# Patient Record
Sex: MALE | Race: WHITE | Employment: OTHER | URBAN - METROPOLITAN AREA
[De-identification: names, ages, dates, MRNs, and addresses within clinical notes are randomized per-mention and may not be internally consistent; named-entity substitution may affect disease eponyms.]

---

## 2017-01-01 ENCOUNTER — HOSPITAL ENCOUNTER (OUTPATIENT)
Dept: GENERAL RADIOLOGY | Age: 77
Discharge: HOME | End: 2017-12-22
Payer: MEDICARE

## 2017-01-01 DIAGNOSIS — R06.02 SHORTNESS OF BREATH: ICD-10-CM

## 2017-01-01 PROCEDURE — 71020 XR CHEST PA LAT: CPT

## 2017-01-06 PROBLEM — Z86.73 HISTORY OF TIA (TRANSIENT ISCHEMIC ATTACK): Status: ACTIVE | Noted: 2017-01-06

## 2017-01-10 ENCOUNTER — HOSPITAL ENCOUNTER (OUTPATIENT)
Dept: GENERAL RADIOLOGY | Age: 77
Discharge: HOME | End: 2017-01-10
Attending: INTERNAL MEDICINE
Payer: MEDICARE

## 2017-01-10 DIAGNOSIS — R05.9 COUGH: ICD-10-CM

## 2017-01-10 PROCEDURE — 71020 XR CHEST PA LAT: CPT

## 2017-01-26 PROBLEM — R05.9 COUGH: Status: ACTIVE | Noted: 2017-01-26

## 2017-04-07 PROBLEM — R06.02 SHORTNESS OF BREATH: Status: ACTIVE | Noted: 2017-04-07

## 2017-04-20 NOTE — PROGRESS NOTES
Patient pre-assessment complete for [facility] POSS WITH Dr Theopolis Goodpasture scheduled for 17 at 11am, arrival time 9am. Patient verified using . Patient instructed to bring all home medications in labeled bottles on the day of procedure. NPO status reinforced. Patient informed to take a full dose aspirin 325mg  or 81 mg x 4 on the day of procedure. Patient instructed to HOLD eliquis (last dose 17) & lasix the am of procedure. Instructed they can take all other medications excluding vitamins & supplements. Patient verbalizes understanding of all instructions & denies any questions at this time.

## 2017-04-21 ENCOUNTER — HOSPITAL ENCOUNTER (OUTPATIENT)
Dept: CARDIAC CATH/INVASIVE PROCEDURES | Age: 77
Setting detail: OBSERVATION
Discharge: HOME | End: 2017-04-22
Attending: INTERNAL MEDICINE | Admitting: INTERNAL MEDICINE
Payer: MEDICARE

## 2017-04-21 LAB
ACT BLD: 199 SECS (ref 70–128)
ANION GAP BLD CALC-SCNC: 10 MMOL/L (ref 7–16)
BUN SERPL-MCNC: 70 MG/DL (ref 8–23)
CALCIUM SERPL-MCNC: 9.6 MG/DL (ref 8.3–10.4)
CHLORIDE SERPL-SCNC: 107 MMOL/L (ref 98–107)
CO2 SERPL-SCNC: 24 MMOL/L (ref 21–32)
CREAT SERPL-MCNC: 12.2 MG/DL (ref 0.8–1.5)
ERYTHROCYTE [DISTWIDTH] IN BLOOD BY AUTOMATED COUNT: 15.3 % (ref 11.9–14.6)
GLUCOSE SERPL-MCNC: 102 MG/DL (ref 65–100)
HCT VFR BLD AUTO: 33.6 % (ref 41.1–50.3)
HGB BLD-MCNC: 11 G/DL (ref 13.6–17.2)
INR PPP: 1 (ref 0.9–1.2)
MAGNESIUM SERPL-MCNC: 2.8 MG/DL (ref 1.8–2.4)
MCH RBC QN AUTO: 33.7 PG (ref 26.1–32.9)
MCHC RBC AUTO-ENTMCNC: 32.7 G/DL (ref 31.4–35)
MCV RBC AUTO: 103.1 FL (ref 79.6–97.8)
PLATELET # BLD AUTO: 196 K/UL (ref 150–450)
PMV BLD AUTO: 9.6 FL (ref 10.8–14.1)
POTASSIUM SERPL-SCNC: 5.1 MMOL/L (ref 3.5–5.1)
PROTHROMBIN TIME: 11.3 SEC (ref 9.6–12)
RBC # BLD AUTO: 3.26 M/UL (ref 4.23–5.67)
SODIUM SERPL-SCNC: 141 MMOL/L (ref 136–145)
WBC # BLD AUTO: 9.9 K/UL (ref 4.3–11.1)

## 2017-04-21 PROCEDURE — 74011000258 HC RX REV CODE- 258: Performed by: INTERNAL MEDICINE

## 2017-04-21 PROCEDURE — 74011000250 HC RX REV CODE- 250: Performed by: INTERNAL MEDICINE

## 2017-04-21 PROCEDURE — 85027 COMPLETE CBC AUTOMATED: CPT | Performed by: INTERNAL MEDICINE

## 2017-04-21 PROCEDURE — 83735 ASSAY OF MAGNESIUM: CPT | Performed by: INTERNAL MEDICINE

## 2017-04-21 PROCEDURE — C8929 TTE W OR WO FOL WCON,DOPPLER: HCPCS

## 2017-04-21 PROCEDURE — 93459 L HRT ART/GRFT ANGIO: CPT

## 2017-04-21 PROCEDURE — C1874 STENT, COATED/COV W/DEL SYS: HCPCS

## 2017-04-21 PROCEDURE — 85610 PROTHROMBIN TIME: CPT | Performed by: INTERNAL MEDICINE

## 2017-04-21 PROCEDURE — 74011250636 HC RX REV CODE- 250/636: Performed by: INTERNAL MEDICINE

## 2017-04-21 PROCEDURE — 77030005318 HC CATH ELECTRD PACE TMP BARD -C

## 2017-04-21 PROCEDURE — C1887 CATHETER, GUIDING: HCPCS

## 2017-04-21 PROCEDURE — 99152 MOD SED SAME PHYS/QHP 5/>YRS: CPT

## 2017-04-21 PROCEDURE — 93571 IV DOP VEL&/PRESS C FLO 1ST: CPT

## 2017-04-21 PROCEDURE — 77030004558 HC CATH ANGI DX SUPR TORQ CARD -A

## 2017-04-21 PROCEDURE — 74011250636 HC RX REV CODE- 250/636

## 2017-04-21 PROCEDURE — C1894 INTRO/SHEATH, NON-LASER: HCPCS

## 2017-04-21 PROCEDURE — C1724 CATH, TRANS ATHEREC,ROTATION: HCPCS

## 2017-04-21 PROCEDURE — 80048 BASIC METABOLIC PNL TOTAL CA: CPT | Performed by: INTERNAL MEDICINE

## 2017-04-21 PROCEDURE — 92978 ENDOLUMINL IVUS OCT C 1ST: CPT

## 2017-04-21 PROCEDURE — C1769 GUIDE WIRE: HCPCS

## 2017-04-21 PROCEDURE — 99153 MOD SED SAME PHYS/QHP EA: CPT

## 2017-04-21 PROCEDURE — 77030012468 HC VLV BLEEDBK CNTRL ABBT -B

## 2017-04-21 PROCEDURE — 74011250637 HC RX REV CODE- 250/637: Performed by: INTERNAL MEDICINE

## 2017-04-21 PROCEDURE — C1760 CLOSURE DEV, VASC: HCPCS

## 2017-04-21 PROCEDURE — C1725 CATH, TRANSLUMIN NON-LASER: HCPCS

## 2017-04-21 PROCEDURE — 77030004559 HC CATH ANGI DX SUPT CARD -B

## 2017-04-21 PROCEDURE — 92933 PRQ TRLML C ATHRC ST ANGIOP1: CPT

## 2017-04-21 PROCEDURE — C1753 CATH, INTRAVAS ULTRASOUND: HCPCS

## 2017-04-21 PROCEDURE — 74011636320 HC RX REV CODE- 636/320: Performed by: INTERNAL MEDICINE

## 2017-04-21 PROCEDURE — 99218 HC RM OBSERVATION: CPT

## 2017-04-21 PROCEDURE — 85347 COAGULATION TIME ACTIVATED: CPT

## 2017-04-21 RX ORDER — HEPARIN SODIUM 200 [USP'U]/100ML
25 INJECTION, SOLUTION INTRAVENOUS CONTINUOUS
Status: DISCONTINUED | OUTPATIENT
Start: 2017-04-21 | End: 2017-04-21

## 2017-04-21 RX ORDER — LOSARTAN POTASSIUM 50 MG/1
50 TABLET ORAL DAILY
Status: DISCONTINUED | OUTPATIENT
Start: 2017-04-22 | End: 2017-04-22 | Stop reason: HOSPADM

## 2017-04-21 RX ORDER — DIAZEPAM 5 MG/1
5 TABLET ORAL ONCE
Status: ACTIVE | OUTPATIENT
Start: 2017-04-21 | End: 2017-04-21

## 2017-04-21 RX ORDER — SEVELAMER CARBONATE 800 MG/1
800 TABLET, FILM COATED ORAL
Status: DISCONTINUED | OUTPATIENT
Start: 2017-04-21 | End: 2017-04-22 | Stop reason: HOSPADM

## 2017-04-21 RX ORDER — TAMSULOSIN HYDROCHLORIDE 0.4 MG/1
0.4 CAPSULE ORAL DAILY
Status: DISCONTINUED | OUTPATIENT
Start: 2017-04-22 | End: 2017-04-21

## 2017-04-21 RX ORDER — FUROSEMIDE 40 MG/1
80 TABLET ORAL 2 TIMES DAILY
Status: DISCONTINUED | OUTPATIENT
Start: 2017-04-21 | End: 2017-04-22 | Stop reason: HOSPADM

## 2017-04-21 RX ORDER — PANTOPRAZOLE SODIUM 40 MG/1
40 TABLET, DELAYED RELEASE ORAL
Status: DISCONTINUED | OUTPATIENT
Start: 2017-04-22 | End: 2017-04-22 | Stop reason: HOSPADM

## 2017-04-21 RX ORDER — HYDRALAZINE HYDROCHLORIDE 50 MG/1
50 TABLET, FILM COATED ORAL 2 TIMES DAILY
Status: DISCONTINUED | OUTPATIENT
Start: 2017-04-21 | End: 2017-04-22 | Stop reason: HOSPADM

## 2017-04-21 RX ORDER — CINACALCET 30 MG/1
120 TABLET, FILM COATED ORAL
Status: DISCONTINUED | OUTPATIENT
Start: 2017-04-21 | End: 2017-04-22 | Stop reason: HOSPADM

## 2017-04-21 RX ORDER — HEPARIN SODIUM 10000 [USP'U]/ML
1000-9000 INJECTION, SOLUTION INTRAVENOUS; SUBCUTANEOUS
Status: DISCONTINUED | OUTPATIENT
Start: 2017-04-21 | End: 2017-04-21

## 2017-04-21 RX ORDER — CLOPIDOGREL BISULFATE 75 MG/1
75 TABLET ORAL DAILY
Status: DISCONTINUED | OUTPATIENT
Start: 2017-04-22 | End: 2017-04-22 | Stop reason: HOSPADM

## 2017-04-21 RX ORDER — POTASSIUM CHLORIDE 20 MEQ/1
20 TABLET, EXTENDED RELEASE ORAL DAILY
Status: DISCONTINUED | OUTPATIENT
Start: 2017-04-22 | End: 2017-04-22

## 2017-04-21 RX ORDER — FINASTERIDE 5 MG/1
5 TABLET, FILM COATED ORAL DAILY
Status: DISCONTINUED | OUTPATIENT
Start: 2017-04-22 | End: 2017-04-22 | Stop reason: HOSPADM

## 2017-04-21 RX ORDER — DOCUSATE SODIUM 100 MG/1
100 CAPSULE, LIQUID FILLED ORAL DAILY
Status: DISCONTINUED | OUTPATIENT
Start: 2017-04-22 | End: 2017-04-22 | Stop reason: HOSPADM

## 2017-04-21 RX ORDER — GUAIFENESIN 100 MG/5ML
81 LIQUID (ML) ORAL DAILY
Status: DISCONTINUED | OUTPATIENT
Start: 2017-04-22 | End: 2017-04-22 | Stop reason: HOSPADM

## 2017-04-21 RX ORDER — DICYCLOMINE HYDROCHLORIDE 10 MG/1
10 CAPSULE ORAL
Status: DISCONTINUED | OUTPATIENT
Start: 2017-04-21 | End: 2017-04-22 | Stop reason: HOSPADM

## 2017-04-21 RX ORDER — MIDAZOLAM HYDROCHLORIDE 1 MG/ML
1-6 INJECTION, SOLUTION INTRAMUSCULAR; INTRAVENOUS
Status: DISCONTINUED | OUTPATIENT
Start: 2017-04-21 | End: 2017-04-21

## 2017-04-21 RX ORDER — MAG HYDROX/ALUMINUM HYD/SIMETH 200-200-20
30 SUSPENSION, ORAL (FINAL DOSE FORM) ORAL ONCE
Status: COMPLETED | OUTPATIENT
Start: 2017-04-21 | End: 2017-04-21

## 2017-04-21 RX ORDER — AMLODIPINE BESYLATE 5 MG/1
5 TABLET ORAL DAILY
Status: DISCONTINUED | OUTPATIENT
Start: 2017-04-22 | End: 2017-04-22 | Stop reason: HOSPADM

## 2017-04-21 RX ORDER — SODIUM CHLORIDE 9 MG/ML
75 INJECTION, SOLUTION INTRAVENOUS CONTINUOUS
Status: DISCONTINUED | OUTPATIENT
Start: 2017-04-21 | End: 2017-04-21

## 2017-04-21 RX ORDER — CLOPIDOGREL 300 MG/1
600 TABLET, FILM COATED ORAL ONCE
Status: COMPLETED | OUTPATIENT
Start: 2017-04-21 | End: 2017-04-21

## 2017-04-21 RX ORDER — PREDNISONE 10 MG/1
10 TABLET ORAL
Status: DISCONTINUED | OUTPATIENT
Start: 2017-04-22 | End: 2017-04-22 | Stop reason: HOSPADM

## 2017-04-21 RX ORDER — LIDOCAINE HYDROCHLORIDE 20 MG/ML
1-20 INJECTION, SOLUTION INFILTRATION; PERINEURAL ONCE
Status: COMPLETED | OUTPATIENT
Start: 2017-04-21 | End: 2017-04-21

## 2017-04-21 RX ORDER — GUAIFENESIN 100 MG/5ML
324 LIQUID (ML) ORAL ONCE
Status: DISCONTINUED | OUTPATIENT
Start: 2017-04-21 | End: 2017-04-21

## 2017-04-21 RX ORDER — ALLOPURINOL 100 MG/1
100 TABLET ORAL DAILY
Status: DISCONTINUED | OUTPATIENT
Start: 2017-04-22 | End: 2017-04-22 | Stop reason: HOSPADM

## 2017-04-21 RX ORDER — CARVEDILOL 25 MG/1
25 TABLET ORAL 2 TIMES DAILY WITH MEALS
Status: DISCONTINUED | OUTPATIENT
Start: 2017-04-21 | End: 2017-04-22 | Stop reason: HOSPADM

## 2017-04-21 RX ORDER — FENTANYL CITRATE 50 UG/ML
25-100 INJECTION, SOLUTION INTRAMUSCULAR; INTRAVENOUS
Status: DISCONTINUED | OUTPATIENT
Start: 2017-04-21 | End: 2017-04-21

## 2017-04-21 RX ORDER — TAMSULOSIN HYDROCHLORIDE 0.4 MG/1
0.4 CAPSULE ORAL EVERY EVENING
Status: DISCONTINUED | OUTPATIENT
Start: 2017-04-21 | End: 2017-04-22 | Stop reason: HOSPADM

## 2017-04-21 RX ADMIN — HEPARIN SODIUM 25 ML/HR: 200 INJECTION, SOLUTION INTRAVENOUS at 14:20

## 2017-04-21 RX ADMIN — SODIUM CHLORIDE 75 ML/HR: 900 INJECTION, SOLUTION INTRAVENOUS at 10:00

## 2017-04-21 RX ADMIN — APIXABAN 2.5 MG: 2.5 TABLET, FILM COATED ORAL at 20:50

## 2017-04-21 RX ADMIN — HEPARIN SODIUM 2000 UNITS: 10000 INJECTION, SOLUTION INTRAVENOUS; SUBCUTANEOUS at 13:40

## 2017-04-21 RX ADMIN — IOPAMIDOL 290 ML: 755 INJECTION, SOLUTION INTRAVENOUS at 14:51

## 2017-04-21 RX ADMIN — PERFLUTREN 1 ML: 6.52 INJECTION, SUSPENSION INTRAVENOUS at 11:00

## 2017-04-21 RX ADMIN — HEPARIN SODIUM 5000 UNITS: 10000 INJECTION, SOLUTION INTRAVENOUS; SUBCUTANEOUS at 13:30

## 2017-04-21 RX ADMIN — HEPARIN SODIUM 25 ML/HR: 200 INJECTION, SOLUTION INTRAVENOUS at 12:55

## 2017-04-21 RX ADMIN — LIDOCAINE HYDROCHLORIDE 200 MG: 20 INJECTION, SOLUTION INFILTRATION; PERINEURAL at 13:09

## 2017-04-21 RX ADMIN — TAMSULOSIN HYDROCHLORIDE 0.4 MG: 0.4 CAPSULE ORAL at 20:50

## 2017-04-21 RX ADMIN — ALUMINUM HYDROXIDE, MAGNESIUM HYDROXIDE, AND SIMETHICONE 30 ML: 200; 200; 20 SUSPENSION ORAL at 14:48

## 2017-04-21 RX ADMIN — CINACALCET HYDROCHLORIDE 120 MG: 30 TABLET, COATED ORAL at 22:07

## 2017-04-21 RX ADMIN — CARVEDILOL 25 MG: 25 TABLET, FILM COATED ORAL at 18:34

## 2017-04-21 RX ADMIN — CLOPIDOGREL BISULFATE 600 MG: 300 TABLET, FILM COATED ORAL at 14:48

## 2017-04-21 RX ADMIN — FUROSEMIDE 80 MG: 40 TABLET ORAL at 18:34

## 2017-04-21 RX ADMIN — HYDRALAZINE HYDROCHLORIDE 50 MG: 50 TABLET, FILM COATED ORAL at 18:34

## 2017-04-21 RX ADMIN — MIDAZOLAM HYDROCHLORIDE 2 MG: 1 INJECTION, SOLUTION INTRAMUSCULAR; INTRAVENOUS at 13:04

## 2017-04-21 NOTE — PROCEDURES
Ashlynandrea Augustin 44       Name:  Elly Antunez   MR#:  [id number]   :  1940   Account #:  [de-identified]   Date of Adm:  2017       DATE OF PROCEDURE: 2017    PROCEDURES PERFORMED:   1. Cardiac catheterization. 2. Percutaneous coronary intervention to the right coronary   artery. HISTORY: This is a 26-year-old gentleman with a history of   coronary artery disease. He has had prior remote CABG and PCI. He is having problems with worsening angina pectoris in spite of   medical therapy. He also has chronic renal failure and is on   home peritoneal dialysis. A cardiac catheterization with   possible angioplasty is recommended. PROCEDURE: Via the right femoral artery, left heart   catheterization with left ventriculography, coronary   angiography, and graft angiography is carried out with a 6-  [place] multipurpose, #4 left Juan Miguel, #4 right Juan Miguel,   multipurpose and IM catheters. After angiography, the right   coronary artery was further assessed with a pressure wire. At   rest from the aorta to the proximal segment, the right coronary   artery has an FFR of 0.75. With angioplasty, the guide catheter was a 6-Kiswahili JR4 with   side holes. A 5-Kiswahili balloon tipped pacer was placed to the   right ventricular apex via right common femoral vein for a   rotational atherectomy. Rotational atherectomy was then   performed of a Roto floppy wire with a 1.5 kwasi. High pressure   balloon angioplasty of the ostium and proximal segment was then   performed with a 3.0 mm balloon. Over a wiggle wire, a 38 mm   long Xience stent was deployed from the ostium through the   proximal segment on a 3.5 mm balloon. Avoiding the distal edge,   the stent was then postdilated at high pressure of 4.0 mm. Repeat IVUS and angiography showed an excellent result. Heparin   was the anticoagulant. He was loaded with Plavix 600 mg post-  procedure.  At the end of the procedure, the sheaths were removed   and Perclose was deployed both at the femoral artery and vein   with good hemostasis. FINDINGS: The central aortic pressure is 128/70 mmHg. The left   ventricular end-diastolic pressure is 16 mmHg. There is no   gradient on pullback across the aortic valve. Left ventriculography reveals normal left ventricular size. There is irregularity of the rhythm due to his atrial   fibrillation. The ejection fraction appears to be normal to   mildly reduced on the order of 50%. Coronary angiography reveals a short left main with disease. It   divides into an LAD and left circumflex. The LAD is atherosclerotic and calcified in its proximal   segment. It gives rise to a septal . The middle and   distal segments filled by antegrade flow and by flow via the   LIMA graft. The major diagonal branch of the LAD is diffusely   diseased with no severe obstruction. The left circumflex is occluded at its origin and fills via its   saphenous vein graft. The right coronary artery is moderately narrowed along its   course in the proximal segment. There appears to be an old   stent. There appears to be some restenosis. The middle and   distal portions of the vessel show atherosclerotic disease and   calcification but no severe obstruction. The saphenous vein graft to the LAD is patent and normal with   good runoff into the obtuse marginal branch which is a large   trifurcateed vessel with retrograde filling up to the left main. The LIMA graft to the LAD is patent and normal with competitive   runoff into the distal LAD segment. Post angioplasty and stenting, the right coronary artery is now   widely patent. The stent is deployed right at the ostium. Repeat   angiography and IVUS showed a very good result. IMPRESSION:   1. Low normal left ventricular function. 2. Severe coronary disease as described. The left anterior   descending is narrowed.  The distal vessel fills via the left   internal mammary artery graft and antegrade flow. The left   circumflex is occluded. The distal vessel fills via normal   saphenous vein graft. The right coronary artery is a big vessel. It has a previously stented in its proximal segment. There is   severe obstructive disease at the ostium and in the proximal   segment. There are diffuse distal changes. 3. Successful percutaneous coronary intervention with stenting   of the right coronary is performed as described above. A good   result was obtained after deployment of a 38 mm long Xience   stent from the ostium of the right coronary artery through its   proximal segment postdilated to high pressure of 4.0 mm.   4. 2/2 patent grafts as described above.         MD ROS [place] / Glenn Ramos   D:  04/21/2017   15:04   T:  04/21/2017   15:32   Job #:  [id number]

## 2017-04-21 NOTE — ROUTINE PROCESS
TRANSFER - OUT REPORT:    Verbal report given to Rafael Jackson RN (name) on Racheal Mccauley  being transferred to [address] [place] [address] (unit) for routine progression of care       Report consisted of patients Situation, Background, Assessment and   Recommendations(SBAR). Information from the following report(s) Procedure Summary, MAR and Recent Results was reviewed with the receiving nurse. Lines:   Peripheral IV 04/21/17 Right Arm (Active)        Opportunity for questions and clarification was provided.       Patient transported with:   [facility] w/ Dr Villalpando Gloss  1 stent to RCA  Perclose to RFA, Perclose to RFV; sites w/o oozing or hematoma  Heparin 7000 units IV  Versed 2 mg IV  Plavix 600 mg PO  Mylanta 30 mls PO

## 2017-04-21 NOTE — PROCEDURES
Brief Cardiac Procedure Note    Patient: Sandra Bolden MRN: [id number]  SSN: [id number]    YOB: 1940  Age: 68 y.o. Sex: male      Date of Procedure: 4/21/2017     Pre-procedure Diagnosis: Chest pain CCS Class III    Post-procedure Diagnosis: Coronary Artery Disease    Procedure: Left Heart Catheterization with Percutaneous Coronary Intervention    Brief Description of Procedure: via rfa and fv    Performed By: April Varghese MD     Assistants:     Anesthesia: Moderate Sedation    Estimated Blood Loss: Less than 10 mL      Specimens: None    Implants: None    Findings:   Ef 55%  Lm ok  Lad severe prox, distal fills via antegrade flow and lima graft  lcx 100%  Distal vessel fills via svg  rca w/ mod angio disease but 0.75 ffr at rest  [facility] nml  svg nml  Pci;  0% ca after 38 mm Xience from ostium thru prox segment  + heparin  + ivus  + ptcra  + perclose fa and fv    Complications: None    Recommendations: Continue medical therapy.     Signed By: April Varghese MD     April 21, 2017

## 2017-04-21 NOTE — PROGRESS NOTES
TRANSFER - IN REPORT:    Verbal report received from Alfredo Grant RN(name) on [employer]  being received from cath lab(unit) for routine progression of care      Report consisted of patients Situation, Background, Assessment and   Recommendations(SBAR). Information from the following report(s) Procedure Summary was reviewed with the receiving nurse. Opportunity for questions and clarification was provided. Assessment completed upon patients arrival to unit and care assumed.

## 2017-04-21 NOTE — PROGRESS NOTES
Bedside and Verbal shift change report given to Raegan Burr RN (oncoming nurse) by self (offgoing nurse). Report included the following information SBAR, Kardex, MAR and Recent Results. Right groin site assessed and WDL.

## 2017-04-21 NOTE — IP AVS SNAPSHOT
Current Discharge Medication List  
  
START taking these medications Dose & Instructions Dispensing Information Comments Morning Noon Evening Bedtime  
 aspirin 81 mg chewable tablet Your last dose was: Your next dose is:    
   
   
 Dose:  81 mg Take 1 Tab by mouth daily. Refills:  0  
     
   
   
   
  
 clopidogrel 75 mg Tab Commonly known as:  PLAVIX Your last dose was: Your next dose is:    
   
   
 Dose:  75 mg Take 1 Tab by mouth daily. Quantity:  30 Tab Refills:  11 CONTINUE these medications which have CHANGED Dose & Instructions Dispensing Information Comments Morning Noon Evening Bedtime  
 azelastine 137 mcg (0.1 %) nasal spray Commonly known as:  ASTELIN What changed:   
- when to take this 
- reasons to take this 
- additional instructions Your last dose was: Your next dose is:    
   
   
 Dose:  1 Spray 1 [place] by Both Nostrils route two (2) times a day. Use in each nostril as directed Quantity:  1 Bottle Refills:  5 CONTINUE these medications which have NOT CHANGED Dose & Instructions Dispensing Information Comments Morning Noon Evening Bedtime  
 allopurinol 100 mg tablet Commonly known as:  Giuliano Ceron Your last dose was: Your next dose is:    
   
   
 Dose:  100 mg Take 100 mg by mouth daily. Indications: am  
 Refills:  0  
     
   
   
   
  
 amLODIPine 5 mg tablet Commonly known as:  Ly Snow Your last dose was: Your next dose is:    
   
   
 Dose:  5 mg Take 5 mg by mouth daily. pm  
 Refills:  0  
     
   
   
   
  
 apixaban 2.5 mg tablet Commonly known as:  Esvin Myles Your last dose was: Your next dose is:    
   
   
 Dose:  2.5 mg Take 1 Tab by mouth two (2) times a day. Quantity:  60 Tab Refills:  11  
     
   
   
   
  
 carvedilol 25 mg tablet Commonly known as:  Harriet Gatica Your last dose was: Your next dose is:    
   
   
 Dose:  25 mg Take 25 mg by mouth two (2) times daily (with meals). Indications: 2-3 times daily with meals Refills:  0  
     
   
   
   
  
 dicyclomine 10 mg capsule Commonly known as:  BENTYL Your last dose was: Your next dose is:    
   
   
 Dose:  10 mg Take 10 mg by mouth daily as needed. Refills:  0  
     
   
   
   
  
 epoetin alcides 10,000 unit/mL injection Commonly known as:  EPOGEN;PROCRIT Your last dose was: Your next dose is:    
   
   
 by SubCUTAneous route as needed. Currently taking abt twice a month at [facility] Refills:  0  
     
   
   
   
  
 finasteride 5 mg tablet Commonly known as:  PROSCAR Your last dose was: Your next dose is:    
   
   
 Dose:  5 mg Take 5 mg by mouth daily. Refills:  0  
     
   
   
   
  
 furosemide 80 mg tablet Commonly known as:  LASIX Your last dose was: Your next dose is:    
   
   
 Dose:  80 mg Take 80 mg by mouth two (2) times a day. Indications: am  
 Refills:  0  
     
   
   
   
  
 hydrALAZINE 50 mg tablet Commonly known as:  APRESOLINE Your last dose was: Your next dose is:    
   
   
 Dose:  50 mg Take 50 mg by mouth two (2) times a day. Refills:  0  
     
   
   
   
  
 isosorbide dinitrate 30 mg tablet Commonly known as:  ISORDIL Your last dose was: Your next dose is:    
   
   
 Dose:  30 mg Take 30 mg by mouth daily. Refills:  0 KRILL OIL PO Your last dose was: Your next dose is: Take  by mouth daily. Refills:  0  
     
   
   
   
  
 losartan 50 mg tablet Commonly known as:  COZAAR Your last dose was: Your next dose is: TAKE 1 TABLET BY MOUTH DAILY Quantity:  90 Tab Refills:  3  
 **Patient requests 90 days supply**  
    
   
   
   
  
 Magnesium Oxide 500 mg Cap Your last dose was: Your next dose is: Take  by mouth daily. Refills:  0  
     
   
   
   
  
 multivitamin tablet Commonly known as:  ONE A DAY Your last dose was: Your next dose is:    
   
   
 Dose:  1 Tab Take 1 Tab by mouth daily. Special Complex for dialysis patients Refills:  0  
     
   
   
   
  
 nitroglycerin 0.4 mg SL tablet Commonly known as:  NITROSTAT Your last dose was: Your next dose is: ONE TABLET UNDER TONGUE AS NEEDED FOR CHEST PAIN EVERY 5 MINUTES Quantity:  25 Tab Refills:  6 Omeprazole Magnesium 20 mg Cpdr  
   
Your last dose was: Your next dose is: Take  by mouth daily. Refills:  0  
     
   
   
   
  
 predniSONE 10 mg tablet Commonly known as:  Hoang Primer Your last dose was: Your next dose is: Take  by mouth as needed. Refills:  0 PROBIOTIC 4X 10-15 mg Tbec Generic drug:  B.infantis-B.ani-B.long-B.bifi Your last dose was: Your next dose is: Take  by mouth daily. Refills:  0  
     
   
   
   
  
 RENVELA 800 mg Tab tab Generic drug:  sevelamer carbonate Your last dose was: Your next dose is:    
   
   
 Dose:  800 mg Take 800 mg by mouth three (3) times daily (with meals). Indications: 4 with meals, 2 with snacks Refills:  0 SENSIPAR 60 mg Tab Generic drug:  cinacalcet Your last dose was: Your next dose is: Take  by mouth. Two tablets QHS Refills:  0 STOOL SOFTENER PO Your last dose was: Your next dose is: Take  by mouth daily. Refills:  0  
     
   
   
   
  
 tamsulosin 0.4 mg capsule Commonly known as:  FLOMAX Your last dose was: Your next dose is:    
   
   
 TK 1 C PO Q NIGHT Refills:  4 STOP taking these medications   
 potassium chloride 20 mEq tablet Commonly known as:  K-DUR, KLOR-CON Where to Get Your Medications Information on where to get these meds will be given to you by the nurse or doctor. ! Ask your nurse or doctor about these medications  
  clopidogrel 75 mg Tab

## 2017-04-21 NOTE — IP AVS SNAPSHOT
[address] 
[phone] Patient: Bernardo Willard MRN: [id number] BLW:[birth year] You are allergic to the following Allergen Reactions Nka (No Known Allergies) Unknown (comments) Recent Documentation Height Weight BMI Smoking Status 1.753 m 87.1 kg 28.37 kg/m2 Former Smoker Emergency Contacts Name Discharge Info Relation Home Work Mobile Jovana Perales DISCHARGE CAREGIVER [3] Spouse [3] [phone] About your hospitalization You were admitted on:  April 21, 2017 You last received care in the:  [facility] 3 TELEMETRY You were discharged on:  April 22, 2017 Unit phone number:  [phone] Why you were hospitalized Your primary diagnosis was:  Not on File Providers Seen During Your Hospitalizations Provider Role Specialty Primary office phone Jose Carlos Kwon MD Attending Provider Cardiology [phone] Your Primary Care Physician (PCP) Primary Care Physician Office Phone Office Fax Tenisha Kaba [phone] Follow-up Information Follow up With Details Comments Contact Info Jose Carlos Kwon MD  2 weeks--office will call on monday with appt [address] [facility] [address] 
[phone] Marilia Pedraza MD Call As needed, For Hospital follow-up [address] [facility] [address] [phone] Your Appointments Monday May 15, 2017  1:00 PM EDT Office Visit with Jose Carlos Kown MD  
[facility] Cardiology ([address] [facility]) [address] [place]  
[address] [facility]  
[phone] Thursday June 01, 2017  1:30 PM EDT Annual Wellness Exam with VALENTÍN Acevedo [facility] ([facility]) [address] [place] [address]  
[phone]  Thursday June 01, 2017  2:00 PM EDT  
 Annual Wellness Exam with Susanne Montes De Oca MD  
[address] [facility] [address] (16[address] [facility]") [address] [facility] [address]  
[phone] Current Discharge Medication List  
  
START taking these medications Dose & Instructions Dispensing Information Comments Morning Noon Evening Bedtime  
 aspirin 81 mg chewable tablet Your last dose was: Your next dose is:    
   
   
 Dose:  81 mg Take 1 Tab by mouth daily. Refills:  0  
     
   
   
   
  
 clopidogrel 75 mg Tab Commonly known as:  PLAVIX Your last dose was: Your next dose is:    
   
   
 Dose:  75 mg Take 1 Tab by mouth daily. Quantity:  30 Tab Refills:  11 CONTINUE these medications which have CHANGED Dose & Instructions Dispensing Information Comments Morning Noon Evening Bedtime  
 azelastine 137 mcg (0.1 %) nasal spray Commonly known as:  ASTELIN What changed:   
- when to take this 
- reasons to take this 
- additional instructions Your last dose was: Your next dose is:    
   
   
 Dose:  1 Spray 1 Chelsea by Both Nostrils route two (2) times a day. Use in each nostril as directed Quantity:  1 Bottle Refills:  5 CONTINUE these medications which have NOT CHANGED Dose & Instructions Dispensing Information Comments Morning Noon Evening Bedtime  
 allopurinol 100 mg tablet Commonly known as:  Nicho See Your last dose was: Your next dose is:    
   
   
 Dose:  100 mg Take 100 mg by mouth daily. Indications: am  
 Refills:  0  
     
   
   
   
  
 amLODIPine 5 mg tablet Commonly known as:  David Hodgson Your last dose was: Your next dose is:    
   
   
 Dose:  5 mg Take 5 mg by mouth daily. pm  
 Refills:  0  
     
   
   
   
  
 apixaban 2.5 mg tablet Commonly known as:  Genna Snyder Your last dose was: Your next dose is:    
   
   
 Dose:  2.5 mg Take 1 Tab by mouth two (2) times a day. Quantity:  60 Tab Refills:  11  
     
   
   
   
  
 carvedilol 25 mg tablet Commonly known as:  Larryabel Jenkins Your last dose was: Your next dose is:    
   
   
 Dose:  25 mg Take 25 mg by mouth two (2) times daily (with meals). Indications: 2-3 times daily with meals Refills:  0  
     
   
   
   
  
 dicyclomine 10 mg capsule Commonly known as:  BENTYL Your last dose was: Your next dose is:    
   
   
 Dose:  10 mg Take 10 mg by mouth daily as needed. Refills:  0  
     
   
   
   
  
 epoetin alcides 10,000 unit/mL injection Commonly known as:  EPOGEN;PROCRIT Your last dose was: Your next dose is:    
   
   
 by SubCUTAneous route as needed. Currently taking abt twice a month at [facility] Refills:  0  
     
   
   
   
  
 finasteride 5 mg tablet Commonly known as:  PROSCAR Your last dose was: Your next dose is:    
   
   
 Dose:  5 mg Take 5 mg by mouth daily. Refills:  0  
     
   
   
   
  
 furosemide 80 mg tablet Commonly known as:  LASIX Your last dose was: Your next dose is:    
   
   
 Dose:  80 mg Take 80 mg by mouth two (2) times a day. Indications: am  
 Refills:  0  
     
   
   
   
  
 hydrALAZINE 50 mg tablet Commonly known as:  APRESOLINE Your last dose was: Your next dose is:    
   
   
 Dose:  50 mg Take 50 mg by mouth two (2) times a day. Refills:  0  
     
   
   
   
  
 isosorbide dinitrate 30 mg tablet Commonly known as:  ISORDIL Your last dose was: Your next dose is:    
   
   
 Dose:  30 mg Take 30 mg by mouth daily. Refills:  0 KRILL OIL PO Your last dose was: Your next dose is: Take  by mouth daily. Refills:  0  
     
   
   
   
  
 losartan 50 mg tablet Commonly known as:  COZAAR  
   
 Your last dose was: Your next dose is: TAKE 1 TABLET BY MOUTH DAILY Quantity:  90 Tab Refills:  3  
 **Patient requests 90 days supply** Magnesium Oxide 500 mg Cap Your last dose was: Your next dose is: Take  by mouth daily. Refills:  0  
     
   
   
   
  
 multivitamin tablet Commonly known as:  ONE A DAY Your last dose was: Your next dose is:    
   
   
 Dose:  1 Tab Take 1 Tab by mouth daily. Special Complex for dialysis patients Refills:  0  
     
   
   
   
  
 nitroglycerin 0.4 mg SL tablet Commonly known as:  NITROSTAT Your last dose was: Your next dose is: ONE TABLET UNDER TONGUE AS NEEDED FOR CHEST PAIN EVERY 5 MINUTES Quantity:  25 Tab Refills:  6 Omeprazole Magnesium 20 mg Cpdr  
   
Your last dose was: Your next dose is: Take  by mouth daily. Refills:  0  
     
   
   
   
  
 predniSONE 10 mg tablet Commonly known as:  Liza Rodríguez Your last dose was: Your next dose is: Take  by mouth as needed. Refills:  0 PROBIOTIC 4X 10-15 mg Tbec Generic drug:  B.infantis-B.ani-B.long-B.bifi Your last dose was: Your next dose is: Take  by mouth daily. Refills:  0  
     
   
   
   
  
 RENVELA 800 mg Tab tab Generic drug:  sevelamer carbonate Your last dose was: Your next dose is:    
   
   
 Dose:  800 mg Take 800 mg by mouth three (3) times daily (with meals). Indications: 4 with meals, 2 with snacks Refills:  0 SENSIPAR 60 mg Tab Generic drug:  cinacalcet Your last dose was: Your next dose is: Take  by mouth. Two tablets QHS Refills:  0 STOOL SOFTENER PO Your last dose was: Your next dose is: Take  by mouth daily. Refills:  0  
     
   
   
   
  
 tamsulosin 0.4 mg capsule Commonly known as:  FLOMAX Your last dose was: Your next dose is:    
   
   
 TK 1 C PO Q NIGHT Refills:  4 STOP taking these medications   
 potassium chloride 20 mEq tablet Commonly known as:  K-DUR, KLOR-CON Where to Get Your Medications Information on where to get these meds will be given to you by the nurse or doctor. ! Ask your nurse or doctor about these medications  
  clopidogrel 75 mg Tab Discharge Instructions Chest Pain: Care Instructions Your Care Instructions There are many things that can cause chest pain. Some are not serious and will get better on their own in a few days. But some kinds of chest pain need more testing and treatment. Your doctor may have recommended a follow-up visit in the next 8 to 12 hours. If you are not getting better, you may need more tests or treatment. Even though your doctor has released you, you still need to watch for any problems. The doctor carefully checked you, but sometimes problems can develop later. If you have new symptoms or if your symptoms do not get better, get medical care right away. If you have worse or different chest pain or pressure that lasts more than 5 minutes or you passed out (lost consciousness), call 911 or seek other emergency help right away. A medical visit is only one step in your treatment. Even if you feel better, you still need to do what your doctor recommends, such as going to all suggested follow-up appointments and taking medicines exactly as directed. This will help you recover and help prevent future problems. How can you care for yourself at home? · Rest until you feel better. · Take your medicine exactly as prescribed. Call your doctor if you think you are having a problem with your medicine. · Do not drive after taking a prescription pain medicine. When should you call for help? Call 911 if: 
· You passed out (lost consciousness). · You have severe difficulty breathing. · You have symptoms of a heart attack. These may include: ¨ Chest pain or pressure, or a strange feeling in your chest. 
¨ Sweating. ¨ Shortness of breath. ¨ Nausea or vomiting. ¨ Pain, pressure, or a strange feeling in your back, neck, jaw, or upper belly or in one or both shoulders or arms. ¨ Lightheadedness or sudden weakness. ¨ A fast or irregular heartbeat. After you call 911, the  may tell you to chew 1 adult-strength or 2 to 4 low-dose aspirin. Wait for an ambulance. Do not try to drive yourself. Call your doctor today if: 
· You have any trouble breathing. · Your chest pain gets worse. · You are dizzy or lightheaded, or you feel like you may faint. · You are not getting better as expected. · You are having new or different chest pain. Where can you learn more? Go to http://suly-lauren.info/. Enter A120 in the search box to learn more about \"Chest Pain: Care Instructions. \" Current as of: May 27, 2016 Content Version: 11.2 © 9322-5590 [employer]. Care instructions adapted under license by [employer] (which disclaims liability or warranty for this information). If you have questions about a medical condition or this instruction, always ask your healthcare professional. [place] [address] any warranty or liability for your use of this information. Percutaneous Coronary Intervention: What to Expect at [facility] Your Recovery Percutaneous coronary intervention (PCI) is the name for procedures that are used to open a narrowed or blocked coronary artery. The two most common PCI procedures are coronary angioplasty and coronary stent placement. Your groin or arm may have a bruise and feel sore for a day or two after a percutaneous coronary intervention (PCI).  You can do light activities around the house, but nothing strenuous for several days. This care sheet gives you a general idea about how long it will take for you to recover. But each person recovers at a different pace. Follow the steps below to get better as quickly as possible. How can you care for yourself at home? Activity · Do not do strenuous exercise and do not lift, pull, or push anything heavy until your doctor says it is okay. This may be for a day or two. You can walk around the house and do light activity, such as cooking. · You may shower 24 to 48 hours after the procedure, if your doctor okays it. Pat the incision dry. Do not take a bath for 1 week, or until your doctor tells you it is okay. · If the catheter was placed in your groin, try not to walk up stairs for the first couple of days. · If the catheter was placed in your arm near your wrist, do not bend your wrist deeply for the first couple of days. Be careful using your hand to get into and out of a chair or bed. · If your doctor recommends it, get more exercise. Walking is a good choice. Bit by bit, increase the amount you walk every day. Try for at least 30 minutes on most days of the week. Diet · Drink plenty of fluids to help your body flush out the dye. If you have kidney, heart, or liver disease and have to limit fluids, talk with your doctor before you increase the amount of fluids you drink. · Keep eating a heart-healthy diet that has lots of fruits, vegetables, and whole grains. If you have not been eating this way, talk to your doctor. You also may want to talk to a dietitian. This expert can help you to learn about healthy foods and plan meals. Medicines · Your doctor will tell you if and when you can restart your medicines. He or she will also give you instructions about taking any new medicines. · If you take blood thinners, such as warfarin (Coumadin), clopidogrel (Plavix), or aspirin, be sure to talk to your doctor.  He or she will tell you if and when to start taking those medicines again. Make sure that you understand exactly what your doctor wants you to do. · Your doctor will prescribe blood-thinning medicines. You will likely take aspirin plus another antiplatelet, such as clopidogrel (Plavix). It is very important that you take these medicines exactly as directed. These medicines help keep the coronary artery open and reduce your risk of a heart attack. · Call your doctor if you think you are having a problem with your medicine. Care of the catheter site · For 1 or 2 days, keep a bandage over the spot where the catheter was inserted. The bandage probably will fall off in this time. · Put ice or a cold pack on the area for 10 to 20 minutes at a time to help with soreness or swelling. Put a thin cloth between the ice and your skin. Follow-up care is a key part of your treatment and safety. Be sure to make and go to all appointments, and call your doctor if you are having problems. It's also a good idea to know your test results and keep a list of the medicines you take. When should you call for help? Call 911 anytime you think you may need emergency care. For example, call if: 
· You passed out (lost consciousness). · You have severe trouble breathing. · You have sudden chest pain and shortness of breath, or you cough up blood. · You have symptoms of a heart attack, such as: ¨ Chest pain or pressure. ¨ Sweating. ¨ Shortness of breath. ¨ Nausea or vomiting. ¨ Pain that spreads from the chest to the neck, jaw, or one or both shoulders or arms. ¨ Dizziness or lightheadedness. ¨ A fast or uneven pulse. After calling 911, chew 1 adult-strength aspirin. Wait for an ambulance. Do not try to drive yourself. · You have been diagnosed with angina, and you have angina symptoms that do not go away with rest or are not getting better within 5 minutes after you take one dose of nitroglycerin. Call your doctor now or seek immediate medical care if: 
· You are bleeding from the area where the catheter was put in your artery. · You have a fast-growing, painful lump at the catheter site. · You have signs of infection, such as: 
¨ Increased pain, swelling, warmth, or redness. ¨ Red streaks leading from the catheter site. ¨ Pus draining from the catheter site. ¨ A fever. · Your leg or arm looks blue or feels cold, numb, or tingly. Watch closely for changes in your health, and be sure to contact your doctor if you have any problems. Where can you learn more? Go to http://suly-lauren.info/. Enter S824 in the search box to learn more about \"Percutaneous Coronary Intervention: What to Expect at Home. \" Current as of: January 27, 2016 Content Version: 11.2 © 7707-8682 [employer]. Care instructions adapted under license by [employer] (which disclaims liability or warranty for this information). If you have questions about a medical condition or this instruction, always ask your healthcare professional. [address] any warranty or liability for your use of this information. Clopidogrel (By mouth) Clopidogrel (jbve-UAM-pf-grel) Helps prevent stroke, heart attack, and other heart problems. This medicine is a platelet inhibitor. Brand Name(s):Plavix There may be other brand names for this medicine. When This Medicine Should Not Be Used: This medicine is not right for everyone. Do not use it if you had an allergic reaction to clopidogrel, or have current bleeding problems, such as a bleeding stomach ulcer. How to Use This Medicine:  
Tablet · Your doctor will tell you how much medicine to use. Do not use more than directed. · This medicine should come with a Medication Guide. Ask your pharmacist for a copy if you do not have one. · Missed dose: Take a dose as soon as you remember.  If it is almost time for your next dose, wait until then and take a regular dose. Do not take extra medicine to make up for a missed dose. · Store the medicine in a closed container at room temperature, away from heat, moisture, and direct light. Drugs and Foods to Avoid: Ask your doctor or pharmacist before using any other medicine, including over-the-counter medicines, vitamins, and herbal products. · Some medicines can affect how clopidogrel works. Tell your doctor if you are using any of the following: ¨ Warfarin ¨ An NSAID medicine, such as celecoxib, diclofenac, ibuprofen, or naproxen ¨ Medicine to treat depression ¨ Stomach medicine, such as esomeprazole or omeprazole Warnings While Using This Medicine: · Tell your doctor if you are pregnant or breastfeeding, had a recent stroke, or have a history of bleeding problems. · This medicine can cause you to bleed and bruise more easily. Take precautions to avoid injury. Brush and floss your teeth gently, do not play rough sports, and be careful with sharp objects. Severe bleeding can be life-threatening. · This medicine may cause a rare but serious blood clotting condition called thrombotic thrombocytopenic purpura. · Make sure any doctor or dentist who treats you knows that you are using this medicine. Tell your doctor if you plan to have surgery or a dental procedure. · Do not stop using this medicine suddenly. Your doctor will need to slowly decrease your dose before you stop it completely. · Your doctor will check your progress and the effects of this medicine at regular visits. Keep all appointments. · Keep all medicine out of the reach of children. Never share your medicine with anyone. Possible Side Effects While Using This Medicine:  
Call your doctor right away if you notice any of these side effects: · Allergic reaction: Itching or hives, swelling in your face or hands, swelling or tingling in your mouth or throat, chest tightness, trouble breathing · Bloody or black, tarry stools · Nosebleeds · Pinpoint red or purple spots on your skin or in your mouth · Problems with vision, speech, or walking · Red or dark brown urine · Seizures · Severe stomach pain · Trouble breathing, tiredness, fast heartbeat, yellow skin or eyes · Unusual bleeding, bruising, or weakness · Vomiting of blood or vomit that looks like coffee grounds If you notice other side effects that you think are caused by this medicine, tell your doctor. Call your doctor for medical advice about side effects. You may report side effects to FDA at 1-417-BES-0855 © 2016 [address] is for End User's use only and may not be sold, redistributed or otherwise used for commercial purposes. The above information is an  only. It is not intended as medical advice for individual conditions or treatments. Talk to your doctor, nurse or pharmacist before following any medical regimen to see if it is safe and effective for you. Discharge Orders Procedure Order Date Status Priority Quantity Spec Type Associated Dx METABOLIC PANEL, BASIC 09/62/50 0806 Future Routine 1 Blood Comments:  BMP in 3 days (on Monday 4/24) Dx: Hyperkalemia "[employer] Announcement We are excited to announce that we are making your provider's discharge notes available to you in "[employer]. You will see these notes when they are completed and signed by the physician that discharged you from your recent hospital stay. If you have any questions or concerns about any information you see in "[employer] please call the Health Information Department where you were seen or reach out to your Primary Care Provider for more information about your plan of care. Introducing [facility] & HE[facility] SERVICES! Dear Jonelle Sheikh: Thank you for requesting a "[employer] account. Our records indicate that you already have an active "[employer] account.   You can access your account anytime at [email or web address] Did you know that you can access your hospital and ER discharge instructions at any time in upurskill? You can also review all of your test results from your hospital stay or ER visit. Additional Information If you have questions, please visit the Frequently Asked Questions section of the upurskill website at [email or web address] Remember, upurskill is NOT to be used for urgent needs. For medical emergencies, dial 911. Now available from your iPhone and Android! General Information Please provide this summary of care documentation to your next provider. Patient Signature:  ____________________________________________________________ Date:  ____________________________________________________________  
  
Jake Breath Provider Signature:  ____________________________________________________________ Date:  ____________________________________________________________

## 2017-04-21 NOTE — PROGRESS NOTES
Right groin site covered with sterile tegaderm, noted to be clean, dry, and intact without bleeding or hematoma. Patient instructed to keep right leg straight and still and head on pillow. Patient verbalizes understanding.

## 2017-04-21 NOTE — CONSULTS
Nephrology consult    Admission Date:  4/21/2017    Admission Diagnosis  chest pain  chest pain    History of Present Illness: This is a 67 yo WM with a history of ESRD on PD who dialyzes with [place] on CCPD. She presented with anginal symptoms and then had cath today that demonstrated multivessel disease. He had a RCA stent placed. He feels well now. Past Medical History:   Diagnosis Date    Anemia, unspecified  7/7/2016    Arrhythmia     IRREG.  HEART BEAT- pt denies a fib - found on cardiac note 5/3/13- pt states he feels flutter at times    Arthritis     ASCAD - artery bypass graft 7/7/2016    ASCAD - artery bypass graft 7/7/2016    CAD (coronary artery disease)     CABG 2007, stented 1995    Chronic kidney disease     STAGE 3 CKD, dialysis    Chronic prostatitis 11/25/2013    Diverticulitis     Diverticulosis 7/7/2016    GERD (gastroesophageal reflux disease)     controlled with NA Bicarb pt takes as renal pt    Glomerulonephritis 7/7/2016    GOUT, UNSPECIFIED 7/7/2016    Hypercholesteremia     Hypertension     Hypertrophy of prostate with urinary obstruction and other lower urinary tract symptoms (LUTS) 11/25/2013    Paroxysmal atrial fibrillation ([address] 7/7/2016    TIA (transient ischemic attack) 7/7/2016      Past Surgical History:   Procedure Laterality Date    ABDOMEN SURGERY PROC UNLISTED      perineal cath    CARDIAC SURG PROCEDURE UNLIST      CABG x 2 in 2007; PTCA WITH STENTS    CREAT AV FISTULA,AUTOGENOUS GRAFT      HX COLONOSCOPY  2011    HX CSF SHUNT      HX HERNIA REPAIR      bilateral    HX ORTHOPAEDIC      rt shoulder rotater cuff,lt knee    HX VASCULAR ACCESS  2010    L u arm    OTHER CELL      LEFT KNEE SURGERY    VASCULAR SURGERY PROCEDURE UNLIST      cabg x2      Current Facility-Administered Medications   Medication Dose Route Frequency    diazePAM (VALIUM) tablet 5 mg  5 mg Oral ONCE    [START ON 4/22/2017] allopurinol (ZYLOPRIM) tablet 100 mg  100 mg Oral DAILY    [START ON 4/22/2017] amLODIPine (NORVASC) tablet 5 mg  5 mg Oral DAILY    apixaban (ELIQUIS) tablet 2.5 mg  2.5 mg Oral BID    carvedilol (COREG) tablet 25 mg  25 mg Oral BID WITH MEALS    cinacalcet (SENSIPAR) tablet 120 mg  120 mg Oral QHS    dicyclomine (BENTYL) capsule 10 mg  10 mg Oral DAILY PRN    . PHARMACY TO SUBSTITUTE PER PROTOCOL    Per Protocol    [START ON 4/22/2017] finasteride (PROSCAR) tablet 5 mg  5 mg Oral DAILY    furosemide (LASIX) tablet 80 mg  80 mg Oral BID    hydrALAZINE (APRESOLINE) tablet 50 mg  50 mg Oral BID    [START ON 4/22/2017] isosorbide dinitrate (ISORDIL) tablet 30 mg  30 mg Oral DAILY    [START ON 4/22/2017] losartan (COZAAR) tablet 50 mg  50 mg Oral DAILY    . PHARMACY TO SUBSTITUTE PER PROTOCOL    Per Protocol    [START ON 4/22/2017] potassium chloride (K-DUR, KLOR-CON) SR tablet 20 mEq  20 mEq Oral DAILY    [START ON 4/22/2017] predniSONE (DELTASONE) tablet 10 mg  10 mg Oral DAILY WITH BREAKFAST    sevelamer carbonate (RENVELA) tab 800 mg  800 mg Oral TID WITH MEALS    [START ON 4/22/2017] tamsulosin (FLOMAX) capsule 0.4 mg  0.4 mg Oral DAILY    [START ON 4/22/2017] clopidogrel (PLAVIX) tablet 75 mg  75 mg Oral DAILY    [START ON 4/22/2017] aspirin chewable tablet 81 mg  81 mg Oral DAILY     Allergies   Allergen Reactions    Nka [No Known Allergies] Unknown (comments)      Social History   Substance Use Topics    Smoking status: Former Smoker     Quit date: 1/1/1980    Smokeless tobacco: Never Used      Comment: CIGAR DAILY FOR 10 YEARS    Alcohol use 0.0 oz/week      Comment: rare      Family History   Problem Relation Age of Onset    Heart Disease Father 59     MI    Heart Attack Father 72     MI    Stroke Mother     Hypertension Mother     Heart Disease Mother     Cancer Sister      stomach        Review of Systems  +SOB  No edema   No n/v/d  No abdominal pain    Objective:     Vitals:    04/21/17 1615 04/21/17 1630 04/21/17 1645 04/21/17 1705   BP: (!) 177/97 (!) 168/97 (!) 177/99 168/90   Pulse: (!) 101 (!) 103 (!) 104 85   Resp: 21 22 16 20   Temp:    97.2 °F (36.2 °C)   SpO2:    92%   Weight:       Height:         No intake or output data in the 24 hours ending 04/21/17 1724    Physical Exam  GEN :in no distress, alert and oriented  HEENT: anicteric sclerae, eomi. Oropharynx without lesions. Mucous membranes are moist.  Neck - supple without JVD, no thyromegaly. No lymphadenopathy. CV - regular rate and rhythm, no murmur, no rub  Lung - clear bilaterally, lungs expand symmetrically  Chest wall - normal appearance  Abd - soft, nontender, bowel sounds present, no hepatosplenomegaly  Ext - no clubbing, no cyanosis, no edema  Neurologic - nonfocal  Genitourinary - bladder nonpalpable  Skin - no rashes, no purpura, no ecchymoses  Psychiatric: Normal mood and affect. LUE AVF with thrill and bruit  PD catheter      Data Review:   Recent Labs      04/21/17   1004   WBC  9.9   HGB  11.0*   HCT  33.6*   PLT  196     Recent Labs      04/21/17   1004   NA  141   K  5.1   CL  107   CO2  24   BUN  70*   CREA  12.20*   GLU  102*   CA  9.6   MG  2.8*     No results for input(s): PH, PCO2, PO2, PCO2 in the last 72 hours. Problem List:     Patient Active Problem List    Diagnosis Date Noted    Shortness of breath 04/07/2017    Cough 01/26/2017    History of TIA (transient ischemic attack) 01/06/2017    End-stage renal disease on peritoneal dialysis ([facility] [address] 11/18/2016    Chronic atrial fibrillation ([facility] 07/19/2016    GOUT, UNSPECIFIED 07/07/2016    Anemia, unspecified  07/07/2016    Diverticulosis 07/07/2016    Benign non-nodular prostatic hyperplasia with lower urinary tract symptoms 11/25/2013    CAD (coronary artery disease) 03/28/2011    HTN (hypertension) 03/28/2011    Dyslipidemia 03/28/2011       Impression/Plan:  1. ESRD On CCPD: 9 hours. Dialyzed last night without difficulty. Volume and electrolytes are OK.  Will hold PD today and plan on PD tomorrow night at home or here if he is not discharged.   2. CAD s/p RCA stent

## 2017-04-21 NOTE — PROGRESS NOTES
TRANSFER - OUT REPORT:    Verbal report given to Love RN(name) on Racheal Mccauley  being transferred to tele(unit) for routine progression of care       Report consisted of patients Situation, Background, Assessment and   Recommendations(SBAR). Information from the following report(s) Procedure Summary was reviewed with the receiving nurse. Lines:   Peripheral IV 04/21/17 Right Arm (Active)        Opportunity for questions and clarification was provided.       Patient transported with:   Registered Nurse

## 2017-04-21 NOTE — PROGRESS NOTES
Bedside and Verbal shift change report given to Kobi Kaur RN (oncoming nurse) by Donna Garcia RN (offgoing nurse). Report included the following information SBAR, Kardex, MAR and Recent Results. Right groin - WDL post heart catheterization. Discussed bed rest restrictions and to call for assistance. Patients Monday- Sunday pill box - located at bedside. Locked up in patients room lock box for safety measures.

## 2017-04-21 NOTE — PROGRESS NOTES
Pt transported to room 309. Right groin assessed with Colonel Oconnor RN. No bleeding or hematoma noted.

## 2017-04-22 VITALS
OXYGEN SATURATION: 96 % | SYSTOLIC BLOOD PRESSURE: 159 MMHG | BODY MASS INDEX: 28.45 KG/M2 | HEIGHT: 69 IN | DIASTOLIC BLOOD PRESSURE: 84 MMHG | HEART RATE: 103 BPM | TEMPERATURE: 97.5 F | RESPIRATION RATE: 20 BRPM | WEIGHT: 192.1 LBS

## 2017-04-22 LAB
ANION GAP BLD CALC-SCNC: 13 MMOL/L (ref 7–16)
BUN SERPL-MCNC: 79 MG/DL (ref 8–23)
CALCIUM SERPL-MCNC: 8.7 MG/DL (ref 8.3–10.4)
CHLORIDE SERPL-SCNC: 109 MMOL/L (ref 98–107)
CO2 SERPL-SCNC: 20 MMOL/L (ref 21–32)
CREAT SERPL-MCNC: 12.8 MG/DL (ref 0.8–1.5)
GLUCOSE SERPL-MCNC: 86 MG/DL (ref 65–100)
POTASSIUM SERPL-SCNC: 5.7 MMOL/L (ref 3.5–5.1)
SODIUM SERPL-SCNC: 142 MMOL/L (ref 136–145)

## 2017-04-22 PROCEDURE — 36415 COLL VENOUS BLD VENIPUNCTURE: CPT | Performed by: INTERNAL MEDICINE

## 2017-04-22 PROCEDURE — 99218 HC RM OBSERVATION: CPT

## 2017-04-22 PROCEDURE — 74011250637 HC RX REV CODE- 250/637: Performed by: INTERNAL MEDICINE

## 2017-04-22 PROCEDURE — 80048 BASIC METABOLIC PNL TOTAL CA: CPT | Performed by: INTERNAL MEDICINE

## 2017-04-22 RX ORDER — GUAIFENESIN 100 MG/5ML
81 LIQUID (ML) ORAL DAILY
Status: SHIPPED | COMMUNITY
Start: 2017-04-22 | End: 2017-08-22

## 2017-04-22 RX ORDER — SODIUM POLYSTYRENE SULFONATE 15 G/60ML
15 SUSPENSION ORAL; RECTAL
Status: DISCONTINUED | OUTPATIENT
Start: 2017-04-22 | End: 2017-04-22 | Stop reason: HOSPADM

## 2017-04-22 RX ORDER — CLOPIDOGREL BISULFATE 75 MG/1
75 TABLET ORAL DAILY
Qty: 30 TAB | Refills: 11 | Status: SHIPPED | OUTPATIENT
Start: 2017-04-22 | End: 2017-11-21 | Stop reason: CLARIF

## 2017-04-22 RX ADMIN — CARVEDILOL 25 MG: 25 TABLET, FILM COATED ORAL at 08:20

## 2017-04-22 RX ADMIN — ISOSORBIDE DINITRATE 30 MG: 20 TABLET ORAL at 08:20

## 2017-04-22 RX ADMIN — HYDRALAZINE HYDROCHLORIDE 50 MG: 50 TABLET, FILM COATED ORAL at 08:19

## 2017-04-22 RX ADMIN — LOSARTAN POTASSIUM 50 MG: 50 TABLET ORAL at 08:19

## 2017-04-22 RX ADMIN — APIXABAN 2.5 MG: 2.5 TABLET, FILM COATED ORAL at 08:20

## 2017-04-22 RX ADMIN — PANTOPRAZOLE SODIUM 40 MG: 40 TABLET, DELAYED RELEASE ORAL at 08:19

## 2017-04-22 RX ADMIN — ALLOPURINOL 100 MG: 100 TABLET ORAL at 08:19

## 2017-04-22 RX ADMIN — ASPIRIN 81 MG: 81 TABLET, CHEWABLE ORAL at 08:19

## 2017-04-22 RX ADMIN — CLOPIDOGREL BISULFATE 75 MG: 75 TABLET, FILM COATED ORAL at 08:20

## 2017-04-22 RX ADMIN — AMLODIPINE BESYLATE 5 MG: 5 TABLET ORAL at 08:20

## 2017-04-22 RX ADMIN — FUROSEMIDE 80 MG: 40 TABLET ORAL at 08:19

## 2017-04-22 NOTE — PROGRESS NOTES
Bedside and Verbal shift change report given to Stephy Bah RN (oncoming nurse) by Alvaro Dorsey RN (offgoing nurse). Report included the following information SBAR, Kardex, MAR and Recent Results. Assessed groin site with oncoming nurse. WDL - ecchymosis noted.

## 2017-04-22 NOTE — PROGRESS NOTES
Notified by monitor room patient had 16 beat run of VTach. Patient asymptomatic - lying in bed.  AxO4 HR 99 /75 - will continue to monitor

## 2017-04-22 NOTE — DISCHARGE SUMMARY
[facility] Cardiology Discharge Summary     Patient ID:  Wing Aparicio  [id number]  68 y.o.  1940    Admit date: 4/21/2017    Discharge date and time:  4-    Admitting Physician: Manav Goel MD     Discharge Physician: Sherrill Dixon PA-C/[facility]    Admission Diagnoses: chest pain    Discharge Diagnoses:   Patient Active Problem List    Diagnosis Date Noted    Shortness of breath 04/07/2017    Cough 01/26/2017    History of TIA (transient ischemic attack) 01/06/2017    End-stage renal disease on peritoneal dialysis ([facility] [address] 11/18/2016    Chronic atrial fibrillation ([facility] [address] 07/19/2016    GOUT, UNSPECIFIED 07/07/2016    Anemia, unspecified  07/07/2016    Diverticulosis 07/07/2016    Benign non-nodular prostatic hyperplasia with lower urinary tract symptoms 11/25/2013    CAD (coronary artery disease) 03/28/2011    HTN (hypertension) 03/28/2011    Dyslipidemia 03/28/2011       Cardiology Procedures this admission:  Left heart catheterization with PCI  Consults: Nephrology    Hospital Course: Pt is a 42-year-old gentleman with a history of coronary artery disease. He has had prior remote CABG and PCI. He has been having problems with worsening angina pectoris in spite of medical therapy. He also has chronic renal failure and is on home peritoneal dialysis. A cardiac catheterization with possible angioplasty was recommended. He was admitted for an AM Admission C at [facility] on 4-21-17. Pt came in to [facility] and was taken to the cath lab by Dr[facility]. Pt was found to have a RCA stenosis that was stented with a 3.5x38 mm Xience Alpine SOCORRO with 0% residual stenosis. Pt tolerated the procedure well and was taken to the telemetry floor for recovery. The following morning Pt was up feeling well without any complaints of CP, SOB or palpitations. Pt's right groin cath site was clean, dry and intact without hematoma or bruit.  Pt's labs showed chronic renal failure and hyperkalemia with K+ 5.7, his KCL was stopped and Kayexalate was ordered per Dr. Jersey Hall. Pt will have BMP on Monday. Pt was seen and examined by Dr. Jersey Hall and determined stable and ready for discharge. Pt was instructed on the importance of taking aspirin and plavix everyday without missing a dose. Pt will need to take dual antiplatelet therapy for at least one year. DISPOSITION: The patient is being discharged home in stable condition on a low saturated fat, low cholesterol and low salt diet. Pt is instructed to advance activities as tolerated limited to fatigue or shortness of breath. Pt is instructed to do no heavy lifting, straining, stooping or squatting for 5 days. Pt is instructed to watch cath site for bleeding/oozing, if seen Pt is instructed to apply firm pressure with clean cloth and call office at [phone]. Pt is instructed to watch for signs of infection which include increasing area of redness around site, fever/hot to touch or purulent drainage. Pt is instructed not to soak in a tub bath for 1 week, but it is okay to shower. Pt is instructed to call office or return to ER for immediate evaluation of any shortness of breath or chest pain not relieved by NTG. Follow up with [facility]iology Dr. Jersey Hall in 2 weeks  Pt is being referred to out patient cardiac rehab. Discharge Exam:   Visit Vitals    /77    Pulse 93    Temp 97.3 °F (36.3 °C)    Resp 18    Ht 5' 9\" (1.753 m)    Wt 87.1 kg (192 lb 1.6 oz)    SpO2 95%    BMI 28.37 kg/m2    Pt has been seen by Dr. Jersey Hall: see his progress note for exam details.     Recent Results (from the past 24 hour(s))   CBC W/O DIFF    Collection Time: 04/21/17 10:04 AM   Result Value Ref Range    WBC 9.9 4.3 - 11.1 K/uL    RBC 3.26 (L) 4.23 - 5.67 M/uL    HGB 11.0 (L) 13.6 - 17.2 g/dL    HCT 33.6 (L) 41.1 - 50.3 %    .1 (H) 79.6 - 97.8 FL    MCH 33.7 (H) 26.1 - 32.9 PG    MCHC 32.7 31.4 - 35.0 g/dL    RDW 15.3 (H) 11.9 - 14.6 %    PLATELET 347 302 - 564 K/uL    MPV 9.6 (L) 10.8 - 80.2 FL   METABOLIC PANEL, BASIC    Collection Time: 04/21/17 10:04 AM   Result Value Ref Range    Sodium 141 136 - 145 mmol/L    Potassium 5.1 3.5 - 5.1 mmol/L    Chloride 107 98 - 107 mmol/L    CO2 24 21 - 32 mmol/L    Anion gap 10 7 - 16 mmol/L    Glucose 102 (H) 65 - 100 mg/dL    BUN 70 (H) 8 - 23 MG/DL    Creatinine 12.20 (H) 0.8 - 1.5 MG/DL    GFR est AA 5 (L) >60 ml/min/1.73m2    GFR est non-AA 4 (L) >60 ml/min/1.73m2    Calcium 9.6 8.3 - 10.4 MG/DL   PROTHROMBIN TIME + INR    Collection Time: 04/21/17 10:04 AM   Result Value Ref Range    Prothrombin time 11.3 9.6 - 12.0 sec    INR 1.0 0.9 - 1.2     MAGNESIUM    Collection Time: 04/21/17 10:04 AM   Result Value Ref Range    Magnesium 2.8 (H) 1.8 - 2.4 mg/dL   POC ACTIVATED CLOTTING TIME    Collection Time: 04/21/17 12:32 PM   Result Value Ref Range    Activated Clotting Time (POC) 199 (H) 70 - 643 SECS   METABOLIC PANEL, BASIC    Collection Time: 04/22/17  6:40 AM   Result Value Ref Range    Sodium 142 136 - 145 mmol/L    Potassium 5.7 (H) 3.5 - 5.1 mmol/L    Chloride 109 (H) 98 - 107 mmol/L    CO2 20 (L) 21 - 32 mmol/L    Anion gap 13 7 - 16 mmol/L    Glucose 86 65 - 100 mg/dL    BUN 79 (H) 8 - 23 MG/DL    Creatinine 12.80 (H) 0.8 - 1.5 MG/DL    GFR est AA 5 (L) >60 ml/min/1.73m2    GFR est non-AA 4 (L) >60 ml/min/1.73m2    Calcium 8.7 8.3 - 10.4 MG/DL         Patient Instructions:   Current Discharge Medication List      START taking these medications    Details   aspirin 81 mg chewable tablet Take 1 Tab by mouth daily. clopidogrel (PLAVIX) 75 mg tab Take 1 Tab by mouth daily. Qty: 30 Tab, Refills: 11         CONTINUE these medications which have NOT CHANGED    Details   finasteride (PROSCAR) 5 mg tablet Take 5 mg by mouth daily. B.infantis-B.ani-B.long-B.bifi (PROBIOTIC 4X) 10-15 mg TbEC Take  by mouth daily.       losartan (COZAAR) 50 mg tablet TAKE 1 TABLET BY MOUTH DAILY  Qty: 90 Tab, Refills: 3    Comments: **Patient requests 90 days supply**      cinacalcet (SENSIPAR) 60 mg tab Take  by mouth. Two tablets QHS      tamsulosin (FLOMAX) 0.4 mg capsule TK 1 C PO Q NIGHT  Refills: 4      DOCUSATE CALCIUM (STOOL SOFTENER PO) Take  by mouth daily. multivitamin (ONE A DAY) tablet Take 1 Tab by mouth daily. Special Complex for dialysis patients      isosorbide dinitrate (ISORDIL) 30 mg tablet Take 30 mg by mouth daily. Omeprazole Magnesium 20 mg cpDR Take  by mouth daily. epoetin alcides (EPOGEN;PROCRIT) 10,000 unit/mL injection by SubCUTAneous route as needed. Currently taking abt twice a month at [facility]      Magnesium Oxide 500 mg cap Take  by mouth daily. KRILL OIL PO Take  by mouth daily. RENVELA 800 mg Tab tab Take 800 mg by mouth three (3) times daily (with meals). Indications: 4 with meals, 2 with snacks      allopurinol (ZYLOPRIM) 100 mg tablet Take 100 mg by mouth daily. Indications: am      furosemide (LASIX) 80 mg tablet Take 80 mg by mouth two (2) times a day. Indications: am      AMLODIPINE 5 mg tablet Take 5 mg by mouth daily. pm      CARVEDILOL 25 mg tablet Take 25 mg by mouth two (2) times daily (with meals). Indications: 2-3 times daily with meals      HYDRALAZINE 50 mg tablet Take 50 mg by mouth two (2) times a day. nitroglycerin (NITROSTAT) 0.4 mg SL tablet ONE TABLET UNDER TONGUE AS NEEDED FOR CHEST PAIN EVERY 5 MINUTES  Qty: 25 Tab, Refills: 6      azelastine (ASTELIN) 137 mcg (0.1 %) nasal spray 1 [place] by Both Nostrils route two (2) times a day. Use in each nostril as directed  Qty: 1 Bottle, Refills: 5    Associated Diagnoses: Cough      apixaban (ELIQUIS) 2.5 mg tablet Take 1 Tab by mouth two (2) times a day. Qty: 60 Tab, Refills: 11      dicyclomine (BENTYL) 10 mg capsule Take 10 mg by mouth daily as needed. predniSONE (DELTASONE) 10 mg tablet Take  by mouth as needed.          HOLD taking these medications       potassium chloride (K-DUR, KLOR-CON) 20 mEq tablet Comments: HOLD                  Signed:  Crys Jimenes NAMRATA  4/22/2017  8:07 AM

## 2017-04-22 NOTE — PROGRESS NOTES
TRANSFER - IN REPORT:    [facility]name[facility] on [employer]  being received from cath lab (unit) for routine progression of care      Report consisted of patients Situation, Background, Assessment and   Recommendations(SBAR). Information from the following report(s) SBAR, Kardex and Procedure Summary was reviewed with the receiving nurse. Opportunity for questions and clarification was provided. Telemetry monitor applied. VS taken. Right groin site assessed and WDL. Pt instructed on bed rest and importance of keeping RLE straight. Bed low and locked. Side rails x2. Call light within reach. Pt verbalizes understanding to call for assistance. Assessment completed upon patients arrival to unit and care assumed.

## 2017-04-22 NOTE — PROGRESS NOTES
Notified by monitor room patient had 6 beat run of VTach. Patient asymptomatic - lying in bed with wife at the bedside. AXO4, /82, HR 96. Will continue to monitor.

## 2017-04-22 NOTE — PROGRESS NOTES
Discharge instructions given and reviewed with patient and wife. Prescriptions given, questions answered. Discharged home.

## 2017-04-22 NOTE — DISCHARGE INSTRUCTIONS
Chest Pain: Care Instructions  Your Care Instructions  There are many things that can cause chest pain. Some are not serious and will get better on their own in a few days. But some kinds of chest pain need more testing and treatment. Your doctor may have recommended a follow-up visit in the next 8 to 12 hours. If you are not getting better, you may need more tests or treatment. Even though your doctor has released you, you still need to watch for any problems. The doctor carefully checked you, but sometimes problems can develop later. If you have new symptoms or if your symptoms do not get better, get medical care right away. If you have worse or different chest pain or pressure that lasts more than 5 minutes or you passed out (lost consciousness), call 911 or seek other emergency help right away. A medical visit is only one step in your treatment. Even if you feel better, you still need to do what your doctor recommends, such as going to all suggested follow-up appointments and taking medicines exactly as directed. This will help you recover and help prevent future problems. How can you care for yourself at home? · Rest until you feel better. · Take your medicine exactly as prescribed. Call your doctor if you think you are having a problem with your medicine. · Do not drive after taking a prescription pain medicine. When should you call for help? Call 911 if:  · You passed out (lost consciousness). · You have severe difficulty breathing. · You have symptoms of a heart attack. These may include:  ¨ Chest pain or pressure, or a strange feeling in your chest.  ¨ Sweating. ¨ Shortness of breath. ¨ Nausea or vomiting. ¨ Pain, pressure, or a strange feeling in your back, neck, jaw, or upper belly or in one or both shoulders or arms. ¨ Lightheadedness or sudden weakness. ¨ A fast or irregular heartbeat.   After you call 911, the  may tell you to chew 1 adult-strength or 2 to 4 low-dose aspirin. Wait for an ambulance. Do not try to drive yourself. Call your doctor today if:  · You have any trouble breathing. · Your chest pain gets worse. · You are dizzy or lightheaded, or you feel like you may faint. · You are not getting better as expected. · You are having new or different chest pain. Where can you learn more? Go to http://suly-lauren.info/. Enter A120 in the search box to learn more about \"Chest Pain: Care Instructions. \"  Current as of: May 27, 2016  Content Version: 11.2  © 0717-4512 [email or web address]. Care instructions adapted under license by [employer] (which disclaims liability or warranty for this information). If you have questions about a medical condition or this instruction, always ask your healthcare professional. [address] any warranty or liability for your use of this information. Percutaneous Coronary Intervention: What to Expect at [facility]    Percutaneous coronary intervention (PCI) is the name for procedures that are used to open a narrowed or blocked coronary artery. The two most common PCI procedures are coronary angioplasty and coronary stent placement. Your groin or arm may have a bruise and feel sore for a day or two after a percutaneous coronary intervention (PCI). You can do light activities around the house, but nothing strenuous for several days. This care sheet gives you a general idea about how long it will take for you to recover. But each person recovers at a different pace. Follow the steps below to get better as quickly as possible. How can you care for yourself at home? Activity  · Do not do strenuous exercise and do not lift, pull, or push anything heavy until your doctor says it is okay. This may be for a day or two. You can walk around the house and do light activity, such as cooking. · You may shower 24 to 48 hours after the procedure, if your doctor okays it.  Matt Ratliff the incision dry. Do not take a bath for 1 week, or until your doctor tells you it is okay. · If the catheter was placed in your groin, try not to walk up stairs for the first couple of days. · If the catheter was placed in your arm near your wrist, do not bend your wrist deeply for the first couple of days. Be careful using your hand to get into and out of a chair or bed. · If your doctor recommends it, get more exercise. Walking is a good choice. Bit by bit, increase the amount you walk every day. Try for at least 30 minutes on most days of the week. Diet  · Drink plenty of fluids to help your body flush out the dye. If you have kidney, heart, or liver disease and have to limit fluids, talk with your doctor before you increase the amount of fluids you drink. · Keep eating a heart-healthy diet that has lots of fruits, vegetables, and whole grains. If you have not been eating this way, talk to your doctor. You also may want to talk to a dietitian. This expert can help you to learn about healthy foods and plan meals. Medicines  · Your doctor will tell you if and when you can restart your medicines. He or she will also give you instructions about taking any new medicines. · If you take blood thinners, such as warfarin (Coumadin), clopidogrel (Plavix), or aspirin, be sure to talk to your doctor. He or she will tell you if and when to start taking those medicines again. Make sure that you understand exactly what your doctor wants you to do. · Your doctor will prescribe blood-thinning medicines. You will likely take aspirin plus another antiplatelet, such as clopidogrel (Plavix). It is very important that you take these medicines exactly as directed. These medicines help keep the coronary artery open and reduce your risk of a heart attack. · Call your doctor if you think you are having a problem with your medicine.   Care of the catheter site  · For 1 or 2 days, keep a bandage over the spot where the catheter was inserted. The bandage probably will fall off in this time. · Put ice or a cold pack on the area for 10 to 20 minutes at a time to help with soreness or swelling. Put a thin cloth between the ice and your skin. Follow-up care is a key part of your treatment and safety. Be sure to make and go to all appointments, and call your doctor if you are having problems. It's also a good idea to know your test results and keep a list of the medicines you take. When should you call for help? Call 911 anytime you think you may need emergency care. For example, call if:  · You passed out (lost consciousness). · You have severe trouble breathing. · You have sudden chest pain and shortness of breath, or you cough up blood. · You have symptoms of a heart attack, such as:  ¨ Chest pain or pressure. ¨ Sweating. ¨ Shortness of breath. ¨ Nausea or vomiting. ¨ Pain that spreads from the chest to the neck, jaw, or one or both shoulders or arms. ¨ Dizziness or lightheadedness. ¨ A fast or uneven pulse. After calling 911, chew 1 adult-strength aspirin. Wait for an ambulance. Do not try to drive yourself. · You have been diagnosed with angina, and you have angina symptoms that do not go away with rest or are not getting better within 5 minutes after you take one dose of nitroglycerin. Call your doctor now or seek immediate medical care if:  · You are bleeding from the area where the catheter was put in your artery. · You have a fast-growing, painful lump at the catheter site. · You have signs of infection, such as:  ¨ Increased pain, swelling, warmth, or redness. ¨ Red streaks leading from the catheter site. ¨ Pus draining from the catheter site. ¨ A fever. · Your leg or arm looks blue or feels cold, numb, or tingly. Watch closely for changes in your health, and be sure to contact your doctor if you have any problems. Where can you learn more? Go to http://suly-lauren.info/.   Enter X503 in the search box to learn more about \"Percutaneous Coronary Intervention: What to Expect at Home. \"  Current as of: January 27, 2016  Content Version: 11.2  © 3100-1769 [employer]. Care instructions adapted under license by [employer] (which disclaims liability or warranty for this information). If you have questions about a medical condition or this instruction, always ask your healthcare professional. [address] any warranty or liability for your use of this information. Clopidogrel (By mouth)   Clopidogrel ([id number]-[id number]-grel)  Helps prevent stroke, heart attack, and other heart problems. This medicine is a platelet inhibitor. Brand Name(s):Plavix   There may be other brand names for this medicine. When This Medicine Should Not Be Used: This medicine is not right for everyone. Do not use it if you had an allergic reaction to clopidogrel, or have current bleeding problems, such as a bleeding stomach ulcer. How to Use This Medicine:   Tablet  · Your doctor will tell you how much medicine to use. Do not use more than directed. · This medicine should come with a Medication Guide. Ask your pharmacist for a copy if you do not have one. · Missed dose: Take a dose as soon as you remember. If it is almost time for your next dose, wait until then and take a regular dose. Do not take extra medicine to make up for a missed dose. · Store the medicine in a closed container at room temperature, away from heat, moisture, and direct light. Drugs and Foods to Avoid:   Ask your doctor or pharmacist before using any other medicine, including over-the-counter medicines, vitamins, and herbal products. · Some medicines can affect how clopidogrel works.  Tell your doctor if you are using any of the following:   ¨ Warfarin  ¨ An NSAID medicine, such as celecoxib, diclofenac, ibuprofen, or naproxen  ¨ Medicine to treat depression  ¨ Stomach medicine, such as esomeprazole or omeprazole  Warnings While Using This Medicine:   · Tell your doctor if you are pregnant or breastfeeding, had a recent stroke, or have a history of bleeding problems. · This medicine can cause you to bleed and bruise more easily. Take precautions to avoid injury. Brush and floss your teeth gently, do not play rough sports, and be careful with sharp objects. Severe bleeding can be life-threatening. · This medicine may cause a rare but serious blood clotting condition called thrombotic thrombocytopenic purpura. · Make sure any doctor or dentist who treats you knows that you are using this medicine. Tell your doctor if you plan to have surgery or a dental procedure. · Do not stop using this medicine suddenly. Your doctor will need to slowly decrease your dose before you stop it completely. · Your doctor will check your progress and the effects of this medicine at regular visits. Keep all appointments. · Keep all medicine out of the reach of children. Never share your medicine with anyone. Possible Side Effects While Using This Medicine:   Call your doctor right away if you notice any of these side effects:  · Allergic reaction: Itching or hives, swelling in your face or hands, swelling or tingling in your mouth or throat, chest tightness, trouble breathing  · Bloody or black, tarry stools  · Nosebleeds  · Pinpoint red or purple spots on your skin or in your mouth  · Problems with vision, speech, or walking  · Red or dark brown urine  · Seizures  · Severe stomach pain  · Trouble breathing, tiredness, fast heartbeat, yellow skin or eyes  · Unusual bleeding, bruising, or weakness  · Vomiting of blood or vomit that looks like coffee grounds  If you notice other side effects that you think are caused by this medicine, tell your doctor. Call your doctor for medical advice about side effects.  You may report side effects to FDA at 0-960-FDA-8446  © 2016 [address] is for End User's use only and may not be sold, redistributed or otherwise used for commercial purposes. The above information is an  only. It is not intended as medical advice for individual conditions or treatments. Talk to your doctor, nurse or pharmacist before following any medical regimen to see if it is safe and effective for you.

## 2017-04-22 NOTE — PROGRESS NOTES
Skin assessment completed with secondary RN. Right groin site puncture s/p heart cath. Site C/D/I and WDL. Sacrum and heels visualized. Intact with no breakdown noted.

## 2017-04-22 NOTE — PROGRESS NOTES
[facility] CARDIOLOGY PROGRESS NOTE           4/22/2017 7:51 AM    Admit Date: 4/21/2017      Subjective:   No cp or sob    ROS:  Cardiovascular:  As noted above    Objective:      Vitals:    04/21/17 2321 04/22/17 0103 04/22/17 0440 04/22/17 0549   BP:  151/82 176/86 160/77   Pulse: 88 89 93    Resp:  20 18    Temp:  97.7 °F (36.5 °C) 97.3 °F (36.3 °C)    SpO2:  96% 95%    Weight:   87.1 kg (192 lb 1.6 oz)    Height:           Physical Exam:  General-No Acute Distress  Neck- supple, no JVD  CV- regular rate and rhythm no MRG  Lung- clear bilaterally  Abd- soft, nontender, nondistended  Ext- no edema bilaterally. Skin- warm and dry    Data Review:   Recent Labs      04/22/17   0640  04/21/17   1004   NA  142  141   K  5.7*  5.1   MG   --   2.8*   BUN  79*  70*   CREA  12.80*  12.20*   GLU  86  102*   WBC   --   9.9   HGB   --   11.0*   HCT   --   33.6*   PLT   --   196   INR   --   1.0       Assessment/Plan:     Doing well post pci, home today.       Sreekanth Jalloh MD  4/22/2017 7:51 AM

## 2017-04-24 ENCOUNTER — HOSPITAL ENCOUNTER (OUTPATIENT)
Dept: LAB | Age: 77
Discharge: HOME | End: 2017-04-24
Attending: INTERNAL MEDICINE
Payer: MEDICARE

## 2017-04-24 LAB
ANION GAP BLD CALC-SCNC: 12 MMOL/L
BUN SERPL-MCNC: 70 MG/DL (ref 8–23)
CALCIUM SERPL-MCNC: 8.4 MG/DL (ref 8.3–10.4)
CHLORIDE SERPL-SCNC: 103 MMOL/L (ref 98–107)
CO2 SERPL-SCNC: 25 MMOL/L (ref 21–32)
CREAT SERPL-MCNC: 12.4 MG/DL (ref 0.8–1.5)
GLUCOSE SERPL-MCNC: 111 MG/DL (ref 65–100)
POTASSIUM SERPL-SCNC: 4.2 MMOL/L (ref 3.5–5.1)
SODIUM SERPL-SCNC: 140 MMOL/L (ref 136–145)

## 2017-04-24 PROCEDURE — 36415 COLL VENOUS BLD VENIPUNCTURE: CPT | Performed by: PHYSICIAN ASSISTANT

## 2017-04-24 PROCEDURE — 80048 BASIC METABOLIC PNL TOTAL CA: CPT | Performed by: PHYSICIAN ASSISTANT

## 2017-06-01 PROBLEM — R06.02 SHORTNESS OF BREATH: Status: RESOLVED | Noted: 2017-04-07 | Resolved: 2017-06-01

## 2017-06-01 PROBLEM — R05.9 COUGH: Status: RESOLVED | Noted: 2017-01-26 | Resolved: 2017-06-01

## 2017-10-18 PROBLEM — K21.9 GASTROESOPHAGEAL REFLUX DISEASE WITHOUT ESOPHAGITIS: Status: ACTIVE | Noted: 2017-10-18

## 2017-12-04 PROBLEM — R06.02 SHORTNESS OF BREATH: Status: RESOLVED | Noted: 2017-04-07 | Resolved: 2017-01-01

## 2018-01-01 ENCOUNTER — PATIENT OUTREACH (OUTPATIENT)
Dept: CASE MANAGEMENT | Age: 78
End: 2018-01-01

## 2018-01-01 ENCOUNTER — APPOINTMENT (OUTPATIENT)
Dept: GENERAL RADIOLOGY | Age: 78
DRG: 070 | End: 2018-01-01
Attending: INTERNAL MEDICINE
Payer: MEDICARE

## 2018-01-01 ENCOUNTER — HOSPITAL ENCOUNTER (OUTPATIENT)
Age: 78
Setting detail: OUTPATIENT SURGERY
Discharge: HOME | End: 2018-04-30
Attending: INTERNAL MEDICINE | Admitting: INTERNAL MEDICINE
Payer: MEDICARE

## 2018-01-01 ENCOUNTER — HOSPITAL ENCOUNTER (OUTPATIENT)
Dept: SURGERY | Age: 78
Discharge: HOME | End: 2018-06-06

## 2018-01-01 ENCOUNTER — ANESTHESIA (OUTPATIENT)
Dept: SURGERY | Age: 78
End: 2018-01-01
Payer: MEDICARE

## 2018-01-01 ENCOUNTER — HOSPITAL ENCOUNTER (OUTPATIENT)
Age: 78
Setting detail: OUTPATIENT SURGERY
Discharge: HOME | End: 2018-06-13
Attending: SURGERY | Admitting: SURGERY
Payer: MEDICARE

## 2018-01-01 ENCOUNTER — APPOINTMENT (OUTPATIENT)
Dept: PHYSICAL THERAPY | Age: 78
End: 2018-01-01
Attending: FAMILY MEDICINE

## 2018-01-01 ENCOUNTER — APPOINTMENT (OUTPATIENT)
Dept: GENERAL RADIOLOGY | Age: 78
End: 2018-01-01
Attending: EMERGENCY MEDICINE
Payer: MEDICARE

## 2018-01-01 ENCOUNTER — APPOINTMENT (OUTPATIENT)
Dept: MRI IMAGING | Age: 78
DRG: 070 | End: 2018-01-01
Attending: PHYSICIAN ASSISTANT
Payer: MEDICARE

## 2018-01-01 ENCOUNTER — HOSPITAL ENCOUNTER (EMERGENCY)
Age: 78
Discharge: HOME | DRG: 260 | End: 2018-09-08
Attending: EMERGENCY MEDICINE
Payer: MEDICARE

## 2018-01-01 ENCOUNTER — HOSPITAL ENCOUNTER (OUTPATIENT)
Age: 78
Setting detail: OUTPATIENT SURGERY
Discharge: HOME | End: 2018-01-04
Attending: INTERNAL MEDICINE | Admitting: INTERNAL MEDICINE
Payer: MEDICARE

## 2018-01-01 ENCOUNTER — HOSPITAL ENCOUNTER (OUTPATIENT)
Dept: PHYSICAL THERAPY | Age: 78
Discharge: HOME | End: 2018-07-06
Attending: FAMILY MEDICINE

## 2018-01-01 ENCOUNTER — HOSPITAL ENCOUNTER (INPATIENT)
Age: 78
LOS: 1 days | Discharge: HOME | DRG: 312 | End: 2018-08-01
Admitting: INTERNAL MEDICINE
Payer: MEDICARE

## 2018-01-01 ENCOUNTER — HOSPICE ADMISSION (OUTPATIENT)
Dept: HOSPICE | Facility: HOSPICE | Age: 78
End: 2018-01-01

## 2018-01-01 ENCOUNTER — HOSPITAL ENCOUNTER (EMERGENCY)
Age: 78
Discharge: HOME | End: 2018-08-07
Attending: EMERGENCY MEDICINE
Payer: MEDICARE

## 2018-01-01 ENCOUNTER — APPOINTMENT (OUTPATIENT)
Dept: CT IMAGING | Age: 78
DRG: 260 | End: 2018-01-01
Attending: EMERGENCY MEDICINE
Payer: MEDICARE

## 2018-01-01 ENCOUNTER — HOSPITAL ENCOUNTER (OUTPATIENT)
Dept: GENERAL RADIOLOGY | Age: 78
Discharge: HOME | End: 2018-04-23
Attending: INTERNAL MEDICINE
Payer: MEDICARE

## 2018-01-01 ENCOUNTER — APPOINTMENT (OUTPATIENT)
Dept: GENERAL RADIOLOGY | Age: 78
DRG: 070 | End: 2018-01-01
Attending: EMERGENCY MEDICINE
Payer: MEDICARE

## 2018-01-01 ENCOUNTER — APPOINTMENT (OUTPATIENT)
Dept: GENERAL RADIOLOGY | Age: 78
DRG: 260 | End: 2018-01-01
Attending: FAMILY MEDICINE
Payer: MEDICARE

## 2018-01-01 ENCOUNTER — APPOINTMENT (OUTPATIENT)
Dept: MRI IMAGING | Age: 78
DRG: 070 | End: 2018-01-01
Attending: HOSPITALIST
Payer: MEDICARE

## 2018-01-01 ENCOUNTER — HOSPITAL ENCOUNTER (OUTPATIENT)
Dept: LAB | Age: 78
Discharge: HOME | End: 2018-08-23
Payer: MEDICARE

## 2018-01-01 ENCOUNTER — HOSPITAL ENCOUNTER (INPATIENT)
Age: 78
LOS: 9 days | DRG: 070 | End: 2018-11-30
Admitting: INTERNAL MEDICINE
Payer: MEDICARE

## 2018-01-01 ENCOUNTER — APPOINTMENT (OUTPATIENT)
Dept: CT IMAGING | Age: 78
DRG: 070 | End: 2018-01-01
Payer: MEDICARE

## 2018-01-01 ENCOUNTER — HOSPITAL ENCOUNTER (INPATIENT)
Age: 78
LOS: 7 days | Discharge: NURSING, REHAB OR LONG-TERM CARE | DRG: 260 | End: 2018-09-18
Attending: EMERGENCY MEDICINE | Admitting: INTERNAL MEDICINE
Payer: MEDICARE

## 2018-01-01 ENCOUNTER — ANESTHESIA EVENT (OUTPATIENT)
Dept: SURGERY | Age: 78
End: 2018-01-01
Payer: MEDICARE

## 2018-01-01 ENCOUNTER — HOSPITAL ENCOUNTER (OUTPATIENT)
Dept: PHYSICAL THERAPY | Age: 78
Discharge: HOME | End: 2018-06-18
Attending: FAMILY MEDICINE
Payer: MEDICARE

## 2018-01-01 ENCOUNTER — HOSPITAL ENCOUNTER (OUTPATIENT)
Age: 78
Setting detail: OUTPATIENT SURGERY
Discharge: HOME | End: 2018-01-31
Attending: INTERNAL MEDICINE | Admitting: INTERNAL MEDICINE
Payer: MEDICARE

## 2018-01-01 ENCOUNTER — HOSPITAL ENCOUNTER (EMERGENCY)
Age: 78
Discharge: HOME | End: 2018-11-09
Attending: EMERGENCY MEDICINE
Payer: MEDICARE

## 2018-01-01 ENCOUNTER — APPOINTMENT (OUTPATIENT)
Dept: CT IMAGING | Age: 78
End: 2018-01-01
Attending: EMERGENCY MEDICINE
Payer: MEDICARE

## 2018-01-01 ENCOUNTER — APPOINTMENT (OUTPATIENT)
Dept: GENERAL RADIOLOGY | Age: 78
DRG: 260 | End: 2018-01-01
Attending: EMERGENCY MEDICINE
Payer: MEDICARE

## 2018-01-01 ENCOUNTER — APPOINTMENT (OUTPATIENT)
Dept: ULTRASOUND IMAGING | Age: 78
DRG: 312 | End: 2018-01-01
Attending: INTERNAL MEDICINE
Payer: MEDICARE

## 2018-01-01 ENCOUNTER — APPOINTMENT (OUTPATIENT)
Dept: CT IMAGING | Age: 78
DRG: 070 | End: 2018-01-01
Attending: INTERNAL MEDICINE
Payer: MEDICARE

## 2018-01-01 ENCOUNTER — HOSPITAL ENCOUNTER (OUTPATIENT)
Dept: CARDIAC CATH/INVASIVE PROCEDURES | Age: 78
Discharge: HOME | End: 2018-08-29
Attending: INTERNAL MEDICINE | Admitting: INTERNAL MEDICINE
Payer: MEDICARE

## 2018-01-01 ENCOUNTER — APPOINTMENT (OUTPATIENT)
Dept: GENERAL RADIOLOGY | Age: 78
DRG: 312 | End: 2018-01-01
Attending: EMERGENCY MEDICINE
Payer: MEDICARE

## 2018-01-01 ENCOUNTER — HOSPITAL ENCOUNTER (OUTPATIENT)
Age: 78
Setting detail: OBSERVATION
Discharge: HOME | End: 2018-10-12
Attending: EMERGENCY MEDICINE | Admitting: INTERNAL MEDICINE
Payer: MEDICARE

## 2018-01-01 ENCOUNTER — APPOINTMENT (OUTPATIENT)
Dept: GENERAL RADIOLOGY | Age: 78
DRG: 312 | End: 2018-01-01
Attending: INTERNAL MEDICINE
Payer: MEDICARE

## 2018-01-01 VITALS
BODY MASS INDEX: 26.67 KG/M2 | WEIGHT: 186.29 LBS | OXYGEN SATURATION: 97 % | HEIGHT: 70 IN | HEART RATE: 81 BPM | RESPIRATION RATE: 18 BRPM | DIASTOLIC BLOOD PRESSURE: 58 MMHG | SYSTOLIC BLOOD PRESSURE: 123 MMHG

## 2018-01-01 VITALS
HEIGHT: 70 IN | HEART RATE: 88 BPM | RESPIRATION RATE: 20 BRPM | OXYGEN SATURATION: 98 % | BODY MASS INDEX: 26.63 KG/M2 | WEIGHT: 186 LBS | DIASTOLIC BLOOD PRESSURE: 70 MMHG | TEMPERATURE: 97.4 F | SYSTOLIC BLOOD PRESSURE: 152 MMHG

## 2018-01-01 VITALS
HEIGHT: 70 IN | TEMPERATURE: 97.8 F | RESPIRATION RATE: 20 BRPM | DIASTOLIC BLOOD PRESSURE: 72 MMHG | WEIGHT: 155 LBS | OXYGEN SATURATION: 96 % | SYSTOLIC BLOOD PRESSURE: 152 MMHG | BODY MASS INDEX: 22.19 KG/M2 | HEART RATE: 74 BPM

## 2018-01-01 VITALS
WEIGHT: 155 LBS | SYSTOLIC BLOOD PRESSURE: 125 MMHG | BODY MASS INDEX: 22.19 KG/M2 | RESPIRATION RATE: 18 BRPM | OXYGEN SATURATION: 97 % | DIASTOLIC BLOOD PRESSURE: 62 MMHG | HEART RATE: 88 BPM | HEIGHT: 70 IN | TEMPERATURE: 97.4 F

## 2018-01-01 VITALS
SYSTOLIC BLOOD PRESSURE: 140 MMHG | HEART RATE: 94 BPM | BODY MASS INDEX: 25.2 KG/M2 | WEIGHT: 176 LBS | DIASTOLIC BLOOD PRESSURE: 67 MMHG | HEIGHT: 70 IN | OXYGEN SATURATION: 92 % | RESPIRATION RATE: 18 BRPM

## 2018-01-01 VITALS
WEIGHT: 165 LBS | TEMPERATURE: 98.4 F | HEIGHT: 70 IN | BODY MASS INDEX: 23.62 KG/M2 | HEART RATE: 121 BPM | DIASTOLIC BLOOD PRESSURE: 72 MMHG | OXYGEN SATURATION: 92 % | RESPIRATION RATE: 18 BRPM | SYSTOLIC BLOOD PRESSURE: 134 MMHG

## 2018-01-01 VITALS
BODY MASS INDEX: 22.19 KG/M2 | OXYGEN SATURATION: 94 % | DIASTOLIC BLOOD PRESSURE: 83 MMHG | SYSTOLIC BLOOD PRESSURE: 150 MMHG | HEART RATE: 89 BPM | HEIGHT: 70 IN | RESPIRATION RATE: 16 BRPM | WEIGHT: 155 LBS

## 2018-01-01 VITALS
RESPIRATION RATE: 17 BRPM | SYSTOLIC BLOOD PRESSURE: 128 MMHG | DIASTOLIC BLOOD PRESSURE: 77 MMHG | TEMPERATURE: 98.6 F | HEART RATE: 89 BPM | OXYGEN SATURATION: 94 %

## 2018-01-01 VITALS
WEIGHT: 169 LBS | RESPIRATION RATE: 19 BRPM | TEMPERATURE: 97.5 F | HEART RATE: 97 BPM | OXYGEN SATURATION: 94 % | HEIGHT: 70 IN | DIASTOLIC BLOOD PRESSURE: 64 MMHG | SYSTOLIC BLOOD PRESSURE: 134 MMHG | BODY MASS INDEX: 24.2 KG/M2

## 2018-01-01 VITALS
WEIGHT: 157.25 LBS | TEMPERATURE: 96.9 F | BODY MASS INDEX: 22.51 KG/M2 | DIASTOLIC BLOOD PRESSURE: 59 MMHG | SYSTOLIC BLOOD PRESSURE: 108 MMHG | HEART RATE: 92 BPM | OXYGEN SATURATION: 95 % | RESPIRATION RATE: 16 BRPM | HEIGHT: 70 IN

## 2018-01-01 VITALS
OXYGEN SATURATION: 93 % | DIASTOLIC BLOOD PRESSURE: 61 MMHG | WEIGHT: 151.1 LBS | BODY MASS INDEX: 22.38 KG/M2 | SYSTOLIC BLOOD PRESSURE: 98 MMHG | HEART RATE: 131 BPM | TEMPERATURE: 99.1 F | HEIGHT: 69 IN | RESPIRATION RATE: 28 BRPM

## 2018-01-01 VITALS
DIASTOLIC BLOOD PRESSURE: 71 MMHG | WEIGHT: 158 LBS | HEART RATE: 70 BPM | SYSTOLIC BLOOD PRESSURE: 142 MMHG | RESPIRATION RATE: 20 BRPM | TEMPERATURE: 97.6 F | BODY MASS INDEX: 23 KG/M2 | OXYGEN SATURATION: 96 %

## 2018-01-01 VITALS
BODY MASS INDEX: 22.96 KG/M2 | SYSTOLIC BLOOD PRESSURE: 149 MMHG | HEART RATE: 79 BPM | DIASTOLIC BLOOD PRESSURE: 73 MMHG | WEIGHT: 155 LBS | RESPIRATION RATE: 16 BRPM | OXYGEN SATURATION: 94 % | TEMPERATURE: 97.9 F | HEIGHT: 69 IN

## 2018-01-01 DIAGNOSIS — I35.0 AORTIC VALVE STENOSIS, ETIOLOGY OF CARDIAC VALVE DISEASE UNSPECIFIED: ICD-10-CM

## 2018-01-01 DIAGNOSIS — R55 SYNCOPE AND COLLAPSE: Primary | ICD-10-CM

## 2018-01-01 DIAGNOSIS — S80.02XA CONTUSION OF LEFT KNEE, INITIAL ENCOUNTER: ICD-10-CM

## 2018-01-01 DIAGNOSIS — J90 PLEURAL EFFUSION: ICD-10-CM

## 2018-01-01 DIAGNOSIS — M54.2 NECK PAIN: ICD-10-CM

## 2018-01-01 DIAGNOSIS — M25.462 PREPATELLAR EFFUSION OF LEFT KNEE: ICD-10-CM
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
## 2018-01-01 DIAGNOSIS — J90 PLEURAL EFFUSION ON RIGHT: ICD-10-CM

## 2018-01-01 DIAGNOSIS — R53.81 DEBILITY: ICD-10-CM

## 2018-01-01 DIAGNOSIS — S80.02XA CONTUSION OF LEFT KNEE, INITIAL ENCOUNTER: Primary | ICD-10-CM

## 2018-01-01 DIAGNOSIS — I25.10 CORONARY ARTERY DISEASE INVOLVING NATIVE CORONARY ARTERY OF NATIVE HEART WITHOUT ANGINA PECTORIS: Chronic | ICD-10-CM

## 2018-01-01 DIAGNOSIS — T14.8XXA MULTIPLE SKIN TEARS: ICD-10-CM

## 2018-01-01 DIAGNOSIS — R55 SYNCOPE AND COLLAPSE: ICD-10-CM

## 2018-01-01 DIAGNOSIS — R53.81 MALAISE: Primary | ICD-10-CM

## 2018-01-01 DIAGNOSIS — S62.307A CLOSED NONDISPLACED FRACTURE OF FIFTH METACARPAL BONE OF LEFT HAND, UNSPECIFIED PORTION OF METACARPAL, INITIAL ENCOUNTER: ICD-10-CM

## 2018-01-01 DIAGNOSIS — G93.40 ENCEPHALOPATHY ACUTE: ICD-10-CM

## 2018-01-01 DIAGNOSIS — Z41.9 SURGERY, ELECTIVE: ICD-10-CM

## 2018-01-01 DIAGNOSIS — T85.611A PERITONEAL DIALYSIS CATHETER DYSFUNCTION, INITIAL ENCOUNTER (HCC): Primary | ICD-10-CM

## 2018-01-01 DIAGNOSIS — T14.8XXA ABRASION: ICD-10-CM

## 2018-01-01 LAB
ACID FAST STN SPEC: NORMAL
ALBUMIN SERPL-MCNC: 2.6 G/DL (ref 3.2–4.6)
ALBUMIN SERPL-MCNC: 2.7 G/DL (ref 3.2–4.6)
ALBUMIN SERPL-MCNC: 2.8 G/DL (ref 3.2–4.6)
ALBUMIN SERPL-MCNC: 3 G/DL (ref 3.2–4.6)
ALBUMIN SERPL-MCNC: 3 G/DL (ref 3.2–4.6)
ALBUMIN SERPL-MCNC: 3.2 G/DL (ref 3.2–4.6)
ALBUMIN SERPL-MCNC: 3.4 G/DL (ref 3.2–4.6)
ALBUMIN/GLOB SERPL: 0.6 {RATIO} (ref 1.2–3.5)
ALBUMIN/GLOB SERPL: 0.7 {RATIO} (ref 1.2–3.5)
ALBUMIN/GLOB SERPL: 0.8 {RATIO} (ref 1.2–3.5)
ALP SERPL-CCNC: 107 U/L (ref 50–136)
ALP SERPL-CCNC: 111 U/L (ref 50–136)
ALP SERPL-CCNC: 135 U/L (ref 50–136)
ALP SERPL-CCNC: 152 U/L (ref 50–136)
ALP SERPL-CCNC: 160 U/L (ref 50–136)
ALT SERPL-CCNC: 12 U/L (ref 12–65)
ALT SERPL-CCNC: 13 U/L (ref 12–65)
ALT SERPL-CCNC: 16 U/L (ref 12–65)
ALT SERPL-CCNC: 19 U/L (ref 12–65)
ALT SERPL-CCNC: 21 U/L (ref 12–65)
ANION GAP SERPL CALC-SCNC: 11 MMOL/L (ref 7–16)
ANION GAP SERPL CALC-SCNC: 12 MMOL/L (ref 7–16)
ANION GAP SERPL CALC-SCNC: 13 MMOL/L
ANION GAP SERPL CALC-SCNC: 13 MMOL/L (ref 7–16)
ANION GAP SERPL CALC-SCNC: 14 MMOL/L (ref 7–16)
ANION GAP SERPL CALC-SCNC: 14 MMOL/L (ref 7–16)
ANION GAP SERPL CALC-SCNC: 15 MMOL/L (ref 7–16)
ANION GAP SERPL CALC-SCNC: 16 MMOL/L (ref 7–16)
APPEARANCE FLD: CLEAR
AST SERPL-CCNC: 16 U/L (ref 15–37)
AST SERPL-CCNC: 17 U/L (ref 15–37)
AST SERPL-CCNC: 18 U/L (ref 15–37)
AST SERPL-CCNC: 28 U/L (ref 15–37)
AST SERPL-CCNC: 37 U/L (ref 15–37)
ATRIAL RATE: 104 BPM
ATRIAL RATE: 117 BPM
ATRIAL RATE: 156 BPM
ATRIAL RATE: 163 BPM
ATRIAL RATE: 250 BPM
ATRIAL RATE: 312 BPM
ATRIAL RATE: 72 BPM
BACTERIA SPEC CULT: NORMAL
BACTERIA SPEC CULT: NORMAL
BASOPHILS # BLD: 0 K/UL (ref 0–0.2)
BASOPHILS # BLD: 0.1 K/UL (ref 0–0.2)
BASOPHILS NFR BLD: 0 % (ref 0–2)
BASOPHILS NFR BLD: 1 % (ref 0–2)
BASOPHILS NFR BLD: 1 % (ref 0–2)
BILIRUB SERPL-MCNC: 0.6 MG/DL (ref 0.2–1.1)
BILIRUB SERPL-MCNC: 0.6 MG/DL (ref 0.2–1.1)
BILIRUB SERPL-MCNC: 0.7 MG/DL (ref 0.2–1.1)
BILIRUB SERPL-MCNC: 0.7 MG/DL (ref 0.2–1.1)
BILIRUB SERPL-MCNC: 1.2 MG/DL (ref 0.2–1.1)
BUN SERPL-MCNC: 25 MG/DL (ref 8–23)
BUN SERPL-MCNC: 28 MG/DL (ref 8–23)
BUN SERPL-MCNC: 33 MG/DL (ref 8–23)
BUN SERPL-MCNC: 34 MG/DL (ref 8–23)
BUN SERPL-MCNC: 38 MG/DL (ref 8–23)
BUN SERPL-MCNC: 41 MG/DL (ref 8–23)
BUN SERPL-MCNC: 42 MG/DL (ref 8–23)
BUN SERPL-MCNC: 42 MG/DL (ref 8–23)
BUN SERPL-MCNC: 45 MG/DL (ref 8–23)
BUN SERPL-MCNC: 47 MG/DL (ref 8–23)
BUN SERPL-MCNC: 52 MG/DL (ref 8–23)
BUN SERPL-MCNC: 59 MG/DL (ref 8–23)
BUN SERPL-MCNC: 59 MG/DL (ref 8–23)
BUN SERPL-MCNC: 60 MG/DL (ref 8–23)
BUN SERPL-MCNC: 60 MG/DL (ref 8–23)
BUN SERPL-MCNC: 65 MG/DL (ref 8–23)
BUN SERPL-MCNC: 67 MG/DL (ref 8–23)
BUN SERPL-MCNC: 73 MG/DL (ref 8–23)
CALCIUM SERPL-MCNC: 6.2 MG/DL (ref 8.3–10.4)
CALCIUM SERPL-MCNC: 6.6 MG/DL (ref 8.3–10.4)
CALCIUM SERPL-MCNC: 6.9 MG/DL (ref 8.3–10.4)
CALCIUM SERPL-MCNC: 7.1 MG/DL (ref 8.3–10.4)
CALCIUM SERPL-MCNC: 7.2 MG/DL (ref 8.3–10.4)
CALCIUM SERPL-MCNC: 7.5 MG/DL (ref 8.3–10.4)
CALCIUM SERPL-MCNC: 7.8 MG/DL (ref 8.3–10.4)
CALCIUM SERPL-MCNC: 8.4 MG/DL (ref 8.3–10.4)
CALCIUM SERPL-MCNC: 8.6 MG/DL (ref 8.3–10.4)
CALCIUM SERPL-MCNC: 8.7 MG/DL (ref 8.3–10.4)
CALCIUM SERPL-MCNC: 8.9 MG/DL (ref 8.3–10.4)
CALCIUM SERPL-MCNC: 9 MG/DL (ref 8.3–10.4)
CALCIUM SERPL-MCNC: 9 MG/DL (ref 8.3–10.4)
CALCIUM SERPL-MCNC: 9.2 MG/DL (ref 8.3–10.4)
CALCIUM SERPL-MCNC: 9.9 MG/DL (ref 8.3–10.4)
CALCULATED P AXIS, ECG09: -162 DEGREES
CALCULATED R AXIS, ECG10: 100 DEGREES
CALCULATED R AXIS, ECG10: 102 DEGREES
CALCULATED R AXIS, ECG10: 105 DEGREES
CALCULATED R AXIS, ECG10: 109 DEGREES
CALCULATED R AXIS, ECG10: 117 DEGREES
CALCULATED R AXIS, ECG10: 53 DEGREES
CALCULATED R AXIS, ECG10: 87 DEGREES
CALCULATED T AXIS, ECG11: -136 DEGREES
CALCULATED T AXIS, ECG11: -154 DEGREES
CALCULATED T AXIS, ECG11: -155 DEGREES
CALCULATED T AXIS, ECG11: -155 DEGREES
CALCULATED T AXIS, ECG11: -173 DEGREES
CALCULATED T AXIS, ECG11: -174 DEGREES
CALCULATED T AXIS, ECG11: 173 DEGREES
CHLORIDE SERPL-SCNC: 100 MMOL/L (ref 98–107)
CHLORIDE SERPL-SCNC: 101 MMOL/L (ref 98–107)
CHLORIDE SERPL-SCNC: 97 MMOL/L (ref 98–107)
CHLORIDE SERPL-SCNC: 98 MMOL/L (ref 98–107)
CHLORIDE SERPL-SCNC: 99 MMOL/L (ref 98–107)
CHLORIDE SERPL-SCNC: 99 MMOL/L (ref 98–107)
CHOLEST SERPL-MCNC: 123 MG/DL
CK MB CFR SERPL CALC: 2.5 %
CK MB SERPL-MCNC: 7.4 NG/ML (ref 0.5–3.6)
CK SERPL-CCNC: 179 U/L (ref 21–215)
CK SERPL-CCNC: 302 U/L (ref 21–215)
CO2 SERPL-SCNC: 21 MMOL/L (ref 21–32)
CO2 SERPL-SCNC: 23 MMOL/L (ref 21–32)
CO2 SERPL-SCNC: 24 MMOL/L (ref 21–32)
CO2 SERPL-SCNC: 25 MMOL/L (ref 21–32)
CO2 SERPL-SCNC: 26 MMOL/L (ref 21–32)
CO2 SERPL-SCNC: 27 MMOL/L (ref 21–32)
CO2 SERPL-SCNC: 28 MMOL/L (ref 21–32)
CO2 SERPL-SCNC: 28 MMOL/L (ref 21–32)
CO2 SERPL-SCNC: 29 MMOL/L (ref 21–32)
COLOR FLD: NORMAL
CORTIS AM PEAK SERPL-MCNC: 21.1 UG/DL (ref 7–25)
CREAT SERPL-MCNC: 5.21 MG/DL (ref 0.8–1.5)
CREAT SERPL-MCNC: 5.3 MG/DL (ref 0.8–1.5)
CREAT SERPL-MCNC: 5.56 MG/DL (ref 0.8–1.5)
CREAT SERPL-MCNC: 6.02 MG/DL (ref 0.8–1.5)
CREAT SERPL-MCNC: 6.08 MG/DL (ref 0.8–1.5)
CREAT SERPL-MCNC: 6.15 MG/DL (ref 0.8–1.5)
CREAT SERPL-MCNC: 6.26 MG/DL (ref 0.8–1.5)
CREAT SERPL-MCNC: 6.35 MG/DL (ref 0.8–1.5)
CREAT SERPL-MCNC: 6.37 MG/DL (ref 0.8–1.5)
CREAT SERPL-MCNC: 6.47 MG/DL (ref 0.8–1.5)
CREAT SERPL-MCNC: 6.83 MG/DL (ref 0.8–1.5)
CREAT SERPL-MCNC: 7.42 MG/DL (ref 0.8–1.5)
CREAT SERPL-MCNC: 7.73 MG/DL (ref 0.8–1.5)
CREAT SERPL-MCNC: 8.34 MG/DL (ref 0.8–1.5)
CREAT SERPL-MCNC: 8.82 MG/DL (ref 0.8–1.5)
CREAT SERPL-MCNC: 8.92 MG/DL (ref 0.8–1.5)
CREAT SERPL-MCNC: 9.19 MG/DL (ref 0.8–1.5)
CREAT SERPL-MCNC: 9.4 MG/DL (ref 0.8–1.5)
DIAGNOSIS, 93000: NORMAL
DIFFERENTIAL METHOD BLD: ABNORMAL
EOSINOPHIL # BLD: 0.1 K/UL (ref 0–0.8)
EOSINOPHIL # BLD: 0.2 K/UL (ref 0–0.8)
EOSINOPHIL NFR BLD: 1 % (ref 0.5–7.8)
EOSINOPHIL NFR BLD: 2 % (ref 0.5–7.8)
ERYTHROCYTE [DISTWIDTH] IN BLOOD BY AUTOMATED COUNT: 15.4 % (ref 11.9–14.6)
ERYTHROCYTE [DISTWIDTH] IN BLOOD BY AUTOMATED COUNT: 15.4 % (ref 11.9–14.6)
ERYTHROCYTE [DISTWIDTH] IN BLOOD BY AUTOMATED COUNT: 15.8 % (ref 11.9–14.6)
ERYTHROCYTE [DISTWIDTH] IN BLOOD BY AUTOMATED COUNT: 16.1 %
ERYTHROCYTE [DISTWIDTH] IN BLOOD BY AUTOMATED COUNT: 16.2 %
ERYTHROCYTE [DISTWIDTH] IN BLOOD BY AUTOMATED COUNT: 16.3 %
ERYTHROCYTE [DISTWIDTH] IN BLOOD BY AUTOMATED COUNT: 16.5 %
ERYTHROCYTE [DISTWIDTH] IN BLOOD BY AUTOMATED COUNT: 16.5 %
ERYTHROCYTE [DISTWIDTH] IN BLOOD BY AUTOMATED COUNT: 16.6 %
ERYTHROCYTE [DISTWIDTH] IN BLOOD BY AUTOMATED COUNT: 16.6 %
ERYTHROCYTE [DISTWIDTH] IN BLOOD BY AUTOMATED COUNT: 16.8 %
ERYTHROCYTE [DISTWIDTH] IN BLOOD BY AUTOMATED COUNT: 17 %
ERYTHROCYTE [DISTWIDTH] IN BLOOD BY AUTOMATED COUNT: 17.7 %
ERYTHROCYTE [DISTWIDTH] IN BLOOD BY AUTOMATED COUNT: 17.9 %
EST. AVERAGE GLUCOSE BLD GHB EST-MCNC: 103 MG/DL
EST. AVERAGE GLUCOSE BLD GHB EST-MCNC: NORMAL MG/DL
FERRITIN SERPL-MCNC: 1106 NG/ML (ref 8–388)
FUNGUS CULTURE, RFCO2T: NORMAL
FUNGUS SMEAR, RFCO1T: NORMAL
FUNGUS SPEC CULT: NORMAL
FUNGUS STAIN, 188244: NORMAL
GLOBULIN SER CALC-MCNC: 4.1 G/DL (ref 2.3–3.5)
GLOBULIN SER CALC-MCNC: 4.2 G/DL (ref 2.3–3.5)
GLOBULIN SER CALC-MCNC: 4.2 G/DL (ref 2.3–3.5)
GLOBULIN SER CALC-MCNC: 4.5 G/DL (ref 2.3–3.5)
GLOBULIN SER CALC-MCNC: 4.7 G/DL (ref 2.3–3.5)
GLUCOSE BLD STRIP.AUTO-MCNC: 101 MG/DL (ref 65–100)
GLUCOSE BLD STRIP.AUTO-MCNC: 102 MG/DL (ref 65–100)
GLUCOSE BLD STRIP.AUTO-MCNC: 104 MG/DL (ref 65–100)
GLUCOSE BLD STRIP.AUTO-MCNC: 105 MG/DL (ref 65–100)
GLUCOSE BLD STRIP.AUTO-MCNC: 106 MG/DL (ref 65–100)
GLUCOSE BLD STRIP.AUTO-MCNC: 107 MG/DL (ref 65–100)
GLUCOSE BLD STRIP.AUTO-MCNC: 111 MG/DL (ref 65–100)
GLUCOSE BLD STRIP.AUTO-MCNC: 113 MG/DL (ref 65–100)
GLUCOSE BLD STRIP.AUTO-MCNC: 114 MG/DL (ref 65–100)
GLUCOSE BLD STRIP.AUTO-MCNC: 126 MG/DL (ref 65–100)
GLUCOSE BLD STRIP.AUTO-MCNC: 136 MG/DL (ref 65–100)
GLUCOSE BLD STRIP.AUTO-MCNC: 137 MG/DL (ref 65–100)
GLUCOSE BLD STRIP.AUTO-MCNC: 144 MG/DL (ref 65–100)
GLUCOSE BLD STRIP.AUTO-MCNC: 148 MG/DL (ref 65–100)
GLUCOSE BLD STRIP.AUTO-MCNC: 148 MG/DL (ref 65–100)
GLUCOSE BLD STRIP.AUTO-MCNC: 164 MG/DL (ref 65–100)
GLUCOSE BLD STRIP.AUTO-MCNC: 165 MG/DL (ref 65–100)
GLUCOSE BLD STRIP.AUTO-MCNC: 191 MG/DL (ref 65–100)
GLUCOSE BLD STRIP.AUTO-MCNC: 52 MG/DL (ref 65–100)
GLUCOSE BLD STRIP.AUTO-MCNC: 55 MG/DL (ref 65–100)
GLUCOSE BLD STRIP.AUTO-MCNC: 59 MG/DL (ref 65–100)
GLUCOSE BLD STRIP.AUTO-MCNC: 60 MG/DL (ref 65–100)
GLUCOSE BLD STRIP.AUTO-MCNC: 77 MG/DL (ref 65–100)
GLUCOSE BLD STRIP.AUTO-MCNC: 77 MG/DL (ref 65–100)
GLUCOSE BLD STRIP.AUTO-MCNC: 78 MG/DL (ref 65–100)
GLUCOSE BLD STRIP.AUTO-MCNC: 79 MG/DL (ref 65–100)
GLUCOSE BLD STRIP.AUTO-MCNC: 80 MG/DL (ref 65–100)
GLUCOSE BLD STRIP.AUTO-MCNC: 80 MG/DL (ref 65–100)
GLUCOSE BLD STRIP.AUTO-MCNC: 81 MG/DL (ref 65–100)
GLUCOSE BLD STRIP.AUTO-MCNC: 82 MG/DL (ref 65–100)
GLUCOSE BLD STRIP.AUTO-MCNC: 86 MG/DL (ref 65–100)
GLUCOSE BLD STRIP.AUTO-MCNC: 86 MG/DL (ref 65–100)
GLUCOSE BLD STRIP.AUTO-MCNC: 89 MG/DL (ref 65–100)
GLUCOSE BLD STRIP.AUTO-MCNC: 90 MG/DL (ref 65–100)
GLUCOSE BLD STRIP.AUTO-MCNC: 90 MG/DL (ref 65–100)
GLUCOSE BLD STRIP.AUTO-MCNC: 94 MG/DL (ref 65–100)
GLUCOSE BLD STRIP.AUTO-MCNC: 95 MG/DL (ref 65–100)
GLUCOSE BLD STRIP.AUTO-MCNC: 97 MG/DL (ref 65–100)
GLUCOSE BLD STRIP.AUTO-MCNC: 98 MG/DL (ref 65–100)
GLUCOSE BLD STRIP.AUTO-MCNC: 99 MG/DL (ref 65–100)
GLUCOSE FLD-MCNC: 120 MG/DL
GLUCOSE SERPL-MCNC: 100 MG/DL (ref 65–100)
GLUCOSE SERPL-MCNC: 102 MG/DL (ref 65–100)
GLUCOSE SERPL-MCNC: 104 MG/DL (ref 65–100)
GLUCOSE SERPL-MCNC: 119 MG/DL (ref 65–100)
GLUCOSE SERPL-MCNC: 132 MG/DL (ref 65–100)
GLUCOSE SERPL-MCNC: 134 MG/DL (ref 65–100)
GLUCOSE SERPL-MCNC: 146 MG/DL (ref 65–100)
GLUCOSE SERPL-MCNC: 171 MG/DL (ref 65–100)
GLUCOSE SERPL-MCNC: 181 MG/DL (ref 65–100)
GLUCOSE SERPL-MCNC: 66 MG/DL (ref 65–100)
GLUCOSE SERPL-MCNC: 72 MG/DL (ref 65–100)
GLUCOSE SERPL-MCNC: 84 MG/DL (ref 65–100)
GLUCOSE SERPL-MCNC: 84 MG/DL (ref 65–100)
GLUCOSE SERPL-MCNC: 86 MG/DL (ref 65–100)
GLUCOSE SERPL-MCNC: 87 MG/DL (ref 65–100)
GLUCOSE SERPL-MCNC: 89 MG/DL (ref 65–100)
GLUCOSE SERPL-MCNC: 92 MG/DL (ref 65–100)
GLUCOSE SERPL-MCNC: 99 MG/DL (ref 65–100)
GRAM STN SPEC: NORMAL
HBA1C MFR BLD: 4.9 % (ref 4.8–6)
HBA1C MFR BLD: 5.2 % (ref 4.8–6)
HCT VFR BLD AUTO: 30.4 % (ref 41.1–50.3)
HCT VFR BLD AUTO: 30.6 % (ref 41.1–50.3)
HCT VFR BLD AUTO: 31 % (ref 41.1–50.3)
HCT VFR BLD AUTO: 32.4 % (ref 41.1–50.3)
HCT VFR BLD AUTO: 32.5 % (ref 41.1–50.3)
HCT VFR BLD AUTO: 33.1 % (ref 41.1–50.3)
HCT VFR BLD AUTO: 34.1 % (ref 41.1–50.3)
HCT VFR BLD AUTO: 34.7 % (ref 41.1–50.3)
HCT VFR BLD AUTO: 34.9 % (ref 41.1–50.3)
HCT VFR BLD AUTO: 35 % (ref 41.1–50.3)
HCT VFR BLD AUTO: 35.8 % (ref 41.1–50.3)
HCT VFR BLD AUTO: 35.9 % (ref 41.1–50.3)
HCT VFR BLD AUTO: 37.8 % (ref 41.1–50.3)
HCT VFR BLD AUTO: 38 % (ref 41.1–50.3)
HCT VFR BLD AUTO: 38.2 % (ref 41.1–50.3)
HDLC SERPL-MCNC: 40 MG/DL (ref 40–60)
HDLC SERPL: 3.1 {RATIO}
HGB BLD-MCNC: 10.1 G/DL (ref 13.6–17.2)
HGB BLD-MCNC: 10.3 G/DL (ref 13.6–17.2)
HGB BLD-MCNC: 10.8 G/DL (ref 13.6–17.2)
HGB BLD-MCNC: 10.9 G/DL (ref 13.6–17.2)
HGB BLD-MCNC: 10.9 G/DL (ref 13.6–17.2)
HGB BLD-MCNC: 11 G/DL (ref 13.6–17.2)
HGB BLD-MCNC: 11.3 G/DL (ref 13.6–17.2)
HGB BLD-MCNC: 11.4 G/DL (ref 13.6–17.2)
HGB BLD-MCNC: 11.5 G/DL (ref 13.6–17.2)
HGB BLD-MCNC: 11.8 G/DL (ref 13.6–17.2)
HGB BLD-MCNC: 12 G/DL (ref 13.6–17.2)
HGB BLD-MCNC: 12.1 G/DL (ref 13.6–17.2)
HGB BLD-MCNC: 9.4 G/DL (ref 13.6–17.2)
HGB BLD-MCNC: 9.5 G/DL (ref 13.6–17.2)
HGB BLD-MCNC: 9.7 G/DL (ref 13.6–17.2)
IMM GRANULOCYTES # BLD: 0 K/UL (ref 0–0.5)
IMM GRANULOCYTES # BLD: 0 K/UL (ref 0–0.5)
IMM GRANULOCYTES # BLD: 0.1 K/UL (ref 0–0.5)
IMM GRANULOCYTES # BLD: 0.2 K/UL (ref 0–0.5)
IMM GRANULOCYTES NFR BLD AUTO: 0 % (ref 0–5)
IMM GRANULOCYTES NFR BLD AUTO: 0 % (ref 0–5)
IMM GRANULOCYTES NFR BLD AUTO: 1 % (ref 0–5)
IMM GRANULOCYTES NFR BLD AUTO: 2 % (ref 0–5)
INR PPP: 1.1
IRON SATN MFR SERPL: 25 %
IRON SERPL-MCNC: 60 UG/DL (ref 35–150)
IRON SERPL-MCNC: 60 UG/DL (ref 35–150)
LACTATE BLD-SCNC: 1.21 MMOL/L (ref 0.5–1.9)
LDH FLD L TO P-CCNC: 52 U/L
LDLC SERPL CALC-MCNC: 51.6 MG/DL
LIPASE SERPL-CCNC: 387 U/L (ref 73–393)
LIPID PROFILE,FLP: ABNORMAL
LYMPHOCYTES # BLD: 1 K/UL (ref 0.5–4.6)
LYMPHOCYTES # BLD: 1.1 K/UL (ref 0.5–4.6)
LYMPHOCYTES # BLD: 1.4 K/UL (ref 0.5–4.6)
LYMPHOCYTES # BLD: 1.7 K/UL (ref 0.5–4.6)
LYMPHOCYTES # BLD: 1.9 K/UL (ref 0.5–4.6)
LYMPHOCYTES # BLD: 1.9 K/UL (ref 0.5–4.6)
LYMPHOCYTES NFR BLD: 11 % (ref 13–44)
LYMPHOCYTES NFR BLD: 13 % (ref 13–44)
LYMPHOCYTES NFR BLD: 15 % (ref 13–44)
LYMPHOCYTES NFR BLD: 15 % (ref 13–44)
LYMPHOCYTES NFR BLD: 21 % (ref 13–44)
LYMPHOCYTES NFR BLD: 7 % (ref 13–44)
LYMPHOCYTES NFR BLD: 9 % (ref 13–44)
MAGNESIUM SERPL-MCNC: 2.2 MG/DL (ref 1.8–2.4)
MAGNESIUM SERPL-MCNC: 2.4 MG/DL (ref 1.8–2.4)
MAGNESIUM SERPL-MCNC: 2.5 MG/DL (ref 1.8–2.4)
MAGNESIUM SERPL-MCNC: 2.5 MG/DL (ref 1.8–2.4)
MAGNESIUM SERPL-MCNC: 2.6 MG/DL (ref 1.8–2.4)
MAGNESIUM SERPL-MCNC: 2.6 MG/DL (ref 1.8–2.4)
MCH RBC QN AUTO: 33.7 PG (ref 26.1–32.9)
MCH RBC QN AUTO: 33.7 PG (ref 26.1–32.9)
MCH RBC QN AUTO: 33.9 PG (ref 26.1–32.9)
MCH RBC QN AUTO: 34.9 PG (ref 26.1–32.9)
MCH RBC QN AUTO: 34.9 PG (ref 26.1–32.9)
MCH RBC QN AUTO: 35 PG (ref 26.1–32.9)
MCH RBC QN AUTO: 35.2 PG (ref 26.1–32.9)
MCH RBC QN AUTO: 35.3 PG (ref 26.1–32.9)
MCH RBC QN AUTO: 35.4 PG (ref 26.1–32.9)
MCH RBC QN AUTO: 35.5 PG (ref 26.1–32.9)
MCH RBC QN AUTO: 35.5 PG (ref 26.1–32.9)
MCH RBC QN AUTO: 35.6 PG (ref 26.1–32.9)
MCH RBC QN AUTO: 35.6 PG (ref 26.1–32.9)
MCH RBC QN AUTO: 35.7 PG (ref 26.1–32.9)
MCHC RBC AUTO-ENTMCNC: 30.6 G/DL (ref 31.4–35)
MCHC RBC AUTO-ENTMCNC: 30.7 G/DL (ref 31.4–35)
MCHC RBC AUTO-ENTMCNC: 31.1 G/DL (ref 31.4–35)
MCHC RBC AUTO-ENTMCNC: 31.1 G/DL (ref 31.4–35)
MCHC RBC AUTO-ENTMCNC: 31.2 G/DL (ref 31.4–35)
MCHC RBC AUTO-ENTMCNC: 31.2 G/DL (ref 31.4–35)
MCHC RBC AUTO-ENTMCNC: 31.4 G/DL (ref 31.4–35)
MCHC RBC AUTO-ENTMCNC: 31.4 G/DL (ref 31.4–35)
MCHC RBC AUTO-ENTMCNC: 31.9 G/DL (ref 31.4–35)
MCHC RBC AUTO-ENTMCNC: 32 G/DL (ref 31.4–35)
MCHC RBC AUTO-ENTMCNC: 32.6 G/DL (ref 31.4–35)
MCHC RBC AUTO-ENTMCNC: 33.2 G/DL (ref 31.4–35)
MCV RBC AUTO: 106.9 FL (ref 79.6–97.8)
MCV RBC AUTO: 107.1 FL (ref 79.6–97.8)
MCV RBC AUTO: 108.7 FL (ref 79.6–97.8)
MCV RBC AUTO: 109.3 FL (ref 79.6–97.8)
MCV RBC AUTO: 109.7 FL (ref 79.6–97.8)
MCV RBC AUTO: 109.9 FL (ref 79.6–97.8)
MCV RBC AUTO: 109.9 FL (ref 79.6–97.8)
MCV RBC AUTO: 111.1 FL (ref 79.6–97.8)
MCV RBC AUTO: 111.4 FL (ref 79.6–97.8)
MCV RBC AUTO: 111.5 FL (ref 79.6–97.8)
MCV RBC AUTO: 112.9 FL (ref 79.6–97.8)
MCV RBC AUTO: 113.7 FL (ref 79.6–97.8)
MCV RBC AUTO: 113.7 FL (ref 79.6–97.8)
MCV RBC AUTO: 114.8 FL (ref 79.6–97.8)
MM INDURATION POC: 0 MM (ref 0–5)
MM INDURATION POC: NORMAL 0 (ref 0–5)
MM INDURATION POC: NORMAL MM (ref 0–5)
MONOCYTES # BLD: 0.4 K/UL (ref 0.1–1.3)
MONOCYTES # BLD: 0.6 K/UL (ref 0.1–1.3)
MONOCYTES # BLD: 1.2 K/UL (ref 0.1–1.3)
MONOCYTES # BLD: 1.2 K/UL (ref 0.1–1.3)
MONOCYTES # BLD: 1.3 K/UL (ref 0.1–1.3)
MONOCYTES # BLD: 1.3 K/UL (ref 0.1–1.3)
MONOCYTES # BLD: 1.4 K/UL (ref 0.1–1.3)
MONOCYTES # BLD: 1.6 K/UL (ref 0.1–1.3)
MONOCYTES # BLD: 2.2 K/UL (ref 0.1–1.3)
MONOCYTES NFR BLD: 10 % (ref 4–12)
MONOCYTES NFR BLD: 11 % (ref 4–12)
MONOCYTES NFR BLD: 13 % (ref 4–12)
MONOCYTES NFR BLD: 15 % (ref 4–12)
MONOCYTES NFR BLD: 17 % (ref 4–12)
MONOCYTES NFR BLD: 4 % (ref 4–12)
MONOCYTES NFR BLD: 5 % (ref 4–12)
MYCOBACTERIUM SPEC QL CULT: NEGATIVE
NEUTS SEG # BLD: 10.6 K/UL (ref 1.7–8.2)
NEUTS SEG # BLD: 11.8 K/UL (ref 1.7–8.2)
NEUTS SEG # BLD: 6.2 K/UL (ref 1.7–8.2)
NEUTS SEG # BLD: 6.5 K/UL (ref 1.7–8.2)
NEUTS SEG # BLD: 7.2 K/UL (ref 1.7–8.2)
NEUTS SEG # BLD: 8.2 K/UL (ref 1.7–8.2)
NEUTS SEG # BLD: 8.7 K/UL (ref 1.7–8.2)
NEUTS SEG # BLD: 8.9 K/UL (ref 1.7–8.2)
NEUTS SEG # BLD: 9 K/UL (ref 1.7–8.2)
NEUTS SEG NFR BLD: 65 % (ref 43–78)
NEUTS SEG NFR BLD: 66 % (ref 43–78)
NEUTS SEG NFR BLD: 73 % (ref 43–78)
NEUTS SEG NFR BLD: 75 % (ref 43–78)
NEUTS SEG NFR BLD: 76 % (ref 43–78)
NEUTS SEG NFR BLD: 78 % (ref 43–78)
NEUTS SEG NFR BLD: 78 % (ref 43–78)
NEUTS SEG NFR BLD: 80 % (ref 43–78)
NEUTS SEG NFR BLD: 80 % (ref 43–78)
NRBC # BLD: 0 K/UL (ref 0–0.2)
NRBC # BLD: 0.03 K/UL (ref 0–0.2)
NRBC # BLD: 0.03 K/UL (ref 0–0.2)
NRBC # BLD: 0.04 K/UL (ref 0–0.2)
NRBC # BLD: 0.06 K/UL (ref 0–0.2)
NRBC # BLD: 0.15 K/UL (ref 0–0.2)
NUC CELL # FLD: 38 /CU MM
PHOSPHATE SERPL-MCNC: 4.5 MG/DL (ref 2.3–3.7)
PHOSPHATE SERPL-MCNC: 4.9 MG/DL (ref 2.3–3.7)
PLATELET # BLD AUTO: 124 K/UL (ref 150–450)
PLATELET # BLD AUTO: 127 K/UL (ref 150–450)
PLATELET # BLD AUTO: 139 K/UL (ref 150–450)
PLATELET # BLD AUTO: 174 K/UL (ref 150–450)
PLATELET # BLD AUTO: 177 K/UL (ref 150–450)
PLATELET # BLD AUTO: 187 K/UL (ref 150–450)
PLATELET # BLD AUTO: 189 K/UL (ref 150–450)
PLATELET # BLD AUTO: 190 K/UL (ref 150–450)
PLATELET # BLD AUTO: 207 K/UL (ref 150–450)
PLATELET # BLD AUTO: 211 K/UL (ref 150–450)
PLATELET # BLD AUTO: 220 K/UL (ref 150–450)
PLATELET # BLD AUTO: 223 K/UL (ref 150–450)
PLATELET # BLD AUTO: 233 K/UL (ref 150–450)
PLATELET # BLD AUTO: 244 K/UL (ref 150–450)
PMV BLD AUTO: 10 FL (ref 9.4–12.3)
PMV BLD AUTO: 10.1 FL (ref 9.4–12.3)
PMV BLD AUTO: 10.2 FL (ref 9.4–12.3)
PMV BLD AUTO: 10.4 FL (ref 9.4–12.3)
PMV BLD AUTO: 10.5 FL (ref 9.4–12.3)
PMV BLD AUTO: 10.5 FL (ref 9.4–12.3)
PMV BLD AUTO: 10.6 FL (ref 9.4–12.3)
PMV BLD AUTO: 10.7 FL (ref 9.4–12.3)
PMV BLD AUTO: 9.3 FL (ref 10.8–14.1)
PMV BLD AUTO: 9.3 FL (ref 10.8–14.1)
PMV BLD AUTO: 9.8 FL (ref 10.8–14.1)
PMV BLD AUTO: 9.8 FL (ref 9.4–12.3)
POTASSIUM BLD-SCNC: 4.3 MMOL/L (ref 3.5–5.1)
POTASSIUM SERPL-SCNC: 3.8 MMOL/L (ref 3.5–5.1)
POTASSIUM SERPL-SCNC: 3.9 MMOL/L (ref 3.5–5.1)
POTASSIUM SERPL-SCNC: 3.9 MMOL/L (ref 3.5–5.1)
POTASSIUM SERPL-SCNC: 4 MMOL/L (ref 3.5–5.1)
POTASSIUM SERPL-SCNC: 4.1 MMOL/L (ref 3.5–5.1)
POTASSIUM SERPL-SCNC: 4.2 MMOL/L (ref 3.5–5.1)
POTASSIUM SERPL-SCNC: 4.3 MMOL/L (ref 3.5–5.1)
POTASSIUM SERPL-SCNC: 4.3 MMOL/L (ref 3.5–5.1)
POTASSIUM SERPL-SCNC: 4.6 MMOL/L (ref 3.5–5.1)
POTASSIUM SERPL-SCNC: 4.8 MMOL/L (ref 3.5–5.1)
POTASSIUM SERPL-SCNC: 4.8 MMOL/L (ref 3.5–5.1)
POTASSIUM SERPL-SCNC: 4.9 MMOL/L (ref 3.5–5.1)
POTASSIUM SERPL-SCNC: 5 MMOL/L (ref 3.5–5.1)
POTASSIUM SERPL-SCNC: 5.1 MMOL/L (ref 3.5–5.1)
POTASSIUM SERPL-SCNC: 5.2 MMOL/L (ref 3.5–5.1)
POTASSIUM SERPL-SCNC: 6.1 MMOL/L (ref 3.5–5.1)
PPD POC: NEGATIVE NEGATIVE
PPD POC: NORMAL NEGATIVE
PPD POC: NORMAL NEGATIVE
PROT FLD-MCNC: 1 G/DL
PROT SERPL-MCNC: 6.9 G/DL (ref 6.3–8.2)
PROT SERPL-MCNC: 7.1 G/DL (ref 6.3–8.2)
PROT SERPL-MCNC: 7.4 G/DL (ref 6.3–8.2)
PROT SERPL-MCNC: 7.5 G/DL (ref 6.3–8.2)
PROT SERPL-MCNC: 8.1 G/DL (ref 6.3–8.2)
PROTHROMBIN TIME: 14.2 SEC (ref 11.5–14.5)
Q-T INTERVAL, ECG07: 322 MS
Q-T INTERVAL, ECG07: 348 MS
Q-T INTERVAL, ECG07: 356 MS
Q-T INTERVAL, ECG07: 366 MS
Q-T INTERVAL, ECG07: 366 MS
Q-T INTERVAL, ECG07: 372 MS
Q-T INTERVAL, ECG07: 432 MS
QRS DURATION, ECG06: 138 MS
QRS DURATION, ECG06: 82 MS
QRS DURATION, ECG06: 84 MS
QRS DURATION, ECG06: 86 MS
QRS DURATION, ECG06: 86 MS
QRS DURATION, ECG06: 88 MS
QRS DURATION, ECG06: 88 MS
QTC CALCULATION (BEZET), ECG08: 397 MS
QTC CALCULATION (BEZET), ECG08: 406 MS
QTC CALCULATION (BEZET), ECG08: 442 MS
QTC CALCULATION (BEZET), ECG08: 452 MS
QTC CALCULATION (BEZET), ECG08: 460 MS
QTC CALCULATION (BEZET), ECG08: 481 MS
QTC CALCULATION (BEZET), ECG08: 514 MS
RBC # BLD AUTO: 2.73 M/UL (ref 4.23–5.6)
RBC # BLD AUTO: 2.79 M/UL (ref 4.23–5.6)
RBC # BLD AUTO: 2.82 M/UL (ref 4.23–5.6)
RBC # BLD AUTO: 2.91 M/UL (ref 4.23–5.6)
RBC # BLD AUTO: 2.98 M/UL (ref 4.23–5.6)
RBC # BLD AUTO: 3.04 M/UL (ref 4.23–5.67)
RBC # BLD AUTO: 3.09 M/UL (ref 4.23–5.6)
RBC # BLD AUTO: 3.12 M/UL (ref 4.23–5.67)
RBC # BLD AUTO: 3.14 M/UL (ref 4.23–5.6)
RBC # BLD AUTO: 3.23 M/UL (ref 4.23–5.6)
RBC # BLD AUTO: 3.24 M/UL (ref 4.23–5.67)
RBC # BLD AUTO: 3.31 M/UL (ref 4.23–5.6)
RBC # BLD AUTO: 3.36 M/UL (ref 4.23–5.6)
RBC # BLD AUTO: 3.44 M/UL (ref 4.23–5.6)
RBC # FLD: NORMAL /CU MM
REFLEX TO ID, RFCO3T: NORMAL
SERVICE CMNT-IMP: NORMAL
SERVICE CMNT-IMP: NORMAL
SODIUM SERPL-SCNC: 134 MMOL/L (ref 136–145)
SODIUM SERPL-SCNC: 135 MMOL/L (ref 136–145)
SODIUM SERPL-SCNC: 136 MMOL/L (ref 136–145)
SODIUM SERPL-SCNC: 136 MMOL/L (ref 136–145)
SODIUM SERPL-SCNC: 137 MMOL/L (ref 136–145)
SODIUM SERPL-SCNC: 138 MMOL/L (ref 136–145)
SODIUM SERPL-SCNC: 139 MMOL/L (ref 136–145)
SODIUM SERPL-SCNC: 139 MMOL/L (ref 136–145)
SODIUM SERPL-SCNC: 140 MMOL/L (ref 136–145)
SODIUM SERPL-SCNC: 141 MMOL/L (ref 136–145)
SODIUM SERPL-SCNC: 141 MMOL/L (ref 136–145)
SPECIMEN PREPARATION: NORMAL
SPECIMEN SOURCE FLD: NORMAL
SPECIMEN SOURCE: NORMAL
TIBC SERPL-MCNC: 237 UG/DL (ref 250–450)
TRIGL SERPL-MCNC: 157 MG/DL (ref 35–150)
TROPONIN I BLD-MCNC: 0.01 NG/ML (ref 0.02–0.05)
TROPONIN I BLD-MCNC: 0.03 NG/ML (ref 0.02–0.05)
TROPONIN I BLD-MCNC: 0.1 NG/ML (ref 0.02–0.05)
TROPONIN I BLD-MCNC: 0.17 NG/ML (ref 0.02–0.05)
TROPONIN I SERPL-MCNC: 0.03 NG/ML (ref 0.02–0.05)
TROPONIN I SERPL-MCNC: 0.03 NG/ML (ref 0.02–0.05)
TROPONIN I SERPL-MCNC: 0.11 NG/ML (ref 0.02–0.05)
TROPONIN I SERPL-MCNC: 0.54 NG/ML (ref 0.02–0.05)
TROPONIN I SERPL-MCNC: 0.64 NG/ML (ref 0.02–0.05)
TROPONIN I SERPL-MCNC: 0.66 NG/ML (ref 0.02–0.05)
VENTRICULAR RATE, ECG03: 104 BPM
VENTRICULAR RATE, ECG03: 123 BPM
VENTRICULAR RATE, ECG03: 75 BPM
VENTRICULAR RATE, ECG03: 82 BPM
VENTRICULAR RATE, ECG03: 85 BPM
VENTRICULAR RATE, ECG03: 88 BPM
VENTRICULAR RATE, ECG03: 89 BPM
VLDLC SERPL CALC-MCNC: 31.4 MG/DL (ref 6–23)
WBC # BLD AUTO: 10.7 K/UL (ref 4.3–11.1)
WBC # BLD AUTO: 11 K/UL (ref 4.3–11.1)
WBC # BLD AUTO: 11.7 K/UL (ref 4.3–11.1)
WBC # BLD AUTO: 12.7 K/UL (ref 4.3–11.1)
WBC # BLD AUTO: 12.9 K/UL (ref 4.3–11.1)
WBC # BLD AUTO: 13 K/UL (ref 4.3–11.1)
WBC # BLD AUTO: 13 K/UL (ref 4.3–11.1)
WBC # BLD AUTO: 14.9 K/UL (ref 4.3–11.1)
WBC # BLD AUTO: 9.1 K/UL (ref 4.3–11.1)
WBC # BLD AUTO: 9.4 K/UL (ref 4.3–11.1)
WBC # BLD AUTO: 9.6 K/UL (ref 4.3–11.1)
WBC # BLD AUTO: 9.8 K/UL (ref 4.3–11.1)

## 2018-01-01 PROCEDURE — 74011250637 HC RX REV CODE- 250/637: Performed by: INTERNAL MEDICINE

## 2018-01-01 PROCEDURE — 80048 BASIC METABOLIC PNL TOTAL CA: CPT

## 2018-01-01 PROCEDURE — 99218 HC RM OBSERVATION: CPT

## 2018-01-01 PROCEDURE — 82962 GLUCOSE BLOOD TEST: CPT

## 2018-01-01 PROCEDURE — 65660000000 HC RM CCU STEPDOWN

## 2018-01-01 PROCEDURE — 96372 THER/PROPH/DIAG INJ SC/IM: CPT

## 2018-01-01 PROCEDURE — 85027 COMPLETE CBC AUTOMATED: CPT

## 2018-01-01 PROCEDURE — 97530 THERAPEUTIC ACTIVITIES: CPT

## 2018-01-01 PROCEDURE — 88112 CYTOPATH CELL ENHANCE TECH: CPT | Performed by: INTERNAL MEDICINE

## 2018-01-01 PROCEDURE — 82728 ASSAY OF FERRITIN: CPT

## 2018-01-01 PROCEDURE — 83690 ASSAY OF LIPASE: CPT

## 2018-01-01 PROCEDURE — 83615 LACTATE (LD) (LDH) ENZYME: CPT | Performed by: INTERNAL MEDICINE

## 2018-01-01 PROCEDURE — 90935 HEMODIALYSIS ONE EVALUATION: CPT

## 2018-01-01 PROCEDURE — 36415 COLL VENOUS BLD VENIPUNCTURE: CPT

## 2018-01-01 PROCEDURE — 77030019605

## 2018-01-01 PROCEDURE — G8979 MOBILITY GOAL STATUS: HCPCS

## 2018-01-01 PROCEDURE — 99285 EMERGENCY DEPT VISIT HI MDM: CPT | Performed by: EMERGENCY MEDICINE

## 2018-01-01 PROCEDURE — 83735 ASSAY OF MAGNESIUM: CPT

## 2018-01-01 PROCEDURE — 74011250636 HC RX REV CODE- 250/636: Performed by: HOSPITALIST

## 2018-01-01 PROCEDURE — 74011000250 HC RX REV CODE- 250: Performed by: HOSPITALIST

## 2018-01-01 PROCEDURE — 0JH632Z INSERTION OF MONITORING DEVICE INTO CHEST SUBCUTANEOUS TISSUE AND FASCIA, PERCUTANEOUS APPROACH: ICD-10-PCS | Performed by: INTERNAL MEDICINE

## 2018-01-01 PROCEDURE — C1887 CATHETER, GUIDING: HCPCS

## 2018-01-01 PROCEDURE — 74011250636 HC RX REV CODE- 250/636

## 2018-01-01 PROCEDURE — 74640000003 HC CRRT SET UP OR EXCHANGE

## 2018-01-01 PROCEDURE — 90471 IMMUNIZATION ADMIN: CPT

## 2018-01-01 PROCEDURE — 84484 ASSAY OF TROPONIN QUANT: CPT

## 2018-01-01 PROCEDURE — 80048 BASIC METABOLIC PNL TOTAL CA: CPT | Performed by: FAMILY MEDICINE

## 2018-01-01 PROCEDURE — 36591 DRAW BLOOD OFF VENOUS DEVICE: CPT

## 2018-01-01 PROCEDURE — 74011250636 HC RX REV CODE- 250/636: Performed by: INTERNAL MEDICINE

## 2018-01-01 PROCEDURE — 99285 EMERGENCY DEPT VISIT HI MDM: CPT

## 2018-01-01 PROCEDURE — 70450 CT HEAD/BRAIN W/O DYE: CPT

## 2018-01-01 PROCEDURE — 80053 COMPREHEN METABOLIC PANEL: CPT

## 2018-01-01 PROCEDURE — 77030011256 HC DRSG MEPILEX <16IN NO BORD MOLN -A

## 2018-01-01 PROCEDURE — 80069 RENAL FUNCTION PANEL: CPT

## 2018-01-01 PROCEDURE — 94640 AIRWAY INHALATION TREATMENT: CPT

## 2018-01-01 PROCEDURE — 97161 PT EVAL LOW COMPLEX 20 MIN: CPT

## 2018-01-01 PROCEDURE — 77030037400 HC ADH TISS HI VISC EXOFIN CHMP -B

## 2018-01-01 PROCEDURE — 99204 OFFICE O/P NEW MOD 45 MIN: CPT | Performed by: INTERNAL MEDICINE

## 2018-01-01 PROCEDURE — 74011000258 HC RX REV CODE- 258: Performed by: INTERNAL MEDICINE

## 2018-01-01 PROCEDURE — 99284 EMERGENCY DEPT VISIT MOD MDM: CPT | Performed by: EMERGENCY MEDICINE

## 2018-01-01 PROCEDURE — 74011250637 HC RX REV CODE- 250/637: Performed by: HOSPITALIST

## 2018-01-01 PROCEDURE — G8978 MOBILITY CURRENT STATUS: HCPCS

## 2018-01-01 PROCEDURE — 80069 RENAL FUNCTION PANEL: CPT | Performed by: INTERNAL MEDICINE

## 2018-01-01 PROCEDURE — 65270000029 HC RM PRIVATE

## 2018-01-01 PROCEDURE — 83036 HEMOGLOBIN GLYCOSYLATED A1C: CPT

## 2018-01-01 PROCEDURE — C1729 CATH, DRAINAGE: HCPCS | Performed by: INTERNAL MEDICINE

## 2018-01-01 PROCEDURE — 73562 X-RAY EXAM OF KNEE 3: CPT

## 2018-01-01 PROCEDURE — 85025 COMPLETE CBC W/AUTO DIFF WBC: CPT

## 2018-01-01 PROCEDURE — 36415 COLL VENOUS BLD VENIPUNCTURE: CPT | Performed by: INTERNAL MEDICINE

## 2018-01-01 PROCEDURE — 74011250637 HC RX REV CODE- 250/637: Performed by: NURSE PRACTITIONER

## 2018-01-01 PROCEDURE — C1751 CATH, INF, PER/CENT/MIDLINE: HCPCS

## 2018-01-01 PROCEDURE — 5A1D70Z PERFORMANCE OF URINARY FILTRATION, INTERMITTENT, LESS THAN 6 HOURS PER DAY: ICD-10-PCS | Performed by: INTERNAL MEDICINE

## 2018-01-01 PROCEDURE — 97110 THERAPEUTIC EXERCISES: CPT

## 2018-01-01 PROCEDURE — 77030018836 HC SOL IRR NACL ICUM -A: Performed by: SURGERY

## 2018-01-01 PROCEDURE — 93005 ELECTROCARDIOGRAM TRACING: CPT | Performed by: EMERGENCY MEDICINE

## 2018-01-01 PROCEDURE — 93306 TTE W/DOPPLER COMPLETE: CPT

## 2018-01-01 PROCEDURE — C1894 INTRO/SHEATH, NON-LASER: HCPCS

## 2018-01-01 PROCEDURE — 74011250637 HC RX REV CODE- 250/637: Performed by: FAMILY MEDICINE

## 2018-01-01 PROCEDURE — 73130 X-RAY EXAM OF HAND: CPT

## 2018-01-01 PROCEDURE — 74011000302 HC RX REV CODE- 302: Performed by: INTERNAL MEDICINE

## 2018-01-01 PROCEDURE — 74011250636 HC RX REV CODE- 250/636: Performed by: EMERGENCY MEDICINE

## 2018-01-01 PROCEDURE — 97165 OT EVAL LOW COMPLEX 30 MIN: CPT

## 2018-01-01 PROCEDURE — 73721 MRI JNT OF LWR EXTRE W/O DYE: CPT

## 2018-01-01 PROCEDURE — 77030012935 HC DRSG AQUACEL BMS -B

## 2018-01-01 PROCEDURE — 76060000032 HC ANESTHESIA 0.5 TO 1 HR: Performed by: SURGERY

## 2018-01-01 PROCEDURE — 32555 ASPIRATE PLEURA W/ IMAGING: CPT | Performed by: INTERNAL MEDICINE

## 2018-01-01 PROCEDURE — 87102 FUNGUS ISOLATION CULTURE: CPT | Performed by: INTERNAL MEDICINE

## 2018-01-01 PROCEDURE — 74011000250 HC RX REV CODE- 250: Performed by: INTERNAL MEDICINE

## 2018-01-01 PROCEDURE — C1769 GUIDE WIRE: HCPCS

## 2018-01-01 PROCEDURE — 0S9D3ZZ DRAINAGE OF LEFT KNEE JOINT, PERCUTANEOUS APPROACH: ICD-10-PCS | Performed by: ORTHOPAEDIC SURGERY

## 2018-01-01 PROCEDURE — 83605 ASSAY OF LACTIC ACID: CPT

## 2018-01-01 PROCEDURE — 93005 ELECTROCARDIOGRAM TRACING: CPT | Performed by: HOSPITALIST

## 2018-01-01 PROCEDURE — 94760 N-INVAS EAR/PLS OXIMETRY 1: CPT

## 2018-01-01 PROCEDURE — 82553 CREATINE MB FRACTION: CPT

## 2018-01-01 PROCEDURE — 87205 SMEAR GRAM STAIN: CPT

## 2018-01-01 PROCEDURE — 80048 BASIC METABOLIC PNL TOTAL CA: CPT | Performed by: EMERGENCY MEDICINE

## 2018-01-01 PROCEDURE — 70551 MRI BRAIN STEM W/O DYE: CPT

## 2018-01-01 PROCEDURE — 74011000250 HC RX REV CODE- 250: Performed by: SURGERY

## 2018-01-01 PROCEDURE — 74011250637 HC RX REV CODE- 250/637: Performed by: EMERGENCY MEDICINE

## 2018-01-01 PROCEDURE — 89050 BODY FLUID CELL COUNT: CPT | Performed by: INTERNAL MEDICINE

## 2018-01-01 PROCEDURE — 85025 COMPLETE CBC W/AUTO DIFF WBC: CPT | Performed by: INTERNAL MEDICINE

## 2018-01-01 PROCEDURE — 88305 TISSUE EXAM BY PATHOLOGIST: CPT | Performed by: INTERNAL MEDICINE

## 2018-01-01 PROCEDURE — 87070 CULTURE OTHR SPECIMN AEROBIC: CPT | Performed by: INTERNAL MEDICINE

## 2018-01-01 PROCEDURE — 71045 X-RAY EXAM CHEST 1 VIEW: CPT

## 2018-01-01 PROCEDURE — 93460 R&L HRT ART/VENTRICLE ANGIO: CPT

## 2018-01-01 PROCEDURE — 77030004559 HC CATH ANGI DX SUPT CARD -B

## 2018-01-01 PROCEDURE — 75810000053 HC SPLINT APPLICATION: Performed by: EMERGENCY MEDICINE

## 2018-01-01 PROCEDURE — 33282 HC IMP PT CARD EVENT RECORD: CPT

## 2018-01-01 PROCEDURE — 72040 X-RAY EXAM NECK SPINE 2-3 VW: CPT

## 2018-01-01 PROCEDURE — 82533 TOTAL CORTISOL: CPT

## 2018-01-01 PROCEDURE — 86580 TB INTRADERMAL TEST: CPT | Performed by: INTERNAL MEDICINE

## 2018-01-01 PROCEDURE — 90686 IIV4 VACC NO PRSV 0.5 ML IM: CPT | Performed by: FAMILY MEDICINE

## 2018-01-01 PROCEDURE — 85018 HEMOGLOBIN: CPT | Performed by: INTERNAL MEDICINE

## 2018-01-01 PROCEDURE — 80061 LIPID PANEL: CPT

## 2018-01-01 PROCEDURE — 76040000019: Performed by: INTERNAL MEDICINE

## 2018-01-01 PROCEDURE — 77030002986 HC SUT PROL J&J -A: Performed by: SURGERY

## 2018-01-01 PROCEDURE — 84132 ASSAY OF SERUM POTASSIUM: CPT

## 2018-01-01 PROCEDURE — 77030031139 HC SUT VCRL2 J&J -A: Performed by: SURGERY

## 2018-01-01 PROCEDURE — 99152 MOD SED SAME PHYS/QHP 5/>YRS: CPT

## 2018-01-01 PROCEDURE — 85025 COMPLETE CBC W/AUTO DIFF WBC: CPT | Performed by: EMERGENCY MEDICINE

## 2018-01-01 PROCEDURE — C1760 CLOSURE DEV, VASC: HCPCS

## 2018-01-01 PROCEDURE — 77030019945 HC PDNG CST 3M -A: Performed by: EMERGENCY MEDICINE

## 2018-01-01 PROCEDURE — 93461 R&L HRT ART/VENTRICLE ANGIO: CPT

## 2018-01-01 PROCEDURE — 83036 HEMOGLOBIN GLYCOSYLATED A1C: CPT | Performed by: FAMILY MEDICINE

## 2018-01-01 PROCEDURE — 77030011640 HC PAD GRND REM COVD -A: Performed by: SURGERY

## 2018-01-01 PROCEDURE — 99153 MOD SED SAME PHYS/QHP EA: CPT

## 2018-01-01 PROCEDURE — 93005 ELECTROCARDIOGRAM TRACING: CPT | Performed by: INTERNAL MEDICINE

## 2018-01-01 PROCEDURE — 77030020246 HC SOL INJ D 10% LFCR 1000ML BG

## 2018-01-01 PROCEDURE — 83540 ASSAY OF IRON: CPT

## 2018-01-01 PROCEDURE — 82550 ASSAY OF CK (CPK): CPT

## 2018-01-01 PROCEDURE — 71046 X-RAY EXAM CHEST 2 VIEWS: CPT

## 2018-01-01 PROCEDURE — 84157 ASSAY OF PROTEIN OTHER: CPT | Performed by: INTERNAL MEDICINE

## 2018-01-01 PROCEDURE — 77030008298 HC SPLNT FBRGLS SCTCH 3M -A: Performed by: EMERGENCY MEDICINE

## 2018-01-01 PROCEDURE — 97535 SELF CARE MNGMENT TRAINING: CPT

## 2018-01-01 PROCEDURE — 76010000138 HC OR TIME 0.5 TO 1 HR: Performed by: SURGERY

## 2018-01-01 PROCEDURE — 74011250636 HC RX REV CODE- 250/636: Performed by: SURGERY

## 2018-01-01 PROCEDURE — 99214 OFFICE O/P EST MOD 30 MIN: CPT | Performed by: INTERNAL MEDICINE

## 2018-01-01 PROCEDURE — G8987 SELF CARE CURRENT STATUS: HCPCS

## 2018-01-01 PROCEDURE — 93880 EXTRACRANIAL BILAT STUDY: CPT

## 2018-01-01 PROCEDURE — 77030039266 HC ADH SKN EXOFIN S2SG -A: Performed by: SURGERY

## 2018-01-01 PROCEDURE — 96360 HYDRATION IV INFUSION INIT: CPT | Performed by: EMERGENCY MEDICINE

## 2018-01-01 PROCEDURE — G8988 SELF CARE GOAL STATUS: HCPCS

## 2018-01-01 PROCEDURE — 76210000006 HC OR PH I REC 0.5 TO 1 HR: Performed by: SURGERY

## 2018-01-01 PROCEDURE — C1764 EVENT RECORDER, CARDIAC: HCPCS

## 2018-01-01 PROCEDURE — 82945 GLUCOSE OTHER FLUID: CPT | Performed by: INTERNAL MEDICINE

## 2018-01-01 PROCEDURE — 77010033678 HC OXYGEN DAILY

## 2018-01-01 PROCEDURE — 85610 PROTHROMBIN TIME: CPT

## 2018-01-01 PROCEDURE — 77030020255 HC SOL INJ LR 1000ML BG

## 2018-01-01 PROCEDURE — 76604 US EXAM CHEST: CPT | Performed by: INTERNAL MEDICINE

## 2018-01-01 PROCEDURE — 74011250636 HC RX REV CODE- 250/636: Performed by: FAMILY MEDICINE

## 2018-01-01 PROCEDURE — 97166 OT EVAL MOD COMPLEX 45 MIN: CPT

## 2018-01-01 PROCEDURE — 74011636320 HC RX REV CODE- 636/320: Performed by: INTERNAL MEDICINE

## 2018-01-01 PROCEDURE — 87116 MYCOBACTERIA CULTURE: CPT | Performed by: INTERNAL MEDICINE

## 2018-01-01 PROCEDURE — 77030020782 HC GWN BAIR PAWS FLX 3M -B: Performed by: ANESTHESIOLOGY

## 2018-01-01 RX ORDER — DILTIAZEM HYDROCHLORIDE 180 MG/1
180 CAPSULE, COATED, EXTENDED RELEASE ORAL DAILY
Status: DISCONTINUED | OUTPATIENT
Start: 2018-01-01 | End: 2018-01-01

## 2018-01-01 RX ORDER — ACETAMINOPHEN 325 MG/1
650 TABLET ORAL
Status: DISCONTINUED | OUTPATIENT
Start: 2018-01-01 | End: 2018-01-01 | Stop reason: HOSPADM

## 2018-01-01 RX ORDER — HEPARIN SODIUM 5000 [USP'U]/ML
5000 INJECTION, SOLUTION INTRAVENOUS; SUBCUTANEOUS EVERY 8 HOURS
Status: DISCONTINUED | OUTPATIENT
Start: 2018-01-01 | End: 2018-01-01 | Stop reason: HOSPADM

## 2018-01-01 RX ORDER — CALCITRIOL 0.25 UG/1
0.5 CAPSULE ORAL DAILY
Status: DISCONTINUED | OUTPATIENT
Start: 2018-01-01 | End: 2018-01-01 | Stop reason: HOSPADM

## 2018-01-01 RX ORDER — BISACODYL 5 MG
5 TABLET, DELAYED RELEASE (ENTERIC COATED) ORAL DAILY PRN
Status: DISCONTINUED | OUTPATIENT
Start: 2018-01-01 | End: 2018-01-01 | Stop reason: HOSPADM

## 2018-01-01 RX ORDER — MORPHINE SULFATE 2 MG/ML
4 INJECTION, SOLUTION INTRAMUSCULAR; INTRAVENOUS
Status: DISCONTINUED | OUTPATIENT
Start: 2018-01-01 | End: 2018-01-01 | Stop reason: HOSPADM

## 2018-01-01 RX ORDER — HYDROCODONE BITARTRATE AND ACETAMINOPHEN 7.5; 325 MG/1; MG/1
1 TABLET ORAL
Status: COMPLETED | OUTPATIENT
Start: 2018-01-01 | End: 2018-01-01

## 2018-01-01 RX ORDER — MIDAZOLAM HYDROCHLORIDE 1 MG/ML
.5-2 INJECTION, SOLUTION INTRAMUSCULAR; INTRAVENOUS
Status: DISCONTINUED | OUTPATIENT
Start: 2018-01-01 | End: 2018-01-01 | Stop reason: HOSPADM

## 2018-01-01 RX ORDER — METOPROLOL SUCCINATE 25 MG/1
25 TABLET, EXTENDED RELEASE ORAL DAILY
Status: DISCONTINUED | OUTPATIENT
Start: 2018-01-01 | End: 2018-01-01 | Stop reason: HOSPADM

## 2018-01-01 RX ORDER — DIAZEPAM 5 MG/1
5 TABLET ORAL ONCE
Status: DISCONTINUED | OUTPATIENT
Start: 2018-01-01 | End: 2018-01-01 | Stop reason: HOSPADM

## 2018-01-01 RX ORDER — SODIUM CHLORIDE 0.9 % (FLUSH) 0.9 %
5-10 SYRINGE (ML) INJECTION AS NEEDED
Status: DISCONTINUED | OUTPATIENT
Start: 2018-01-01 | End: 2018-01-01 | Stop reason: HOSPADM

## 2018-01-01 RX ORDER — SODIUM CHLORIDE 9 MG/ML
INJECTION, SOLUTION INTRAVENOUS
Status: DISCONTINUED | OUTPATIENT
Start: 2018-01-01 | End: 2018-01-01 | Stop reason: HOSPADM

## 2018-01-01 RX ORDER — HYDROCODONE BITARTRATE AND ACETAMINOPHEN 5; 325 MG/1; MG/1
1 TABLET ORAL
Status: DISCONTINUED | OUTPATIENT
Start: 2018-01-01 | End: 2018-01-01 | Stop reason: HOSPADM

## 2018-01-01 RX ORDER — HEPARIN SODIUM 200 [USP'U]/100ML
3 INJECTION, SOLUTION INTRAVENOUS CONTINUOUS
Status: DISCONTINUED | OUTPATIENT
Start: 2018-01-01 | End: 2018-01-01 | Stop reason: HOSPADM

## 2018-01-01 RX ORDER — LIDOCAINE HYDROCHLORIDE 10 MG/ML
1-5 INJECTION INFILTRATION; PERINEURAL ONCE
Status: COMPLETED | OUTPATIENT
Start: 2018-01-01 | End: 2018-01-01

## 2018-01-01 RX ORDER — BUDESONIDE 0.5 MG/2ML
500 INHALANT ORAL
Status: DISCONTINUED | OUTPATIENT
Start: 2018-01-01 | End: 2018-01-01

## 2018-01-01 RX ORDER — SODIUM CHLORIDE, SODIUM LACTATE, POTASSIUM CHLORIDE, CALCIUM CHLORIDE 600; 310; 30; 20 MG/100ML; MG/100ML; MG/100ML; MG/100ML
100 INJECTION, SOLUTION INTRAVENOUS CONTINUOUS
Status: DISCONTINUED | OUTPATIENT
Start: 2018-01-01 | End: 2018-01-01 | Stop reason: HOSPADM

## 2018-01-01 RX ORDER — CLOPIDOGREL BISULFATE 75 MG/1
75 TABLET ORAL DAILY
Status: DISCONTINUED | OUTPATIENT
Start: 2018-01-01 | End: 2018-01-01 | Stop reason: HOSPADM

## 2018-01-01 RX ORDER — OXYCODONE HYDROCHLORIDE 5 MG/1
5 TABLET ORAL
Status: DISCONTINUED | OUTPATIENT
Start: 2018-01-01 | End: 2018-01-01 | Stop reason: HOSPADM

## 2018-01-01 RX ORDER — ONDANSETRON 2 MG/ML
4 INJECTION INTRAMUSCULAR; INTRAVENOUS
Status: DISCONTINUED | OUTPATIENT
Start: 2018-01-01 | End: 2018-01-01 | Stop reason: HOSPADM

## 2018-01-01 RX ORDER — HYDROCODONE BITARTRATE AND ACETAMINOPHEN 5; 325 MG/1; MG/1
1 TABLET ORAL ONCE
Status: COMPLETED | OUTPATIENT
Start: 2018-01-01 | End: 2018-01-01

## 2018-01-01 RX ORDER — METOPROLOL TARTRATE 5 MG/5ML
5 INJECTION INTRAVENOUS
Status: COMPLETED | OUTPATIENT
Start: 2018-01-01 | End: 2018-01-01

## 2018-01-01 RX ORDER — SODIUM CHLORIDE 0.9 % (FLUSH) 0.9 %
5-10 SYRINGE (ML) INJECTION EVERY 8 HOURS
Status: DISCONTINUED | OUTPATIENT
Start: 2018-01-01 | End: 2018-01-01 | Stop reason: HOSPADM

## 2018-01-01 RX ORDER — DEXTROSE MONOHYDRATE 100 MG/ML
25 INJECTION, SOLUTION INTRAVENOUS CONTINUOUS
Status: DISCONTINUED | OUTPATIENT
Start: 2018-01-01 | End: 2018-01-01

## 2018-01-01 RX ORDER — MAG HYDROX/ALUMINUM HYD/SIMETH 200-200-20
30 SUSPENSION, ORAL (FINAL DOSE FORM) ORAL
Status: DISCONTINUED | OUTPATIENT
Start: 2018-01-01 | End: 2018-01-01 | Stop reason: HOSPADM

## 2018-01-01 RX ORDER — PROPOFOL 10 MG/ML
INJECTION, EMULSION INTRAVENOUS AS NEEDED
Status: DISCONTINUED | OUTPATIENT
Start: 2018-01-01 | End: 2018-01-01 | Stop reason: HOSPADM

## 2018-01-01 RX ORDER — HEPARIN SODIUM 5000 [USP'U]/ML
5000 INJECTION, SOLUTION INTRAVENOUS; SUBCUTANEOUS ONCE
Status: COMPLETED | OUTPATIENT
Start: 2018-01-01 | End: 2018-01-01

## 2018-01-01 RX ORDER — HEPARIN SODIUM 1000 [USP'U]/ML
5000 INJECTION, SOLUTION INTRAVENOUS; SUBCUTANEOUS
Status: DISCONTINUED | OUTPATIENT
Start: 2018-01-01 | End: 2018-01-01

## 2018-01-01 RX ORDER — NALOXONE HYDROCHLORIDE 0.4 MG/ML
0.4 INJECTION, SOLUTION INTRAMUSCULAR; INTRAVENOUS; SUBCUTANEOUS AS NEEDED
Status: DISCONTINUED | OUTPATIENT
Start: 2018-01-01 | End: 2018-01-01

## 2018-01-01 RX ORDER — CINACALCET 30 MG/1
120 TABLET, FILM COATED ORAL
Status: DISCONTINUED | OUTPATIENT
Start: 2018-01-01 | End: 2018-01-01 | Stop reason: HOSPADM

## 2018-01-01 RX ORDER — SCOLOPAMINE TRANSDERMAL SYSTEM 1 MG/1
1 PATCH, EXTENDED RELEASE TRANSDERMAL
Status: DISCONTINUED | OUTPATIENT
Start: 2018-01-01 | End: 2018-01-01 | Stop reason: HOSPADM

## 2018-01-01 RX ORDER — HYDROCODONE BITARTRATE AND ACETAMINOPHEN 5; 325 MG/1; MG/1
1 TABLET ORAL
Qty: 5 TAB | Refills: 0 | Status: ON HOLD | OUTPATIENT
Start: 2018-01-01 | End: 2018-01-01

## 2018-01-01 RX ORDER — VALACYCLOVIR HYDROCHLORIDE 500 MG/1
500 TABLET, FILM COATED ORAL DAILY
Status: DISPENSED | OUTPATIENT
Start: 2018-01-01 | End: 2018-01-01

## 2018-01-01 RX ORDER — LORAZEPAM 0.5 MG/1
0.5 TABLET ORAL 3 TIMES DAILY
Status: DISCONTINUED | OUTPATIENT
Start: 2018-01-01 | End: 2018-01-01 | Stop reason: HOSPADM

## 2018-01-01 RX ORDER — NALOXONE HYDROCHLORIDE 0.4 MG/ML
0.1 INJECTION, SOLUTION INTRAMUSCULAR; INTRAVENOUS; SUBCUTANEOUS AS NEEDED
Status: DISCONTINUED | OUTPATIENT
Start: 2018-01-01 | End: 2018-01-01 | Stop reason: HOSPADM

## 2018-01-01 RX ORDER — PROPOFOL 10 MG/ML
INJECTION, EMULSION INTRAVENOUS
Status: DISCONTINUED | OUTPATIENT
Start: 2018-01-01 | End: 2018-01-01 | Stop reason: HOSPADM

## 2018-01-01 RX ORDER — FLUDROCORTISONE ACETATE 0.1 MG/1
100 TABLET ORAL DAILY
Status: DISCONTINUED | OUTPATIENT
Start: 2018-01-01 | End: 2018-01-01 | Stop reason: HOSPADM

## 2018-01-01 RX ORDER — GUAIFENESIN 100 MG/5ML
81-324 LIQUID (ML) ORAL ONCE
Status: DISCONTINUED | OUTPATIENT
Start: 2018-01-01 | End: 2018-01-01 | Stop reason: HOSPADM

## 2018-01-01 RX ORDER — ALBUTEROL SULFATE 0.83 MG/ML
2.5 SOLUTION RESPIRATORY (INHALATION)
Status: DISCONTINUED | OUTPATIENT
Start: 2018-01-01 | End: 2018-01-01

## 2018-01-01 RX ORDER — OXYCODONE HYDROCHLORIDE 5 MG/1
2.5 TABLET ORAL
Status: DISCONTINUED | OUTPATIENT
Start: 2018-01-01 | End: 2018-01-01 | Stop reason: HOSPADM

## 2018-01-01 RX ORDER — FENTANYL CITRATE 50 UG/ML
INJECTION, SOLUTION INTRAMUSCULAR; INTRAVENOUS AS NEEDED
Status: DISCONTINUED | OUTPATIENT
Start: 2018-01-01 | End: 2018-01-01 | Stop reason: HOSPADM

## 2018-01-01 RX ORDER — ATORVASTATIN CALCIUM 10 MG/1
20 TABLET, FILM COATED ORAL DAILY
Status: DISCONTINUED | OUTPATIENT
Start: 2018-01-01 | End: 2018-01-01

## 2018-01-01 RX ORDER — LANOLIN ALCOHOL/MO/W.PET/CERES
400 CREAM (GRAM) TOPICAL DAILY
Status: DISCONTINUED | OUTPATIENT
Start: 2018-01-01 | End: 2018-01-01

## 2018-01-01 RX ORDER — CEFAZOLIN SODIUM/WATER 2 G/20 ML
2 SYRINGE (ML) INTRAVENOUS
Status: COMPLETED | OUTPATIENT
Start: 2018-01-01 | End: 2018-01-01

## 2018-01-01 RX ORDER — CINACALCET 30 MG/1
60 TABLET, FILM COATED ORAL
Status: DISCONTINUED | OUTPATIENT
Start: 2018-01-01 | End: 2018-01-01 | Stop reason: HOSPADM

## 2018-01-01 RX ORDER — LIDOCAINE HYDROCHLORIDE 10 MG/ML
0.1 INJECTION INFILTRATION; PERINEURAL AS NEEDED
Status: DISCONTINUED | OUTPATIENT
Start: 2018-01-01 | End: 2018-01-01 | Stop reason: HOSPADM

## 2018-01-01 RX ORDER — MIDAZOLAM HYDROCHLORIDE 1 MG/ML
2 INJECTION, SOLUTION INTRAMUSCULAR; INTRAVENOUS ONCE
Status: DISCONTINUED | OUTPATIENT
Start: 2018-01-01 | End: 2018-01-01 | Stop reason: HOSPADM

## 2018-01-01 RX ORDER — INSULIN LISPRO 100 [IU]/ML
INJECTION, SOLUTION INTRAVENOUS; SUBCUTANEOUS
Status: DISCONTINUED | OUTPATIENT
Start: 2018-01-01 | End: 2018-01-01

## 2018-01-01 RX ORDER — METOPROLOL SUCCINATE 25 MG/1
25 TABLET, EXTENDED RELEASE ORAL DAILY
Status: DISCONTINUED | OUTPATIENT
Start: 2018-01-01 | End: 2018-01-01

## 2018-01-01 RX ORDER — HYDROCODONE BITARTRATE AND HOMATROPINE METHYLBROMIDE 1.5; 5 MG/5ML; MG/5ML
5 SYRUP ORAL
Status: DISCONTINUED | OUTPATIENT
Start: 2018-01-01 | End: 2018-01-01 | Stop reason: HOSPADM

## 2018-01-01 RX ORDER — CYCLOBENZAPRINE HCL 10 MG
2.5 TABLET ORAL
Status: DISCONTINUED | OUTPATIENT
Start: 2018-01-01 | End: 2018-01-01 | Stop reason: HOSPADM

## 2018-01-01 RX ORDER — DEXTROSE 40 %
15 GEL (GRAM) ORAL AS NEEDED
Status: DISCONTINUED | OUTPATIENT
Start: 2018-01-01 | End: 2018-01-01

## 2018-01-01 RX ORDER — FINASTERIDE 5 MG/1
5 TABLET, FILM COATED ORAL DAILY
Status: DISCONTINUED | OUTPATIENT
Start: 2018-01-01 | End: 2018-01-01 | Stop reason: HOSPADM

## 2018-01-01 RX ORDER — HYDROCODONE BITARTRATE AND ACETAMINOPHEN 5; 325 MG/1; MG/1
1 TABLET ORAL
Qty: 20 TAB | Refills: 0 | Status: SHIPPED | OUTPATIENT
Start: 2018-01-01 | End: 2018-01-01

## 2018-01-01 RX ORDER — LIDOCAINE HYDROCHLORIDE AND EPINEPHRINE 20; 10 MG/ML; UG/ML
1.5 INJECTION, SOLUTION INFILTRATION; PERINEURAL ONCE
Status: COMPLETED | OUTPATIENT
Start: 2018-01-01 | End: 2018-01-01

## 2018-01-01 RX ORDER — HYDROCODONE BITARTRATE AND ACETAMINOPHEN 5; 325 MG/1; MG/1
1 TABLET ORAL
Qty: 17 TAB | Refills: 0 | Status: ON HOLD | OUTPATIENT
Start: 2018-01-01 | End: 2018-01-01

## 2018-01-01 RX ORDER — OXYCODONE AND ACETAMINOPHEN 5; 325 MG/1; MG/1
1 TABLET ORAL
Qty: 10 TAB | Refills: 0 | Status: SHIPPED | OUTPATIENT
Start: 2018-01-01 | End: 2018-01-01

## 2018-01-01 RX ORDER — SODIUM CHLORIDE 9 MG/ML
75 INJECTION, SOLUTION INTRAVENOUS ONCE
Status: COMPLETED | OUTPATIENT
Start: 2018-01-01 | End: 2018-01-01

## 2018-01-01 RX ORDER — FLUDROCORTISONE ACETATE 0.1 MG/1
50 TABLET ORAL DAILY
Status: DISCONTINUED | OUTPATIENT
Start: 2018-01-01 | End: 2018-01-01

## 2018-01-01 RX ORDER — FLUDROCORTISONE ACETATE 0.1 MG/1
0.1 TABLET ORAL DAILY
Qty: 30 TAB | Refills: 0 | Status: SHIPPED
Start: 2018-01-01

## 2018-01-01 RX ORDER — ALBUTEROL SULFATE 0.83 MG/ML
2.5 SOLUTION RESPIRATORY (INHALATION) ONCE
Status: COMPLETED | OUTPATIENT
Start: 2018-01-01 | End: 2018-01-01

## 2018-01-01 RX ORDER — SEVELAMER CARBONATE 800 MG/1
800 TABLET, FILM COATED ORAL
Status: DISCONTINUED | OUTPATIENT
Start: 2018-01-01 | End: 2018-01-01

## 2018-01-01 RX ORDER — HEPARIN SODIUM 5000 [USP'U]/ML
5000 INJECTION, SOLUTION INTRAVENOUS; SUBCUTANEOUS EVERY 8 HOURS
Status: DISCONTINUED | OUTPATIENT
Start: 2018-01-01 | End: 2018-01-01

## 2018-01-01 RX ORDER — OXYCODONE HYDROCHLORIDE 5 MG/1
10 TABLET ORAL
Status: DISCONTINUED | OUTPATIENT
Start: 2018-01-01 | End: 2018-01-01 | Stop reason: HOSPADM

## 2018-01-01 RX ORDER — SEVELAMER CARBONATE 800 MG/1
800 TABLET, FILM COATED ORAL
Status: DISCONTINUED | OUTPATIENT
Start: 2018-01-01 | End: 2018-01-01 | Stop reason: HOSPADM

## 2018-01-01 RX ORDER — MIDAZOLAM HYDROCHLORIDE 1 MG/ML
2 INJECTION, SOLUTION INTRAMUSCULAR; INTRAVENOUS
Status: DISCONTINUED | OUTPATIENT
Start: 2018-01-01 | End: 2018-01-01 | Stop reason: HOSPADM

## 2018-01-01 RX ORDER — AMOXICILLIN 250 MG
1 CAPSULE ORAL DAILY
Status: DISCONTINUED | OUTPATIENT
Start: 2018-01-01 | End: 2018-01-01 | Stop reason: HOSPADM

## 2018-01-01 RX ORDER — TRAMADOL HYDROCHLORIDE 50 MG/1
50 TABLET ORAL
Qty: 20 TAB | Refills: 0 | Status: SHIPPED | OUTPATIENT
Start: 2018-01-01 | End: 2018-01-01

## 2018-01-01 RX ORDER — LORAZEPAM 0.5 MG/1
0.5 TABLET ORAL
Status: DISCONTINUED | OUTPATIENT
Start: 2018-01-01 | End: 2018-01-01 | Stop reason: HOSPADM

## 2018-01-01 RX ORDER — BUPIVACAINE HYDROCHLORIDE 2.5 MG/ML
INJECTION, SOLUTION EPIDURAL; INFILTRATION; INTRACAUDAL AS NEEDED
Status: DISCONTINUED | OUTPATIENT
Start: 2018-01-01 | End: 2018-01-01 | Stop reason: HOSPADM

## 2018-01-01 RX ORDER — NITROGLYCERIN 0.4 MG/1
0.4 TABLET SUBLINGUAL AS NEEDED
Status: DISCONTINUED | OUTPATIENT
Start: 2018-01-01 | End: 2018-01-01 | Stop reason: HOSPADM

## 2018-01-01 RX ORDER — NALOXONE HYDROCHLORIDE 0.4 MG/ML
0.4 INJECTION, SOLUTION INTRAMUSCULAR; INTRAVENOUS; SUBCUTANEOUS AS NEEDED
Status: DISCONTINUED | OUTPATIENT
Start: 2018-01-01 | End: 2018-01-01 | Stop reason: HOSPADM

## 2018-01-01 RX ORDER — ASPIRIN 325 MG
325 TABLET ORAL DAILY
Status: DISCONTINUED | OUTPATIENT
Start: 2018-01-01 | End: 2018-01-01

## 2018-01-01 RX ORDER — HYDROMORPHONE HYDROCHLORIDE 2 MG/ML
0.5 INJECTION, SOLUTION INTRAMUSCULAR; INTRAVENOUS; SUBCUTANEOUS
Status: DISCONTINUED | OUTPATIENT
Start: 2018-01-01 | End: 2018-01-01 | Stop reason: HOSPADM

## 2018-01-01 RX ORDER — CEFAZOLIN SODIUM/WATER 2 G/20 ML
2 SYRINGE (ML) INTRAVENOUS ONCE
Status: COMPLETED | OUTPATIENT
Start: 2018-01-01 | End: 2018-01-01

## 2018-01-01 RX ORDER — CALCITRIOL 0.25 UG/1
0.5 CAPSULE ORAL DAILY
Status: DISCONTINUED | OUTPATIENT
Start: 2018-01-01 | End: 2018-01-01 | Stop reason: ALTCHOICE

## 2018-01-01 RX ORDER — METOPROLOL SUCCINATE 25 MG/1
25 TABLET, EXTENDED RELEASE ORAL DAILY
Qty: 30 TAB | Refills: 0 | Status: SHIPPED
Start: 2018-01-01 | End: 2018-01-01 | Stop reason: SDUPTHER

## 2018-01-01 RX ORDER — METOPROLOL SUCCINATE 50 MG/1
100 TABLET, EXTENDED RELEASE ORAL DAILY
Status: DISCONTINUED | OUTPATIENT
Start: 2018-01-01 | End: 2018-01-01

## 2018-01-01 RX ORDER — EPINEPHRINE 1 MG/ML
INJECTION, SOLUTION, CONCENTRATE INTRAVENOUS AS NEEDED
Status: DISCONTINUED | OUTPATIENT
Start: 2018-01-01 | End: 2018-01-01 | Stop reason: HOSPADM

## 2018-01-01 RX ORDER — FLUDROCORTISONE ACETATE 0.1 MG/1
0.1 TABLET ORAL DAILY
Status: DISCONTINUED | OUTPATIENT
Start: 2018-01-01 | End: 2018-01-01 | Stop reason: HOSPADM

## 2018-01-01 RX ORDER — OXYCODONE HYDROCHLORIDE 5 MG/1
2.5 TABLET ORAL
Status: DISCONTINUED | OUTPATIENT
Start: 2018-01-01 | End: 2018-01-01

## 2018-01-01 RX ORDER — ONDANSETRON 2 MG/ML
4 INJECTION INTRAMUSCULAR; INTRAVENOUS ONCE
Status: DISCONTINUED | OUTPATIENT
Start: 2018-01-01 | End: 2018-01-01 | Stop reason: HOSPADM

## 2018-01-01 RX ORDER — GUAIFENESIN/DEXTROMETHORPHAN 100-10MG/5
5 SYRUP ORAL 3 TIMES DAILY
Status: DISPENSED | OUTPATIENT
Start: 2018-01-01 | End: 2018-01-01

## 2018-01-01 RX ORDER — FLUDROCORTISONE ACETATE 0.1 MG/1
0.1 TABLET ORAL DAILY
Status: DISCONTINUED | OUTPATIENT
Start: 2018-01-01 | End: 2018-01-01

## 2018-01-01 RX ORDER — TAMSULOSIN HYDROCHLORIDE 0.4 MG/1
0.4 CAPSULE ORAL
Status: DISCONTINUED | OUTPATIENT
Start: 2018-01-01 | End: 2018-01-01 | Stop reason: HOSPADM

## 2018-01-01 RX ORDER — METOPROLOL SUCCINATE 50 MG/1
50 TABLET, EXTENDED RELEASE ORAL DAILY
Qty: 30 TAB | Refills: 0 | Status: ON HOLD | OUTPATIENT
Start: 2018-01-01 | End: 2018-01-01

## 2018-01-01 RX ORDER — DICYCLOMINE HYDROCHLORIDE 10 MG/1
10 CAPSULE ORAL
Status: DISCONTINUED | OUTPATIENT
Start: 2018-01-01 | End: 2018-01-01 | Stop reason: HOSPADM

## 2018-01-01 RX ORDER — MORPHINE SULFATE 2 MG/ML
2 INJECTION, SOLUTION INTRAMUSCULAR; INTRAVENOUS
Status: DISCONTINUED | OUTPATIENT
Start: 2018-01-01 | End: 2018-01-01

## 2018-01-01 RX ORDER — HYDROCODONE BITARTRATE AND ACETAMINOPHEN 5; 325 MG/1; MG/1
1 TABLET ORAL
Status: DISCONTINUED | OUTPATIENT
Start: 2018-01-01 | End: 2018-01-01

## 2018-01-01 RX ORDER — METOPROLOL SUCCINATE 50 MG/1
50 TABLET, EXTENDED RELEASE ORAL DAILY
Status: DISCONTINUED | OUTPATIENT
Start: 2018-01-01 | End: 2018-01-01 | Stop reason: HOSPADM

## 2018-01-01 RX ORDER — ALBUTEROL SULFATE 0.83 MG/ML
2.5 SOLUTION RESPIRATORY (INHALATION) AS NEEDED
Status: DISCONTINUED | OUTPATIENT
Start: 2018-01-01 | End: 2018-01-01 | Stop reason: HOSPADM

## 2018-01-01 RX ORDER — LEVALBUTEROL INHALATION SOLUTION 0.63 MG/3ML
0.63 SOLUTION RESPIRATORY (INHALATION)
Status: DISCONTINUED | OUTPATIENT
Start: 2018-01-01 | End: 2018-01-01 | Stop reason: CLARIF

## 2018-01-01 RX ORDER — GUAIFENESIN 600 MG/1
600 TABLET, EXTENDED RELEASE ORAL EVERY 12 HOURS
Status: DISCONTINUED | OUTPATIENT
Start: 2018-01-01 | End: 2018-01-01

## 2018-01-01 RX ORDER — HYDROCODONE BITARTRATE AND HOMATROPINE METHYLBROMIDE 1.5; 5 MG/5ML; MG/5ML
5 SYRUP ORAL
Status: COMPLETED | OUTPATIENT
Start: 2018-01-01 | End: 2018-01-01

## 2018-01-01 RX ORDER — HYDROCODONE BITARTRATE AND ACETAMINOPHEN 5; 325 MG/1; MG/1
1-2 TABLET ORAL
Qty: 19 TAB | Refills: 0 | Status: SHIPPED | OUTPATIENT
Start: 2018-01-01

## 2018-01-01 RX ORDER — METOPROLOL SUCCINATE 50 MG/1
50 TABLET, EXTENDED RELEASE ORAL DAILY
Status: DISCONTINUED | OUTPATIENT
Start: 2018-01-01 | End: 2018-01-01

## 2018-01-01 RX ORDER — DIPHENHYDRAMINE HYDROCHLORIDE 50 MG/ML
12.5 INJECTION, SOLUTION INTRAMUSCULAR; INTRAVENOUS
Status: DISCONTINUED | OUTPATIENT
Start: 2018-01-01 | End: 2018-01-01 | Stop reason: HOSPADM

## 2018-01-01 RX ORDER — CLOPIDOGREL BISULFATE 75 MG/1
75 TABLET ORAL DAILY
Status: DISCONTINUED | OUTPATIENT
Start: 2018-01-01 | End: 2018-01-01

## 2018-01-01 RX ORDER — BENZONATATE 100 MG/1
100 CAPSULE ORAL
Status: DISCONTINUED | OUTPATIENT
Start: 2018-01-01 | End: 2018-01-01

## 2018-01-01 RX ORDER — LORAZEPAM 2 MG/ML
1 INJECTION INTRAMUSCULAR
Status: DISCONTINUED | OUTPATIENT
Start: 2018-01-01 | End: 2018-01-01 | Stop reason: HOSPADM

## 2018-01-01 RX ORDER — TAMSULOSIN HYDROCHLORIDE 0.4 MG/1
0.4 CAPSULE ORAL DAILY
Status: DISCONTINUED | OUTPATIENT
Start: 2018-01-01 | End: 2018-01-01

## 2018-01-01 RX ORDER — FENTANYL CITRATE 50 UG/ML
100 INJECTION, SOLUTION INTRAMUSCULAR; INTRAVENOUS ONCE
Status: DISCONTINUED | OUTPATIENT
Start: 2018-01-01 | End: 2018-01-01 | Stop reason: HOSPADM

## 2018-01-01 RX ORDER — ZOLPIDEM TARTRATE 5 MG/1
5 TABLET ORAL
Status: DISCONTINUED | OUTPATIENT
Start: 2018-01-01 | End: 2018-01-01 | Stop reason: HOSPADM

## 2018-01-01 RX ORDER — DEXTROSE 50 % IN WATER (D50W) INTRAVENOUS SYRINGE
25-50 AS NEEDED
Status: DISCONTINUED | OUTPATIENT
Start: 2018-01-01 | End: 2018-01-01

## 2018-01-01 RX ADMIN — ERYTHROPOIETIN 8000 UNITS: 4000 INJECTION, SOLUTION INTRAVENOUS; SUBCUTANEOUS at 10:30

## 2018-01-01 RX ADMIN — Medication 400 MG: at 09:20

## 2018-01-01 RX ADMIN — Medication 10 ML: at 13:04

## 2018-01-01 RX ADMIN — Medication 10 ML: at 14:00

## 2018-01-01 RX ADMIN — ALBUTEROL SULFATE 2.5 MG: 2.5 SOLUTION RESPIRATORY (INHALATION) at 19:08

## 2018-01-01 RX ADMIN — SEVELAMER CARBONATE 800 MG: 800 TABLET, FILM COATED ORAL at 09:22

## 2018-01-01 RX ADMIN — SEVELAMER CARBONATE 800 MG: 800 TABLET, FILM COATED ORAL at 09:20

## 2018-01-01 RX ADMIN — ZOLPIDEM TARTRATE 5 MG: 5 TABLET ORAL at 22:05

## 2018-01-01 RX ADMIN — INFLUENZA VIRUS VACCINE 0.5 ML: 15; 15; 15; 15 SUSPENSION INTRAMUSCULAR at 14:49

## 2018-01-01 RX ADMIN — SEVELAMER CARBONATE 800 MG: 800 TABLET, FILM COATED ORAL at 16:19

## 2018-01-01 RX ADMIN — Medication 1 AMPULE: at 20:02

## 2018-01-01 RX ADMIN — ERYTHROPOIETIN 4000 UNITS: 4000 INJECTION, SOLUTION INTRAVENOUS; SUBCUTANEOUS at 09:54

## 2018-01-01 RX ADMIN — ATORVASTATIN CALCIUM 20 MG: 10 TABLET, FILM COATED ORAL at 08:16

## 2018-01-01 RX ADMIN — FINASTERIDE 5 MG: 5 TABLET, FILM COATED ORAL at 08:34

## 2018-01-01 RX ADMIN — HEPARIN SODIUM 5000 UNITS: 5000 INJECTION INTRAVENOUS; SUBCUTANEOUS at 06:24

## 2018-01-01 RX ADMIN — DEXTROSE MONOHYDRATE 25 ML/HR: 10 INJECTION, SOLUTION INTRAVENOUS at 16:46

## 2018-01-01 RX ADMIN — METOPROLOL SUCCINATE 25 MG: 25 TABLET, EXTENDED RELEASE ORAL at 09:20

## 2018-01-01 RX ADMIN — LORAZEPAM 0.5 MG: 0.5 TABLET ORAL at 22:52

## 2018-01-01 RX ADMIN — FINASTERIDE 5 MG: 5 TABLET, FILM COATED ORAL at 12:32

## 2018-01-01 RX ADMIN — LIDOCAINE HYDROCHLORIDE,EPINEPHRINE BITARTRATE 30 MG: 20; .01 INJECTION, SOLUTION INFILTRATION; PERINEURAL at 15:00

## 2018-01-01 RX ADMIN — Medication 10 ML: at 17:15

## 2018-01-01 RX ADMIN — HYDROCODONE BITARTRATE AND ACETAMINOPHEN 1 TABLET: 5; 325 TABLET ORAL at 18:16

## 2018-01-01 RX ADMIN — CLOPIDOGREL BISULFATE 75 MG: 75 TABLET ORAL at 11:46

## 2018-01-01 RX ADMIN — DILTIAZEM HYDROCHLORIDE 180 MG: 180 CAPSULE, COATED, EXTENDED RELEASE ORAL at 10:46

## 2018-01-01 RX ADMIN — FINASTERIDE 5 MG: 5 TABLET, FILM COATED ORAL at 11:26

## 2018-01-01 RX ADMIN — SEVELAMER CARBONATE 800 MG: 800 TABLET, FILM COATED ORAL at 08:16

## 2018-01-01 RX ADMIN — HEPARIN SODIUM 3 ML/HR: 5000 INJECTION, SOLUTION INTRAVENOUS; SUBCUTANEOUS at 12:02

## 2018-01-01 RX ADMIN — ALUMINUM HYDROXIDE, MAGNESIUM HYDROXIDE, AND SIMETHICONE 30 ML: 200; 200; 20 SUSPENSION ORAL at 09:34

## 2018-01-01 RX ADMIN — HEPARIN SODIUM 5000 UNITS: 5000 INJECTION INTRAVENOUS; SUBCUTANEOUS at 00:47

## 2018-01-01 RX ADMIN — Medication 10 ML: at 14:30

## 2018-01-01 RX ADMIN — DEXTROSE MONOHYDRATE 25 G: 25 INJECTION, SOLUTION INTRAVENOUS at 15:02

## 2018-01-01 RX ADMIN — Medication 5 ML: at 05:13

## 2018-01-01 RX ADMIN — Medication 10 ML: at 05:51

## 2018-01-01 RX ADMIN — PROPOFOL 100 MG: 10 INJECTION, EMULSION INTRAVENOUS at 08:18

## 2018-01-01 RX ADMIN — MORPHINE SULFATE 4 MG: 2 INJECTION, SOLUTION INTRAMUSCULAR; INTRAVENOUS at 23:43

## 2018-01-01 RX ADMIN — GUAIFENESIN AND DEXTROMETHORPHAN 5 ML: 100; 10 SYRUP ORAL at 13:09

## 2018-01-01 RX ADMIN — BUDESONIDE 500 MCG: 0.5 INHALANT RESPIRATORY (INHALATION) at 19:08

## 2018-01-01 RX ADMIN — METOPROLOL SUCCINATE 25 MG: 25 TABLET, EXTENDED RELEASE ORAL at 17:35

## 2018-01-01 RX ADMIN — SEVELAMER CARBONATE 800 MG: 800 TABLET, FILM COATED ORAL at 16:59

## 2018-01-01 RX ADMIN — Medication 5 ML: at 05:27

## 2018-01-01 RX ADMIN — IOPAMIDOL 155 ML: 755 INJECTION, SOLUTION INTRAVENOUS at 12:54

## 2018-01-01 RX ADMIN — Medication 1 AMPULE: at 21:59

## 2018-01-01 RX ADMIN — SEVELAMER CARBONATE 800 MG: 800 TABLET, FILM COATED ORAL at 17:15

## 2018-01-01 RX ADMIN — Medication 5 ML: at 14:00

## 2018-01-01 RX ADMIN — FLUDROCORTISONE ACETATE 100 MCG: 0.1 TABLET ORAL at 12:42

## 2018-01-01 RX ADMIN — HEPARIN SODIUM 5000 UNITS: 5000 INJECTION INTRAVENOUS; SUBCUTANEOUS at 05:51

## 2018-01-01 RX ADMIN — METOPROLOL SUCCINATE 50 MG: 50 TABLET, EXTENDED RELEASE ORAL at 08:16

## 2018-01-01 RX ADMIN — Medication 10 ML: at 04:30

## 2018-01-01 RX ADMIN — Medication 5 ML: at 22:05

## 2018-01-01 RX ADMIN — SEVELAMER CARBONATE 800 MG: 800 TABLET, FILM COATED ORAL at 15:40

## 2018-01-01 RX ADMIN — CINACALCET HYDROCHLORIDE 120 MG: 30 TABLET, COATED ORAL at 21:41

## 2018-01-01 RX ADMIN — FENTANYL CITRATE 25 MCG: 50 INJECTION, SOLUTION INTRAMUSCULAR; INTRAVENOUS at 08:00

## 2018-01-01 RX ADMIN — SEVELAMER CARBONATE 800 MG: 800 TABLET, FILM COATED ORAL at 13:09

## 2018-01-01 RX ADMIN — SEVELAMER CARBONATE 800 MG: 800 TABLET, FILM COATED ORAL at 11:31

## 2018-01-01 RX ADMIN — Medication 10 ML: at 06:01

## 2018-01-01 RX ADMIN — METOPROLOL SUCCINATE 50 MG: 50 TABLET, EXTENDED RELEASE ORAL at 08:57

## 2018-01-01 RX ADMIN — HYDROCODONE BITARTRATE AND ACETAMINOPHEN 1 TABLET: 5; 325 TABLET ORAL at 22:06

## 2018-01-01 RX ADMIN — HYDROCODONE BITARTRATE AND ACETAMINOPHEN 1 TABLET: 5; 325 TABLET ORAL at 08:17

## 2018-01-01 RX ADMIN — HYDROCODONE BITARTRATE AND ACETAMINOPHEN 1 TABLET: 5; 325 TABLET ORAL at 22:29

## 2018-01-01 RX ADMIN — LORAZEPAM 1 MG: 2 INJECTION INTRAMUSCULAR; INTRAVENOUS at 20:14

## 2018-01-01 RX ADMIN — HYDROCODONE BITARTRATE AND ACETAMINOPHEN 1 TABLET: 5; 325 TABLET ORAL at 00:46

## 2018-01-01 RX ADMIN — FINASTERIDE 5 MG: 5 TABLET, FILM COATED ORAL at 11:00

## 2018-01-01 RX ADMIN — METOPROLOL SUCCINATE 50 MG: 50 TABLET, EXTENDED RELEASE ORAL at 11:47

## 2018-01-01 RX ADMIN — METOPROLOL SUCCINATE 50 MG: 50 TABLET, EXTENDED RELEASE ORAL at 09:13

## 2018-01-01 RX ADMIN — SEVELAMER CARBONATE 800 MG: 800 TABLET, FILM COATED ORAL at 16:32

## 2018-01-01 RX ADMIN — HYDROCODONE BITARTRATE AND ACETAMINOPHEN 1 TABLET: 5; 325 TABLET ORAL at 01:35

## 2018-01-01 RX ADMIN — CINACALCET HYDROCHLORIDE 120 MG: 30 TABLET, COATED ORAL at 22:04

## 2018-01-01 RX ADMIN — CLOPIDOGREL BISULFATE 75 MG: 75 TABLET ORAL at 08:34

## 2018-01-01 RX ADMIN — HEPARIN SODIUM 5000 UNITS: 5000 INJECTION INTRAVENOUS; SUBCUTANEOUS at 17:08

## 2018-01-01 RX ADMIN — SODIUM CHLORIDE 500 ML: 900 INJECTION, SOLUTION INTRAVENOUS at 13:34

## 2018-01-01 RX ADMIN — Medication 1 AMPULE: at 21:19

## 2018-01-01 RX ADMIN — Medication 10 ML: at 22:01

## 2018-01-01 RX ADMIN — HYDROCODONE BITARTRATE AND ACETAMINOPHEN 1 TABLET: 5; 325 TABLET ORAL at 21:08

## 2018-01-01 RX ADMIN — METOPROLOL SUCCINATE 25 MG: 25 TABLET, EXTENDED RELEASE ORAL at 09:05

## 2018-01-01 RX ADMIN — ATORVASTATIN CALCIUM 20 MG: 10 TABLET, FILM COATED ORAL at 08:23

## 2018-01-01 RX ADMIN — HEPARIN SODIUM 5000 UNITS: 5000 INJECTION INTRAVENOUS; SUBCUTANEOUS at 05:15

## 2018-01-01 RX ADMIN — HEPARIN SODIUM 5000 UNITS: 5000 INJECTION INTRAVENOUS; SUBCUTANEOUS at 22:42

## 2018-01-01 RX ADMIN — Medication 10 ML: at 13:12

## 2018-01-01 RX ADMIN — CLOPIDOGREL BISULFATE 75 MG: 75 TABLET ORAL at 09:20

## 2018-01-01 RX ADMIN — CLOPIDOGREL BISULFATE 75 MG: 75 TABLET, FILM COATED ORAL at 08:57

## 2018-01-01 RX ADMIN — ZOLPIDEM TARTRATE 5 MG: 5 TABLET ORAL at 21:08

## 2018-01-01 RX ADMIN — Medication 5 ML: at 05:49

## 2018-01-01 RX ADMIN — Medication 5 ML: at 21:09

## 2018-01-01 RX ADMIN — CLOPIDOGREL BISULFATE 75 MG: 75 TABLET ORAL at 12:42

## 2018-01-01 RX ADMIN — FINASTERIDE 5 MG: 5 TABLET, FILM COATED ORAL at 09:22

## 2018-01-01 RX ADMIN — GUAIFENESIN AND DEXTROMETHORPHAN 5 ML: 100; 10 SYRUP ORAL at 09:00

## 2018-01-01 RX ADMIN — FENTANYL CITRATE 25 MCG: 50 INJECTION, SOLUTION INTRAMUSCULAR; INTRAVENOUS at 08:16

## 2018-01-01 RX ADMIN — SEVELAMER CARBONATE 800 MG: 800 TABLET, FILM COATED ORAL at 17:08

## 2018-01-01 RX ADMIN — TUBERCULIN PURIFIED PROTEIN DERIVATIVE 5 UNITS: 5 INJECTION, SOLUTION INTRADERMAL at 21:09

## 2018-01-01 RX ADMIN — FLUDROCORTISONE ACETATE 100 MCG: 0.1 TABLET ORAL at 10:37

## 2018-01-01 RX ADMIN — CLOPIDOGREL BISULFATE 75 MG: 75 TABLET ORAL at 09:05

## 2018-01-01 RX ADMIN — Medication 1 AMPULE: at 10:38

## 2018-01-01 RX ADMIN — CLOPIDOGREL BISULFATE 75 MG: 75 TABLET ORAL at 11:00

## 2018-01-01 RX ADMIN — Medication 1 AMPULE: at 21:51

## 2018-01-01 RX ADMIN — SEVELAMER CARBONATE 800 MG: 800 TABLET, FILM COATED ORAL at 09:13

## 2018-01-01 RX ADMIN — Medication 10 ML: at 15:36

## 2018-01-01 RX ADMIN — APIXABAN 2.5 MG: 2.5 TABLET, FILM COATED ORAL at 08:44

## 2018-01-01 RX ADMIN — DICYCLOMINE HYDROCHLORIDE 10 MG: 10 CAPSULE ORAL at 20:12

## 2018-01-01 RX ADMIN — HEPARIN SODIUM 5000 UNITS: 5000 INJECTION INTRAVENOUS; SUBCUTANEOUS at 21:08

## 2018-01-01 RX ADMIN — HEPARIN SODIUM 5000 UNITS: 5000 INJECTION INTRAVENOUS; SUBCUTANEOUS at 18:29

## 2018-01-01 RX ADMIN — HEPARIN SODIUM 5000 UNITS: 5000 INJECTION INTRAVENOUS; SUBCUTANEOUS at 21:03

## 2018-01-01 RX ADMIN — HYDROCODONE BITARTRATE AND ACETAMINOPHEN 1 TABLET: 7.5; 325 TABLET ORAL at 07:43

## 2018-01-01 RX ADMIN — PROPOFOL 100 MG: 10 INJECTION, EMULSION INTRAVENOUS at 08:30

## 2018-01-01 RX ADMIN — GUAIFENESIN AND DEXTROMETHORPHAN 5 ML: 100; 10 SYRUP ORAL at 16:19

## 2018-01-01 RX ADMIN — SEVELAMER CARBONATE 800 MG: 800 TABLET, FILM COATED ORAL at 11:47

## 2018-01-01 RX ADMIN — Medication 10 ML: at 06:00

## 2018-01-01 RX ADMIN — HYDROCODONE BITARTRATE AND ACETAMINOPHEN 1 TABLET: 5; 325 TABLET ORAL at 04:28

## 2018-01-01 RX ADMIN — FLUDROCORTISONE ACETATE 100 MCG: 0.1 TABLET ORAL at 09:20

## 2018-01-01 RX ADMIN — Medication 1 AMPULE: at 09:00

## 2018-01-01 RX ADMIN — METOPROLOL TARTRATE 5 MG: 1 INJECTION, SOLUTION INTRAVENOUS at 17:35

## 2018-01-01 RX ADMIN — Medication 1 AMPULE: at 08:23

## 2018-01-01 RX ADMIN — Medication 10 ML: at 21:01

## 2018-01-01 RX ADMIN — METOPROLOL SUCCINATE 50 MG: 50 TABLET, EXTENDED RELEASE ORAL at 09:22

## 2018-01-01 RX ADMIN — Medication 10 ML: at 14:10

## 2018-01-01 RX ADMIN — Medication 10 ML: at 21:53

## 2018-01-01 RX ADMIN — Medication: at 19:04

## 2018-01-01 RX ADMIN — Medication 1 AMPULE: at 21:03

## 2018-01-01 RX ADMIN — HYDROCODONE BITARTRATE AND ACETAMINOPHEN 1 TABLET: 5; 325 TABLET ORAL at 21:22

## 2018-01-01 RX ADMIN — SEVELAMER CARBONATE 800 MG: 800 TABLET, FILM COATED ORAL at 12:10

## 2018-01-01 RX ADMIN — HYDROCODONE BITARTRATE AND ACETAMINOPHEN 1 TABLET: 5; 325 TABLET ORAL at 20:12

## 2018-01-01 RX ADMIN — METOPROLOL SUCCINATE 25 MG: 25 TABLET, EXTENDED RELEASE ORAL at 12:31

## 2018-01-01 RX ADMIN — CLOPIDOGREL BISULFATE 75 MG: 75 TABLET, FILM COATED ORAL at 13:10

## 2018-01-01 RX ADMIN — Medication 10 ML: at 21:09

## 2018-01-01 RX ADMIN — HYDROCODONE BITARTRATE AND ACETAMINOPHEN 1 TABLET: 5; 325 TABLET ORAL at 03:10

## 2018-01-01 RX ADMIN — BENZONATATE 100 MG: 100 CAPSULE ORAL at 17:36

## 2018-01-01 RX ADMIN — FLUDROCORTISONE ACETATE 100 MCG: 0.1 TABLET ORAL at 09:13

## 2018-01-01 RX ADMIN — HEPARIN SODIUM 5000 UNITS: 5000 INJECTION INTRAVENOUS; SUBCUTANEOUS at 21:19

## 2018-01-01 RX ADMIN — Medication 10 ML: at 23:44

## 2018-01-01 RX ADMIN — ALBUTEROL SULFATE 2.5 MG: 2.5 SOLUTION RESPIRATORY (INHALATION) at 18:30

## 2018-01-01 RX ADMIN — Medication 1 AMPULE: at 08:16

## 2018-01-01 RX ADMIN — METOPROLOL SUCCINATE 50 MG: 50 TABLET, EXTENDED RELEASE ORAL at 09:20

## 2018-01-01 RX ADMIN — ACETAMINOPHEN 650 MG: 325 TABLET ORAL at 11:31

## 2018-01-01 RX ADMIN — FINASTERIDE 5 MG: 5 TABLET, FILM COATED ORAL at 08:43

## 2018-01-01 RX ADMIN — SEVELAMER CARBONATE 800 MG: 800 TABLET, FILM COATED ORAL at 17:07

## 2018-01-01 RX ADMIN — STANDARDIZED SENNA CONCENTRATE AND DOCUSATE SODIUM 1 TABLET: 8.6; 5 TABLET, FILM COATED ORAL at 08:42

## 2018-01-01 RX ADMIN — FLUDROCORTISONE ACETATE 100 MCG: 0.1 TABLET ORAL at 08:16

## 2018-01-01 RX ADMIN — Medication 10 ML: at 13:13

## 2018-01-01 RX ADMIN — GUAIFENESIN 600 MG: 600 TABLET, EXTENDED RELEASE ORAL at 16:11

## 2018-01-01 RX ADMIN — FINASTERIDE 5 MG: 5 TABLET, FILM COATED ORAL at 09:07

## 2018-01-01 RX ADMIN — Medication 10 ML: at 22:00

## 2018-01-01 RX ADMIN — MORPHINE SULFATE 4 MG: 2 INJECTION, SOLUTION INTRAMUSCULAR; INTRAVENOUS at 17:45

## 2018-01-01 RX ADMIN — HEPARIN SODIUM 5000 UNITS: 5000 INJECTION INTRAVENOUS; SUBCUTANEOUS at 21:59

## 2018-01-01 RX ADMIN — VALACYCLOVIR HYDROCHLORIDE 500 MG: 500 TABLET, FILM COATED ORAL at 08:57

## 2018-01-01 RX ADMIN — GUAIFENESIN AND DEXTROMETHORPHAN 5 ML: 100; 10 SYRUP ORAL at 21:51

## 2018-01-01 RX ADMIN — SEVELAMER CARBONATE 800 MG: 800 TABLET, FILM COATED ORAL at 16:40

## 2018-01-01 RX ADMIN — Medication 10 ML: at 21:21

## 2018-01-01 RX ADMIN — Medication 5 ML: at 05:54

## 2018-01-01 RX ADMIN — Medication 10 ML: at 05:10

## 2018-01-01 RX ADMIN — ACETAMINOPHEN 650 MG: 325 TABLET ORAL at 23:16

## 2018-01-01 RX ADMIN — Medication 1 AMPULE: at 08:50

## 2018-01-01 RX ADMIN — Medication 400 MG: at 08:34

## 2018-01-01 RX ADMIN — Medication 5 ML: at 22:28

## 2018-01-01 RX ADMIN — HEPARIN SODIUM 5000 UNITS: 5000 INJECTION INTRAVENOUS; SUBCUTANEOUS at 14:21

## 2018-01-01 RX ADMIN — BENZONATATE 100 MG: 100 CAPSULE ORAL at 10:44

## 2018-01-01 RX ADMIN — APIXABAN 2.5 MG: 2.5 TABLET, FILM COATED ORAL at 21:18

## 2018-01-01 RX ADMIN — CLOPIDOGREL BISULFATE 75 MG: 75 TABLET, FILM COATED ORAL at 17:36

## 2018-01-01 RX ADMIN — APIXABAN 2.5 MG: 2.5 TABLET, FILM COATED ORAL at 10:45

## 2018-01-01 RX ADMIN — Medication 5 ML: at 21:41

## 2018-01-01 RX ADMIN — Medication 1 AMPULE: at 22:42

## 2018-01-01 RX ADMIN — Medication 10 ML: at 14:22

## 2018-01-01 RX ADMIN — Medication 5 ML: at 06:31

## 2018-01-01 RX ADMIN — CLOPIDOGREL BISULFATE 75 MG: 75 TABLET, FILM COATED ORAL at 09:13

## 2018-01-01 RX ADMIN — LIDOCAINE HYDROCHLORIDE 5 ML: 10 INJECTION, SOLUTION INFILTRATION; PERINEURAL at 12:00

## 2018-01-01 RX ADMIN — METOPROLOL SUCCINATE 100 MG: 50 TABLET, EXTENDED RELEASE ORAL at 10:45

## 2018-01-01 RX ADMIN — ERYTHROPOIETIN 4000 UNITS: 4000 INJECTION, SOLUTION INTRAVENOUS; SUBCUTANEOUS at 09:37

## 2018-01-01 RX ADMIN — METOPROLOL SUCCINATE 50 MG: 50 TABLET, EXTENDED RELEASE ORAL at 09:00

## 2018-01-01 RX ADMIN — Medication 10 ML: at 21:37

## 2018-01-01 RX ADMIN — CYCLOBENZAPRINE HYDROCHLORIDE 2.5 MG: 10 TABLET, FILM COATED ORAL at 10:39

## 2018-01-01 RX ADMIN — Medication 10 ML: at 22:06

## 2018-01-01 RX ADMIN — Medication 5 ML: at 21:18

## 2018-01-01 RX ADMIN — SEVELAMER CARBONATE 800 MG: 800 TABLET, FILM COATED ORAL at 17:50

## 2018-01-01 RX ADMIN — ACETAMINOPHEN 650 MG: 325 TABLET ORAL at 09:20

## 2018-01-01 RX ADMIN — Medication 10 ML: at 21:39

## 2018-01-01 RX ADMIN — CYCLOBENZAPRINE HYDROCHLORIDE 2.5 MG: 10 TABLET, FILM COATED ORAL at 16:18

## 2018-01-01 RX ADMIN — FLUDROCORTISONE ACETATE 100 MCG: 0.1 TABLET ORAL at 09:05

## 2018-01-01 RX ADMIN — PROPOFOL 100 MCG/KG/MIN: 10 INJECTION, EMULSION INTRAVENOUS at 08:15

## 2018-01-01 RX ADMIN — VALACYCLOVIR HYDROCHLORIDE 500 MG: 500 TABLET, FILM COATED ORAL at 15:40

## 2018-01-01 RX ADMIN — SEVELAMER CARBONATE 800 MG: 800 TABLET, FILM COATED ORAL at 16:46

## 2018-01-01 RX ADMIN — ERYTHROPOIETIN 4000 UNITS: 4000 INJECTION, SOLUTION INTRAVENOUS; SUBCUTANEOUS at 10:35

## 2018-01-01 RX ADMIN — VALACYCLOVIR HYDROCHLORIDE 500 MG: 500 TABLET, FILM COATED ORAL at 09:13

## 2018-01-01 RX ADMIN — MIDAZOLAM HYDROCHLORIDE 2 MG: 1 INJECTION, SOLUTION INTRAMUSCULAR; INTRAVENOUS at 12:02

## 2018-01-01 RX ADMIN — SEVELAMER CARBONATE 800 MG: 800 TABLET, FILM COATED ORAL at 12:44

## 2018-01-01 RX ADMIN — Medication 10 ML: at 20:08

## 2018-01-01 RX ADMIN — CALCITRIOL 0.5 MCG: 0.25 CAPSULE, LIQUID FILLED ORAL at 10:45

## 2018-01-01 RX ADMIN — CLOPIDOGREL BISULFATE 75 MG: 75 TABLET, FILM COATED ORAL at 08:16

## 2018-01-01 RX ADMIN — HEPARIN SODIUM 5000 UNITS: 5000 INJECTION INTRAVENOUS; SUBCUTANEOUS at 21:35

## 2018-01-01 RX ADMIN — Medication 2 G: at 08:09

## 2018-01-01 RX ADMIN — HEPARIN SODIUM 5000 UNITS: 5000 INJECTION INTRAVENOUS; SUBCUTANEOUS at 14:02

## 2018-01-01 RX ADMIN — Medication 2 G: at 14:47

## 2018-01-01 RX ADMIN — FINASTERIDE 5 MG: 5 TABLET, FILM COATED ORAL at 09:20

## 2018-01-01 RX ADMIN — APIXABAN 2.5 MG: 2.5 TABLET, FILM COATED ORAL at 09:06

## 2018-01-01 RX ADMIN — Medication 1 AMPULE: at 12:42

## 2018-01-01 RX ADMIN — Medication 10 ML: at 05:03

## 2018-01-01 RX ADMIN — Medication 10 ML: at 21:17

## 2018-01-01 RX ADMIN — Medication 10 ML: at 05:15

## 2018-01-01 RX ADMIN — Medication 1 AMPULE: at 21:08

## 2018-01-01 RX ADMIN — HYDROCODONE BITARTRATE AND HOMATROPINE METHYLBROMIDE 5 ML: 5; 1.5 SOLUTION ORAL at 16:11

## 2018-01-01 RX ADMIN — METOPROLOL SUCCINATE 50 MG: 50 TABLET, EXTENDED RELEASE ORAL at 11:26

## 2018-01-01 RX ADMIN — DEXTROSE MONOHYDRATE 25 G: 25 INJECTION, SOLUTION INTRAVENOUS at 09:12

## 2018-01-01 RX ADMIN — FLUDROCORTISONE ACETATE 100 MCG: 0.1 TABLET ORAL at 13:11

## 2018-01-01 RX ADMIN — CLOPIDOGREL BISULFATE 75 MG: 75 TABLET, FILM COATED ORAL at 08:23

## 2018-01-01 RX ADMIN — FENTANYL CITRATE 25 MCG: 50 INJECTION, SOLUTION INTRAMUSCULAR; INTRAVENOUS at 08:28

## 2018-01-01 RX ADMIN — ALUMINUM HYDROXIDE, MAGNESIUM HYDROXIDE, AND SIMETHICONE 30 ML: 200; 200; 20 SUSPENSION ORAL at 10:35

## 2018-01-01 RX ADMIN — Medication 1 AMPULE: at 21:36

## 2018-01-01 RX ADMIN — HYDROCODONE BITARTRATE AND ACETAMINOPHEN 1 TABLET: 5; 325 TABLET ORAL at 12:31

## 2018-01-01 RX ADMIN — CLOPIDOGREL BISULFATE 75 MG: 75 TABLET ORAL at 08:43

## 2018-01-01 RX ADMIN — Medication 400 MG: at 11:46

## 2018-01-01 RX ADMIN — SEVELAMER CARBONATE 800 MG: 800 TABLET, FILM COATED ORAL at 12:31

## 2018-01-01 RX ADMIN — CINACALCET HYDROCHLORIDE 120 MG: 30 TABLET, COATED ORAL at 20:03

## 2018-01-01 RX ADMIN — SEVELAMER CARBONATE 800 MG: 800 TABLET, FILM COATED ORAL at 09:06

## 2018-01-01 RX ADMIN — ACETAMINOPHEN 650 MG: 325 TABLET ORAL at 10:08

## 2018-01-01 RX ADMIN — SEVELAMER CARBONATE 800 MG: 800 TABLET, FILM COATED ORAL at 08:58

## 2018-01-01 RX ADMIN — APIXABAN 2.5 MG: 2.5 TABLET, FILM COATED ORAL at 16:58

## 2018-01-01 RX ADMIN — HYDROCODONE BITARTRATE AND ACETAMINOPHEN 1 TABLET: 5; 325 TABLET ORAL at 18:29

## 2018-01-01 RX ADMIN — HEPARIN SODIUM 5000 UNITS: 1000 INJECTION, SOLUTION INTRAVENOUS; SUBCUTANEOUS at 09:59

## 2018-01-01 RX ADMIN — CLOPIDOGREL BISULFATE 75 MG: 75 TABLET ORAL at 09:22

## 2018-01-01 RX ADMIN — Medication 10 ML: at 13:34

## 2018-01-01 RX ADMIN — HEPARIN SODIUM 5000 UNITS: 1000 INJECTION, SOLUTION INTRAVENOUS; SUBCUTANEOUS at 12:28

## 2018-01-01 RX ADMIN — HYDROCODONE BITARTRATE AND ACETAMINOPHEN 1 TABLET: 5; 325 TABLET ORAL at 21:17

## 2018-01-01 RX ADMIN — Medication 5 ML: at 18:29

## 2018-01-01 RX ADMIN — VALACYCLOVIR HYDROCHLORIDE 500 MG: 500 TABLET, FILM COATED ORAL at 13:10

## 2018-01-01 RX ADMIN — HEPARIN SODIUM 5000 UNITS: 5000 INJECTION INTRAVENOUS; SUBCUTANEOUS at 16:19

## 2018-01-01 RX ADMIN — METOPROLOL SUCCINATE 50 MG: 50 TABLET, EXTENDED RELEASE ORAL at 08:44

## 2018-01-01 RX ADMIN — FENTANYL CITRATE 25 MCG: 50 INJECTION, SOLUTION INTRAMUSCULAR; INTRAVENOUS at 08:25

## 2018-01-01 RX ADMIN — CINACALCET HYDROCHLORIDE 120 MG: 30 TABLET, COATED ORAL at 21:08

## 2018-01-01 RX ADMIN — FINASTERIDE 5 MG: 5 TABLET, FILM COATED ORAL at 12:41

## 2018-01-01 RX ADMIN — LORAZEPAM 1 MG: 2 INJECTION INTRAMUSCULAR; INTRAVENOUS at 14:48

## 2018-01-01 RX ADMIN — SEVELAMER CARBONATE 800 MG: 800 TABLET, FILM COATED ORAL at 10:46

## 2018-01-01 RX ADMIN — FLUDROCORTISONE ACETATE 100 MCG: 0.1 TABLET ORAL at 10:39

## 2018-01-01 RX ADMIN — TAMSULOSIN HYDROCHLORIDE 0.4 MG: 0.4 CAPSULE ORAL at 21:18

## 2018-01-01 RX ADMIN — Medication 10 ML: at 14:25

## 2018-01-01 RX ADMIN — FLUDROCORTISONE ACETATE 100 MCG: 0.1 TABLET ORAL at 08:23

## 2018-01-01 RX ADMIN — CINACALCET HYDROCHLORIDE 120 MG: 30 TABLET, COATED ORAL at 21:22

## 2018-01-01 RX ADMIN — Medication 400 MG: at 11:00

## 2018-01-01 RX ADMIN — Medication 10 ML: at 14:16

## 2018-01-01 RX ADMIN — BISACODYL 5 MG: 5 TABLET, COATED ORAL at 12:12

## 2018-01-01 RX ADMIN — METOPROLOL SUCCINATE 25 MG: 25 TABLET, EXTENDED RELEASE ORAL at 08:23

## 2018-01-01 RX ADMIN — SEVELAMER CARBONATE 800 MG: 800 TABLET, FILM COATED ORAL at 11:24

## 2018-01-01 RX ADMIN — HEPARIN SODIUM 5000 UNITS: 5000 INJECTION INTRAVENOUS; SUBCUTANEOUS at 06:00

## 2018-01-01 RX ADMIN — CALCITRIOL 0.5 MCG: 0.25 CAPSULE, LIQUID FILLED ORAL at 12:41

## 2018-01-01 RX ADMIN — METOPROLOL SUCCINATE 50 MG: 50 TABLET, EXTENDED RELEASE ORAL at 08:34

## 2018-01-01 RX ADMIN — APIXABAN 2.5 MG: 2.5 TABLET, FILM COATED ORAL at 16:59

## 2018-01-01 RX ADMIN — Medication 1 AMPULE: at 13:09

## 2018-01-01 RX ADMIN — SEVELAMER CARBONATE 800 MG: 800 TABLET, FILM COATED ORAL at 17:44

## 2018-01-01 RX ADMIN — HYDROCODONE BITARTRATE AND ACETAMINOPHEN 1 TABLET: 5; 325 TABLET ORAL at 05:12

## 2018-01-01 RX ADMIN — FLUDROCORTISONE ACETATE 100 MCG: 0.1 TABLET ORAL at 08:58

## 2018-01-01 RX ADMIN — Medication 10 ML: at 21:08

## 2018-01-01 RX ADMIN — CLOPIDOGREL BISULFATE 75 MG: 75 TABLET ORAL at 10:45

## 2018-01-01 RX ADMIN — SEVELAMER CARBONATE 800 MG: 800 TABLET, FILM COATED ORAL at 08:34

## 2018-01-01 RX ADMIN — CINACALCET HYDROCHLORIDE 120 MG: 30 TABLET, COATED ORAL at 21:17

## 2018-01-01 RX ADMIN — HEPARIN SODIUM 5000 UNITS: 5000 INJECTION INTRAVENOUS; SUBCUTANEOUS at 05:03

## 2018-01-01 RX ADMIN — LORAZEPAM 0.5 MG: 0.5 TABLET ORAL at 10:10

## 2018-01-01 RX ADMIN — ACETAMINOPHEN 650 MG: 325 TABLET ORAL at 09:19

## 2018-01-01 RX ADMIN — Medication 400 MG: at 11:26

## 2018-01-01 RX ADMIN — LORAZEPAM 0.5 MG: 0.5 TABLET ORAL at 09:00

## 2018-01-01 RX ADMIN — Medication 5 ML: at 23:16

## 2018-01-01 RX ADMIN — SEVELAMER CARBONATE 800 MG: 800 TABLET, FILM COATED ORAL at 09:05

## 2018-01-01 RX ADMIN — HYDROCODONE BITARTRATE AND HOMATROPINE METHYLBROMIDE 5 ML: 5; 1.5 SOLUTION ORAL at 22:52

## 2018-01-01 RX ADMIN — FINASTERIDE 5 MG: 5 TABLET, FILM COATED ORAL at 09:05

## 2018-01-01 RX ADMIN — CINACALCET HYDROCHLORIDE 120 MG: 30 TABLET, COATED ORAL at 21:01

## 2018-01-01 RX ADMIN — SODIUM CHLORIDE 75 ML/HR: 900 INJECTION, SOLUTION INTRAVENOUS at 14:45

## 2018-01-01 RX ADMIN — MORPHINE SULFATE 2 MG: 2 INJECTION, SOLUTION INTRAMUSCULAR; INTRAVENOUS at 14:51

## 2018-01-01 RX ADMIN — HEPARIN SODIUM 5000 UNITS: 1000 INJECTION, SOLUTION INTRAVENOUS; SUBCUTANEOUS at 08:22

## 2018-01-01 RX ADMIN — Medication 10 ML: at 14:52

## 2018-01-01 RX ADMIN — SEVELAMER CARBONATE 800 MG: 800 TABLET, FILM COATED ORAL at 16:53

## 2018-01-01 RX ADMIN — SODIUM CHLORIDE: 9 INJECTION, SOLUTION INTRAVENOUS at 08:08

## 2018-01-01 RX ADMIN — STANDARDIZED SENNA CONCENTRATE AND DOCUSATE SODIUM 1 TABLET: 8.6; 5 TABLET, FILM COATED ORAL at 10:45

## 2018-01-01 RX ADMIN — HYDROCODONE BITARTRATE AND ACETAMINOPHEN 1 TABLET: 5; 325 TABLET ORAL at 12:37

## 2018-01-01 RX ADMIN — CALCITRIOL 0.5 MCG: 0.25 CAPSULE, LIQUID FILLED ORAL at 08:44

## 2018-01-01 RX ADMIN — HEPARIN SODIUM 5000 UNITS: 5000 INJECTION INTRAVENOUS; SUBCUTANEOUS at 21:39

## 2018-01-01 RX ADMIN — CINACALCET HYDROCHLORIDE 120 MG: 30 TABLET, COATED ORAL at 23:15

## 2018-01-01 RX ADMIN — BISACODYL 5 MG: 5 TABLET, COATED ORAL at 21:01

## 2018-01-01 RX ADMIN — Medication 10 ML: at 05:18

## 2018-01-01 RX ADMIN — Medication 1 AMPULE: at 08:56

## 2018-01-01 RX ADMIN — HYDROCODONE BITARTRATE AND ACETAMINOPHEN 1 TABLET: 5; 325 TABLET ORAL at 21:59

## 2018-01-01 RX ADMIN — APIXABAN 2.5 MG: 2.5 TABLET, FILM COATED ORAL at 17:15

## 2018-01-01 RX ADMIN — TUBERCULIN PURIFIED PROTEIN DERIVATIVE 5 UNITS: 5 INJECTION, SOLUTION INTRADERMAL at 09:12

## 2018-01-01 RX ADMIN — GUAIFENESIN AND DEXTROMETHORPHAN 5 ML: 100; 10 SYRUP ORAL at 22:41

## 2018-01-01 RX ADMIN — CINACALCET HYDROCHLORIDE 120 MG: 30 TABLET, COATED ORAL at 22:28

## 2018-01-01 RX ADMIN — METOPROLOL SUCCINATE 50 MG: 50 TABLET, EXTENDED RELEASE ORAL at 10:40

## 2018-01-01 RX ADMIN — Medication 5 ML: at 21:22

## 2018-01-01 RX ADMIN — Medication 10 ML: at 21:06

## 2018-01-01 RX ADMIN — Medication 400 MG: at 09:07

## 2018-01-01 RX ADMIN — CLOPIDOGREL BISULFATE 75 MG: 75 TABLET ORAL at 11:26

## 2018-01-01 RX ADMIN — CLOPIDOGREL BISULFATE 75 MG: 75 TABLET ORAL at 09:07

## 2018-01-01 RX ADMIN — FINASTERIDE 5 MG: 5 TABLET, FILM COATED ORAL at 11:46

## 2018-01-01 RX ADMIN — Medication 10 ML: at 14:01

## 2018-01-01 RX ADMIN — HEPARIN SODIUM 5000 UNITS: 5000 INJECTION, SOLUTION INTRAVENOUS; SUBCUTANEOUS at 07:02

## 2018-01-01 RX ADMIN — SEVELAMER CARBONATE 800 MG: 800 TABLET, FILM COATED ORAL at 12:12

## 2018-01-01 RX ADMIN — SEVELAMER CARBONATE 800 MG: 800 TABLET, FILM COATED ORAL at 10:59

## 2018-01-01 RX ADMIN — CINACALCET HYDROCHLORIDE 60 MG: 30 TABLET, COATED ORAL at 21:17

## 2018-01-01 RX ADMIN — Medication 10 ML: at 06:25

## 2018-01-01 RX ADMIN — HYDROCODONE BITARTRATE AND ACETAMINOPHEN 1 TABLET: 5; 325 TABLET ORAL at 21:41

## 2018-01-01 RX ADMIN — CLOPIDOGREL BISULFATE 75 MG: 75 TABLET ORAL at 12:31

## 2018-01-01 RX ADMIN — Medication 1 AMPULE: at 21:39

## 2018-01-01 RX ADMIN — METOPROLOL SUCCINATE 50 MG: 50 TABLET, EXTENDED RELEASE ORAL at 13:11

## 2018-01-01 RX ADMIN — HEPARIN SODIUM 5000 UNITS: 5000 INJECTION INTRAVENOUS; SUBCUTANEOUS at 05:07

## 2018-01-04 PROBLEM — J90 PLEURAL EFFUSION ON RIGHT: Status: ACTIVE | Noted: 2018-01-01

## 2018-01-04 NOTE — PROGRESS NOTES
Pt sat up on side of bed for thoracentesis. Consent obtained. Time out performed. Pts vitals monitored throughout procedure. Right ultrasound done and pic taken of pleural fluid. ~1500 ml yellow pleural fluid from right. Pt tolerated procedure well with no adverse rxn. Specimens sent to the lab x 3 and labeled appropriately. Site dressed appropriately. Lung sliding done and ultrasound findings reviewed by MD.       Pt discharged home by himself after discharge instructions gone over.

## 2018-01-04 NOTE — IP AVS SNAPSHOT
[address] 
[phone] Patient: Major Guaman MRN: [id number] VPR:3/4/8802 About your hospitalization You were admitted on:  January 4, 2018 You last received care in the:  [facility] You were discharged on:  January 4, 2018 Why you were hospitalized Your primary diagnosis was:  Pleural Effusion On Right Follow-up Information None Your Scheduled Appointments Wednesday February 28, 2018  2:15 PM EST Office Visit with Sung Arambula, [address] ([address] [place] [address]) [address]  
[address] Paty Espinosa 81  
[phone] Discharge Orders None A check bianka indicates which time of day the medication should be taken. My Medications ASK your doctor about these medications Instructions Each Dose to Equal  
 Morning Noon Evening Bedtime  
 calcitRIOL 0.5 mcg capsule Commonly known as:  ROCALTROL Your last dose was: Your next dose is: Take 0.5 mcg by mouth daily. 0.5 mcg  
    
   
   
   
  
 clopidogrel 75 mg Tab Commonly known as:  PLAVIX Your last dose was: Your next dose is: Take 75 mg by mouth daily. 75 mg  
    
   
   
   
  
 dicyclomine 10 mg capsule Commonly known as:  BENTYL Your last dose was: Your next dose is: Take 10 mg by mouth daily as needed. 10 mg  
    
   
   
   
  
 dilTIAZem  mg ER capsule Commonly known as:  CARDIZEM CD Your last dose was: Your next dose is: Take 1 Cap by mouth daily. 180 mg  
    
   
   
   
  
 ELIQUIS 2.5 mg tablet Generic drug:  apixaban Your last dose was: Your next dose is: Take 2.5 mg by mouth two (2) times a day. 2.5 mg  
    
   
   
   
  
 epoetin alcides 10,000 unit/mL injection Commonly known as:  EPOGEN;PROCRIT Your last dose was: Your next dose is:    
   
   
 by SubCUTAneous route as needed. Currently taking abt twice a month at [facility]  
     
   
   
   
  
 finasteride 5 mg tablet Commonly known as:  PROSCAR Your last dose was: Your next dose is: Take 5 mg by mouth daily. 5 mg  
    
   
   
   
  
 furosemide 80 mg tablet Commonly known as:  LASIX Your last dose was: Your next dose is: Take 80 mg by mouth two (2) times a day. Indications: am  
 80 mg KRILL OIL PO Your last dose was: Your next dose is: Take  by mouth daily. losartan 50 mg tablet Commonly known as:  COZAAR Your last dose was: Your next dose is: TAKE 1 TABLET BY MOUTH DAILY Magnesium Oxide 500 mg Cap Your last dose was: Your next dose is: Take  by mouth daily. metoprolol succinate 100 mg tablet Commonly known as:  TOPROL-XL Your last dose was: Your next dose is: Take 1 Tab by mouth daily. 100 mg  
    
   
   
   
  
 multivitamin tablet Commonly known as:  ONE A DAY Your last dose was: Your next dose is: Take 1 Tab by mouth daily. Special Complex for dialysis patients 1 Tab  
    
   
   
   
  
 nitroglycerin 0.4 mg SL tablet Commonly known as:  NITROSTAT Your last dose was: Your next dose is: ONE TABLET UNDER TONGUE AS NEEDED FOR CHEST PAIN EVERY 5 MINUTES Omeprazole Magnesium 20 mg Cpdr  
   
Your last dose was: Your next dose is: Take  by mouth daily. predniSONE 10 mg tablet Commonly known as:  Flor Parker Your last dose was: Your next dose is: Take  by mouth as needed. PROBIOTIC 4X 10-15 mg Tbec Generic drug:  B.infantis-B.ani-B.long-B.bifi Your last dose was: Your next dose is: Take  by mouth daily. RENVELA 800 mg Tab tab Generic drug:  sevelamer carbonate Your last dose was: Your next dose is: Take 800 mg by mouth three (3) times daily (with meals). Indications: 4 with meals, 2 with snacks 800 mg SENSIPAR 60 mg Tab Generic drug:  cinacalcet Your last dose was: Your next dose is: Take 60 mg by mouth two (2) times a day. Two tablets QHS 60 mg STOOL SOFTENER PO Your last dose was: Your next dose is: Take  by mouth daily. Discharge Instructions Thoracentesis: What to Expect at [facility] Your Recovery Thoracentesis (say \"hezg-fe-tbj-BYRON-sis\") is a procedure to remove fluid from the space between the lungs and the chest wall (pleural space). This procedure may also be called a \"chest tap. \" It is normal to have a small amount of fluid in the pleural space. But too much fluid can build up because of problems such as infection, heart failure, or lung cancer. The procedure may have been done to help with shortness of breath and pain caused by the fluid buildup. Or you may have had this procedure so the doctor could test the fluid to find the cause of the buildup. Your chest may be sore where the doctor put the needle or catheter into your skin (the puncture site). This usually gets better after a day or two. You can go back to work or your normal activities as soon as you feel up to it. If the doctor sent the fluid to a lab for testing, it may take several days to get the results. The doctor or nurse will discuss the results with you.  
This care sheet gives you a general idea about how long it will take for you to recover. But each person recovers at a different pace. Follow the steps below to feel better as quickly as possible. How can you care for yourself at home? Activity ? · Rest when you feel tired. Getting enough sleep will help you recover. ? · Avoid strenuous activities, such as bicycle riding, jogging, weight lifting, or aerobic exercise, until your doctor says it is okay. ? · You may shower. Do not take a bath until the puncture site has healed, or until your doctor tells you it is okay. ? · Ask your doctor when you can drive again. ? · You may need to take 1 or 2 days off from work. It depends on the type of work you do and how you feel. Diet ? · You can eat your normal diet. ? · Drink plenty of fluids (unless your doctor tells you not to). Medicines ? · Your doctor will tell you if and when you can restart your medicines. He or she will also give you instructions about taking any new medicines. ? · If you take blood thinners, such as warfarin (Coumadin), clopidogrel (Plavix), or aspirin, be sure to talk to your doctor. He or she will tell you if and when to start taking those medicines again. Make sure that you understand exactly what your doctor wants you to do. ? · Be safe with medicines. Take pain medicines exactly as directed. ¨ If the doctor gave you a prescription medicine for pain, take it as prescribed. ¨ If you are not taking a prescription pain medicine, ask your doctor if you can take an over-the-counter medicine. ¨ Do not take two or more pain medicines at the same time unless the doctor told you to. Many pain medicines have acetaminophen, which is Tylenol. Too much acetaminophen (Tylenol) can be harmful. ? · If you think your pain medicine is making you sick to your stomach: 
¨ Take your medicine after meals (unless your doctor has told you not to). ¨ Ask your doctor for a different pain medicine. ? · If your doctor prescribed antibiotics, take them as directed. Do not stop taking them just because you feel better. You need to take the full course of antibiotics. ?Care of the puncture site ? · Wash the area daily with warm, soapy water, and pat it dry. Don't use hydrogen peroxide or alcohol, which may delay healing. You may cover the area with a gauze bandage if it weeps or rubs against clothing. Change the bandage every day. ? · Keep the area clean and dry. Follow-up care is a key part of your treatment and safety. Be sure to make and go to all appointments, and call your doctor if you are having problems. It's also a good idea to know your test results and keep a list of the medicines you take. Keep area dry for the next 24 hrs. Should you feel the fluid is returning (you're becoming short of breath) call [facility] @ [phone] for a repeat Thoracentesis. When should you call for help? Call 911 anytime you think you may need emergency care. For example, call if: 
? · You passed out (lost consciousness). ? · You have severe trouble breathing. ? · You have sudden chest pain and shortness of breath, or you cough up blood. ?Call your doctor now or seek immediate medical care if: 
? · You have shortness of breath that is new or getting worse. ? · You have new or worse pain in your chest, especially when you take a deep breath. ? · You are sick to your stomach or cannot keep fluids down. ? · You have a fever over 100°F.  
? · Bright red blood has soaked through the bandage over your puncture site. ? · You have signs of infection, such as: 
¨ Increased pain, swelling, warmth, or redness. ¨ Red streaks leading from the puncture site. ¨ Pus draining from the puncture site. ¨ Swollen lymph nodes in your neck, armpits, or groin. ¨ A fever. ? · You cough up a lot more mucus than normal, or your mucus changes color. ?Watch closely for changes in your health, and be sure to contact your doctor if you have any problems. Where can you learn more? Go to http://suly-lauren.info/. Enter O836 in the search box to learn more about \"Thoracentesis: What to Expect at Home. \" Current as of: May 12, 2017 Content Version: 11.4 © 3545-5318 [employer]Ã¢â€žÂ¢ Interactive. Care instructions adapted under license by [employer] (which disclaims liability or warranty for this information). If you have questions about a medical condition or this instruction, always ask your healthcare professional. [place] [address] any warranty or liability for your use of this information. ACO Transitions of Care Introducing [employer] [address]s offers a voluntary care coordination program to provide high quality service and care to [facility] fee-for-service beneficiaries. [place] was designed to help you enhance your health and well-being through the following services: ? Transitions of Care  support for individuals who are transitioning from one care setting to another (example: Hospital to home). ? Chronic and Complex Care Coordination  support for individuals and caregivers of those with serious or chronic illnesses or with more than one chronic (ongoing) condition and those who take a number of different medications. If you meet specific medical criteria, a [facility] may call you directly to coordinate your care with your primary care physician and your other care providers. For questions about the [facility] programs, please, contact your physicians office. For general questions or additional information about Accountable Care Organizations: 
Please visit www.medicare.gov/acos. html or call 1-800-MEDICARE (0-385.362.5271) TTY users should call 1-441.385.5965. Introducing [facility]! Dear Kushal Gonzalez: Thank you for requesting a [employer] account. Our records indicate that you already have an active [employer] account. You can access your account anytime at [email or web address] Did you know that you can access your hospital and ER discharge instructions at any time in [employer]? You can also review all of your test results from your hospital stay or ER visit. Additional Information If you have questions, please visit the Frequently Asked Questions section of the [employer] website at [email or web address] Remember, [employer] is NOT to be used for urgent needs. For medical emergencies, dial 911. Now available from your iPhone and Android! Providers Seen During Your Hospitalization Provider Specialty Primary office phone Lucille Shipman MD Pulmonary Disease [phone] Your Primary Care Physician (PCP) Primary Care Physician Office Phone Office Fax [facility] [address] [phone] You are allergic to the following Allergen Reactions Nka (No Known Allergies) Unknown (comments) Recent Documentation Height Weight BMI Smoking Status 1.778 m 84.5 kg 26.73 kg/m2 Current Every Day Smoker Emergency Contacts Name Discharge Info Relation Home Work Mobile Jasbir Castillo  Son [22] [phone] Patient Belongings The following personal items are in your possession at time of discharge: 
  Dental Appliances: None  Visual Aid: Glasses Please provide this summary of care documentation to your next provider. Signatures-by signing, you are acknowledging that this After Visit Summary has been reviewed with you and you have received a copy. Patient Signature:  ____________________________________________________________ Date:  ____________________________________________________________ `    
    
 Provider Signature:  ____________________________________________________________ Date:  ____________________________________________________________

## 2018-01-04 NOTE — H&P
History and Physical/Consult  [facility]    1/4/2018    Date of Admission:  1/4/2018      Subjective:     Patient is a 68 y.o.  male presents with R pleural effusion. Patient referred by Dr Lise Roland for possible R thoracentesis with R pleural effusion. Patient with history of PD nightly and had had an upper respiratory illness recently. Had been seen by Dr Gregg Gorman in the office and CXR revealed R pleural effusion- hence this visit. Patient here for diagnostic and therapeutic thoracentesis. This is a first time occurrence for him. States he lost ~5 lb in the past month and smokes a cigar occasionally but accumulated a 20 pack year smoking history. Prior to Admission Medications   Prescriptions Last Dose Informant Patient Reported? Taking? B.infantis-B.ani-B.long-B.bifi (PROBIOTIC 4X) 10-15 mg TbEC 1/4/2018 at Unknown time  Yes Yes   Sig: Take  by mouth daily. DOCUSATE CALCIUM (STOOL SOFTENER PO) 1/4/2018 at Unknown time  Yes Yes   Sig: Take  by mouth daily. KRILL OIL PO 1/4/2018 at Unknown time  Yes Yes   Sig: Take  by mouth daily. Magnesium Oxide 500 mg cap 1/4/2018 at Unknown time  Yes Yes   Sig: Take  by mouth daily. Omeprazole Magnesium 20 mg cpDR 1/4/2018 at Unknown time  Yes Yes   Sig: Take  by mouth daily. RENVELA 800 mg Tab tab 1/4/2018 at Unknown time  Yes Yes   Sig: Take 800 mg by mouth three (3) times daily (with meals). Indications: 4 with meals, 2 with snacks   apixaban (ELIQUIS) 2.5 mg tablet 12/30/2017  Yes No   Sig: Take 2.5 mg by mouth two (2) times a day. calcitRIOL (ROCALTROL) 0.5 mcg capsule 1/4/2018 at Unknown time  Yes Yes   Sig: Take 0.5 mcg by mouth daily. cinacalcet (SENSIPAR) 60 mg tab 1/3/2018 at Unknown time  Yes Yes   Sig: Take 60 mg by mouth two (2) times a day. Two tablets QHS    clopidogrel (PLAVIX) 75 mg tab 12/30/2017  Yes No   Sig: Take 75 mg by mouth daily.    dicyclomine (BENTYL) 10 mg capsule Unknown at Unknown time  Yes No   Sig: Take 10 mg by mouth daily as needed. dilTIAZem CD (CARDIZEM CD) 180 mg ER capsule 1/3/2018 at Unknown time  No Yes   Sig: Take 1 Cap by mouth daily. epoetin alcides (EPOGEN;PROCRIT) 10,000 unit/mL injection 12/4/2017 at Unknown time  Yes Yes   Sig: by SubCUTAneous route as needed. Currently taking abt [facility] a [facility] at [facility]   finasteride (PROSCAR) 5 mg tablet 1/4/2018 at Unknown time  Yes Yes   Sig: Take 5 mg by mouth daily. furosemide (LASIX) 80 mg tablet 1/4/2018 at Unknown time  Yes Yes   Sig: Take 80 mg by mouth two (2) times a day. Indications: am   losartan (COZAAR) 50 mg tablet 1/4/2018 at Unknown time  No Yes   Sig: TAKE 1 TABLET BY MOUTH DAILY   metoprolol succinate (TOPROL-XL) 100 mg tablet 1/4/2018 at Unknown time  No Yes   Sig: Take 1 Tab by mouth daily. multivitamin (ONE A DAY) tablet 1/4/2018 at Unknown time  Yes Yes   Sig: Take 1 Tab by mouth daily. Special Complex for dialysis patients   nitroglycerin (NITROSTAT) 0.4 mg SL tablet Unknown at Unknown time  No No   Sig: ONE TABLET UNDER TONGUE AS NEEDED FOR CHEST PAIN EVERY 5 MINUTES   predniSONE (DELTASONE) 10 mg tablet 12/4/2017 at Unknown time  Yes Yes   Sig: Take  by mouth as needed.       Facility-Administered Medications: None       Review of Systems  Denies: fevers, chills, sweats, fatigue, malaise, anorexia,   Denies: blurry vision, loss of vision, eye pain, photophobia  Denies: hearing loss, ringing in the ears, earache, epistaxis  Denies: chest pain, palpitations, syncope, orthopnea, paroxysmal nocturnal dyspnea, claudication  Denies: dysphagia, odynophagia, nausea, vomiting, diarrhea, constipation, abdominal pain, jaundice, melena   Denies: frequency, dysuria, nocturia, urinary incontinence, stones, hematuria  Denies: polydipsia/polyuria, skin changes, temperature intolerance, unexpected weight gain  Denies: back pain, joint pain, joint swelling, muscle pain, muscle weakness  Denies: bleeding problems, blood transfusions, bruising, pallor, swollen lymph nodes  Denies: headache, dysarthria, blurred vision, diplopia,seizure, focal deficits. Admits to:  Dyspnea and 5 lb wt loss          Past Medical History:   Diagnosis Date    Anemia, unspecified  7/7/2016    Arrhythmia     IRREG.  HEART BEAT- pt denies a fib - found on cardiac note 5/3/13- pt states he feels flutter at times    Arthritis     ASCAD - artery bypass graft 7/7/2016    ASCAD - artery bypass graft 7/7/2016    CAD (coronary artery disease)     CABG 2007, stented 1995    Chronic kidney disease     STAGE 3 CKD, dialysis    Chronic prostatitis 11/25/2013    Diverticulitis     Diverticulosis 7/7/2016    GERD (gastroesophageal reflux disease)     controlled with NA Bicarb pt takes as renal pt    Glomerulonephritis 7/7/2016    GOUT, UNSPECIFIED 7/7/2016    Hypercholesteremia     Hypertension     Hypertrophy of prostate with urinary obstruction and other lower urinary tract symptoms (LUTS) 11/25/2013    Kidney failure     Paroxysmal atrial fibrillation ([address] 7/7/2016    TIA (transient ischemic attack) 7/7/2016     Past Surgical History:   Procedure Laterality Date    ABDOMEN SURGERY PROC UNLISTED      perineal cath    CARDIAC SURG PROCEDURE UNLIST      CABG x 2 in 2007; PTCA WITH STENTS    CREAT AV FISTULA,AUTOGENOUS GRAFT      HX COLONOSCOPY  2011    HX CORONARY ARTERY BYPASS GRAFT      HX CSF SHUNT      HX HEART CATHETERIZATION      HX HERNIA REPAIR      bilateral    HX ORTHOPAEDIC      rt shoulder rotater cuff,lt knee    HX VASCULAR ACCESS  2010    L u arm    OTHER CELL      LEFT KNEE SURGERY    VASCULAR SURGERY PROCEDURE UNLIST      cabg x2     Social History     Social History    Marital status:      Spouse name: N/A    Number of children: 3    Years of education: N/A     Occupational History    marketing management      Social History Main Topics    Smoking status: Current Every Day Smoker     Last attempt to quit: 1/1/1980    Smokeless tobacco: Never Used Comment: CIGAR DAILY FOR 10 YEARS    Alcohol use 0.0 oz/week      Comment: rare    Drug use: No    Sexual activity: No     Other Topics Concern    Not on file     Social History Narrative     Family History   Problem Relation Age of Onset    Heart Disease Father 59     MI    Heart Attack Father 72     MI    Stroke Mother     Hypertension Mother     Heart Disease Mother     Cancer Sister      stomach     Allergies   Allergen Reactions    Nka [No Known Allergies] Unknown (comments)       No current facility-administered medications for this encounter. Objective:     Vitals:    01/04/18 1526 01/04/18 1555   BP: 160/88 165/75   Pulse: 90 80   Resp: 20 20   SpO2: 92% 95%   Weight: 186 lb 4.6 oz (84.5 kg)    Height: 5' 10\" (1.778 m)      PHYSICAL EXAM     Gen- the patient is well developed and in no acute distress  HEENT- PERRL, EOMI, no scleral icterus       nose without alar flaring or epistaxis                  oral muscosa moist without cyanosis  Neck- no JVD or retractions  Lungs- Decreased BS on R  Heart- RRR without M,G,R  Abd- soft and non-tender; with positive bowel sounds. Ext- warm without cyanosis. There is trace lower leg edema. Skin- no jaundice or rashes, no wounds   Neuro- alert and oriented x 3. No gross sensorimotor deficits are present. No results for input(s): WBC, HGB, HCT, PLT, HGBEXT, HCTEXT, PLTEXT in the last 72 hours. No results for input(s): NA, K, CL, GLU, CO2, BUN, CREA, MG, PHOS, INR, BNPP in the last 72 hours. No lab exists for component: TROIP, INREXT  No results for input(s): PH, PCO2, PO2, HCO3 in the last 72 hours.     Assessment:  (Medical Decision Making)     Patient Active Problem List   Diagnosis Code    CAD (coronary artery disease) I25.10    HTN (hypertension) I10    Benign non-nodular prostatic hyperplasia with lower urinary tract symptoms N40.1    GOUT, UNSPECIFIED M10.9    Anemia, unspecified  D64.9    Chronic atrial fibrillation (HCC) I48.2  End-stage renal disease (HCC) N18.6    History of TIA (transient ischemic attack) Z86.73    Hypercholesterolemia E78.00    Internal carotid artery stenosis I65.29    Gastroesophageal reflux disease without esophagitis K21.9    Pleural effusion on right J90       Plan:  (Medical Decision Making)     Hospital Problems  Date Reviewed: 11/21/2017          Codes Class Noted POA    * (Principal)Pleural effusion on right ICD-10-CM: J90  ICD-9-CM: 511.9  1/4/2018 Unknown    Proceed with planned thoracentesis for diagnosis and Rx.           Kathi Arguelles MD

## 2018-01-04 NOTE — PROCEDURES
PROCEDURE:    DIAGNOSTIC/THERAPEUTIC THORACENTESIS        PRE-OP DIAGNOSIS:    R PLEURAL EFFUSION    POST-OP DIAGNOSIS:    R PLEURAL EFFUSION    ASSISTANT:        ANESTHESIA:    LOCAL ANESTHESIA WITH 1% LIDOCAINE 10 CC TOTAL. CHEST ULTRASOUND FINDINGS:    A Turbo-M, Sonosite ultrasound with a 5-16 mHz probe was used to image the chest and localize the pleural effusion on the Left/and/Right chest.    A large/moderate/small anechoic space was seen on the Left/Right consistent with an uncomplicated pleural effusion. A large/moderate/small,  complex pleural space was identified on ultrasound, there were multiple loculations and septetions present, with the pleural fluid having a mixed echogenic character. DESCRIPTION OF PROCEDURE:    After obtaining informed consent and localizing the safest location for thoracentesis, the  9th  intercostal space was marked with a blunt, plastic needle cap in the mid scapular line. An [employer] AK-0100 Pleral-Seal thoracentesis kit was used to perform the procedure. The skin was  cleansed with the supplied  chlorhexididne swab and then draped in the usual fasion. Using the previously marked location as a giude, a 22 G 1.5 inch needle was used to inject 10 cc of 1% lidocaine into the skin and subcutaneous tissue, as well as onto the underlying rib and inter-costal muscles, pleural fluid was aspirated to assure proper location, prior to removing the anesthesia needle. A 3mm  incision was then made, with the supplied scalpel in the usual fashion to facilitate the insertiopn of the thoracentesis needle. The needle with an 8French thoracentesis catheter was then introduced into the chest through the previously made incision in the usual fashion, the rib localized with the needle, and the catheter then marched over the rib into the pleural space.     After aspirating fluid, the thoracentesis catheter was then placed into the chest using the needle itself as a trocar. The needle was then removed and the catheter was attached to the supplied tubing without complication. 12 cc of Clear Light Yellow  fluid, was aspirated and sent for analysis. Following this a total of 1500 cc was removed from the Right chest, without complication, with the patient experiencing coughing during the procedure. Fluid was sent for the following tests:    Cell count with differential  LDH  Glucose  Total protein  Cytology  AFB  Fungus  Routine culture and Gram stain    Post procedure US confirmed completedrainage of the effusion.     EBL:     One drop      COMPLICATIONS:    none    Suspect migration of dialysate fluid into R chest- await results of Roberth Roberts MD.

## 2018-01-04 NOTE — DISCHARGE INSTRUCTIONS
Thoracentesis: What to Expect at Home  Your Recovery  Thoracentesis (say \"rdfv-oy-rem-BYRON-sis\") is a procedure to remove fluid from the space between the lungs and the chest wall (pleural space). This procedure may also be called a \"chest tap. \" It is normal to have a small amount of fluid in the pleural space. But too much fluid can build up because of problems such as infection, heart failure, or lung cancer. The procedure may have been done to help with shortness of breath and pain caused by the fluid buildup. Or you may have had this procedure so the doctor could test the fluid to find the cause of the buildup. Your chest may be sore where the doctor put the needle or catheter into your skin (the puncture site). This usually gets better after a day or two. You can go back to work or your normal activities as soon as you feel up to it. If the doctor sent the fluid to a lab for testing, it may take several days to get the results. The doctor or nurse will discuss the results with you. This care sheet gives you a general idea about how long it will take for you to recover. But each person recovers at a different pace. Follow the steps below to feel better as quickly as possible. How can you care for yourself at home? Activity  ? · Rest when you feel tired. Getting enough sleep will help you recover. ? · Avoid strenuous activities, such as bicycle riding, jogging, weight lifting, or aerobic exercise, until your doctor says it is okay. ? · You may shower. Do not take a bath until the puncture site has healed, or until your doctor tells you it is okay. ? · Ask your doctor when you can drive again. ? · You may need to take 1 or 2 days off from work. It depends on the type of work you do and how you feel. Diet  ? · You can eat your normal diet. ? · Drink plenty of fluids (unless your doctor tells you not to). Medicines  ? · Your doctor will tell you if and when you can restart your medicines.  Zach or she will also give you instructions about taking any new medicines. ? · If you take blood thinners, such as warfarin (Coumadin), clopidogrel (Plavix), or aspirin, be sure to talk to your doctor. He or she will tell you if and when to start taking those medicines again. Make sure that you understand exactly what your doctor wants you to do. ? · Be safe with medicines. Take pain medicines exactly as directed. ¨ If the doctor gave you a prescription medicine for pain, take it as prescribed. ¨ If you are not taking a prescription pain medicine, ask your doctor if you can take an over-the-counter medicine. ¨ Do not take two or more pain medicines at the same time unless the doctor told you to. Many pain medicines have acetaminophen, which is Tylenol. Too much acetaminophen (Tylenol) can be harmful. ? · If you think your pain medicine is making you sick to your stomach:  ¨ Take your medicine after meals (unless your doctor has told you not to). ¨ Ask your doctor for a different pain medicine. ? · If your doctor prescribed antibiotics, take them as directed. Do not stop taking them just because you feel better. You need to take the full course of antibiotics. ?Care of the puncture site  ? · Wash the area daily with warm, soapy water, and pat it dry. Don't use hydrogen peroxide or alcohol, which may delay healing. You may cover the area with a gauze bandage if it weeps or rubs against clothing. Change the bandage every day. ? · Keep the area clean and dry. Follow-up care is a key part of your treatment and safety. Be sure to make and go to all appointments, and call your doctor if you are having problems. It's also a good idea to know your test results and keep a list of the medicines you take. Keep area dry for the next 24 hrs. Should you feel the fluid is returning (you're becoming short of breath) call [facility] @ [phone] for a repeat Thoracentesis.       When should you call for help?  Call 911 anytime you think you may need emergency care. For example, call if:  ? · You passed out (lost consciousness). ? · You have severe trouble breathing. ? · You have sudden chest pain and shortness of breath, or you cough up blood. ?Call your doctor now or seek immediate medical care if:  ? · You have shortness of breath that is new or getting worse. ? · You have new or worse pain in your chest, especially when you take a deep breath. ? · You are sick to your stomach or cannot keep fluids down. ? · You have a fever over 100°F.   ? · Bright red blood has soaked through the bandage over your puncture site. ? · You have signs of infection, such as:  ¨ Increased pain, swelling, warmth, or redness. ¨ Red streaks leading from the puncture site. ¨ Pus draining from the puncture site. ¨ Swollen lymph nodes in your neck, armpits, or groin. ¨ A fever. ? · You cough up a lot more mucus than normal, or your mucus changes color. ? Watch closely for changes in your health, and be sure to contact your doctor if you have any problems. Where can you learn more? Go to http://suly-lauren.info/. Enter L230 in the search box to learn more about \"Thoracentesis: What to Expect at Home. \"  Current as of: May 12, 2017  Content Version: 11.4  © 2567-8108 Healthwise, [employer]. Care instructions adapted under license by [employer] (which disclaims liability or warranty for this information). If you have questions about a medical condition or this instruction, always ask your healthcare professional. [place] [address] any warranty or liability for your use of this information.

## 2018-01-04 NOTE — IP AVS SNAPSHOT
Mary Meetfrancisca 
 
 
 [address] [facility] [address] [facility] 
[phone] Patient: Gilma Law MRN: [id number] YVB:[birth year] A check bianka indicates which time of day the medication should be taken. My Medications ASK your doctor about these medications Instructions Each Dose to Equal  
 Morning Noon Evening Bedtime  
 calcitRIOL 0.5 mcg capsule Commonly known as:  ROCALTROL Your last dose was: Your next dose is: Take 0.5 mcg by mouth daily. 0.5 mcg  
    
   
   
   
  
 clopidogrel 75 mg Tab Commonly known as:  PLAVIX Your last dose was: Your next dose is: Take 75 mg by mouth daily. 75 mg  
    
   
   
   
  
 dicyclomine 10 mg capsule Commonly known as:  BENTYL Your last dose was: Your next dose is: Take 10 mg by mouth daily as needed. 10 mg  
    
   
   
   
  
 dilTIAZem  mg ER capsule Commonly known as:  CARDIZEM CD Your last dose was: Your next dose is: Take 1 Cap by mouth daily. 180 mg  
    
   
   
   
  
 ELIQUIS 2.5 mg tablet Generic drug:  apixaban Your last dose was: Your next dose is: Take 2.5 mg by mouth two (2) times a day. 2.5 mg  
    
   
   
   
  
 epoetin alcides 10,000 unit/mL injection Commonly known as:  EPOGEN;PROCRIT Your last dose was: Your next dose is:    
   
   
 by SubCUTAneous route as needed. Currently taking abt twice a month at [facility]  
     
   
   
   
  
 finasteride 5 mg tablet Commonly known as:  PROSCAR Your last dose was: Your next dose is: Take 5 mg by mouth daily. 5 mg  
    
   
   
   
  
 furosemide 80 mg tablet Commonly known as:  LASIX Your last dose was: Your next dose is: Take 80 mg by mouth two (2) times a day.  Indications: am  
 80 mg  
    
   
   
   
  
 KRILL OIL PO Your last dose was: Your next dose is: Take  by mouth daily. losartan 50 mg tablet Commonly known as:  COZAAR Your last dose was: Your next dose is: TAKE 1 TABLET BY MOUTH DAILY Magnesium Oxide 500 mg Cap Your last dose was: Your next dose is: Take  by mouth daily. metoprolol succinate 100 mg tablet Commonly known as:  TOPROL-XL Your last dose was: Your next dose is: Take 1 Tab by mouth daily. 100 mg  
    
   
   
   
  
 multivitamin tablet Commonly known as:  ONE A DAY Your last dose was: Your next dose is: Take 1 Tab by mouth daily. Special Complex for dialysis patients 1 Tab  
    
   
   
   
  
 nitroglycerin 0.4 mg SL tablet Commonly known as:  NITROSTAT Your last dose was: Your next dose is: ONE TABLET UNDER TONGUE AS NEEDED FOR CHEST PAIN EVERY 5 MINUTES Omeprazole Magnesium 20 mg Cpdr  
   
Your last dose was: Your next dose is: Take  by mouth daily. predniSONE 10 mg tablet Commonly known as:  Natalia Panola Your last dose was: Your next dose is: Take  by mouth as needed. PROBIOTIC 4X 10-15 mg Tbec Generic drug:  B.infantis-B.ani-B.long-B.bifi Your last dose was: Your next dose is: Take  by mouth daily. RENVELA 800 mg Tab tab Generic drug:  sevelamer carbonate Your last dose was: Your next dose is: Take 800 mg by mouth three (3) times daily (with meals). Indications: 4 with meals, 2 with snacks 800 mg SENSIPAR 60 mg Tab Generic drug:  cinacalcet Your last dose was: Your next dose is: Take 60 mg by mouth two (2) times a day. Two tablets QHS 60 mg STOOL SOFTENER PO Your last dose was: Your next dose is: Take  by mouth daily.

## 2018-01-31 NOTE — PROGRESS NOTES
Pt sat up on side of bed for thoracentesis. Consent obtained. Time out performed. Pts vitals monitored throughout procedure. Left and right ultrasound done and pic taken of pleural fluid. 1500 ml pleural fluid from removed from the right side. Pt tolerated procedure well with no adverse rxn. No specimens sent to the lab per Dr. Barbara Collazo. Site dressed appropriately.

## 2018-01-31 NOTE — PROCEDURES
Pre PROCEDURE DX :    THERAPEUTIC THORACENTESIS. INDICATION/ Site:    PLEURAL EFFUSION --- right:    POST PROCEDURE DX : clear yellowish pleural effusion    Patient identification: done by RT and Dr. Esthela Aguillon. All agreed    ANESTHESIA:    LOCAL ANESTHESIA WITH 1% LIDOCAINE 10CC TOTAL. CHEST ULTRASOUND FINDINGS:    A Turbo-M, Sonosite ultrasound with a 5-16 mHz probe was used to image the chest and localize the pleural effusion on the right  chest.    A large anechoic space was seen on the right consistent with an uncomplicated pleural effusion. See image for review. DESCRIPTION OF PROCEDURE:    After obtaining informed consent and localizing the safest location for thoracentesis, the  space was marked with a blunt, plastic needle cap in the mid scapular line. An [employer] AK-0100 Pleral-Seal thoracentesis kit was used to perform the procedure. The skin was  cleansed with the supplied  chlorhexididne swab and then draped in the usual fasion. Using the previously marked location as a giude, a 22 G 1.5 inch needle was used to inject 10 cc of 1% lidocaine into the skin and subcutaneous tissue, as well as onto the underlying rib and inter-costal muscles, pleural fluid was aspirated to assure proper location, prior to removing the anesthesia needle. A 3mm  incision was then made, with the supplied scalpel in the usual fashion to facilitate the insertiopn of the thoracentesis needle. The needle with an 8French thoracentesis catheter was then introduced into the chest through the previously made incision in the usual fashio,. The rib licalized with the needle, and the catheter then marched over the rib into the pleural space. After aspirating fluid, the thoracentesis catheter was then placed into the chest using the needle itself as a trocar. The needle was then removed and the catheter was attached to the supplied tubing without complication.     A total of 1500 cc of yellow  fluid was removed without complication. Lung slide was performed and revealed no PTX. The patient was stable post procedure.      EBL : 2 ml    STUDIES ORDERED:   None    Dasha Lancaster MD

## 2018-01-31 NOTE — IP AVS SNAPSHOT
[address] [facility] 
[phone] Patient: Lino Ibarra MRN: [id number] KMS:[birth year] A check bianka indicates which time of day the medication should be taken. My Medications ASK your doctor about these medications Instructions Each Dose to Equal  
 Morning Noon Evening Bedtime  
 calcitRIOL 0.5 mcg capsule Commonly known as:  ROCALTROL Your last dose was: Your next dose is: Take 0.5 mcg by mouth daily. 0.5 mcg  
    
   
   
   
  
 clopidogrel 75 mg Tab Commonly known as:  PLAVIX Your last dose was: Your next dose is: Take 75 mg by mouth daily. 75 mg  
    
   
   
   
  
 dicyclomine 10 mg capsule Commonly known as:  BENTYL Your last dose was: Your next dose is: Take 10 mg by mouth daily as needed. 10 mg  
    
   
   
   
  
 dilTIAZem  mg ER capsule Commonly known as:  CARDIZEM CD Your last dose was: Your next dose is: Take 1 Cap by mouth daily. 180 mg  
    
   
   
   
  
 ELIQUIS 2.5 mg tablet Generic drug:  apixaban Your last dose was: Your next dose is: Take 2.5 mg by mouth two (2) times a day. 2.5 mg  
    
   
   
   
  
 epoetin alcides 10,000 unit/mL injection Commonly known as:  EPOGEN;PROCRIT Your last dose was: Your next dose is:    
   
   
 by SubCUTAneous route as needed. Currently taking abt twice a month at [facility]  
     
   
   
   
  
 finasteride 5 mg tablet Commonly known as:  PROSCAR Your last dose was: Your next dose is: Take 5 mg by mouth daily. 5 mg  
    
   
   
   
  
 furosemide 80 mg tablet Commonly known as:  LASIX Your last dose was: Your next dose is: Take 80 mg by mouth two (2) times a day.  Indications: am  
 80 mg  
    
   
   
   
  
 KRILL OIL PO Your last dose was: Your next dose is: Take  by mouth daily. losartan 50 mg tablet Commonly known as:  COZAAR Your last dose was: Your next dose is: TAKE 1 TABLET BY MOUTH DAILY Magnesium Oxide 500 mg Cap Your last dose was: Your next dose is: Take  by mouth daily. metoprolol succinate 100 mg tablet Commonly known as:  TOPROL-XL Your last dose was: Your next dose is: Take 1 Tab by mouth daily. 100 mg  
    
   
   
   
  
 multivitamin tablet Commonly known as:  ONE A DAY Your last dose was: Your next dose is: Take 1 Tab by mouth daily. Special Complex for dialysis patients 1 Tab  
    
   
   
   
  
 nitroglycerin 0.4 mg SL tablet Commonly known as:  NITROSTAT Your last dose was: Your next dose is: ONE TABLET UNDER TONGUE AS NEEDED FOR CHEST PAIN EVERY 5 MINUTES Omeprazole Magnesium 20 mg Cpdr  
   
Your last dose was: Your next dose is: Take  by mouth daily. predniSONE 10 mg tablet Commonly known as:  Buck Lies Your last dose was: Your next dose is: Take  by mouth as needed. PROBIOTIC 4X 10-15 mg Tbec Generic drug:  B.infantis-B.ani-B.long-B.bifi Your last dose was: Your next dose is: Take  by mouth daily. RENVELA 800 mg Tab tab Generic drug:  sevelamer carbonate Your last dose was: Your next dose is: Take 800 mg by mouth three (3) times daily (with meals). Indications: 4 with meals, 2 with snacks 800 mg SENSIPAR 60 mg Tab Generic drug:  cinacalcet Your last dose was: Your next dose is: Take 60 mg by mouth two (2) times a day. Two tablets QHS 60 mg STOOL SOFTENER PO Your last dose was: Your next dose is: Take  by mouth daily.

## 2018-01-31 NOTE — DISCHARGE INSTRUCTIONS
Thoracentesis: What to Expect at Home  Your Recovery  Thoracentesis (say \"unly-ip-xrq-BYRON-sis\") is a procedure to remove fluid from the space between the lungs and the chest wall (pleural space). This procedure may also be called a \"chest tap. \" It is normal to have a small amount of fluid in the pleural space. But too much fluid can build up because of problems such as infection, heart failure, or lung cancer. The procedure may have been done to help with shortness of breath and pain caused by the fluid buildup. Or you may have had this procedure so the doctor could test the fluid to find the cause of the buildup. Your chest may be sore where the doctor put the needle or catheter into your skin (the puncture site). This usually gets better after a day or two. You can go back to work or your normal activities as soon as you feel up to it. If the doctor sent the fluid to a lab for testing, it may take several days to get the results. The doctor or nurse will discuss the results with you. This care sheet gives you a general idea about how long it will take for you to recover. But each person recovers at a different pace. Follow the steps below to feel better as quickly as possible. How can you care for yourself at home? Activity  ? · Rest when you feel tired. Getting enough sleep will help you recover. ? · Avoid strenuous activities, such as bicycle riding, jogging, weight lifting, or aerobic exercise, until your doctor says it is okay. ? · You may shower. Do not take a bath until the puncture site has healed, or until your doctor tells you it is okay. ? · Ask your doctor when you can drive again. ? · You may need to take 1 or 2 days off from work. It depends on the type of work you do and how you feel. Diet  ? · You can eat your normal diet. ? · Drink plenty of fluids (unless your doctor tells you not to). Medicines  ? · Your doctor will tell you if and when you can restart your medicines.  Zach or she will also give you instructions about taking any new medicines. ? · If you take blood thinners, such as warfarin (Coumadin), clopidogrel (Plavix), or aspirin, be sure to talk to your doctor. He or she will tell you if and when to start taking those medicines again. Make sure that you understand exactly what your doctor wants you to do. ? · Be safe with medicines. Take pain medicines exactly as directed. ¨ If the doctor gave you a prescription medicine for pain, take it as prescribed. ¨ If you are not taking a prescription pain medicine, ask your doctor if you can take an over-the-counter medicine. ¨ Do not take two or more pain medicines at the same time unless the doctor told you to. Many pain medicines have acetaminophen, which is Tylenol. Too much acetaminophen (Tylenol) can be harmful. ? · If you think your pain medicine is making you sick to your stomach:  ¨ Take your medicine after meals (unless your doctor has told you not to). ¨ Ask your doctor for a different pain medicine. ? · If your doctor prescribed antibiotics, take them as directed. Do not stop taking them just because you feel better. You need to take the full course of antibiotics. ?Care of the puncture site  ? · Wash the area daily with warm, soapy water, and pat it dry. Don't use hydrogen peroxide or alcohol, which may delay healing. You may cover the area with a gauze bandage if it weeps or rubs against clothing. Change the bandage every day. ? · Keep the area clean and dry. Follow-up care is a key part of your treatment and safety. Be sure to make and go to all appointments, and call your doctor if you are having problems. It's also a good idea to know your test results and keep a list of the medicines you take. When should you call for help? Call 911 anytime you think you may need emergency care. For example, call if:  ? · You passed out (lost consciousness). ? · You have severe trouble breathing.    ? · You have sudden chest pain and shortness of breath, or you cough up blood. ?Call your doctor now or seek immediate medical care if:  ? · You have shortness of breath that is new or getting worse. ? · You have new or worse pain in your chest, especially when you take a deep breath. ? · You are sick to your stomach or cannot keep fluids down. ? · You have a fever over 100°F.   ? · Bright red blood has soaked through the bandage over your puncture site. ? · You have signs of infection, such as:  ¨ Increased pain, swelling, warmth, or redness. ¨ Red streaks leading from the puncture site. ¨ Pus draining from the puncture site. ¨ Swollen lymph nodes in your neck, armpits, or groin. ¨ A fever. ? · You cough up a lot more mucus than normal, or your mucus changes color. ? Watch closely for changes in your health, and be sure to contact your doctor if you have any problems. Where can you learn more? Go to http://suly-lauren.info/. Enter N087 in the search box to learn more about \"Thoracentesis: What to Expect at Home. \"  Current as of: May 12, 2017  Content Version: 11.4  © 7344-0867 Healthwise, [employer]. Care instructions adapted under license by [employer] (which disclaims liability or warranty for this information). If you have questions about a medical condition or this instruction, always ask your healthcare professional. [address] any warranty or liability for your use of this information.       Please call [facility] at [phone] with any issues

## 2018-01-31 NOTE — IP AVS SNAPSHOT
[address] 
[phone] Patient: Hannah Ellis MRN: [id number] About your hospitalization You were admitted on:  January 31, 2018 You last received care in the:  [facility] You were discharged on:  January 31, 2018 Why you were hospitalized Your primary diagnosis was:  Not on File Follow-up Information None Your Scheduled Appointments Wednesday February 28, 2018  2:15 PM EST Office Visit with Mary Govea, [address] [place] [address] ([address] [place] [address]) [address]  
[address] Paty Espinosa 81  
[phone] Discharge Orders None A check bianka indicates which time of day the medication should be taken. My Medications ASK your doctor about these medications Instructions Each Dose to Equal  
 Morning Noon Evening Bedtime  
 calcitRIOL 0.5 mcg capsule Commonly known as:  ROCALTROL Your last dose was: Your next dose is: Take 0.5 mcg by mouth daily. 0.5 mcg  
    
   
   
   
  
 clopidogrel 75 mg Tab Commonly known as:  PLAVIX Your last dose was: Your next dose is: Take 75 mg by mouth daily. 75 mg  
    
   
   
   
  
 dicyclomine 10 mg capsule Commonly known as:  BENTYL Your last dose was: Your next dose is: Take 10 mg by mouth daily as needed. 10 mg  
    
   
   
   
  
 dilTIAZem  mg ER capsule Commonly known as:  CARDIZEM CD Your last dose was: Your next dose is: Take 1 Cap by mouth daily. 180 mg  
    
   
   
   
  
 ELIQUIS 2.5 mg tablet Generic drug:  apixaban Your last dose was: Your next dose is: Take 2.5 mg by mouth two (2) times a day. 2.5 mg  
    
   
   
   
  
 epoetin alcides 10,000 unit/mL injection Commonly known as:  EPOGEN;PROCRIT Your last dose was: Your next dose is:    
   
   
 by SubCUTAneous route as needed. Currently taking abt twice a month at [facility]  
     
   
   
   
  
 finasteride 5 mg tablet Commonly known as:  PROSCAR Your last dose was: Your next dose is: Take 5 mg by mouth daily. 5 mg  
    
   
   
   
  
 furosemide 80 mg tablet Commonly known as:  LASIX Your last dose was: Your next dose is: Take 80 mg by mouth two (2) times a day. Indications: am  
 80 mg KRILL OIL PO Your last dose was: Your next dose is: Take  by mouth daily. losartan 50 mg tablet Commonly known as:  COZAAR Your last dose was: Your next dose is: TAKE 1 TABLET BY MOUTH DAILY Magnesium Oxide 500 mg Cap Your last dose was: Your next dose is: Take  by mouth daily. metoprolol succinate 100 mg tablet Commonly known as:  TOPROL-XL Your last dose was: Your next dose is: Take 1 Tab by mouth daily. 100 mg  
    
   
   
   
  
 multivitamin tablet Commonly known as:  ONE A DAY Your last dose was: Your next dose is: Take 1 Tab by mouth daily. Special Complex for dialysis patients 1 Tab  
    
   
   
   
  
 nitroglycerin 0.4 mg SL tablet Commonly known as:  NITROSTAT Your last dose was: Your next dose is: ONE TABLET UNDER TONGUE AS NEEDED FOR CHEST PAIN EVERY 5 MINUTES Omeprazole Magnesium 20 mg Cpdr  
   
Your last dose was: Your next dose is: Take  by mouth daily. predniSONE 10 mg tablet Commonly known as:  Vilmamary lou Rice[place] Your last dose was: Your next dose is: Take  by mouth as needed. PROBIOTIC 4X 10-15 mg Tbec Generic drug:  B.infantis-B.ani-B.long-B.bifi Your last dose was: Your next dose is: Take  by mouth daily. RENVELA 800 mg Tab tab Generic drug:  sevelamer carbonate Your last dose was: Your next dose is: Take 800 mg by mouth three (3) times daily (with meals). Indications: 4 with meals, 2 with snacks 800 mg SENSIPAR 60 mg Tab Generic drug:  cinacalcet Your last dose was: Your next dose is: Take 60 mg by mouth two (2) times a day. Two tablets QHS 60 mg STOOL SOFTENER PO Your last dose was: Your next dose is: Take  by mouth daily. Discharge Instructions Thoracentesis: What to Expect at [facility] Your Recovery Thoracentesis (say \"ndzv-rv-ows-BYRON-sis\") is a procedure to remove fluid from the space between the lungs and the chest wall (pleural space). This procedure may also be called a \"chest tap. \" It is normal to have a small amount of fluid in the pleural space. But too much fluid can build up because of problems such as infection, heart failure, or lung cancer. The procedure may have been done to help with shortness of breath and pain caused by the fluid buildup. Or you may have had this procedure so the doctor could test the fluid to find the cause of the buildup. Your chest may be sore where the doctor put the needle or catheter into your skin (the puncture site). This usually gets better after a day or two. You can go back to work or your normal activities as soon as you feel up to it. If the doctor sent the fluid to a lab for testing, it may take several days to get the results. The doctor or nurse will discuss the results with you.  
This care sheet gives you a general idea about how long it will take for you to recover. But each person recovers at a different pace. Follow the steps below to feel better as quickly as possible. How can you care for yourself at home? Activity ? · Rest when you feel tired. Getting enough sleep will help you recover. ? · Avoid strenuous activities, such as bicycle riding, jogging, weight lifting, or aerobic exercise, until your doctor says it is okay. ? · You may shower. Do not take a bath until the puncture site has healed, or until your doctor tells you it is okay. ? · Ask your doctor when you can drive again. ? · You may need to take 1 or 2 days off from work. It depends on the type of work you do and how you feel. Diet ? · You can eat your normal diet. ? · Drink plenty of fluids (unless your doctor tells you not to). Medicines ? · Your doctor will tell you if and when you can restart your medicines. He or she will also give you instructions about taking any new medicines. ? · If you take blood thinners, such as warfarin (Coumadin), clopidogrel (Plavix), or aspirin, be sure to talk to your doctor. He or she will tell you if and when to start taking those medicines again. Make sure that you understand exactly what your doctor wants you to do. ? · Be safe with medicines. Take pain medicines exactly as directed. ¨ If the doctor gave you a prescription medicine for pain, take it as prescribed. ¨ If you are not taking a prescription pain medicine, ask your doctor if you can take an over-the-counter medicine. ¨ Do not take two or more pain medicines at the same time unless the doctor told you to. Many pain medicines have acetaminophen, which is Tylenol. Too much acetaminophen (Tylenol) can be harmful. ? · If you think your pain medicine is making you sick to your stomach: 
¨ Take your medicine after meals (unless your doctor has told you not to). ¨ Ask your doctor for a different pain medicine. ? · If your doctor prescribed antibiotics, take them as directed. Do not stop taking them just because you feel better. You need to take the full course of antibiotics. ?Care of the puncture site ? · Wash the area daily with warm, soapy water, and pat it dry. Don't use hydrogen peroxide or alcohol, which may delay healing. You may cover the area with a gauze bandage if it weeps or rubs against clothing. Change the bandage every day. ? · Keep the area clean and dry. Follow-up care is a key part of your treatment and safety. Be sure to make and go to all appointments, and call your doctor if you are having problems. It's also a good idea to know your test results and keep a list of the medicines you take. When should you call for help? Call 911 anytime you think you may need emergency care. For example, call if: 
? · You passed out (lost consciousness). ? · You have severe trouble breathing. ? · You have sudden chest pain and shortness of breath, or you cough up blood. ?Call your doctor now or seek immediate medical care if: 
? · You have shortness of breath that is new or getting worse. ? · You have new or worse pain in your chest, especially when you take a deep breath. ? · You are sick to your stomach or cannot keep fluids down. ? · You have a fever over 100°F.  
? · Bright red blood has soaked through the bandage over your puncture site. ? · You have signs of infection, such as: 
¨ Increased pain, swelling, warmth, or redness. ¨ Red streaks leading from the puncture site. ¨ Pus draining from the puncture site. ¨ Swollen lymph nodes in your neck, armpits, or groin. ¨ A fever. ? · You cough up a lot more mucus than normal, or your mucus changes color. ? Watch closely for changes in your health, and be sure to contact your doctor if you have any problems. Where can you learn more? Go to http://suly-lauren.info/. Enter Q225 in the search box to learn more about \"Thoracentesis: What to Expect at Home. \" Current as of: May 12, 2017 Content Version: 11.4 © 0193-9531 Healthwise, Incorporated. Care instructions adapted under license by [employer] (which disclaims liability or warranty for this information). If you have questions about a medical condition or this instruction, always ask your healthcare professional. [address] any warranty or liability for your use of this information. Please call [facility] at [phone] with any issues ACO Transitions of Care Introducing [employer] offers a voluntary care coordination program to provide high quality service and care to [facility] fee-for-service beneficiaries. Kalyan Guo was designed to help you enhance your health and well-being through the following services: ? Transitions of Care  support for individuals who are transitioning from one care setting to another (example: Hospital to home). ? Chronic and Complex Care Coordination  support for individuals and caregivers of those with serious or chronic illnesses or with more than one chronic (ongoing) condition and those who take a number of different medications. If you meet specific medical criteria, a [address] [place] [address] Rd may call you directly to coordinate your care with your primary care physician and your other care providers. For questions about the [facility] programs, please, contact your physicians office. For general questions or additional information about Accountable Care Organizations: 
Please visit www.medicare.gov/acos. html or call 1-800-MEDICARE (2-549.550.5621) TTY users should call 9-758.613.3962. Introducing [facility]! Dear Blas Garcia: Thank you for requesting a [employer] account.   Our records indicate that you already have an active [employer] account. You can access your account anytime at [email or web address] Did you know that you can access your hospital and ER discharge instructions at any time in [employer]? You can also review all of your test results from your hospital stay or ER visit. Additional Information If you have questions, please visit the Frequently Asked Questions section of the [employer] website at [email or web address] Remember, [employer] is NOT to be used for urgent needs. For medical emergencies, dial 911. Now available from your iPhone and Android! Providers Seen During Your Hospitalization Provider Specialty Primary office phone More Barkley MD Pulmonary Disease [phone] Your Primary Care Physician (PCP) Primary Care Physician Office Phone Office Fax Brian Lynch, [address] [phone] You are allergic to the following Allergen Reactions Nka (No Known Allergies) Unknown (comments) Recent Documentation Height Weight BMI Smoking Status 1.778 m 84.4 kg 26.69 kg/m2 Current Every Day Smoker Emergency Contacts Name Discharge Info Relation Home Work Mobile Jasbir Leyva0  Son [22] [phone] Patient Belongings The following personal items are in your possession at time of discharge: 
  Dental Appliances: None  Visual Aid: Glasses Please provide this summary of care documentation to your next provider. Signatures-by signing, you are acknowledging that this After Visit Summary has been reviewed with you and you have received a copy. Patient Signature:  ____________________________________________________________ Date:  ____________________________________________________________  
  
Cristhian Crum Provider Signature:  ____________________________________________________________ Date:  ____________________________________________________________

## 2018-01-31 NOTE — H&P (VIEW-ONLY)
History and Physical/Consult  Luís Carreno    1/4/2018    Date of Admission:  1/4/2018      Subjective:     Patient is a 68 y.o.  male presents with R pleural effusion. Patient referred by Dr Garland Bumpers for possible R thoracentesis with R pleural effusion. Patient with history of PD nightly and had had an upper respiratory illness recently. Had been seen by Dr Jes Gibson in the office and CXR revealed R pleural effusion- hence this visit. Patient here for diagnostic and therapeutic thoracentesis. This is a first time occurrence for him. States he lost ~5 lb in the past month and smokes a cigar occasionally but accumulated a 20 pack year smoking history. Prior to Admission Medications   Prescriptions Last Dose Informant Patient Reported? Taking? B.infantis-B.ani-B.long-B.bifi (PROBIOTIC 4X) 10-15 mg TbEC 1/4/2018 at Unknown time  Yes Yes   Sig: Take  by mouth daily. DOCUSATE CALCIUM (STOOL SOFTENER PO) 1/4/2018 at Unknown time  Yes Yes   Sig: Take  by mouth daily. KRILL OIL PO 1/4/2018 at Unknown time  Yes Yes   Sig: Take  by mouth daily. Magnesium Oxide 500 mg cap 1/4/2018 at Unknown time  Yes Yes   Sig: Take  by mouth daily. Omeprazole Magnesium 20 mg cpDR 1/4/2018 at Unknown time  Yes Yes   Sig: Take  by mouth daily. RENVELA 800 mg Tab tab 1/4/2018 at Unknown time  Yes Yes   Sig: Take 800 mg by mouth three (3) times daily (with meals). Indications: 4 with meals, 2 with snacks   apixaban (ELIQUIS) 2.5 mg tablet 12/30/2017  Yes No   Sig: Take 2.5 mg by mouth two (2) times a day. calcitRIOL (ROCALTROL) 0.5 mcg capsule 1/4/2018 at Unknown time  Yes Yes   Sig: Take 0.5 mcg by mouth daily. cinacalcet (SENSIPAR) 60 mg tab 1/3/2018 at Unknown time  Yes Yes   Sig: Take 60 mg by mouth two (2) times a day. Two tablets QHS    clopidogrel (PLAVIX) 75 mg tab 12/30/2017  Yes No   Sig: Take 75 mg by mouth daily.    dicyclomine (BENTYL) 10 mg capsule Unknown at Unknown time  Yes No   Sig: Take 10 mg by mouth daily as needed. dilTIAZem CD (CARDIZEM CD) 180 mg ER capsule 1/3/2018 at Unknown time  No Yes   Sig: Take 1 Cap by mouth daily. epoetin alcides (EPOGEN;PROCRIT) 10,000 unit/mL injection 12/4/2017 at Unknown time  Yes Yes   Sig: by SubCUTAneous route as needed. Currently taking abt [facility] a [facility] at [facility]   finasteride (PROSCAR) 5 mg tablet 1/4/2018 at Unknown time  Yes Yes   Sig: Take 5 mg by mouth daily. furosemide (LASIX) 80 mg tablet 1/4/2018 at Unknown time  Yes Yes   Sig: Take 80 mg by mouth two (2) times a day. Indications: am   losartan (COZAAR) 50 mg tablet 1/4/2018 at Unknown time  No Yes   Sig: TAKE 1 TABLET BY MOUTH DAILY   metoprolol succinate (TOPROL-XL) 100 mg tablet 1/4/2018 at Unknown time  No Yes   Sig: Take 1 Tab by mouth daily. multivitamin (ONE A DAY) tablet 1/4/2018 at Unknown time  Yes Yes   Sig: Take 1 Tab by mouth daily. Special Complex for dialysis patients   nitroglycerin (NITROSTAT) 0.4 mg SL tablet Unknown at Unknown time  No No   Sig: ONE TABLET UNDER TONGUE AS NEEDED FOR CHEST PAIN EVERY 5 MINUTES   predniSONE (DELTASONE) 10 mg tablet 12/4/2017 at Unknown time  Yes Yes   Sig: Take  by mouth as needed.       Facility-Administered Medications: None       Review of Systems  Denies: fevers, chills, sweats, fatigue, malaise, anorexia,   Denies: blurry vision, loss of vision, eye pain, photophobia  Denies: hearing loss, ringing in the ears, earache, epistaxis  Denies: chest pain, palpitations, syncope, orthopnea, paroxysmal nocturnal dyspnea, claudication  Denies: dysphagia, odynophagia, nausea, vomiting, diarrhea, constipation, abdominal pain, jaundice, melena   Denies: frequency, dysuria, nocturia, urinary incontinence, stones, hematuria  Denies: polydipsia/polyuria, skin changes, temperature intolerance, unexpected weight gain  Denies: back pain, joint pain, joint swelling, muscle pain, muscle weakness  Denies: bleeding problems, blood transfusions, bruising, pallor, swollen lymph nodes  Denies: headache, dysarthria, blurred vision, diplopia,seizure, focal deficits. Admits to:  Dyspnea and 5 lb wt loss          Past Medical History:   Diagnosis Date    Anemia, unspecified  7/7/2016    Arrhythmia     IRREG.  HEART BEAT- pt denies a fib - found on cardiac note 5/3/13- pt states he feels flutter at times    Arthritis     ASCAD - artery bypass graft 7/7/2016    ASCAD - artery bypass graft 7/7/2016    CAD (coronary artery disease)     CABG 2007, stented 1995    Chronic kidney disease     STAGE 3 CKD, dialysis    Chronic prostatitis 11/25/2013    Diverticulitis     Diverticulosis 7/7/2016    GERD (gastroesophageal reflux disease)     controlled with NA Bicarb pt takes as renal pt    Glomerulonephritis 7/7/2016    GOUT, UNSPECIFIED 7/7/2016    Hypercholesteremia     Hypertension     Hypertrophy of prostate with urinary obstruction and other lower urinary tract symptoms (LUTS) 11/25/2013    Kidney failure     Paroxysmal atrial fibrillation ([address] 7/7/2016    TIA (transient ischemic attack) 7/7/2016     Past Surgical History:   Procedure Laterality Date    ABDOMEN SURGERY PROC UNLISTED      perineal cath    CARDIAC SURG PROCEDURE UNLIST      CABG x 2 in 2007; PTCA WITH STENTS    CREAT AV FISTULA,AUTOGENOUS GRAFT      HX COLONOSCOPY  2011    HX CORONARY ARTERY BYPASS GRAFT      HX CSF SHUNT      HX HEART CATHETERIZATION      HX HERNIA REPAIR      bilateral    HX ORTHOPAEDIC      rt shoulder rotater cuff,lt knee    HX VASCULAR ACCESS  2010    L u arm    OTHER CELL      LEFT KNEE SURGERY    VASCULAR SURGERY PROCEDURE UNLIST      cabg x2     Social History     Social History    Marital status:      Spouse name: N/A    Number of children: 3    Years of education: N/A     Occupational History    marketing management      Social History Main Topics    Smoking status: Current Every Day Smoker     Last attempt to quit: 1/1/1980    Smokeless tobacco: Never Used Comment: CIGAR DAILY FOR 10 YEARS    Alcohol use 0.0 oz/week      Comment: rare    Drug use: No    Sexual activity: No     Other Topics Concern    Not on file     Social History Narrative     Family History   Problem Relation Age of Onset    Heart Disease Father 59     MI    Heart Attack Father 72     MI    Stroke Mother     Hypertension Mother     Heart Disease Mother     Cancer Sister      stomach     Allergies   Allergen Reactions    Nka [No Known Allergies] Unknown (comments)       No current facility-administered medications for this encounter. Objective:     Vitals:    01/04/18 1526 01/04/18 1555   BP: 160/88 165/75   Pulse: 90 80   Resp: 20 20   SpO2: 92% 95%   Weight: 186 lb 4.6 oz (84.5 kg)    Height: 5' 10\" (1.778 m)      PHYSICAL EXAM     Gen- the patient is well developed and in no acute distress  HEENT- PERRL, EOMI, no scleral icterus       nose without alar flaring or epistaxis                  oral muscosa moist without cyanosis  Neck- no JVD or retractions  Lungs- Decreased BS on R  Heart- RRR without M,G,R  Abd- soft and non-tender; with positive bowel sounds. Ext- warm without cyanosis. There is trace lower leg edema. Skin- no jaundice or rashes, no wounds   Neuro- alert and oriented x 3. No gross sensorimotor deficits are present. No results for input(s): WBC, HGB, HCT, PLT, HGBEXT, HCTEXT, PLTEXT in the last 72 hours. No results for input(s): NA, K, CL, GLU, CO2, BUN, CREA, MG, PHOS, INR, BNPP in the last 72 hours. No lab exists for component: TROIP, INREXT  No results for input(s): PH, PCO2, PO2, HCO3 in the last 72 hours.     Assessment:  (Medical Decision Making)     Patient Active Problem List   Diagnosis Code    CAD (coronary artery disease) I25.10    HTN (hypertension) I10    Benign non-nodular prostatic hyperplasia with lower urinary tract symptoms N40.1    GOUT, UNSPECIFIED M10.9    Anemia, unspecified  D64.9    Chronic atrial fibrillation (HCC) I48.2  End-stage renal disease (HCC) N18.6    History of TIA (transient ischemic attack) Z86.73    Hypercholesterolemia E78.00    Internal carotid artery stenosis I65.29    Gastroesophageal reflux disease without esophagitis K21.9    Pleural effusion on right J90       Plan:  (Medical Decision Making)     Hospital Problems  Date Reviewed: 11/21/2017          Codes Class Noted POA    * (Principal)Pleural effusion on right ICD-10-CM: J90  ICD-9-CM: 511.9  1/4/2018 Unknown    Proceed with planned thoracentesis for diagnosis and Rx.           Figueroa Ferguson MD

## 2018-04-30 NOTE — PROCEDURES
PROCEDURE:  THERAPEUTIC THORACENTESIS       PRE-OP DIAGNOSIS:  R PLEURAL EFFUSION    POST-OP DIAGNOSIS:  R PLEURAL EFFUSION    VOLUME REMOVED:    2350cc    ANESTHESIA:    LOCAL ANESTHESIA WITH 1% LIDOCAINE 10 CC TOTAL. CHEST ULTRASOUND FINDINGS:    A Turbo-M, Sonosite ultrasound with a 5-16 mHz probe was used to image the chest and localize the pleural effusion on the right chest.    A large anechoic space was seen on the right consistent with an uncomplicated pleural effusion. DESCRIPTION OF PROCEDURE:    After obtaining informed consent and localizing the safest location for thoracentesis, the  8th intercostal space was marked with a blunt, plastic needle cap in the mid scapular line. An Vital Metrix AK-0100 Pleral-Seal thoracentesis kit was used to perform the procedure. The skin was cleansed with the supplied chlorhexidine swab and then draped in the usual fashion. Using the previously marked location as a guide, a 22 G 1.5 inch needle was used to inject 1% lidocaine into the skin and subcutaneous tissue, as well as onto the underlying rib and inter-costal muscles. Pleural fluid was aspirated to assure proper location and additional lidocaine was injected into the pleural space prior to removing the anesthesia needle. A 3mm incision was then made with the supplied scalpel in the usual fashion to facilitate the insertion of the thoracentesis needle. The needle with an 8 Frisian thoracentesis catheter was then introduced into the chest through the previously made incision in the usual fashion, the rib localized with the needle, and the catheter then marched over the rib into the pleural space. After aspirating fluid, the thoracentesis catheter was then placed into the chest using the needle itself as a trocar. The needle was then removed and the catheter was attached to the supplied tubing without complication. 2350 cc of clear, yellow fluid was aspirated. No analysis was sent. Post procedure US confirmed near complete drainage of the effusion and presence of lung sliding, ruling out pneumothorax.  ([id number])    EBL:     None    COMPLICATIONS:    None      Angeli Oshea MD

## 2018-04-30 NOTE — PROGRESS NOTES
Pt sat up on side of bed for thoracentesis. Consent obtained. Time out performed. Pts vitals monitored throughout procedure. Left and right ultrasound done and pic taken of pleural fluid. 2350 ml nathan yellow pleural fluid from right side removed. Pt tolerated procedure well with no adverse rxn. No specimens sent to the lab per Dr. Tatiana Bhatia. Site dressed appropriately.

## 2018-04-30 NOTE — IP AVS SNAPSHOT
[facility] 
 
 
 [address] 
[phone] Patient: Wolfgang Herron MRN: [id number] A check bianka indicates which time of day the medication should be taken. My Medications ASK your doctor about these medications Instructions Each Dose to Equal  
 Morning Noon Evening Bedtime  
 calcitRIOL 0.5 mcg capsule Commonly known as:  ROCALTROL Your last dose was: Your next dose is: Take 0.5 mcg by mouth daily. 0.5 mcg  
    
   
   
   
  
 clopidogrel 75 mg Tab Commonly known as:  PLAVIX Your last dose was: Your next dose is: Take 75 mg by mouth daily. 75 mg  
    
   
   
   
  
 dicyclomine 10 mg capsule Commonly known as:  BENTYL Your last dose was: Your next dose is: Take 10 mg by mouth daily as needed. 10 mg  
    
   
   
   
  
 dilTIAZem  mg ER capsule Commonly known as:  CARDIZEM CD Your last dose was: Your next dose is: Take 1 Cap by mouth daily. 180 mg  
    
   
   
   
  
 ELIQUIS 2.5 mg tablet Generic drug:  apixaban Your last dose was: Your next dose is: Take 2.5 mg by mouth two (2) times a day. 2.5 mg  
    
   
   
   
  
 epoetin alcides 10,000 unit/mL injection Commonly known as:  EPOGEN;PROCRIT Your last dose was: Your next dose is:    
   
   
 by SubCUTAneous route as needed. Currently taking abt twice a month at [facility]  
     
   
   
   
  
 finasteride 5 mg tablet Commonly known as:  PROSCAR Your last dose was: Your next dose is: Take 5 mg by mouth daily. 5 mg  
    
   
   
   
  
 furosemide 80 mg tablet Commonly known as:  LASIX Your last dose was: Your next dose is: Take 80 mg by mouth two (2) times a day.  Indications: am  
 80 mg  
    
   
   
   
  
 KRILL OIL PO Your last dose was: Your next dose is: Take  by mouth daily. losartan 50 mg tablet Commonly known as:  COZAAR Your last dose was: Your next dose is: TAKE 1 TABLET BY MOUTH DAILY Magnesium Oxide 500 mg Cap Your last dose was: Your next dose is: Take  by mouth daily. metoprolol succinate 100 mg tablet Commonly known as:  TOPROL-XL Your last dose was: Your next dose is: Take 1 Tab by mouth daily. 100 mg  
    
   
   
   
  
 multivitamin tablet Commonly known as:  ONE A DAY Your last dose was: Your next dose is: Take 1 Tab by mouth daily. Special Complex for dialysis patients 1 Tab  
    
   
   
   
  
 nitroglycerin 0.4 mg SL tablet Commonly known as:  NITROSTAT Your last dose was: Your next dose is: ONE TABLET UNDER TONGUE AS NEEDED FOR CHEST PAIN EVERY 5 MINUTES Omeprazole Magnesium 20 mg Cpdr  
   
Your last dose was: Your next dose is: Take  by mouth daily. predniSONE 10 mg tablet Commonly known as:  Melly Naranjo Your last dose was: Your next dose is: Take  by mouth as needed. PROBIOTIC 4X 10-15 mg Tbec Generic drug:  B.infantis-B.ani-B.long-B.bifi Your last dose was: Your next dose is: Take  by mouth daily. RENVELA 800 mg Tab tab Generic drug:  sevelamer carbonate Your last dose was: Your next dose is: Take 800 mg by mouth three (3) times daily (with meals). Indications: 3 with meals, 2 with snacks 800 mg SENSIPAR 60 mg Tab Generic drug:  cinacalcet Your last dose was: Your next dose is: Take 60 mg by mouth nightly. 60 mg STOOL SOFTENER PO Your last dose was: Your next dose is: Take  by mouth daily. tamsulosin 0.4 mg capsule Commonly known as:  FLOMAX Your last dose was: Your next dose is:    
   
   
 TK 1 C PO Q NIGHT

## 2018-04-30 NOTE — H&P
HISTORY AND PHYSICAL      [facility]    4[facility]    Date of Admission:  4/30/2018    The patient's chart is reviewed and the patient is discussed with the staff. Subjective:     Patient is a 68 y.o.  male presents with recurrent right sided pleural effusion. It is felt this effusion is coming from ESRD with PD until recently, changed back to HD. His last thoracentesis on record was 1/31 with 1.5L removed. He states he has had gradually worsening dyspnea since then. He has some ongoing LE edema. He denies any fever, chills, cough but does endorse fatigue. Seen urgently in the [address] lab for repeat thoracentesis    Review of Systems  A comprehensive review of systems was negative except for that written in the HPI. Patient Active Problem List   Diagnosis Code    CAD (coronary artery disease) I25.10    HTN (hypertension) I10    Benign non-nodular prostatic hyperplasia with lower urinary tract symptoms N40.1    GOUT, UNSPECIFIED M10.9    Anemia, unspecified  D64.9    Chronic atrial fibrillation (HCC) I48.2    End-stage renal disease (HCC) N18.6    History of TIA (transient ischemic attack) Z86.73    Hypercholesterolemia E78.00    Internal carotid artery stenosis I65.29    Gastroesophageal reflux disease without esophagitis K21.9    Pleural effusion on right J90       Prior to Admission Medications   Prescriptions Last Dose Informant Patient Reported? Taking? B.infantis-B.ani-B.long-B.bifi (PROBIOTIC 4X) 10-15 mg TbEC 4/29/2018 at Unknown time  Yes Yes   Sig: Take  by mouth daily. DOCUSATE CALCIUM (STOOL SOFTENER PO) 4/29/2018 at Unknown time  Yes Yes   Sig: Take  by mouth daily. KRILL OIL PO 4/29/2018 at Unknown time  Yes Yes   Sig: Take  by mouth daily. Magnesium Oxide 500 mg cap 4/29/2018 at Unknown time  Yes Yes   Sig: Take  by mouth daily. Omeprazole Magnesium 20 mg cpDR 4/29/2018 at Unknown time  Yes Yes   Sig: Take  by mouth daily.    RENVELA 800 mg Tab tab 4/29/2018 at Unknown time  Yes Yes   Sig: Take 800 mg by mouth three (3) times daily (with meals). Indications: 3 with meals, 2 with snacks   apixaban (ELIQUIS) 2.5 mg tablet 4/25/2018  Yes No   Sig: Take 2.5 mg by mouth two (2) times a day. calcitRIOL (ROCALTROL) 0.5 mcg capsule 4/29/2018 at Unknown time  Yes Yes   Sig: Take 0.5 mcg by mouth daily. cinacalcet (SENSIPAR) 60 mg tab 4/29/2018 at Unknown time  Yes Yes   Sig: Take 60 mg by mouth nightly. clopidogrel (PLAVIX) 75 mg tab 4/29/2018 at Unknown time  Yes Yes   Sig: Take 75 mg by mouth daily. dicyclomine (BENTYL) 10 mg capsule 4/29/2018 at Unknown time  Yes Yes   Sig: Take 10 mg by mouth daily as needed. dilTIAZem CD (CARDIZEM CD) 180 mg ER capsule 4/29/2018 at Unknown time  No Yes   Sig: Take 1 Cap by mouth daily. epoetin alcides (EPOGEN;PROCRIT) 10,000 unit/mL injection 4/29/2018 at Unknown time  Yes Yes   Sig: by SubCUTAneous route as needed. Currently taking abt twice a month at [facility]   finasteride (PROSCAR) 5 mg tablet 4/29/2018 at Unknown time  Yes Yes   Sig: Take 5 mg by mouth daily. furosemide (LASIX) 80 mg tablet 4/29/2018 at Unknown time  Yes Yes   Sig: Take 80 mg by mouth two (2) times a day. Indications: am   losartan (COZAAR) 50 mg tablet 4/29/2018 at Unknown time  No Yes   Sig: TAKE 1 TABLET BY MOUTH DAILY   metoprolol succinate (TOPROL-XL) 100 mg tablet 4/29/2018 at Unknown time  No Yes   Sig: Take 1 Tab by mouth daily. multivitamin (ONE A DAY) tablet 4/29/2018 at Unknown time  Yes Yes   Sig: Take 1 Tab by mouth daily. Special Complex for dialysis patients   nitroglycerin (NITROSTAT) 0.4 mg SL tablet 4/29/2018 at Unknown time  No Yes   Sig: ONE TABLET UNDER TONGUE AS NEEDED FOR CHEST PAIN EVERY 5 MINUTES   predniSONE (DELTASONE) 10 mg tablet 4/29/2018 at Unknown time  Yes Yes   Sig: Take  by mouth as needed.    tamsulosin (FLOMAX) 0.4 mg capsule   Yes No   Sig: TK 1 C PO Q NIGHT      Facility-Administered Medications: None Past Medical History:   Diagnosis Date    Anemia, unspecified  7/7/2016    Arrhythmia     IRREG.  HEART BEAT- pt denies a fib - found on cardiac note 5/3/13- pt states he feels flutter at times    Arthritis     ASCAD - artery bypass graft 7/7/2016    ASCAD - artery bypass graft 7/7/2016    CAD (coronary artery disease)     CABG 2007, stented 1995    Chronic kidney disease     STAGE 3 CKD, dialysis    Chronic prostatitis 11/25/2013    Diverticulitis     Diverticulosis 7/7/2016    GERD (gastroesophageal reflux disease)     controlled with NA Bicarb pt takes as renal pt    Glomerulonephritis 7/7/2016    GOUT, UNSPECIFIED 7/7/2016    Hypercholesteremia     Hypertension     Hypertrophy of prostate with urinary obstruction and other lower urinary tract symptoms (LUTS) 11/25/2013    Kidney failure     Paroxysmal atrial fibrillation ([address] 7/7/2016    TIA (transient ischemic attack) 7/7/2016     Past Surgical History:   Procedure Laterality Date    ABDOMEN SURGERY PROC UNLISTED      perineal cath    CARDIAC SURG PROCEDURE UNLIST      CABG x 2 in 2007; PTCA WITH STENTS    CREAT AV FISTULA,AUTOGENOUS GRAFT      HX COLONOSCOPY  2011    HX CORONARY ARTERY BYPASS GRAFT      HX CSF SHUNT      HX HEART CATHETERIZATION      HX HERNIA REPAIR      bilateral    HX ORTHOPAEDIC      rt shoulder rotater cuff,lt knee    HX VASCULAR ACCESS  2010    L u arm    OTHER CELL      LEFT KNEE SURGERY    VASCULAR SURGERY PROCEDURE UNLIST      cabg x2     Social History     Social History    Marital status:      Spouse name: N/A    Number of children: 3    Years of education: N/A     Occupational History    marketing management      Social History Main Topics    Smoking status: Current Every Day Smoker     Last attempt to quit: 1/1/1980    Smokeless tobacco: Never Used      Comment: CIGAR DAILY FOR 10 YEARS    Alcohol use 0.0 oz/week      Comment: rare    Drug use: No    Sexual activity: No Other Topics Concern    Not on file     Social History Narrative     Family History   Problem Relation Age of Onset    Heart Disease Father 59     MI    Heart Attack Father 72     MI    Stroke Mother     Hypertension Mother     Heart Disease Mother     Cancer Sister      stomach     Allergies   Allergen Reactions    Nka [No Known Allergies] Unknown (comments)       No current facility-administered medications for this encounter. Objective:     Vitals:    04/30/18 1151 04/30/18 1156 04/30/18 1200 04/30/18 1205   BP: 164/79 150/71 139/73 140/67   Pulse: 94 93 95 94   Resp: 16 18 22 18   SpO2: 95% 96% 97% 92%   Weight:       Height:           PHYSICAL EXAM     Constitutional:  the patient is well developed and in no acute distress  EENMT:  Sclera clear, pupils equal, oral mucosa moist  Respiratory: Decreased on right. Cardiovascular:  RRR without M,G,R  Gastrointestinal: soft and non-tender; with positive bowel sounds. Musculoskeletal: warm without cyanosis. There is 1-2+ B lower leg edema. Skin:  no jaundice or rashes, no wounds   Neurologic: no gross neuro deficits     Psychiatric:  alert and oriented x ppt    CXR:  4/23/18  IMPRESSION:   1. Increasing now moderate right pleural effusion. No results for input(s): WBC, HGB, HCT, PLT, INR, HGBEXT, HCTEXT, PLTEXT, HGBEXT, HCTEXT, PLTEXT in the last 72 hours. No lab exists for component: INREXT, INREXT  No results for input(s): NA, K, CL, GLU, CO2, BUN, CREA, MG, PHOS, CA, TROIQ, ALB, TBIL, TBILI, GPT, ALT, SGOT, BNPP in the last 72 hours. No lab exists for component: TROIP  No results for input(s): PH, PCO2, PO2, HCO3 in the last 72 hours. No results for input(s): LCAD, LAC in the last 72 hours.     Assessment:  (Medical Decision Making)     Hospital Problems  Date Reviewed: 11/21/2017          Codes Class Noted POA    Pleural effusion on right ICD-10-CM: J90  ICD-9-CM: 511.9  1/4/2018 Yes            Recurrent R sided pleural effusion, transudate on last PFA, likely related to ESRD. Possibly to PD in the past, now on HD. Plan:  (Medical Decision Making)     --Will proceed with planned thoracentesis. -will need to be run at lowest tolerated dry weight possible. More than 50% of the time documented was spent in face-to-face contact with the patient and in the care of the patient on the floor/unit where the patient is located.     Leeanna Finch MD

## 2018-04-30 NOTE — DISCHARGE INSTRUCTIONS
Thoracentesis: What to Expect at Home  Your Recovery  Thoracentesis (say \"mlbt-xo-lau-BYRON-sis\") is a procedure to remove fluid from the space between the lungs and the chest wall (pleural space). This procedure may also be called a \"chest tap. \" It is normal to have a small amount of fluid in the pleural space. But too much fluid can build up because of problems such as infection, heart failure, or lung cancer. The procedure may have been done to help with shortness of breath and pain caused by the fluid buildup. Or you may have had this procedure so the doctor could test the fluid to find the cause of the buildup. Your chest may be sore where the doctor put the needle or catheter into your skin (the puncture site). This usually gets better after a day or two. You can go back to work or your normal activities as soon as you feel up to it. If the doctor sent the fluid to a lab for testing, it may take several days to get the results. The doctor or nurse will discuss the results with you. This care sheet gives you a general idea about how long it will take for you to recover. But each person recovers at a different pace. Follow the steps below to feel better as quickly as possible. How can you care for yourself at home? Activity  ? · Rest when you feel tired. Getting enough sleep will help you recover. ? · Avoid strenuous activities, such as bicycle riding, jogging, weight lifting, or aerobic exercise, until your doctor says it is okay. ? · You may shower. Do not take a bath until the puncture site has healed, or until your doctor tells you it is okay. ? · Ask your doctor when you can drive again. ? · You may need to take 1 or 2 days off from work. It depends on the type of work you do and how you feel. Diet  ? · You can eat your normal diet. ? · Drink plenty of fluids (unless your doctor tells you not to). Medicines  ? · Your doctor will tell you if and when you can restart your medicines.  Zach or she will also give you instructions about taking any new medicines. ? · If you take blood thinners, such as warfarin (Coumadin), clopidogrel (Plavix), or aspirin, be sure to talk to your doctor. He or she will tell you if and when to start taking those medicines again. Make sure that you understand exactly what your doctor wants you to do. ? · Be safe with medicines. Take pain medicines exactly as directed. ¨ If the doctor gave you a prescription medicine for pain, take it as prescribed. ¨ If you are not taking a prescription pain medicine, ask your doctor if you can take an over-the-counter medicine. ¨ Do not take two or more pain medicines at the same time unless the doctor told you to. Many pain medicines have acetaminophen, which is Tylenol. Too much acetaminophen (Tylenol) can be harmful. ? · If you think your pain medicine is making you sick to your stomach:  ¨ Take your medicine after meals (unless your doctor has told you not to). ¨ Ask your doctor for a different pain medicine. ? · If your doctor prescribed antibiotics, take them as directed. Do not stop taking them just because you feel better. You need to take the full course of antibiotics. ?Care of the puncture site  ? · Wash the area daily with warm, soapy water, and pat it dry. Don't use hydrogen peroxide or alcohol, which may delay healing. You may cover the area with a gauze bandage if it weeps or rubs against clothing. Change the bandage every day. ? · Keep the area clean and dry. Follow-up care is a key part of your treatment and safety. Be sure to make and go to all appointments, and call your doctor if you are having problems. It's also a good idea to know your test results and keep a list of the medicines you take. When should you call for help? Call 911 anytime you think you may need emergency care. For example, call if:  ? · You passed out (lost consciousness). ? · You have severe trouble breathing.    ? · You have sudden chest pain and shortness of breath, or you cough up blood. ?Call your doctor now or seek immediate medical care if:  ? · You have shortness of breath that is new or getting worse. ? · You have new or worse pain in your chest, especially when you take a deep breath. ? · You are sick to your stomach or cannot keep fluids down. ? · You have a fever over 100°F.   ? · Bright red blood has soaked through the bandage over your puncture site. ? · You have signs of infection, such as:  ¨ Increased pain, swelling, warmth, or redness. ¨ Red streaks leading from the puncture site. ¨ Pus draining from the puncture site. ¨ Swollen lymph nodes in your neck, armpits, or groin. ¨ A fever. ? · You cough up a lot more mucus than normal, or your mucus changes color. ? Watch closely for changes in your health, and be sure to contact your doctor if you have any problems. Where can you learn more? Go to http://suly-lauren.info/. Enter T649 in the search box to learn more about \"Thoracentesis: What to Expect at Home. \"  Current as of: May 12, 2017  Content Version: 11.4  © 4050-4988 [employer], [employer]. Care instructions adapted under license by [employer] (which disclaims liability or warranty for this information). If you have questions about a medical condition or this instruction, always ask your healthcare professional. [address] any warranty or liability for your use of this information. Please call [facility] at [phone] with any issues.

## 2018-04-30 NOTE — IP AVS SNAPSHOT
[address] 
[phone] Patient: America Michel MRN: [id number] SHE:[id number] About your hospitalization You were admitted on:  April 30, 2018 You last received care in the:  [facility] You were discharged on:  April 30, 2018 Why you were hospitalized Your primary diagnosis was:  Not on File Follow-up Information None Your Scheduled Appointments Monday April 30, 2018 THORACENTESIS, ULTRASOUND with Vasyl Monreal MD  
[facility] ([address] [place] [address]) [address] [place] [address]  
[phone] Wednesday May 23, 2018 11:00 AM EDT Office Visit with Karma Mann. Gayatri Savage, [address] [place], [place] [address] [address] [address] [phone] Discharge Orders None A check bianka indicates which time of day the medication should be taken. My Medications ASK your doctor about these medications Instructions Each Dose to Equal  
 Morning Noon Evening Bedtime  
 calcitRIOL 0.5 mcg capsule Commonly known as:  ROCALTROL Your last dose was: Your next dose is: Take 0.5 mcg by mouth daily. 0.5 mcg  
    
   
   
   
  
 clopidogrel 75 mg Tab Commonly known as:  PLAVIX Your last dose was: Your next dose is: Take 75 mg by mouth daily. 75 mg  
    
   
   
   
  
 dicyclomine 10 mg capsule Commonly known as:  BENTYL Your last dose was: Your next dose is: Take 10 mg by mouth daily as needed. 10 mg  
    
   
   
   
  
 dilTIAZem  mg ER capsule Commonly known as:  CARDIZEM CD Your last dose was: Your next dose is: Take 1 Cap by mouth daily. 180 mg  
    
   
   
   
  
 ELIQUIS 2.5 mg tablet Generic drug:  apixaban Your last dose was: Your next dose is: Take 2.5 mg by mouth two (2) times a day. 2.5 mg  
    
   
   
   
  
 epoetin alcides 10,000 unit/mL injection Commonly known as:  EPOGEN;PROCRIT Your last dose was: Your next dose is:    
   
   
 by SubCUTAneous route as needed. Currently taking abt twice a month at [facility]  
     
   
   
   
  
 finasteride 5 mg tablet Commonly known as:  PROSCAR Your last dose was: Your next dose is: Take 5 mg by mouth daily. 5 mg  
    
   
   
   
  
 furosemide 80 mg tablet Commonly known as:  LASIX Your last dose was: Your next dose is: Take 80 mg by mouth two (2) times a day. Indications: am  
 80 mg KRILL OIL PO Your last dose was: Your next dose is: Take  by mouth daily. losartan 50 mg tablet Commonly known as:  COZAAR Your last dose was: Your next dose is: TAKE 1 TABLET BY MOUTH DAILY Magnesium Oxide 500 mg Cap Your last dose was: Your next dose is: Take  by mouth daily. metoprolol succinate 100 mg tablet Commonly known as:  TOPROL-XL Your last dose was: Your next dose is: Take 1 Tab by mouth daily. 100 mg  
    
   
   
   
  
 multivitamin tablet Commonly known as:  ONE A DAY Your last dose was: Your next dose is: Take 1 Tab by mouth daily. Special Complex for dialysis patients 1 Tab  
    
   
   
   
  
 nitroglycerin 0.4 mg SL tablet Commonly known as:  NITROSTAT Your last dose was: Your next dose is: ONE TABLET UNDER TONGUE AS NEEDED FOR CHEST PAIN EVERY 5 MINUTES Omeprazole Magnesium 20 mg Cpdr  
   
Your last dose was: Your next dose is: Take  by mouth daily. predniSONE 10 mg tablet Commonly known as:  Celeste Look Your last dose was: Your next dose is: Take  by mouth as needed. PROBIOTIC 4X 10-15 mg Tbec Generic drug:  B.infantis-B.ani-B.long-B.bifi Your last dose was: Your next dose is: Take  by mouth daily. RENVELA 800 mg Tab tab Generic drug:  sevelamer carbonate Your last dose was: Your next dose is: Take 800 mg by mouth three (3) times daily (with meals). Indications: 3 with meals, 2 with snacks 800 mg SENSIPAR 60 mg Tab Generic drug:  cinacalcet Your last dose was: Your next dose is: Take 60 mg by mouth nightly. 60 mg STOOL SOFTENER PO Your last dose was: Your next dose is: Take  by mouth daily. tamsulosin 0.4 mg capsule Commonly known as:  FLOMAX Your last dose was: Your next dose is:    
   
   
 TK 1 C PO Q NIGHT Discharge Instructions Thoracentesis: What to Expect at [facility] Your Recovery Thoracentesis (say \"erdj-gm-qpx-BYRON-sis\") is a procedure to remove fluid from the space between the lungs and the chest wall (pleural space). This procedure may also be called a \"chest tap. \" It is normal to have a small amount of fluid in the pleural space. But too much fluid can build up because of problems such as infection, heart failure, or lung cancer. The procedure may have been done to help with shortness of breath and pain caused by the fluid buildup. Or you may have had this procedure so the doctor could test the fluid to find the cause of the buildup. Your chest may be sore where the doctor put the needle or catheter into your skin (the puncture site).  This usually gets better after a day or two. You can go back to work or your normal activities as soon as you feel up to it. If the doctor sent the fluid to a lab for testing, it may take several days to get the results. The doctor or nurse will discuss the results with you. This care sheet gives you a general idea about how long it will take for you to recover. But each person recovers at a different pace. Follow the steps below to feel better as quickly as possible. How can you care for yourself at home? Activity ? · Rest when you feel tired. Getting enough sleep will help you recover. ? · Avoid strenuous activities, such as bicycle riding, jogging, weight lifting, or aerobic exercise, until your doctor says it is okay. ? · You may shower. Do not take a bath until the puncture site has healed, or until your doctor tells you it is okay. ? · Ask your doctor when you can drive again. ? · You may need to take 1 or 2 days off from work. It depends on the type of work you do and how you feel. Diet ? · You can eat your normal diet. ? · Drink plenty of fluids (unless your doctor tells you not to). Medicines ? · Your doctor will tell you if and when you can restart your medicines. He or she will also give you instructions about taking any new medicines. ? · If you take blood thinners, such as warfarin (Coumadin), clopidogrel (Plavix), or aspirin, be sure to talk to your doctor. He or she will tell you if and when to start taking those medicines again. Make sure that you understand exactly what your doctor wants you to do. ? · Be safe with medicines. Take pain medicines exactly as directed. ¨ If the doctor gave you a prescription medicine for pain, take it as prescribed. ¨ If you are not taking a prescription pain medicine, ask your doctor if you can take an over-the-counter medicine. ¨ Do not take two or more pain medicines at the same time unless the doctor told you to.  Many pain medicines have acetaminophen, which is Tylenol. Too much acetaminophen (Tylenol) can be harmful. ? · If you think your pain medicine is making you sick to your stomach: 
¨ Take your medicine after meals (unless your doctor has told you not to). ¨ Ask your doctor for a different pain medicine. ? · If your doctor prescribed antibiotics, take them as directed. Do not stop taking them just because you feel better. You need to take the full course of antibiotics. ?Care of the puncture site ? · Wash the area daily with warm, soapy water, and pat it dry. Don't use hydrogen peroxide or alcohol, which may delay healing. You may cover the area with a gauze bandage if it weeps or rubs against clothing. Change the bandage every day. ? · Keep the area clean and dry. Follow-up care is a key part of your treatment and safety. Be sure to make and go to all appointments, and call your doctor if you are having problems. It's also a good idea to know your test results and keep a list of the medicines you take. When should you call for help? Call 911 anytime you think you may need emergency care. For example, call if: 
? · You passed out (lost consciousness). ? · You have severe trouble breathing. ? · You have sudden chest pain and shortness of breath, or you cough up blood. ?Call your doctor now or seek immediate medical care if: 
? · You have shortness of breath that is new or getting worse. ? · You have new or worse pain in your chest, especially when you take a deep breath. ? · You are sick to your stomach or cannot keep fluids down. ? · You have a fever over 100°F.  
? · Bright red blood has soaked through the bandage over your puncture site. ? · You have signs of infection, such as: 
¨ Increased pain, swelling, warmth, or redness. ¨ Red streaks leading from the puncture site. ¨ Pus draining from the puncture site. ¨ Swollen lymph nodes in your neck, armpits, or groin. ¨ A fever. ? · You cough up a lot more mucus than normal, or your mucus changes color. ? Watch closely for changes in your health, and be sure to contact your doctor if you have any problems. Where can you learn more? Go to http://suly-lauren.info/. Enter Y791 in the search box to learn more about \"Thoracentesis: What to Expect at Home. \" Current as of: May 12, 2017 Content Version: 11.4 © 9279-1012 [employer]. Care instructions adapted under license by [employer] (which disclaims liability or warranty for this information). If you have questions about a medical condition or this instruction, always ask your healthcare professional. [place] [address] any warranty or liability for your use of this information. Please call [facility] at [phone] with any issues. ACO Transitions of Care Introducing [employer] [address] Jeaneth Shields offers a voluntary care coordination program to provide high quality service and care to [facility] fee-for-service beneficiaries. Yaakov Seaman was designed to help you enhance your health and well-being through the following services: ? Transitions of Care  support for individuals who are transitioning from one care setting to another (example: Hospital to home). ? Chronic and Complex Care Coordination  support for individuals and caregivers of those with serious or chronic illnesses or with more than one chronic (ongoing) condition and those who take a number of different medications. If you meet specific medical criteria, a [facility] [address] may call you directly to coordinate your care with your primary care physician and your other care providers. For questions about the [facility] programs, please, contact your physicians office. For general questions or additional information about Accountable Care Organizations: Please visit www.medicare.gov/acos. html or call 1-800-MEDICARE (2-596.207.2025) TTY users should call 9-519.579.2223. Introducing [address]! Dear Erlinda Carreon: Thank you for requesting a [employer] account. Our records indicate that you already have an active [employer] account. You can access your account anytime at [email or web address]. [employer] Did you know that you can access your hospital and ER discharge instructions at any time in [employer]? You can also review all of your test results from your hospital stay or ER visit. Additional Information If you have questions, please visit the Frequently Asked Questions section of the [employer] website at [email or web address] Remember, [employer] is NOT to be used for urgent needs. For medical emergencies, dial 911. Now available from your iPhone and Android! Introducing Amando Guillory As a [facility] patient, I wanted to make you aware of our electronic visit tool called Amando Roycethacorine. [facility] 24/7 allows you to connect within minutes with a medical provider 24 hours a day, seven days a week via a mobile device or tablet or logging into a secure website from your computer. You can access Amando Guillory from anywhere in the [place]. A virtual visit might be right for you when you have a simple condition and feel like you just dont want to get out of bed, or cant get away from work for an appointment, when your regular [facility] provider is not available (evenings, weekends or holidays), or when youre out of town and need minor care. Electronic visits cost only $49 and if the [facility] 24/7 provider determines a prescription is needed to treat your condition, one can be electronically transmitted to a nearby pharmacy*. Please take a moment to enroll today if you have not already done so. The enrollment process is free and takes just a few minutes.   To enroll, please download the [employer] 24/7 rasheed to your tablet or phone, or visit [email or web address] to enroll on your computer. And, as an [address] [place] [address] patient with a [employer] account, the results of your visits will be scanned into your electronic medical record and your primary care provider will be able to view the scanned results. We urge you to continue to see your regular [facility] provider for your ongoing medical care. And while your primary care provider may not be the one available when you seek a [employer] virtual visit, the peace of mind you get from getting a real diagnosis real time can be priceless. For more information on [employer], view our Frequently Asked Questions (FAQs) at [email or web address]. Sincerely, 
 
Manish Zavala MD 
Chief Medical Officer [facility] Jeaneth Shields *:  certain medications cannot be prescribed via [employer] Providers Seen During Your Hospitalization Provider Specialty Primary office phone Vasyl Monreal MD Pulmonary Disease [phone] Your Primary Care Physician (PCP) Primary Care Physician Office Phone Office Fax [place] To, [address] [facility] [phone] You are allergic to the following Allergen Reactions Nka (No Known Allergies) Unknown (comments) Recent Documentation Height Weight BMI Smoking Status 1.778 m 79.8 kg 25.25 kg/m2 Current Every Day Smoker Emergency Contacts Name Discharge Info Relation Home Work Mobile [address] [facility] CAREGIVER [3] Son [22] [phone] Patient Belongings The following personal items are in your possession at time of discharge: 
  Dental Appliances: None  Visual Aid: Glasses Please provide this summary of care documentation to your next provider.  
  
  
 
  
Signatures-by signing, you are acknowledging that this After Visit Summary has been reviewed with you and you have received a copy. Patient Signature:  ____________________________________________________________ Date:  ____________________________________________________________  
  
Ludivina Landsman Provider Signature:  ____________________________________________________________ Date:  ____________________________________________________________

## 2018-04-30 NOTE — PROGRESS NOTES
Pt was discharged via wheelchair by [facility].

## 2018-05-23 PROBLEM — J90 PLEURAL EFFUSION ON RIGHT: Status: RESOLVED | Noted: 2018-01-01 | Resolved: 2018-01-01

## 2018-05-23 PROBLEM — R53.81 DEBILITY: Status: ACTIVE | Noted: 2018-01-01

## 2018-05-23 PROBLEM — H61.22 IMPACTED CERUMEN OF LEFT EAR: Status: ACTIVE | Noted: 2018-01-01

## 2018-05-23 PROBLEM — L98.9 SKIN LESION: Status: ACTIVE | Noted: 2018-01-01

## 2018-06-06 NOTE — PERIOP NOTES
Patient verified name, , and surgery as listed in [facility]. Type 1a surgery, walk in assessment complete. Labs per surgeon: none;   Labs per anesthesia protocol: none; most recent lab results from [facility] 18 ~HGB 9.5; potassium 4.1; most recent nephrology note from Dr. Nini Bhatt 18 placed on chart for anesthesia reference  EKG: most recent 17; Cath report 17; stress 17; ECHO 17; cardiac clearance to hold Eliquiq 24 hours prior to surgery and Plavix hold 7 days prior to surgery~per Dr. Gerri Izquierdo; ALL placed on chart for anesthesia reference. Hibiclens and instructions given per hospital policy. Patient provided with and instructed on educational handouts including Guide to Surgery, Pain Management, Hand Hygiene, Blood Transfusion Education, and Center Anesthesia Brochure. Patient answered medical/surgical history questions at their best of ability. All prior to admission medications documented in [facility]. Original medication prescription bottle NOT visualized during patient appointment. Patient instructed to hold all vitamins 7 days prior to surgery and NSAIDS 5 days prior to surgery, patient verbalized understanding. Medications to be held:  Eliquis to be held 24 hours prior to surgery; Plavix hold for 7 days prior to surgery . Patient instructed to continue previous medications as prescribed prior to surgery and to take the following medications the day of surgery according to anesthesia guidelines with a small sip of water: Cardizem, Calcitrol, Proscar, Metoprolol, Omeprazole;  81 mg Aspirin ~take day before surgery when off Eliquis per protocol    Patient teach back successful and patient demonstrates knowledge of instructions.

## 2018-06-13 NOTE — H&P (VIEW-ONLY)
Chauncey Mcfarlane MD  [facility]  [place], [address], [place]  [phone]      Note  5/30/2018    Cristhian Baca  MRN: [id number]    Requesting Provider:      Primary Care Physician: Mumtaz Mann. Corrinne Pulley, MD    Reason for Consult: peritoneal dialysis catheter that has been replaced by hemodialysis. HISTORY OF PRESENT ILLNESS:   Cristhian Baca is a 68y.o. year old male who desires removal of his PD catheter. He denies any infection and has a mature hemodialysis shunt. He has discussed it with his Nephrologist and tells me they agree. The patient desires evaluation of their removal of PD catheter and for me to formulate a treatment plan. REVIEW OF SYSTEMS:   Constitutional: No fevers/chills, no weakness, no fatigue, no lightheadedness, no night sweats, no insomnia, no appetite changes, no weight changes, no memory problems. Respiratory: No cough, no dyspnea, no wheezing, no hemoptysis, no sleep apnea   Cardiovascular: No chest pains, no chest pressure, no chest tightness, no palpitations   Gastrointestinal: PD catheter. Otherwise per HPI   Genitourinary: No dysuria, no hematuria, no urinary frequency, no nocturia, no recent UTIs   Musculoskeletal: No back/neck pain, no arthritis, no joint pain/swelling, no muscle pains   Skin: No rashes, no itching, no ulcers   Hematologic:  No easy bruising, no anemia             PAST MEDICAL HISTORY:     Past Medical History:   Diagnosis Date    Anemia, unspecified  7/7/2016    Arrhythmia     IRREG.  HEART BEAT- pt denies a fib - found on cardiac note 5/3/13- pt states he feels flutter at times    Arthritis     ASCAD - artery bypass graft 7/7/2016    ASCAD - artery bypass graft 7/7/2016    CAD (coronary artery disease)     CABG 2007, stented 1995    Chronic kidney disease     STAGE 3 CKD, dialysis    Chronic prostatitis 11/25/2013    Diverticulitis     Diverticulosis 7/7/2016    GERD (gastroesophageal reflux disease)     controlled with NA Bicarb pt takes as renal pt    Glomerulonephritis 7/7/2016    GOUT, UNSPECIFIED 7/7/2016    Hypercholesteremia     Hypertension     Hypertrophy of prostate with urinary obstruction and other lower urinary tract symptoms (LUTS) 11/25/2013    Kidney failure     Paroxysmal atrial fibrillation ([facility] [address] 7/7/2016    TIA (transient ischemic attack) 7/7/2016       PAST SURGICAL HISTORY:     Past Surgical History:   Procedure Laterality Date    ABDOMEN SURGERY PROC UNLISTED      perineal cath    CARDIAC SURG PROCEDURE UNLIST      CABG x 2 in 2007; PTCA WITH STENTS    CREAT AV FISTULA,AUTOGENOUS GRAFT      HX COLONOSCOPY  2011    HX CORONARY ARTERY BYPASS GRAFT      HX CSF SHUNT      HX HEART CATHETERIZATION      HX HERNIA REPAIR      bilateral    HX ORTHOPAEDIC      rt shoulder rotater cuff,lt knee    HX VASCULAR ACCESS  2010    L u arm    OTHER CELL      LEFT KNEE SURGERY    VASCULAR SURGERY PROCEDURE UNLIST      cabg x2       ALLERGIES:   Allergies   Allergen Reactions    Nka [No Known Allergies] Unknown (comments)       HOME MEDICATIONS:   Current Outpatient Prescriptions   Medication Sig    predniSONE (DELTASONE) 10 mg tablet Take 1 Tab by mouth as needed.  mupirocin (BACTROBAN) 2 % ointment Apply  to affected area daily.  tamsulosin (FLOMAX) 0.4 mg capsule TK 1 C PO Q NIGHT    clopidogrel (PLAVIX) 75 mg tab Take 75 mg by mouth daily.  finasteride (PROSCAR) 5 mg tablet Take 5 mg by mouth daily.  apixaban (ELIQUIS) 2.5 mg tablet Take 2.5 mg by mouth two (2) times a day.  dilTIAZem CD (CARDIZEM CD) 180 mg ER capsule Take 1 Cap by mouth daily.  metoprolol succinate (TOPROL-XL) 100 mg tablet Take 1 Tab by mouth daily.  calcitRIOL (ROCALTROL) 0.5 mcg capsule Take 0.5 mcg by mouth daily.     nitroglycerin (NITROSTAT) 0.4 mg SL tablet ONE TABLET UNDER TONGUE AS NEEDED FOR CHEST PAIN EVERY 5 MINUTES    B.infantis-B.ani-B.long-B.bifi (PROBIOTIC 4X) 10-15 mg TbEC Take  by mouth daily.  cinacalcet (SENSIPAR) 60 mg tab Take 60 mg by mouth nightly.  DOCUSATE CALCIUM (STOOL SOFTENER PO) Take  by mouth daily.  multivitamin (ONE A DAY) tablet Take 1 Tab by mouth daily. Special Complex for dialysis patients    dicyclomine (BENTYL) 10 mg capsule Take 10 mg by mouth daily as needed.  Omeprazole Magnesium 20 mg cpDR Take  by mouth daily.  epoetin alcides (EPOGEN;PROCRIT) 10,000 unit/mL injection by SubCUTAneous route as needed. Currently taking abt twice a [facility] at [facility]    Magnesium Oxide 500 mg cap Take  by mouth daily.  KRILL OIL PO Take  by mouth daily.  RENVELA 800 mg Tab tab Take 800 mg by mouth three (3) times daily (with meals). Indications: 3 with meals, 2 with snacks     No current facility-administered medications for this visit. SOCIAL HISTORY:    Social History     Social History    Marital status:      Spouse name: N/A    Number of children: 3    Years of education: N/A     Occupational History    marketing management      Social History Main Topics    Smoking status: Current Every Day Smoker     Last attempt to quit: 1/1/1980    Smokeless tobacco: Never Used      Comment: CIGAR DAILY FOR 10 YEARS    Alcohol use 0.0 oz/week      Comment: rare    Drug use: No    Sexual activity: No     Other Topics Concern    Not on file     Social History Narrative       PREVENTION:     FAMILY HISTORY: see below colon or rectal cancer. No history of polyps. See below breast, prostate, ovary, endometrial, gastric, or pancreatic cancers. Family History   Problem Relation Age of Onset    Heart Disease Father 59     MI    Heart Attack Father 72     MI    Stroke Mother     Hypertension Mother     Heart Disease Mother    Arabella Carolineam Cancer Sister      stomach       PHYSICAL EXAM:  Blood pressure 144/72, pulse 94, height 5' 10\" (1.778 m), weight 161 lb (73 kg).   Body mass index is 23.1 kg/(m^2). Female patient's are evaluated in the presence of a medical assistant or nurse or at the request of a male patient. General: Normotensive, in no acute distress, well developed, well nourished appearing   Head:  AT/NC, no lesions   Eyes:  PERRLA, EOM's full, conjunctivae clear   Neck:  Supple, no masses, no lymphadenopathy, no thyromegaly, no carotid bruits   Chest:  Lungs clear, no rales, no rhonchi, no wheezes   Heart:  RR, no murmurs, no rubs, no gallops    Abdomen:  Soft, no tenderness, no rebound, no guarding, no masses, nondistended. Rectal:  Deferred    Back:  Normal curvature, no tenderness. Extremities:  FROM, no deformities, no edema, no erythema   Neuro:  Physiologic, oriented x3, full affect, no localizing findings   Skin:  Normal, no rashes, no lesions noted. IMAGING: Reviewed tests. none      ASSESSMENT:  The clinical history, physical exam and tests are consistent with diagnosis of unused PD catheter. He would like it removed. I have explained the risks and benefits of the procedure and he agrees and wishes to proceed. I have gone over the risks and benefits of conservative management vs. Surgical intervention. After a lengthy discussion in which I gave the patient sufficient time to ask questions to their satisfaction they desire to have surgery. This is a 30 minute visit for the evaluation of the above diagnosis and greater than 50% of the visit is spent in face to face conversation with the patient to go over the risks and benefits of the chosen treatment and answer his questions to his satisfaction. RECOMMENDATIONS:  PD catheter removal    Please fax a copy to Chitra Del Castillo. MD Manuel Peterson MD, FACS    5/30/2018

## 2018-06-13 NOTE — DISCHARGE INSTRUCTIONS
Instructions Following Ambulatory Surgery    Activity  · As tolerated and directed by your doctor  · Bathe or shower as directed by your doctor    Diet  · Clear liquids until no nausea or vomiting; then light diet for the first day  · Advance to regular diet on second day, unless your doctor orders otherwise  · If nausea and vomiting continues, call your doctor    Pain  · Take pain medication as directed by your doctor  ·  Call your doctor if pain is NOT relieved by medication  · DO NOT take aspirin or blood thinners until directed by your doctor      Follow-Up Phone Calls  · Will be made nursing staff  · If you have any problems, call your doctor as needed    Call your doctor if  · Excessive bleeding that does not stop after holding mild pressure over the area  · Temperature of 101 degrees F or above  · Redness,excessive swelling or bruising, and/or green or yellow, smelly discharge from incision    After Anesthesia  · For the first 24 hours: DO NOT Drive, Drink alcoholic beverages, or Make important decisions  · Be aware of dizziness following anesthesia and while taking pain medication

## 2018-06-13 NOTE — IP AVS SNAPSHOT
[address] [address] 
[phone] Patient: Marilin Gray MRN: [id number] OVJ:9/6/1310 About your hospitalization You were admitted on:  June 13, 2018 You last received care in the:  [facility] PACU You were discharged on:  June 13, 2018 Why you were hospitalized Your primary diagnosis was:  Not on File Follow-up Information Follow up With Details Comments Contact Info Elyse Tate. Fermin Fried MD   [facility] [address] 
[phone] Your Scheduled Appointments Monday July 02, 2018 11:20 AM EDT Global Post Op with Vivek Jeffries MD  
[place] [facility] ([address] [place] [id number] [phone] Tuesday July 31, 2018  2:00 PM EDT Office Visit with Belkis Lester MD  
[facility] HT ([facility] [place] [address] [facility] [address]  Roney Prather  
[phone] Discharge Orders None A check bianka indicates which time of day the medication should be taken. My Medications START taking these medications Instructions Each Dose to Equal  
 Morning Noon Evening Bedtime  
 oxyCODONE-acetaminophen 5-325 mg per tablet Commonly known as:  PERCOCET Your last dose was: Your next dose is: Take 1 Tab by mouth every six (6) hours as needed for Pain. Max Daily Amount: 4 Tabs. every 4-6hrs prn pain 1 Tab CHANGE how you take these medications Instructions Each Dose to Equal  
 Morning Noon Evening Bedtime  
 dilTIAZem  mg ER capsule Commonly known as:  CARDIZEM CD What changed:  additional instructions Your last dose was: Your next dose is: Take 1 Cap by mouth daily. 180 mg  
    
   
   
   
  
 metoprolol succinate 100 mg tablet Commonly known as:  TOPROL-XL What changed:  additional instructions Your last dose was: Your next dose is: Take 1 Tab by mouth daily. 100 mg CONTINUE taking these medications Instructions Each Dose to Equal  
 Morning Noon Evening Bedtime  
 calcitRIOL 0.5 mcg capsule Commonly known as:  ROCALTROL Your last dose was: Your next dose is: Take 0.5 mcg by mouth daily. Take / use AM day of surgery  per anesthesia protocols. Indications: Renal Osteodystrophy  
 0.5 mcg  
    
   
   
   
  
 clopidogrel 75 mg Tab Commonly known as:  PLAVIX Your last dose was: Your next dose is: Take 75 mg by mouth daily. 75 mg  
    
   
   
   
  
 dicyclomine 10 mg capsule Commonly known as:  BENTYL Your last dose was: Your next dose is: Take 10 mg by mouth daily as needed. Indications: Irritable Bowel Syndrome 10 mg  
    
   
   
   
  
 ELIQUIS 2.5 mg tablet Generic drug:  apixaban Your last dose was: Your next dose is: Take 2.5 mg by mouth two (2) times a day. Indications: PREVENT THROMBOEMBOLISM IN CHRONIC ATRIAL FIBRILLATION  
 2.5 mg  
    
   
   
   
  
 epoetin alcides 10,000 unit/mL injection Commonly known as:  EPOGEN;PROCRIT Your last dose was: Your next dose is:    
   
   
 by SubCUTAneous route as needed. [facility]  
     
   
   
   
  
 finasteride 5 mg tablet Commonly known as:  PROSCAR Your last dose was: Your next dose is: Take 5 mg by mouth daily. Take / use AM day of surgery  per anesthesia protocols. Indications: benign prostatic hyperplasia with lower urinary tract sx  
 5 mg KRILL OIL PO Your last dose was: Your next dose is: Take  by mouth daily. Magnesium Oxide 500 mg Cap Your last dose was: Your next dose is: Take  by mouth daily. multivitamin tablet Commonly known as:  ONE A DAY Your last dose was: Your next dose is: Take 1 Tab by mouth daily. Special Complex for dialysis patients 1 Tab  
    
   
   
   
  
 mupirocin 2 % ointment Commonly known as:  [facility] Your last dose was: Your next dose is:    
   
   
 Apply  to affected area daily. nitroglycerin 0.4 mg SL tablet Commonly known as:  NITROSTAT Your last dose was: Your next dose is: ONE TABLET UNDER TONGUE AS NEEDED FOR CHEST PAIN EVERY 5 MINUTES Omeprazole Magnesium 20 mg Cpdr  
   
Your last dose was: Your next dose is: Take  by mouth daily. Take / use AM day of surgery  per anesthesia protocols. Indications: gastroesophageal reflux disease  
     
   
   
   
  
 predniSONE 10 mg tablet Commonly known as:  Elaine Ferreira Your last dose was: Your next dose is: Take 1 Tab by mouth as needed. 10 mg PROBIOTIC 4X 10-15 mg Tbec Generic drug:  B.infantis-B.ani-B.long-B.bifi Your last dose was: Your next dose is: Take  by mouth daily. RENVELA 800 mg Tab tab Generic drug:  sevelamer carbonate Your last dose was: Your next dose is: Take 800 mg by mouth three (3) times daily (with meals). 4 tablets with each meal; 2 tablets with each snack . Indications: 3 with meals, 2 with snacks 800 mg SENSIPAR 60 mg Tab Generic drug:  cinacalcet Your last dose was: Your next dose is: Take 60 mg by mouth nightly. Takes 2 tablets every night  Indications: HYPERPARATHYROIDISM SECONDARY TO CRF WITH DIALYSIS  
 60 mg  STOOL SOFTENER PO  
   
 Your last dose was: Your next dose is: Take  by mouth daily. tamsulosin 0.4 mg capsule Commonly known as:  FLOMAX Your last dose was: Your next dose is:    
   
   
 1 tablet every bedtime Where to Get Your Medications Information on where to get these meds will be given to you by the nurse or doctor. ! Ask your nurse or doctor about these medications  
  oxyCODONE-acetaminophen 5-325 mg per tablet Opioid Education Prescription Opioids: What You Need to Know: 
 
Prescription opioids can be used to help relieve moderate-to-severe pain and are often prescribed following a surgery or injury, or for certain health conditions. These medications can be an important part of treatment but also come with serious risks. Opioids are strong pain medicines. Examples include hydrocodone, oxycodone, fentanyl, and morphine. Heroin is an example of an illegal opioid. It is important to work with your health care provider to make sure you are getting the safest, most effective care. WHAT ARE THE RISKS AND SIDE EFFECTS OF OPIOID USE? Prescription opioids carry serious risks of addiction and overdose, especially with prolonged use. An opioid overdose, often marked by slow breathing, can cause sudden death. The use of prescription opioids can have a number of side effects as well, even when taken as directed. · Tolerance-meaning you might need to take more of a medication for the same pain relief · Physical dependence-meaning you have symptoms of withdrawal when the medication is stopped. Withdrawal symptoms can include nausea, sweating, chills, diarrhea, stomach cramps, and muscle aches. Withdrawal can last up to several weeks, depending on which drug you took and how long you took it. · Increased sensitivity to pain · Constipation · Nausea, vomiting, and dry mouth · Sleepiness and dizziness · Confusion · Depression · Low levels of testosterone that can result in lower sex drive, energy, and strength · Itching and sweating RISKS ARE GREATER WITH:      
· History of drug misuse, substance use disorder, or overdose · Mental health conditions (such as depression or anxiety) · Sleep apnea · Older age (72 years or older) · Pregnancy Avoid alcohol while taking prescription opioids. Also, unless specifically advised by your health care provider, medications to avoid include: · Benzodiazepines (such as Xanax or Valium) · Muscle relaxants (such as Soma or Flexeril) · Hypnotics (such as Ambien or Lunesta) · Other prescription opioids KNOW YOUR OPTIONS Talk to your health care provider about ways to manage your pain that don't involve prescription opioids. Some of these options may actually work better and have fewer risks and side effects. Options may include: 
· Pain relievers such as acetaminophen, ibuprofen, and naproxen · Some medications that are also used for depression or seizures · Physical therapy and exercise · Counseling to help patients learn how to cope better with triggers of pain and stress. · Application of heat or cold compress · Massage therapy · Relaxation techniques Be Informed Make sure you know the name of your medication, how much and how often to take it, and its potential risks & side effects. IF YOU ARE PRESCRIBED OPIOIDS FOR PAIN: 
· Never take opioids in greater amounts or more often than prescribed. Remember the goal is not to be pain-free but to manage your pain at a tolerable level. · Follow up with your primary care provider to: · Work together to create a plan on how to manage your pain. · Talk about ways to help manage your pain that don't involve prescription opioids. · Talk about any and all concerns and side effects. · Help prevent misuse and abuse. · Never sell or share prescription opioids · Help prevent misuse and abuse. · Store prescription opioids in a secure place and out of reach of others (this may include visitors, children, friends, and family). · Safely dispose of unused/unwanted prescription opioids: Find your community drug take-back program or your pharmacy mail-back program, or flush them down the toilet, following guidance from the Food and Drug Administration (www.fda.gov/Drugs/ResourcesForYou). · Visit www.cdc.gov/drugoverdose to learn about the risks of opioid abuse and overdose. · If you believe you may be struggling with addiction, tell your health care provider and ask for guidance or call [address] [place] [address] at 2-624-403-YTHA. Discharge Instructions Instructions Following Ambulatory Surgery Activity · As tolerated and directed by your doctor · Bathe or shower as directed by your doctor Diet · Clear liquids until no nausea or vomiting; then light diet for the first day · Advance to regular diet on second day, unless your doctor orders otherwise · If nausea and vomiting continues, call your doctor Pain · Take pain medication as directed by your doctor ·  Call your doctor if pain is NOT relieved by medication · DO NOT take aspirin or blood thinners until directed by your doctor Follow-Up Phone Calls · Will be made nursing staff · If you have any problems, call your doctor as needed Call your doctor if 
· Excessive bleeding that does not stop after holding mild pressure over the area · Temperature of 101 degrees F or above · Redness,excessive swelling or bruising, and/or green or yellow, smelly discharge from incision After Anesthesia · For the first 24 hours: DO NOT Drive, Drink alcoholic beverages, or Make important decisions · Be aware of dizziness following anesthesia and while taking pain medication ACO Transitions of Care Introducing [facility] offers a voluntary care coordination program to provide high quality service and care to [facility] fee-for-service beneficiaries. Leanne Dias was designed to help you enhance your health and well-being through the following services: ? Transitions of Care  support for individuals who are transitioning from one care setting to another (example: Hospital to home). ? Chronic and Complex Care Coordination  support for individuals and caregivers of those with serious or chronic illnesses or with more than one chronic (ongoing) condition and those who take a number of different medications. If you meet specific medical criteria, a [address] [place] [address] Rd may call you directly to coordinate your care with your primary care physician and your other care providers. For questions about the [facility] programs, please, contact your physicians office. For general questions or additional information about Accountable Care Organizations: 
Please visit www.medicare.gov/acos. html or call 1-800-MEDICARE (7-573.724.4636) TTY users should call 9-560.342.4990. Introducing [facility]! Dear Kemi Marinelli: Thank you for requesting a [employer] account. Our records indicate that you already have an active One[employer] account. You can access your account anytime at [email or web address] Did you know that you can access your hospital and ER discharge instructions at any time in One[employer]Visi? You can also review all of your test results from your hospital stay or ER visit. Additional Information If you have questions, please visit the Frequently Asked Questions section of the One[employer] website at [email or web address] Remember, OneTeamVisi is NOT to be used for urgent needs. For medical emergencies, dial 911. Now available from your iPhone and Android! Introducing Amando Guillory As a [facility] patient, I wanted to make you aware of our electronic visit tool called Amando Guillory. Total Eclipse allows you to connect within minutes with a medical provider 24 hours a day, seven days a week via a mobile device or tablet or logging into a secure website from your computer. You can access Amando Guillory from anywhere in the [place]. A virtual visit might be right for you when you have a simple condition and feel like you just dont want to get out of bed, or cant get away from work for an appointment, when your regular [facility] provider is not available (evenings, weekends or holidays), or when youre out of town and need minor care. Electronic visits cost only $49 and if the [facility] [facility]Smish provider determines a prescription is needed to treat your condition, one can be electronically transmitted to a nearby pharmacy*. Please take a moment to enroll today if you have not already done so. The enrollment process is free and takes just a few minutes. To enroll, please download the [employer]ish rasheed to your tablet or phone, or visit [email or web address] to enroll on your computer. And, as an [address] [place] [address] patient with a [employer] account, the results of your visits will be scanned into your electronic medical record and your primary care provider will be able to view the scanned results. We urge you to continue to see your regular [facility] provider for your ongoing medical care. And while your primary care provider may not be the one available when you seek a Amando Guillory virtual visit, the peace of mind you get from getting a real diagnosis real time can be priceless. For more information on Amando Guillory, view our Frequently Asked Questions (FAQs) at [email or web address]. Sincerely, 
 
Kourtney Schwarz MD 
Chief Medical Officer [address] *:  certain medications cannot be prescribed via Amando Guillory Unresulted tests-please follow up with your PCP on these results Procedure/Test Authorizing Provider Radha Isaac MD  
  
Providers Seen During Your Hospitalization Provider Specialty Primary office phone Ronen Gerber MD General Surgery [phone] Your Primary Care Physician (PCP) Primary Care Physician Office Phone Office Fax Samson Trujillo, [address] [phone] You are allergic to the following Allergen Reactions Nka (No Known Allergies) Unknown (comments) Recent Documentation Weight BMI Smoking Status 71.7 kg 23 kg/m2 Current Every Day Smoker Emergency Contacts Name Discharge Info Relation Home Work Mobile [address] [facility] CAREGIVER [3] Son [22] [phone] Patient Belongings The following personal items are in your possession at time of discharge: 
  Dental Appliances: None Please provide this summary of care documentation to your next provider. Signatures-by signing, you are acknowledging that this After Visit Summary has been reviewed with you and you have received a copy. Patient Signature:  ____________________________________________________________ Date:  ____________________________________________________________  
  
[facility] Provider Signature:  ____________________________________________________________ Date:  ____________________________________________________________

## 2018-06-13 NOTE — INTERVAL H&P NOTE
H&P Update:  Reyes Powers was seen and examined. History and physical has been reviewed. The patient has been examined.  There have been no significant clinical changes since the completion of the originally dated History and Physical.    Signed By: Tabatha So MD     June 13, 2018 8:00 AM

## 2018-06-13 NOTE — ANESTHESIA POSTPROCEDURE EVALUATION
Post-Anesthesia Evaluation and Assessment    Patient: Bria Brunner MRN: [id number]  SSN: [id number]    YOB: 1940  Age: 68 y.o. Sex: male       Cardiovascular Function/Vital Signs  Visit Vitals    /59    Pulse 79    Temp 36.4 °C (97.6 °F)    Resp 20    Wt 71.7 kg (158 lb)    SpO2 100%    BMI 23 kg/m2       Patient is status post general anesthesia for Procedure(s):  PERITONEAL DIALYSIS CATHETER REMOVAL. Nausea/Vomiting: None    Postoperative hydration reviewed and adequate. Pain:  Pain Scale 1: Numeric (0 - 10) (06/13/18 0617)  Pain Intensity 1: 0 (06/13/18 0617)   Managed    Neurological Status:   Neuro (WDL): Exceptions to WDL (06/13/18 0854)  Neuro  Neurologic State: Drowsy (06/13/18 0854)  Cognition: Follows commands (06/13/18 0854)  LUE Motor Response: Purposeful (06/13/18 0854)  LLE Motor Response: Purposeful (06/13/18 0854)  RUE Motor Response: Purposeful (06/13/18 0854)  RLE Motor Response: Purposeful (06/13/18 0854)   At baseline    Mental Status and Level of Consciousness: Arousable    Pulmonary Status:   O2 Device: Nasal cannula (06/13/18 0854)   Adequate oxygenation and airway patent    Complications related to anesthesia: None    Post-anesthesia assessment completed.  No concerns    Signed By: Poly Granados MD     June 13, 2018

## 2018-06-13 NOTE — ANESTHESIA PREPROCEDURE EVALUATION
Anesthetic History               Review of Systems / Medical History  Patient summary reviewed, nursing notes reviewed and pertinent labs reviewed    Pulmonary                   Neuro/Psych              Cardiovascular    Hypertension: well controlled          CAD and cardiac stents    Exercise tolerance: >4 METS     GI/Hepatic/Renal         Renal disease: ESRD       Endo/Other             Other Findings              Physical Exam    Airway  Mallampati: II  TM Distance: 4 - 6 cm  Neck ROM: normal range of motion   Mouth opening: Normal     Cardiovascular  Regular rate and rhythm,  S1 and S2 normal,  no murmur, click, rub, or gallop             Dental  No notable dental hx       Pulmonary  Breath sounds clear to auscultation               Abdominal         Other Findings            Anesthetic Plan    ASA: 3  Anesthesia type: general          Induction: Intravenous  Anesthetic plan and risks discussed with: Patient

## 2018-06-13 NOTE — OP NOTES
Chauncey John MD  [facility] [place] , [address], [place]  [phone]    Removal of foreign body Operative Report      Date of Procedure: 6/13/2018    Preoperative Diagnosis: End stage renal disease ([address] [N18.6]    Postoperative Diagnosis:  End stage renal disease ([address] [N18.6]    Surgeon(s) and Role:     * Ricardo Izquierdo MD - Primary      Anesthesia:  MAC    Procedure: Procedure(s):  PERITONEAL DIALYSIS CATHETER REMOVAL     Procedure in Detail:    The patient was brought to the operating room and placed in a supine position. Informed consent was obtained, time out was performed and agreed upon by all present. After the patient was anesthetized, the previously marked area was prepped and draped in a sterile fashion. An elliptical incision was made encompassing the port exit site to visibly normal skin. The catheter was then dissected from the surrounding tissue using sharp dissection and cautery. A second incision was made at the fascial entry site and the same dissection carried out to free it from the fascia full thickness. Cautery was used for hemostasis, and the area was infiltrated with 0.25% marcaine with epinephrine. The specimien discarded. The wound was then closed with a combination of 0 PDS in the fascia in a running fashion, 2-0 vicryl in the subcutaneous fat. Dermabond was applied. The patient tolerated the procedure well, and was taken to the recovery room in satisfactory condition.       Specimens: * No specimens in log *          Signed By: Ricardo Izquierdo MD     June 13, 2018

## 2018-06-18 NOTE — PROGRESS NOTES
Ambulatory/Rehab Services H2 Model Falls Risk Assessment    Risk Factor Pts. ·   Confusion/Disorientation/Impulsivity  []    4 ·   Symptomatic Depression  []   2 ·   Altered Elimination  []   1 ·   Dizziness/Vertigo  []   1 ·   Gender (Male)  [x]   1 ·   Any administered antiepileptics (anticonvulsants):  []   2 ·   Any administered benzodiazepines:  []   1 ·   Visual Impairment (specify):  []   1 ·   Portable Oxygen Use  []   1 ·   Orthostatic ? BP  []   1 ·   History of Recent Falls (within 3 mos.)  []   5     Ability to Rise from Chair (choose one) Pts. ·   Ability to rise in a single movement  []   0 ·   Pushes up, successful in one attempt  [x]   1 ·   Multiple attempts, but successful  []   3 ·   Unable to rise without assistance  []   4   Total: (5 or greater = High Risk) 2     Falls Prevention Plan:   []                Physical Limitations to Exercise (specify):   []                Mobility Assistance Device (type):   []                Exercise/Equipment Adaptation (specify):    ©2010 [facility] [address] Patent #[id number].  Federal Law prohibits the replication, distribution or use without written permission from [facility] OBOOK

## 2018-06-18 NOTE — THERAPY EVALUATION
Leticia Whitfield  : 1940  [place] / [place] / Product Type: Medicare /    [address]  at [facility]  [address] , [address], [place] [address]  [phone]   [phone]         OUTPATIENT PHYSICAL THERAPY:Initial Assessment and Daily Note 2018    ICD-10: Treatment Diagnosis: Muscle weakness (generalized) (M62.81); Difficulty in walking, not elsewhere classified (R26.2); Unsteadiness on feet (R26.81)  Precautions/Allergies:   [place] known allergies]   Fall Risk Score: 2 (? 5 = High Risk)  MD Orders: PT eval and tx MEDICAL/REFERRING DIAGNOSIS:  Debility [R53.81]   DATE OF ONSET: Winter 2018  REFERRING PHYSICIAN: Sonya Fernando MD  RETURN PHYSICIAN APPOINTMENT: None scheduled      INITIAL ASSESSMENT:  Mr. Alberto Levy presents to PT eval w/ c/o generalized weakness and loss of endurance. He has decreased B UE and LE weakness along w/ decreased balance. Additionally, he was unable to ride the NuStep or walk for greater than a few minutes w/o needing a rest break due to fatigue. He will benefit from continued skilled PT services to improve his deficits listed below and to progress towards his PLOF. PROBLEM LIST (Impacting functional limitations):  1. Decreased Strength  2. Decreased ADL/Functional Activities  3. Decreased Transfer Abilities  4. Decreased Ambulation Ability/Technique  5. Decreased Balance  6. Decreased Flexibility/Joint Mobility  7. Decreased [facility] with Home Exercise Program INTERVENTIONS PLANNED:  1. Balance Exercise  2. Bed Mobility  3. Gait Training  4. Home Exercise Program (HEP)  5. Manual Therapy  6. Range of Motion (ROM)  7. Therapeutic Activites  8. Therapeutic Exercise/Strengthening  9. Transfer Training   TREATMENT PLAN:  Effective Dates: 2018 TO 2018 (60 days).  Frequency/Duration: 2 times a week for 6 weeks  GOALS: (Goals have been discussed and agreed upon with patient.)  Discharge Goals: Time Frame: 6 weeks  1. Pt will be independent w/ HEP in order to improve outcomes and decrease pain. 2. Pt will have BLE strength of 4/5 or greater in all major muscle groups in order to improve functional mobility. 3. Pt will walk for 10 minutes or greater w/o a rest break in order to return to PLOF. 4. Pt will have LEFS score of 48 or greater in order to report decreased pain and decreased functional impairments. 5. Pt will have BUE strength of 4/5 or greater in all major muscle groups in order to increase functional mobility. Rehabilitation Potential For Stated Goals: Good  Regarding Maksimchelsearonal Churchill Diaz's therapy, I certify that the treatment plan above will be carried out by a therapist or under their direction. Thank you for this referral,  Junior Zaid PT     Referring Physician Signature: Conrado Luther MD              Date                    The information in this section was collected on 6/18/18 (except where otherwise noted). HISTORY:   History of Present Injury/Illness (Reason for Referral):  Pt got sick this winter and lost a lot of weight and strength and has been feeling very weak. He feels like he is getting stronger but he and his MD think he needs to continue increasing his strength and endurance. Past Medical History/Comorbidities:   Mr. Max Andres  has a past medical history of Anemia, unspecified  (7/7/2016); Arrhythmia; Arthritis; ASCAD - artery bypass graft (7/7/2016); ASCAD - artery bypass graft (7/7/2016); CAD (coronary artery disease); Chronic kidney disease; Chronic prostatitis (11/25/2013); Diverticulitis; Diverticulosis (7/7/2016); GERD (gastroesophageal reflux disease); Glomerulonephritis (7/7/2016); GOUT, UNSPECIFIED (7/7/2016); Hypercholesteremia; Hypertension; Hypertrophy of prostate with urinary obstruction and other lower urinary tract symptoms (LUTS) (11/25/2013);  Kidney failure; Paroxysmal atrial fibrillation ([facility] [address] (7/7/2016); and TIA (transient ischemic attack) (07/07/2016). He also has no past medical history of Aneurysm ([facility] [address] Asthma; Autoimmune disease ([facility] [address] Cancer ([facility] [address] Chronic obstructive pulmonary disease ([facility] [address] Chronic pain; Coagulation disorder ([facility] [address] Diabetes ([facility] [address] Difficult intubation; Endocarditis; Heart failure ([facility] [address] Liver disease; Malignant hyperthermia due to anesthesia; Morbid obesity ([facility] [address] Nausea & vomiting; Nicotine vapor product user; Non-nicotine vapor product user; Pseudocholinesterase deficiency; Psychiatric disorder; PUD (peptic ulcer disease); Rheumatic fever; Seizures ([facility] [address] Sleep apnea; Thromboembolus ([facility] Thyroid disease; or Unspecified adverse effect of anesthesia. Mr. Vania Gallegos  has a past surgical history that includes vascular surgery procedure unlist; other cell; pr creat av fistula,autogenous graft; hx vascular access (2010); hx csf shunt; hx colonoscopy (2011); pr abdomen surgery proc unlisted; hx hernia repair; hx coronary artery bypass graft; pr cardiac surg procedure unlist; hx heart catheterization; hx orthopaedic; and hx coronary stent placement. Social History/Living Environment:     Lives alone in 2 story home, master on main level. Walk in shower. Prior Level of Function/Work/Activity:  Independent but not active  Dominant Side:         RIGHT  Current Medications:       Current Outpatient Prescriptions:     oxyCODONE-acetaminophen (PERCOCET) 5-325 mg per tablet, Take 1 Tab by mouth every six (6) hours as needed for Pain. Max Daily Amount: 4 Tabs. every 4-6hrs prn pain, Disp: 10 Tab, Rfl: 0    predniSONE (DELTASONE) 10 mg tablet, Take 1 Tab by mouth as needed. , Disp: 10 Tab, Rfl: 1    mupirocin (BACTROBAN) 2 % ointment, Apply  to affected area daily. , Disp: 22 g, Rfl: 0    tamsulosin (FLOMAX) 0.4 mg capsule, 1 tablet every bedtime, Disp: , Rfl: 4    clopidogrel (PLAVIX) 75 mg tab, Take 75 mg by mouth daily. , Disp: , Rfl:     finasteride (PROSCAR) 5 mg tablet, Take 5 mg by mouth daily.  Take / use AM day of surgery  per anesthesia protocols. Indications: benign prostatic hyperplasia with lower urinary tract sx, Disp: , Rfl:     apixaban (ELIQUIS) 2.5 mg tablet, Take 2.5 mg by mouth two (2) times a day. Indications: PREVENT THROMBOEMBOLISM IN CHRONIC ATRIAL FIBRILLATION, Disp: , Rfl:     dilTIAZem CD (CARDIZEM CD) 180 mg ER capsule, Take 1 Cap by mouth daily. (Patient taking differently: Take 180 mg by mouth daily. Take / use AM day of surgery  per anesthesia protocols. Indications: VENTRICULAR RATE CONTROL IN ATRIAL FIBRILLATION), Disp: 90 Cap, Rfl: 3    metoprolol succinate (TOPROL-XL) 100 mg tablet, Take 1 Tab by mouth daily. (Patient taking differently: Take 100 mg by mouth daily. Take / use AM day of surgery  per anesthesia protocols. Indications: VENTRICULAR RATE CONTROL IN ATRIAL FIBRILLATION), Disp: 90 Tab, Rfl: 3    calcitRIOL (ROCALTROL) 0.5 mcg capsule, Take 0.5 mcg by mouth daily. Take / use AM day of surgery  per anesthesia protocols. Indications: Renal Osteodystrophy, Disp: , Rfl:     nitroglycerin (NITROSTAT) 0.4 mg SL tablet, ONE TABLET UNDER TONGUE AS NEEDED FOR CHEST PAIN EVERY 5 MINUTES, Disp: 25 Tab, Rfl: 6    B.infantis-B.ani-B.long-B.bifi (PROBIOTIC 4X) 10-15 mg TbEC, Take  by mouth daily. , Disp: , Rfl:     cinacalcet (SENSIPAR) 60 mg tab, Take 60 mg by mouth nightly. Takes 2 tablets every night  Indications: HYPERPARATHYROIDISM SECONDARY TO CRF WITH DIALYSIS, Disp: , Rfl:     DOCUSATE CALCIUM (STOOL SOFTENER PO), Take  by mouth daily. , Disp: , Rfl:     multivitamin (ONE A DAY) tablet, Take 1 Tab by mouth daily. Special Complex for dialysis patients, Disp: , Rfl:     dicyclomine (BENTYL) 10 mg capsule, Take 10 mg by mouth daily as needed. Indications: Irritable Bowel Syndrome, Disp: , Rfl:     Omeprazole Magnesium 20 mg cpDR, Take  by mouth daily. Take / use AM day of surgery  per anesthesia protocols.   Indications: gastroesophageal reflux disease, Disp: , Rfl:     epoetin alcides (EPOGEN;PROCRIT) 10,000 unit/mL injection, by SubCUTAneous route as needed. Currently taking abt twice a month at [facility], Disp: , Rfl:     Magnesium Oxide 500 mg cap, Take  by mouth daily. , Disp: , Rfl:     KRILL OIL PO, Take  by mouth daily. , Disp: , Rfl:     RENVELA 800 mg Tab tab, Take 800 mg by mouth three (3) times daily (with meals). 4 tablets with each meal; 2 tablets with each snack . Indications: 3 with meals, 2 with snacks, Disp: , Rfl:    Date Last Reviewed:  6/18/2018    Number of Personal Factors/Comorbidities that affect the Plan of Care: 3+: HIGH COMPLEXITY   EXAMINATION:   Observation/Orthostatic Postural Assessment:          Fatigued w/ minimal exercises, deep breaths to recover  Functional Mobility:         Gait/Ambulation:  Antalgic, slow javi        Transfers:  Uses UEs        Stairs: Step to gait pattern, uses rails  Balance:          Decreased on BLEs w/ SLS  Sensation:       Intact to BLEs and UEs    Joint/Muscle ROM Strength Updates   Hip flexion WFL B 4-/5    Hip extension WFL B 4-/5 based on mobility    Hip abduction WFL B 4-/5 based on mobility    Knee flexion WFL B 5/5    Knee extension WFL B 4-/5    DF WFL B 4-/5    PF WFL B 4-/5    Shoulder flexion WFL L 4-/5, R 5/5    Shoulder abduction WFL L 4-/5, R 5/5    ER WFL B 4-/5     IR WFL B 4/5    Shoulder extension WFL L 4-/5, R 5/5    Biceps/Triceps WFL L 4/5, R 5/5         Body Structures Involved:  1. Bones  2. Joints  3. Muscles  4. Ligaments Body Functions Affected:  1. Sensory/Pain  2. Neuromusculoskeletal  3. Movement Related Activities and Participation Affected:  1. General Tasks and Demands  2. Mobility  3. Self Care  4. Domestic Life  5. Interpersonal Interactions and Relationships  6.  Community, Social and [place]   Number of elements (examined above) that affect the Plan of Care: 3: MODERATE COMPLEXITY   CLINICAL PRESENTATION:   Presentation: Stable and uncomplicated: LOW COMPLEXITY   CLINICAL DECISION MAKING: Outcome Measure: Tool Used: Lower Extremity Functional Scale (LEFS)  Score:  Initial: 41/80 Most Recent: X/80 (Date: -- )   Interpretation of Score: 20 questions each scored on a 5 point scale with 0 representing \"extreme difficulty or unable to perform\" and 4 representing \"no difficulty\". The lower the score, the greater the functional disability. 80/80 represents no disability. Minimal detectable change is 9 points. Score 80 79-63 62-48 47-32 31-16 15-1 0   Modifier CH CI CJ CK CL CM CN     ? Mobility - Walking and Moving Around:     - CURRENT STATUS: CK - 40%-59% impaired, limited or restricted    - GOAL STATUS: CJ - 20%-39% impaired, limited or restricted    - D/C STATUS:  ---------------To be determined---------------    Medical Necessity:   · Patient is expected to demonstrate progress in strength, range of motion, balance and coordination to increase independence with functional tasks. Reason for Services/Other Comments:  · Patient continues to demonstrate capacity to improve strength, ROM, balance, mobility which will increase independence. Use of outcome tool(s) and clinical judgement create a POC that gives a: Clear prediction of patient's progress: LOW COMPLEXITY            TREATMENT:   (In addition to Assessment/Re-Assessment sessions the following treatments were rendered)  Pre-treatment Symptoms/Complaints:  See above. Pain: Initial:     0/10 Post Session:  0/10     THERAPEUTIC EXERCISE: (25 minutes):  Exercises per grid below to improve mobility, strength and balance. Required minimal verbal and tactile cues to promote proper body alignment, promote proper body posture and promote proper body mechanics. Progressed resistance, range, repetitions and complexity of movement as indicated.    Date:  6/18/18 Date:   Date:     Activity/Exercise Parameters Parameters Parameters   LAQ 10x BLEs     Mini squat 10x at counter     Hip extension 10x at counter     Hip abduction 10x at counter     March 10x in //     Step ups 10x B LEs 3rd step in //     NuStep 2 minutes @ 2.0 before needed long break, 2 mins at 1.0 before needed break and knee soreness     Walking 2:30, 401 feet before pt had to stop for a break           Treatment/Session Assessment:    · Response to Treatment:  Pt very fatigued w/ minimal exercises. He will benefit from continued strength and endurance exercises. · Compliance with Program/Exercises: Will assess as treatment progresses. · Recommendations/Intent for next treatment session: \"Next visit will focus on advancements to more challenging activities\". [employer]  Access Code: [email or web address]   URL: [email or web address]   Date: 06/18/2018   Prepared by: Sraa Dasilva     Exercises   Seated Long Arc Quad - 10 reps - 1 sets - 5 hold - 1x daily - 3x weekly   Mini Squat with Counter Support - 10 reps - 1 sets - 5 hold - 1x daily - 3x weekly   Standing Hip Extension with Counter Support - 10 reps - 1 sets - 5 hold - 1x daily - 3x weekly   Standing Hip Abduction with Counter Support - 10 reps - 1 sets - 5 hold - 1x daily - 3x weekly     Variance from POC: none  PT Patient Time In/Time Out  Time In: 1343  Time Out: 1424  Treatment time: [address], PT

## 2018-07-06 NOTE — PROGRESS NOTES
Pako Del Angel  : 1940  [place] / Product Type: Medicare /    [address]  at [facility]  [address], [address], [place] [address]  [phone]   [phone]         OUTPATIENT PHYSICAL THERAPY:Discontinuation Summary 2018      INITIAL ASSESSMENT:  Pako eDl Angel was seen in physical therapy for only his initial evaluation and cancelled the remaining visits due to inability to have the stamina after his dialysis treatments. Treatment has been discontinued at this time due to pt preference and desire to return to MD..  The below goals were met prior to discontinuation. No goals were met due to patient only coming to evaluation. Thank you for this referral.        TREATMENT PLAN:  GOALS: (Goals have been discussed and agreed upon with patient.)  Discharge Goals: Time Frame: 6 weeks  1. Pt will be independent w/ HEP in order to improve outcomes and decrease pain. 2. Pt will have BLE strength of 4/5 or greater in all major muscle groups in order to improve functional mobility. 3. Pt will walk for 10 minutes or greater w/o a rest break in order to return to PLOF. 4. Pt will have LEFS score of 48 or greater in order to report decreased pain and decreased functional impairments. 5. Pt will have BUE strength of 4/5 or greater in all major muscle groups in order to increase functional mobility.             Wood Brower, PT

## 2018-07-30 NOTE — IP AVS SNAPSHOT
[address] 
[phone] Patient: Dimitri Davidson MRN: [id number] ONV:5/3/[birth year]31 About your hospitalization You were admitted on:  July 31, 2018 You last received care in the:  [facility] 7 MED SURG You were discharged on:  August 1, 2018 Why you were hospitalized Your primary diagnosis was:  Syncope Your diagnoses also included:  Esrd (End Stage Renal Disease) (Hcc), Cad (Coronary Artery Disease), Htn (Hypertension), Chronic Atrial Fibrillation (Hcc), Anemia, Hypercholesterolemia Follow-up Information Follow up With Details Comments Contact Info Devon Carney MD   [address] [place] [id number] 
[phone] Wilber Grant MD On 8/23/2018 at 10:45 a.m. 2 [address] [address] 
[phone] Your Scheduled Appointments Wednesday August 22, 2018 11:00 AM EDT Office Visit with Devon Carney, [facility]) [facility]5 [address] [address] [phone] Wednesday August 22, 2018 11:30 AM EDT MEDICARE [facility]LL[facility] with [address] [id number] [phone] Thursday August 23, 2018 10:45 AM EDT Office Visit with Wilber Grant, 700 [facility] ([address] [place] [address]) 2 [address] 81  
[phone] Discharge Orders None A check bianka indicates which time of day the medication should be taken. My Medications CHANGE how you take these medications Instructions Each Dose to Equal  
 Morning Noon Evening Bedtime  
 metoprolol succinate 50 mg XL tablet Commonly known as:  TOPROL-XL What changed:   
- medication strength 
- how much to take Your next dose is:  Tomorrow Morning Take 1 Tab by mouth daily. 50 mg CONTINUE taking these medications Instructions Each Dose to Equal  
 Morning Noon Evening Bedtime  
 calcitRIOL 0.5 mcg capsule Commonly known as:  ROCALTROL Your next dose is:  Tomorrow Morning Take 0.5 mcg by mouth daily. Take / use AM day of surgery  per anesthesia protocols. Indications: Renal Osteodystrophy  
 0.5 mcg  
    
  
   
   
   
  
 clopidogrel 75 mg Tab Commonly known as:  PLAVIX Your next dose is:  Tomorrow Morning Take 75 mg by mouth daily. 75 mg  
    
  
   
   
   
  
 dicyclomine 10 mg capsule Commonly known as:  BENTYL Your next dose is: Take on as needed schedule Take 10 mg by mouth daily as needed. Indications: Irritable Bowel Syndrome 10 mg  
    
   
   
   
  
 ELIQUIS 2.5 mg tablet Generic drug:  apixaban Your next dose is: This evening Take 2.5 mg by mouth two (2) times a day. Indications: PREVENT THROMBOEMBOLISM IN CHRONIC ATRIAL FIBRILLATION  
 2.5 mg  
    
  
   
   
  
   
  
 epoetin alcides 10,000 unit/mL injection Commonly known as:  EPOGEN;PROCRIT  
   
 by SubCUTAneous route as needed. Currently taking abt twice a month at [facility]  
     
   
   
   
  
 finasteride 5 mg tablet Commonly known as:  PROSCAR Your next dose is:  Tomorrow Morning Take 5 mg by mouth daily. Take / use AM day of surgery  per anesthesia protocols. Indications: benign prostatic hyperplasia with lower urinary tract sx  
 5 mg KRILL OIL PO Your next dose is:  Resume home schedule Take  by mouth daily. Magnesium Oxide 500 mg Cap Your next dose is:  Resume home schedule Take  by mouth daily. multivitamin tablet Commonly known as:  ONE A DAY Your next dose is:  Tomorrow Morning Take 1 Tab by mouth daily. Special Complex for dialysis patients 1 Tab mupirocin 2 % ointment Commonly known as:  [facility] Apply  to affected area daily. nitroglycerin 0.4 mg SL tablet Commonly known as:  NITROSTAT  
   
 ONE TABLET UNDER TONGUE AS NEEDED FOR CHEST PAIN EVERY 5 MINUTES Omeprazole Magnesium 20 mg Cpdr  
Your next dose is:  Tomorrow Morning Take  by mouth daily. Take / use AM day of surgery  per anesthesia protocols. Indications: gastroesophageal reflux disease  
     
  
   
   
   
  
 oxyCODONE-acetaminophen 5-325 mg per tablet Commonly known as:  PERCOCET Your next dose is: Take on as needed schedule Take 1 Tab by mouth every six (6) hours as needed for Pain. Max Daily Amount: 4 Tabs. every 4-6hrs prn pain 1 Tab  
    
   
   
   
  
 predniSONE 10 mg tablet Commonly known as:  Danielle Greek Your next dose is: Take on as needed schedule Take 1 Tab by mouth as needed. 10 mg PROBIOTIC 4X 10-15 mg Tbec Generic drug:  B.infantis-B.ani-B.long-B.bifi Your next dose is:  Resume home schedule Take  by mouth daily. RENVELA 800 mg Tab tab Generic drug:  sevelamer carbonate Your next dose is:  TODAY with meals Take 800 mg by mouth three (3) times daily (with meals). 4 tablets with each meal; 2 tablets with each snack . Indications: 3 with meals, 2 with snacks 800 mg SENSIPAR 60 mg Tab Generic drug:  cinacalcet Your next dose is: Take tonight Take 60 mg by mouth nightly. Takes 2 tablets every night  Indications: HYPERPARATHYROIDISM SECONDARY TO CRF WITH DIALYSIS  
 60 mg STOOL SOFTENER PO Your next dose is:  Resume home schedule Take  by mouth daily. tamsulosin 0.4 mg capsule Commonly known as:  FLOMAX Your next dose is: Take tonight 1 tablet every bedtime STOP taking these medications   
 dilTIAZem  mg ER capsule Commonly known as:  CARDIZEM CD Where to Get Your Medications These medications were sent to [address] AT [address], [address] [place] [id number] Phone:  [phone]  
  metoprolol succinate 50 mg XL tablet Opioid Education Prescription Opioids: What You Need to Know: 
 
Prescription opioids can be used to help relieve moderate-to-severe pain and are often prescribed following a surgery or injury, or for certain health conditions. These medications can be an important part of treatment but also come with serious risks. Opioids are strong pain medicines. Examples include hydrocodone, oxycodone, fentanyl, and morphine. Heroin is an example of an illegal opioid. It is important to work with your health care provider to make sure you are getting the safest, most effective care. WHAT ARE THE RISKS AND SIDE EFFECTS OF OPIOID USE? Prescription opioids carry serious risks of addiction and overdose, especially with prolonged use. An opioid overdose, often marked by slow breathing, can cause sudden death. The use of prescription opioids can have a number of side effects as well, even when taken as directed. · Tolerance-meaning you might need to take more of a medication for the same pain relief · Physical dependence-meaning you have symptoms of withdrawal when the medication is stopped. Withdrawal symptoms can include nausea, sweating, chills, diarrhea, stomach cramps, and muscle aches. Withdrawal can last up to several weeks, depending on which drug you took and how long you took it. · Increased sensitivity to pain · Constipation · Nausea, vomiting, and dry mouth · Sleepiness and dizziness · Confusion · Depression · Low levels of testosterone that can result in lower sex drive, energy, and strength · Itching and sweating RISKS ARE GREATER WITH:      
· History of drug misuse, substance use disorder, or overdose · Mental health conditions (such as depression or anxiety) · Sleep apnea · Older age (72 years or older) · Pregnancy Avoid alcohol while taking prescription opioids. Also, unless specifically advised by your health care provider, medications to avoid include: · Benzodiazepines (such as Xanax or Valium) · Muscle relaxants (such as Soma or Flexeril) · Hypnotics (such as Ambien or Lunesta) · Other prescription opioids KNOW YOUR OPTIONS Talk to your health care provider about ways to manage your pain that don't involve prescription opioids. Some of these options may actually work better and have fewer risks and side effects. Options may include: 
· Pain relievers such as acetaminophen, ibuprofen, and naproxen · Some medications that are also used for depression or seizures · Physical therapy and exercise · Counseling to help patients learn how to cope better with triggers of pain and stress. · Application of heat or cold compress · Massage therapy · Relaxation techniques Be Informed Make sure you know the name of your medication, how much and how often to take it, and its potential risks & side effects. IF YOU ARE PRESCRIBED OPIOIDS FOR PAIN: 
· Never take opioids in greater amounts or more often than prescribed. Remember the goal is not to be pain-free but to manage your pain at a tolerable level. · Follow up with your primary care provider to: · Work together to create a plan on how to manage your pain. · Talk about ways to help manage your pain that don't involve prescription opioids. · Talk about any and all concerns and side effects. · Help prevent misuse and abuse. · Never sell or share prescription opioids · Help prevent misuse and abuse.  
· Store prescription opioids in a secure place and out of reach of others (this may include visitors, children, friends, and family). · Safely dispose of unused/unwanted prescription opioids: Find your community drug take-back program or your pharmacy mail-back program, or flush them down the toilet, following guidance from the Food and Drug Administration (www.fda.gov/Drugs/ResourcesForYou). · Visit www.cdc.gov/drugoverdose to learn about the risks of opioid abuse and overdose. · If you believe you may be struggling with addiction, tell your health care provider and ask for guidance or call [facility] [employer] at 8-814-594-JURY. Discharge Instructions Fainting: Care Instructions Your Care Instructions When you faint, or pass out, you lose consciousness for a short time. A brief drop in blood flow to the brain often causes it. When you fall or lie down, more blood flows to your brain and you regain consciousness. Emotional stress, pain, or overheating-especially if you have been standing-can make you faint. In these cases, fainting is usually not serious. But fainting can be a sign of a more serious problem. Your doctor may want you to have more tests to rule out other causes. The treatment you need depends on the reason why you fainted. The doctor has checked you carefully, but problems can develop later. If you notice any problems or new symptoms, get medical treatment right away. Follow-up care is a key part of your treatment and safety. Be sure to make and go to all appointments, and call your doctor if you are having problems. It's also a good idea to know your test results and keep a list of the medicines you take. How can you care for yourself at home? · Drink plenty of fluids to prevent dehydration. If you have kidney, heart, or liver disease and have to limit fluids, talk with your doctor before you increase your fluid intake. When should you call for help? Call 911 anytime you think you may need emergency care. For example, call if: 
  · You have symptoms of a heart problem. These may include: ¨ Chest pain or pressure. ¨ Severe trouble breathing. ¨ A fast or irregular heartbeat. ¨ Lightheadedness or sudden weakness. ¨ Coughing up pink, foamy mucus. ¨ Passing out. After you call 911, the  may tell you to chew 1 adult-strength or 2 to 4 low-dose aspirin. Wait for an ambulance. Do not try to drive yourself.  
  · You have symptoms of a stroke. These may include: 
¨ Sudden numbness, tingling, weakness, or loss of movement in your face, arm, or leg, especially on only one side of your body. ¨ Sudden vision changes. ¨ Sudden trouble speaking. ¨ Sudden confusion or trouble understanding simple statements. ¨ Sudden problems with walking or balance. ¨ A sudden, severe headache that is different from past headaches.  
  · You passed out (lost consciousness) again.  
 Watch closely for changes in your health, and be sure to contact your doctor if: 
  · You do not get better as expected. Where can you learn more? Go to http://sulyMashworklauren.info/. Enter X088 in the search box to learn more about \"Fainting: Care Instructions. \" Current as of: November 20, 2017 Content Version: 11.7 © 4583-7788 "[employer]. Care instructions adapted under license by "[employer] (which disclaims liability or warranty for this information). If you have questions about a medical condition or this instruction, always ask your healthcare professional. [place] [address] any warranty or liability for your use of this information. DISCHARGE SUMMARY from Nurse PATIENT INSTRUCTIONS: 
 
 
F-face looks uneven A-arms unable to move or move unevenly S-speech slurred or non-existent T-time-call 911 as soon as signs and symptoms begin-DO NOT go Back to bed or wait to see if you get better-TIME IS BRAIN. Warning Signs of HEART ATTACK Call 911 if you have these symptoms: 
? Chest discomfort. Most heart attacks involve discomfort in the center of the chest that lasts more than a few minutes, or that goes away and comes back. It can feel like uncomfortable pressure, squeezing, fullness, or pain. ? Discomfort in other areas of the upper body. Symptoms can include pain or discomfort in one or both arms, the back, neck, jaw, or stomach. ? Shortness of breath with or without chest discomfort. ? Other signs may include breaking out in a cold sweat, nausea, or lightheadedness. Don't wait more than five minutes to call [address]! Fast action can save your life. Calling 911 is almost always the fastest way to get lifesaving treatment. Emergency Medical Services staff can begin treatment when they arrive  up to an hour sooner than if someone gets to the hospital by car. The discharge information has been reviewed with the patient. The patient verbalized understanding. Discharge medications reviewed with the patient and appropriate educational materials and side effects teaching were provided. ___________________________________________________________________________________________________________________________________ ACO Transitions of Care Introducing [employer] [address] Jeaneth Shields offers a voluntary care coordination program to provide high quality service and care to [facility] fee-for-service beneficiaries. Jodi Albert was designed to help you enhance your health and well-being through the following services: ? Transitions of Care  support for individuals who are transitioning from one care setting to another (example: Hospital to home). ? Chronic and Complex Care Coordination  support for individuals and caregivers of those with serious or chronic illnesses or with more than one chronic (ongoing) condition and those who take a number of different medications. If you meet specific medical criteria, a [facility] [address] may call you directly to coordinate your care with your primary care physician and your other care providers. For questions about the [facility] programs, please, contact your physicians office. For general questions or additional information about Accountable Care Organizations: 
Please visit www.medicare.gov/acos. html or call 1-800-MEDICARE (5-865.285.3712) TTY users should call 1-822.470.1400. Introducing [facility] & HEALTH SERVICES! Dear Juan Carlos Swift: Thank you for requesting a charity: water account. Our records indicate that you already have an active charity: water account. You can access your account anytime at [email or web address]/[email or web address] Did you know that you can access your hospital and ER discharge instructions at any time in charity: water? You can also review all of your test results from your hospital stay or ER visit. Additional Information If you have questions, please visit the Frequently Asked Questions section of the charity: water website at [email or web address] Remember, charity: water is NOT to be used for urgent needs. For medical emergencies, dial 911. Now available from your iPhone and Android! Introducing Amando Guillory As a Rosalva Moncada patient, I wanted to make you aware of our electronic visit tool called Amando Guillory. [facility] Moncada 24/7 allows you to connect within minutes with a medical provider 24 hours a day, seven days a week via a mobile device or tablet or logging into a secure website from your computer. You can access Amando Guillory from anywhere in the [place]. A virtual visit might be right for you when you have a simple condition and feel like you just dont want to get out of bed, or cant get away from work for an appointment, when your regular [employer] provider is not available (evenings, weekends or holidays), or when youre out of town and need minor care. Electronic visits cost only $49 and if the [employer] 24/7 provider determines a prescription is needed to treat your condition, one can be electronically transmitted to a nearby pharmacy*. Please take a moment to enroll today if you have not already done so. The enrollment process is free and takes just a few minutes. To enroll, please download the [employer] 24/7 rasheed to your tablet or phone, or visit [email or web address] to enroll on your computer. And, as an [address] [place] [address] patient with a [facility] account, the results of your visits will be scanned into your electronic medical record and your primary care provider will be able to view the scanned results. We urge you to continue to see your regular [employer] provider for your ongoing medical care. And while your primary care provider may not be the one available when you seek a [employer] virtual visit, the peace of mind you get from getting a real diagnosis real time can be priceless. For more information on [employer], view our Frequently Asked Questions (FAQs) at [email or web address]. Sincerely, 
 
Eunice Shetty MD 
Chief Medical Officer Tana Shields *:  certain medications cannot be prescribed via [employer] Providers Seen During Your Hospitalization Provider Specialty Primary office phone Nathalia Monk MD Emergency Medicine [phone] Clare Myles MD Family Practice [phone] MD [facility] [phone] Alexandria Lemons MD Family Practice [phone] Your Primary Care Physician (PCP) Primary Care Physician Office Phone Office Fax Wai Snow, [address] [phone] You are allergic to the following Allergen Reactions Nka (No Known Allergies) Unknown (comments) Recent Documentation Height Weight BMI Smoking Status 1.753 m 70.3 kg 22.89 kg/m2 Current Every Day Smoker Emergency Contacts Name Discharge Info Relation Home Work Mobile [address] [facility] CAREGIVER [3] Son [22] [phone] Patient Belongings The following personal items are in your possession at time of discharge: 
     Visual Aid: Glasses             Clothing: At bedside Please provide this summary of care documentation to your next provider. Signatures-by signing, you are acknowledging that this After Visit Summary has been reviewed with you and you have received a copy. Patient Signature:  ____________________________________________________________ Date:  ____________________________________________________________  
  
Pratibha Artist Provider Signature:  ____________________________________________________________ Date:  ____________________________________________________________

## 2018-07-31 PROBLEM — N18.6 ESRD (END STAGE RENAL DISEASE) (HCC): Status: ACTIVE | Noted: 2018-01-01

## 2018-07-31 PROBLEM — R55 SYNCOPE: Status: ACTIVE | Noted: 2018-01-01

## 2018-07-31 NOTE — PROGRESS NOTES
07/31/18 1632 Dual Skin Pressure Injury Assessment Dual Skin Pressure Injury Assessment WDL Second Care Provider (Based on [address] [place] [address]) Ramandeep Esquivel, RN

## 2018-07-31 NOTE — CONSULTS
Javier Staley 
MR#: [id number] : 1940 ACCOUNT #: [de-identified] DATE OF SERVICE: 2018 REASON FOR CONSULTATION:  We are seeing the patient at the request of Dr. Jose Francisco Francisco with regards to end-stage renal disease. HISTORY OF PRESENT ILLNESS:  The patient is a 20-year-old  male who presented to [facility] early this morning after a syncopal episode at home. He is being admitted to the hospital now for further evaluation and treatment. PAST MEDICAL HISTORY:  Significant for end-stage renal disease, on chronic maintenance hemodialysis at [facility] q. Tuesday, Thursday and Saturday via left upper arm AV fistula. He dialyzes every Tuesday, Thursday and Saturday and is due for dialysis today. He had been on peritoneal dialysis in the past, but this had to be discontinued due to recurrent leakage of pleural fluid into his thorax. Other medical problems include atherosclerotic coronary artery disease, status post CABG, history of atrial fibrillation, chronic hypertension, benign prostatic hypertrophy, anemia of chronic kidney disease and renal osteodystrophy. ALLERGIES:  HE HAS NO KNOWN DRUG ALLERGIES. CURRENT MEDICATIONS:  Include Eliquis 2.5 mg p.o. twice a day, Rocaltrol 0.5 mcg p.o. daily, Sensipar 60 mg p.o. at bedtime, Plavix 75 mg p.o. daily, Cardizem- mg p.o. daily, Proscar 5 mg p.o. daily, Toprol- mg p.o. daily, Stella-Colace 1 tab p.o. daily, Renvela 800 mg 3 tabs 3 times a day with meals and Flomax 0.4 mg p.o. at bedtime. SOCIAL HISTORY:  He is single. He smokes 1 cigar a day and rarely has an alcoholic beverage. FAMILY HISTORY:  Negative for any kidney disease. REVIEW OF SYSTEMS:  He denies any fevers, chills. He has had syncope as stated above. No headaches or other dizzy spells other than stated above.   He has no shortness of breath, cough, wheezing. No chest pain or palpitations. No nausea, vomiting, heartburn, abdominal pain, no constipation or diarrhea. He does not make any urine anymore. PHYSICAL EXAMINATION: 
GENERAL:  Reveals a pleasant elderly white male in no acute distress. VITAL SIGNS:  He is afebrile. Blood pressure is 134/72, pulse is 67, respirations are 16 and not labored. HEAD:  Normocephalic. EYES:  Pupils equal, react to light and accommodation. Extraocular muscles are intact. Fundi were not visualized. LUNGS:  Clear. No rales or wheezes are heard. Breath sounds equal bilaterally. HEART:  Regular rate and rhythm. ABDOMEN:  Soft. Bowel sounds are present. There is no hepatosplenomegaly. GENITAL AND RECTAL:  Deferred. EXTREMITIES:  He has a functioning AV fistula in his left upper arm with a good thrill and bruit present. LABORATORY DATA:  White count 11, hemoglobin 10.9, hematocrit 34.1, platelets are 765. Chemistry:  Sodium is 137, potassium 4.8, chloride 101, CO2 is 25, blood sugar 181, BUN 52, creatinine 8.34, calcium is 9.2. Chest x-ray shows cardiomegaly with vascular congestion and an infiltrate in the right lower lung with a right pleural effusion. EKG shows AFib. ASSESSMENT: 
1. Syncope, questionable etiology. 2.  End-stage renal disease, on chronic maintenance hemodialysis. 3.  Atrial fibrillation. 4.  Atherosclerotic coronary artery disease, status post coronary artery bypass graft. 5.  Renal osteodystrophy. 6.  Anemia of chronic kidney disease. 7.  Chronic hypertension. 8.  Benign prostatic hypertrophy. 9.  Remote history of transient ischemic attack. PLAN:  We will provide him with dialysis later today and 3 times a week while he is here in the hospital. 
 
Thank you very much for allowing us to see him and helping in his care. MD Lenore Le / Oli 
D: 07/31/2018 07:48    
T: 07/31/2018 09:03 JOB #: [id number] CC: Jc Page MD 
[facility] [address] Newman, [address] [facility] CC: [facility]

## 2018-07-31 NOTE — ED NOTES
Report from Fritz Paulino, [address] [facility]. Pt resting in bed,resp easy,VSS, belongs in reach. Offered restroom and change in position.

## 2018-07-31 NOTE — ED NOTES
Report to [facility], [address] [facility]. Transfer of care at this time.

## 2018-07-31 NOTE — ED NOTES
Medication sent earlier to pharmacy, called to obtain medications and was told they are labeled and they are being sent at this time.

## 2018-07-31 NOTE — CONSULTS
[facility] Cardiology Consult Date of  Admission: 7/30/2018 10:58 PM  
 
Primary Care Physician: Dr. Nikolai Hwang Primary Cardiologist: Dr. Aydee Puentes Referring Physician: Hospitalist  
Consulting Physician: Dr. Aydee Puentes 
 
CC/Reason for consult: Syncope Arleth Enriquez is a 68 y.o. male with prior h/o CAD s/p CABG x2 (LIMA. LAD, SVG>Lt Cx) in 2007 and PCI (4/17 with RCA) as well, HTN, chronic A. Fib, ESRD (HD on T,Th, and Sat), HLP, TIA, syncope and recurrent thoracentesis secondary to PD fluid now receiving HD. Patient presented to ED at [facility] with syncope. He reports was with generalized weakness on sat then Monday evening weakness returned after going from sitting position watching TV to walking to kitchen to do dishes. He laid on floor for approx 45 mins before he crawled to phone and called EMS. Lab in ED unremarkable compared to his baseline, CXR showed Cardiomegaly with vascular congestion, EKG with controlled A. Fib (no change from prior), and US carotids showed no evidence of hemodynamically significant stenosis. Hospitalist admitting to tele for syncope and consulted cardiology. Not orthostatic in ED. Currently in dialysis receiving HD. He currently denies any recurrent syncope. Diagnosis  CAD (coronary artery disease)  HTN (hypertension)  Benign non-nodular prostatic hyperplasia with lower urinary tract symptoms  GOUT, UNSPECIFIED  Anemia, unspecified  Chronic atrial fibrillation ([address]  End-stage renal disease ([address]  History of TIA (transient ischemic attack)  Hypercholesterolemia  Internal carotid artery stenosis  Gastroesophageal reflux disease without esophagitis  Impacted cerumen of left ear  Debility  Skin lesion  Syncope  ESRD (end stage renal disease) ([address] Past Medical History:  
Diagnosis Date  Anemia, unspecified  7/7/2016  Arrhythmia IRREG.  HEART BEAT- pt denies a fib - found on cardiac note 5/3/13- pt states he feels flutter at times  Arthritis  ASCAD - artery bypass graft 7/7/2016  ASCAD - artery bypass graft 7/7/2016  CAD (coronary artery disease) CABG 2007, stented 1995  Chronic kidney disease STAGE 3 CKD, dialysis  Chronic prostatitis 11/25/2013  Diverticulitis  Diverticulosis 7/7/2016  GERD (gastroesophageal reflux disease)   
 takes omeprazole  Glomerulonephritis 7/7/2016  GOUT, UNSPECIFIED 7/7/2016  Hypercholesteremia   
 pt states is under control  Hypertension  Hypertrophy of prostate with urinary obstruction and other lower urinary tract symptoms (LUTS) 11/25/2013  Kidney failure  Paroxysmal atrial fibrillation ([address] 7/7/2016  TIA (transient ischemic attack) 07/07/2016  
 no residual weakness Past Surgical History:  
Procedure Laterality Date  ABDOMEN SURGERY PROC UNLISTED    
 perineal cath  CARDIAC SURG PROCEDURE UNLIST CABG x 2 in 2007; PTCA WITH STENTS  
 CREAT AV FISTULA,AUTOGENOUS GRAFT    
 HX COLONOSCOPY  2011  HX CORONARY ARTERY BYPASS GRAFT    
 HX CORONARY STENT PLACEMENT    
 new stent placement 2017  HX CSF SHUNT  HX HEART CATHETERIZATION    
 HX HERNIA REPAIR    
 bilateral  
 HX ORTHOPAEDIC    
 rt shoulder rotater cuff,lt knee  HX VASCULAR ACCESS  2010 L u arm  
 OTHER CELL    
 LEFT KNEE SURGERY  VASCULAR SURGERY PROCEDURE UNLIST    
 cabg x2 Allergies Allergen Reactions  Nka [No Known Allergies] Unknown (comments) Family History Problem Relation Age of Onset  Heart Disease Father 59 MI  
 Heart Attack Father 72 MI  
 Stroke Mother  Hypertension Mother  Heart Disease Mother  Cancer Sister   
  stomach Current Facility-Administered Medications Medication Dose Route Frequency  sodium chloride (NS) flush 5-10 mL  5-10 mL IntraVENous Q8H  
 sodium chloride (NS) flush 5-10 mL  5-10 mL IntraVENous PRN  
 acetaminophen (TYLENOL) tablet 650 mg  650 mg Oral Q4H PRN  
 apixaban (ELIQUIS) tablet 2.5 mg  2.5 mg Oral Q12H  cinacalcet (SENSIPAR) tablet 60 mg  60 mg Oral QHS  calcitRIOL (ROCALTROL) capsule 0.5 mcg  0.5 mcg Oral DAILY  clopidogrel (PLAVIX) tablet 75 mg  75 mg Oral DAILY  dilTIAZem CD (CARDIZEM CD) capsule 180 mg  180 mg Oral DAILY  senna-docusate (PERICOLACE) 8.6-50 mg per tablet 1 Tab  1 Tab Oral DAILY  sevelamer carbonate (RENVELA) tab 800 mg  800 mg Oral TID WITH MEALS  finasteride (PROSCAR) tablet 5 mg  5 mg Oral DAILY  metoprolol succinate (TOPROL-XL) XL tablet 100 mg  100 mg Oral DAILY  tamsulosin (FLOMAX) capsule 0.4 mg  0.4 mg Oral QHS  ondansetron (ZOFRAN) injection 4 mg  4 mg IntraVENous Q6H PRN  
 oxyCODONE IR (ROXICODONE) tablet 2.5 mg  2.5 mg Oral Q6H PRN Current Outpatient Prescriptions Medication Sig  
 oxyCODONE-acetaminophen (PERCOCET) 5-325 mg per tablet Take 1 Tab by mouth every six (6) hours as needed for Pain. Max Daily Amount: 4 Tabs. every 4-6hrs prn pain  predniSONE (DELTASONE) 10 mg tablet Take 1 Tab by mouth as needed.  mupirocin (BACTROBAN) 2 % ointment Apply  to affected area daily.  tamsulosin (FLOMAX) 0.4 mg capsule 1 tablet every bedtime  clopidogrel (PLAVIX) 75 mg tab Take 75 mg by mouth daily.  finasteride (PROSCAR) 5 mg tablet Take 5 mg by mouth daily. Take / use AM day of surgery  per anesthesia protocols. Indications: benign prostatic hyperplasia with lower urinary tract sx  apixaban (ELIQUIS) 2.5 mg tablet Take 2.5 mg by mouth two (2) times a day. Indications: PREVENT THROMBOEMBOLISM IN CHRONIC ATRIAL FIBRILLATION  
 dilTIAZem CD (CARDIZEM CD) 180 mg ER capsule Take 1 Cap by mouth daily. (Patient taking differently: Take 180 mg by mouth daily. Take / use AM day of surgery  per anesthesia protocols. Indications: VENTRICULAR RATE CONTROL IN ATRIAL FIBRILLATION)  metoprolol succinate (TOPROL-XL) 100 mg tablet Take 1 Tab by mouth daily.  (Patient taking differently: Take 100 mg by mouth daily. Take / use AM day of surgery  per anesthesia protocols. Indications: VENTRICULAR RATE CONTROL IN ATRIAL FIBRILLATION)  calcitRIOL (ROCALTROL) 0.5 mcg capsule Take 0.5 mcg by mouth daily. Take / use AM day of surgery  per anesthesia protocols. Indications: Renal Osteodystrophy  nitroglycerin (NITROSTAT) 0.4 mg SL tablet ONE TABLET UNDER TONGUE AS NEEDED FOR CHEST PAIN EVERY 5 MINUTES  
 B.infantis-B.ani-B.long-B.bifi (PROBIOTIC 4X) 10-15 mg TbEC Take  by mouth daily.  cinacalcet (SENSIPAR) 60 mg tab Take 60 mg by mouth nightly. Takes 2 tablets every night  Indications: HYPERPARATHYROIDISM SECONDARY TO CRF WITH DIALYSIS  
 DOCUSATE CALCIUM (STOOL SOFTENER PO) Take  by mouth daily.  multivitamin (ONE A DAY) tablet Take 1 Tab by mouth daily. Special Complex for dialysis patients  dicyclomine (BENTYL) 10 mg capsule Take 10 mg by mouth daily as needed. Indications: Irritable Bowel Syndrome  Omeprazole Magnesium 20 mg cpDR Take  by mouth daily. Take / use AM day of surgery  per anesthesia protocols. Indications: gastroesophageal reflux disease  epoetin alcides (EPOGEN;PROCRIT) 10,000 unit/mL injection by SubCUTAneous route as needed. Currently taking abt [facility] a [facility] at [facility]  Magnesium Oxide 500 mg cap Take  by mouth daily.  KRILL OIL PO Take  by mouth daily.  RENVELA 800 mg Tab tab Take 800 mg by mouth three (3) times daily (with meals). 4 tablets with each meal; 2 tablets with each snack . Indications: 3 with meals, 2 with snacks Review of Systems Constitution: Positive for weakness and malaise/fatigue. Negative for diaphoresis. HENT: Negative for congestion. Cardiovascular: Positive for near-syncope and syncope. Negative for chest pain, claudication, cyanosis, dyspnea on exertion, irregular heartbeat, leg swelling, orthopnea, palpitations and paroxysmal nocturnal dyspnea.   
Respiratory: Negative for cough, shortness of breath and wheezing. Endocrine: Negative for cold intolerance and heat intolerance. Hematologic/Lymphatic: Does not bruise/bleed easily. Skin: Negative for nail changes. Neurological: Negative for dizziness and headaches. Physical Exam 
Vitals:  
 07/31/18 1004 07/31/18 1006 07/31/18 1007 07/31/18 1008 BP: 143/62 131/69 143/62 135/69 Pulse: 74 75 74 75 Resp: 20 20 22 21 Temp:      
SpO2: 97% 94% 96% 94% Weight:      
Height:      
 
 
Physical Exam: 
General: Well Developed, Well Nourished, No Acute Distress HEENT: pupils equal and round, no abnormalities noted Neck: supple, no JVD, no carotid bruits Heart: S1S2 irregular irregular Lungs: Clear throughout auscultation bilaterally without adventitious sounds Abd: soft, nontender, nondistended, with good bowel sounds Ext: warm, no edema, calves supple/nontender, pulses 2+ bilaterally Skin: warm and dry Psychiatric: Normal mood and affect Neurologic: Alert and oriented X 3 Cardiographics Telemetry: A. Fib with controlled rate ECG: A. Fib with controlled rate and NSST changes Echocardiogram: 4/17 showed -  Left ventricle: Systolic function was at the lower limits of normal. Ejection fraction was estimated in the range of 50 % to 55 %. Although no diagnostic regional wall motion abnormality was identified, this possibility 
cannot be completely excluded on the basis of this study. There was mild concentric hypertrophy. -  Right ventricle: There was moderate pulmonary artery hypertension. 
-  Left atrium: The atrium was moderately to markedly dilated. -  Right atrium: The atrium was mildly to moderately dilated. -  Inferior vena cava, hepatic veins: The inferior vena cava was mildly dilated. The respirophasic change in diameter was less than 50%. -  Aortic valve: There was mild to moderate stenosis. Valve mean gradient was18 mmHg. -  Mitral valve: There was mild to moderate regurgitation. -  Tricuspid valve:  There was moderate regurgitation. Labs:  
Recent Labs  
   07/30/18 
 2307 NA  137  
K  4.8 BUN  52* CREA  8.34* GLU  181* WBC  11.0 HGB  10.9* HCT  34.1*  
PLT  211 Assessment/Plan: 
 
 Assessment:  
  
Principal Problem: 
  Syncope (7/31/2018)- sounds orthostatic hypotension by history. Not orthostatic in ED. US carotids ok. Will order repeat orthostatic B/Ps when patient gets back to floor. B/P currently low side of normal. Will stop cardizem CD and decreased Toprol to 50 mg daily. Active Problems: 
  CAD (coronary artery disease) (3/28/2011)- no active angina sx; continue  Plavix and BB Overview: S/p stent prior to CABG; CABG 2007 HTN (hypertension) (3/28/2011)- actually low side of normal; see above with med changes Anemia, unspecified  (7/7/2016) Overview: 2nd to CKD Chronic atrial fibrillation ([facility] (7/19/2016)- rate controlled; see above with stopping cardizem CD and decreasing Toprol Will follow. Hypercholesterolemia (8/15/2013) ESRD (end stage renal disease) ([facility] (7/31/2018)- currently receiving HD. Thank you very much for this referral. We appreciate the opportunity to participate in this patient's care. We will follow along with above stated plan. Mervat Meyer NP Consulting MD: Dr. Lindsey Fisher

## 2018-07-31 NOTE — CONSULTS
[facility] Subjective: ESRD  Renal consult dictated # [id number] Review of Systems -  
General ROS: negative for - fever, chills Respiratory ROS: no SOB, cough, ALBERT Cardiovascular ROS: no CP, palpitations Gastrointestinal ROS: no N&V, abdominal pain, diarrhea Genito-Urinary ROS: no difficulty voiding, dysuria Neurological ROS: no seizures, focal weekness Objective: 
 
Vitals:  
 07/31/18 2341 07/31/18 0500 07/31/18 0449 07/31/18 4608 BP: 131/67 134/71 132/66 134/72 Pulse: 64 64 71 67 Resp:      
Temp:      
SpO2: 93% 94% 93% 93% Weight:      
Height:      
 
 
PE 
Gen: in no acute distress CV:reg rate Chest:clear Abd: soft Ext/Access: av fistula left upper arm ok Mabeline Sink LAB Recent Labs  
   07/30/18 
 2307 WBC  11.0 HGB  10.9* HCT  34.1*  
PLT  211 Recent Labs  
   07/30/18 
 2307 NA  137  
K  4.8  
CL  101 CO2  25 GLU  181* BUN  52* CREA  8.34* CA  9.2 Radiology A/P:  
Patient Active Problem List  
Diagnosis Code  CAD (coronary artery disease) I25.10  
 HTN (hypertension) I10  Benign non-nodular prostatic hyperplasia with lower urinary tract symptoms N40.1  GOUT, UNSPECIFIED M10.9  Anemia, unspecified  D64.9  Chronic atrial fibrillation (HCC) I48.2  End-stage renal disease (HCC) N18.6  History of TIA (transient ischemic attack) Z86.73  
 Hypercholesterolemia E78.00  Internal carotid artery stenosis I65.29  
 Gastroesophageal reflux disease without esophagitis K21.9  Impacted cerumen of left ear H61.22  
 Debility R53.81  
 Skin lesion L98.9  Syncope R55  ESRD (end stage renal disease) ([facility] N18.6 ESRD - for dialysis later today Syncope A Fib ASHD Pt seen on dialysis at 11:13am.   On dialysis for clearance.  
 
Danielle Pereira MD

## 2018-07-31 NOTE — DIALYSIS
TRANSFER IN - DIALYSIS Received patient in dialysis unit from ER (unit) for ordered procedure. Consent verified for renal replacement therapy. Patient alert and oriented and vital signs stable. /82 P75 Hemodialysis initiated using left upper arm AVF and 15 g needles. Machine settings per MD order. Will monitor during treatment.

## 2018-07-31 NOTE — DIALYSIS
TRANSFER OUT -DIALYSIS Hemodialysis treatment completed without complications. Patient alert and oriented. VS stable  /74  P 76   
 
 2.5 Kgs removed. Needles X2 removed from access and manual pressure held until hemostasis complete and pressure dressing applied. Patient to 18 after dialysis. Reported to Mariel Mendez RN.

## 2018-07-31 NOTE — ED PROVIDER NOTES
HPI Comments: 77-year-old male sick left side. Patient states yesterday he was feeling drained and had lack of energy particularly when he stood up. Denies got up and had a syncopal episode he did not injure anything on the fall. Patient is a dialysis patient receives his treatments on Tuesdays Thursdays Saturdays. He was on Saturday did not miss any. Patient is a 68 y.o. male presenting with syncope. The history is provided by the patient. Syncope This is a recurrent problem. The current episode started 3 to 5 hours ago. The problem occurs constantly. The problem has been resolved. Length of episode of loss of consciousness: unknown amount of time but was brief. The problem is associated with standing up. Associated symptoms include malaise/fatigue. Pertinent negatives include no visual change, no chest pain, no fever, no congestion, no headaches, no seizures and no slurred speech. He has tried nothing for the symptoms. Past Medical History:  
Diagnosis Date  Anemia, unspecified  7/7/2016  Arrhythmia IRREG. HEART BEAT- pt denies a fib - found on cardiac note 5/3/13- pt states he feels flutter at times  Arthritis  ASCAD - artery bypass graft 7/7/2016  ASCAD - artery bypass graft 7/7/2016  CAD (coronary artery disease) CABG 2007, stented 1995  Chronic kidney disease STAGE 3 CKD, dialysis  Chronic prostatitis 11/25/2013  Diverticulitis  Diverticulosis 7/7/2016  GERD (gastroesophageal reflux disease)   
 takes omeprazole  Glomerulonephritis 7/7/2016  GOUT, UNSPECIFIED 7/7/2016  Hypercholesteremia   
 pt states is under control  Hypertension  Hypertrophy of prostate with urinary obstruction and other lower urinary tract symptoms (LUTS) 11/25/2013  Kidney failure  Paroxysmal atrial fibrillation ([address] 7/7/2016  TIA (transient ischemic attack) 07/07/2016  
 no residual weakness Past Surgical History:  
Procedure Laterality Date  ABDOMEN SURGERY PROC UNLISTED    
 perineal cath  CARDIAC SURG PROCEDURE UNLIST CABG x 2 in 2007; PTCA WITH STENTS  
 CREAT AV FISTULA,AUTOGENOUS GRAFT    
 HX COLONOSCOPY  2011  HX CORONARY ARTERY BYPASS GRAFT    
 HX CORONARY STENT PLACEMENT    
 new stent placement 2017  HX CSF SHUNT  HX HEART CATHETERIZATION    
 HX HERNIA REPAIR    
 bilateral  
 HX ORTHOPAEDIC    
 rt shoulder rotater cuff,lt knee  HX VASCULAR ACCESS  2010 L u arm  
 OTHER CELL    
 LEFT KNEE SURGERY  VASCULAR SURGERY PROCEDURE UNLIST    
 cabg x2 Family History:  
Problem Relation Age of Onset  Heart Disease Father 59 MI  
 Heart Attack Father 72 MI  
 Stroke Mother  Hypertension Mother  Heart Disease Mother  Cancer Sister   
  stomach Social History Social History  Marital status:  Spouse name: N/A  
 Number of children: 3  
 Years of education: N/A Occupational History  marketing management Social History Main Topics  Smoking status: Current Every Day Smoker Last attempt to quit: 1/1/1980  Smokeless tobacco: Never Used Comment: CIGAR DAILY FOR 10 YEARS  
 Alcohol use 0.0 oz/week Comment: rare  Drug use: No  
 Sexual activity: No  
 
Other Topics Concern  Not on file Social History Narrative ALLERGIES: Nka [no known allergies] Review of Systems Constitutional: Positive for malaise/fatigue. Negative for activity change and fever. HENT: Negative. Negative for congestion. Eyes: Negative. Respiratory: Negative. Cardiovascular: Positive for syncope. Negative for chest pain. Gastrointestinal: Negative. Genitourinary: Negative. Musculoskeletal: Negative. Skin: Negative. Neurological: Negative for seizures and headaches. Psychiatric/Behavioral: Negative. All other systems reviewed and are negative. Vitals:  
 07/30/18 2301 BP: 129/61 Pulse: 77 Resp: 16 Temp: 98.6 °F (37 °C) SpO2: 93% Weight: 70.3 kg (155 lb) Height: 5' 9\" (1.753 m) Physical Exam  
Constitutional: He is oriented to person, place, and time. He appears well-developed and well-nourished. No distress. HENT:  
Head: Normocephalic and atraumatic. Right Ear: External ear normal.  
Left Ear: External ear normal.  
Nose: Nose normal.  
Mouth/Throat: Oropharynx is clear and moist. No oropharyngeal exudate. Eyes: Conjunctivae and EOM are normal. Pupils are equal, round, and reactive to light. Right eye exhibits no discharge. Left eye exhibits no discharge. No scleral icterus. Neck: Normal range of motion. Neck supple. No JVD present. No tracheal deviation present. Cardiovascular: Normal rate, regular rhythm and intact distal pulses. Murmur heard. Systolic murmur is present with a grade of 3/6 Pulmonary/Chest: Effort normal and breath sounds normal. No stridor. No respiratory distress. He has no wheezes. He exhibits no tenderness. Abdominal: Soft. Bowel sounds are normal. He exhibits no distension and no mass. There is no tenderness. Musculoskeletal: Normal range of motion. He exhibits no edema or tenderness. Neurological: He is alert and oriented to person, place, and time. No cranial nerve deficit. Skin: Skin is warm and dry. No rash noted. He is not diaphoretic. No erythema. No pallor. Psychiatric: He has a normal mood and affect. His behavior is normal. Thought content normal.  
Nursing note and vitals reviewed. MDM Number of Diagnoses or Management Options Syncope and collapse:  
Diagnosis management comments: Patient had syncopal episode has a cardiac history. We'll ask hospitalist to observe overnight. Have cardiology see in the morning. Amount and/or Complexity of Data Reviewed Clinical lab tests: ordered and reviewed Tests in the radiology section of CPT®: ordered and reviewed Tests in the medicine section of CPT®: ordered and reviewed Risk of Complications, Morbidity, and/or Mortality Presenting problems: high Diagnostic procedures: high Management options: high ED Course Procedures

## 2018-07-31 NOTE — H&P
HOSPITALIST H&P/CONSULT 
NAME:  Claudia Galeano Age:  68 y.o. 
:   1940 MRN:   [id number] PCP: Alecia Gerard. Shantanu Wheeler MD 
Consulting MD: Treatment Team: Attending Provider: Laureano Lester MD; Primary Nurse: Elizabeth Whitaker RN 
HPI:  
68yo M with PMH of ESRD (on HD T,Th,Sat), CAD sp CABG, Atrial Fib, HTN and BPH presents to ER with syncopal episode. Patient lives alone and reports single syncopal episode after standing in his home. He lost consciousness and denies head trauma. He reports generalized weakness and was not able to get up after he \"came to\". It took him several minutes to crawl to phone and call for EMS. He denies CP, palpitations, SOB or recent illness. EKG shows Atrial fib, rate controlled without ischemia. His Troponin < 0.01 and CXR showed vascular congestion. He denies missing any HD sessions Complete ROS done and is as stated in HPI or otherwise negative Past Medical History:  
Diagnosis Date  Anemia, unspecified  2016  Arrhythmia IRREG. HEART BEAT- pt denies a fib - found on cardiac note 5/3/13- pt states he feels flutter at times  Arthritis  ASCAD - artery bypass graft 2016  ASCAD - artery bypass graft 2016  CAD (coronary artery disease) CABG , stented   Chronic kidney disease STAGE 3 CKD, dialysis  Chronic prostatitis 2013  Diverticulitis  Diverticulosis 2016  GERD (gastroesophageal reflux disease)   
 takes omeprazole  Glomerulonephritis 2016  GOUT, UNSPECIFIED 2016  Hypercholesteremia   
 pt states is under control  Hypertension  Hypertrophy of prostate with urinary obstruction and other lower urinary tract symptoms (LUTS) 2013  Kidney failure  Paroxysmal atrial fibrillation ([facility] [address] 2016  TIA (transient ischemic attack) 2016  
 no residual weakness Past Surgical History:  
Procedure Laterality Date  [address] UNLISTED    
 perineal cath  CARDIAC SURG PROCEDURE UNLIST CABG x 2 in 2007; PTCA WITH STENTS  
 CREAT AV FISTULA,AUTOGENOUS GRAFT    
 HX COLONOSCOPY  2011  HX CORONARY ARTERY BYPASS GRAFT    
 HX CORONARY STENT PLACEMENT    
 new stent placement 2017  HX CSF SHUNT  HX HEART CATHETERIZATION    
 HX HERNIA REPAIR    
 bilateral  
 HX ORTHOPAEDIC    
 rt shoulder rotater cuff,lt knee  HX VASCULAR ACCESS  2010 L u arm  
 OTHER CELL    
 LEFT KNEE SURGERY  VASCULAR SURGERY PROCEDURE UNLIST    
 cabg x2 Prior to Admission Medications Prescriptions Last Dose Informant Patient Reported? Taking? B.infantis-B.ani-B.long-B.bifi (PROBIOTIC 4X) 10-15 mg TbEC   Yes No  
Sig: Take  by mouth daily. DOCUSATE CALCIUM (STOOL SOFTENER PO)   Yes No  
Sig: Take  by mouth daily. KRILL OIL PO   Yes No  
Sig: Take  by mouth daily. Magnesium Oxide 500 mg cap   Yes No  
Sig: Take  by mouth daily. Omeprazole Magnesium 20 mg cpDR   Yes No  
Sig: Take  by mouth daily. Take / use AM day of surgery  per anesthesia protocols. Indications: gastroesophageal reflux disease RENVELA 800 mg Tab tab   Yes No  
Sig: Take 800 mg by mouth three (3) times daily (with meals). 4 tablets with each meal; 2 tablets with each snack . Indications: 3 with meals, 2 with snacks  
apixaban (ELIQUIS) 2.5 mg tablet   Yes No  
Sig: Take 2.5 mg by mouth two (2) times a day. Indications: PREVENT THROMBOEMBOLISM IN CHRONIC ATRIAL FIBRILLATION  
calcitRIOL (ROCALTROL) 0.5 mcg capsule   Yes No  
Sig: Take 0.5 mcg by mouth daily. Take / use AM day of surgery  per anesthesia protocols. Indications: Renal Osteodystrophy  
cinacalcet (SENSIPAR) 60 mg tab   Yes No  
Sig: Take 60 mg by mouth nightly. Takes 2 tablets every night  Indications: HYPERPARATHYROIDISM SECONDARY TO CRF WITH DIALYSIS  
clopidogrel (PLAVIX) 75 mg tab   Yes No  
Sig: Take 75 mg by mouth daily.   
dicyclomine (BENTYL) 10 mg capsule   Yes No  
Sig: Take 10 mg by mouth daily as needed. Indications: Irritable Bowel Syndrome  
dilTIAZem CD (CARDIZEM CD) 180 mg ER capsule   No No  
Sig: Take 1 Cap by mouth daily. Patient taking differently: Take 180 mg by mouth daily. Take / use AM day of surgery  per anesthesia protocols. Indications: VENTRICULAR RATE CONTROL IN ATRIAL FIBRILLATION  
epoetin alcides (EPOGEN;PROCRIT) 10,000 unit/mL injection   Yes No  
Sig: by SubCUTAneous route as needed. Currently taking abt [facility] a [facility] at [facility]  
finasteride (PROSCAR) 5 mg tablet   Yes No  
Sig: Take 5 mg by mouth daily. Take / use AM day of surgery  per anesthesia protocols. Indications: benign prostatic hyperplasia with lower urinary tract sx  
metoprolol succinate (TOPROL-XL) 100 mg tablet   No No  
Sig: Take 1 Tab by mouth daily. Patient taking differently: Take 100 mg by mouth daily. Take / use AM day of surgery  per anesthesia protocols. Indications: VENTRICULAR RATE CONTROL IN ATRIAL FIBRILLATION  
multivitamin (ONE A DAY) tablet   Yes No  
Sig: Take 1 Tab by mouth daily. Special Complex for dialysis patients  
mupirocin (BACTROBAN) 2 % ointment   No No  
Sig: Apply  to affected area daily. nitroglycerin (NITROSTAT) 0.4 mg SL tablet   No No  
Sig: ONE TABLET UNDER TONGUE AS NEEDED FOR CHEST PAIN EVERY 5 MINUTES  
oxyCODONE-acetaminophen (PERCOCET) 5-325 mg per tablet   No No  
Sig: Take 1 Tab by mouth every six (6) hours as needed for Pain. Max Daily Amount: 4 Tabs. every 4-6hrs prn pain  
predniSONE (DELTASONE) 10 mg tablet   No No  
Sig: Take 1 Tab by mouth as needed. tamsulosin (FLOMAX) 0.4 mg capsule   Yes No  
Si tablet every bedtime Facility-Administered Medications: None Allergies Allergen Reactions  Nka [No Known Allergies] Unknown (comments) Social History Substance Use Topics  Smoking status: Current Every Day Smoker Last attempt to quit: 1980  Smokeless tobacco: Never Used    Comment: CIGAR DAILY FOR 10 YEARS  
 Alcohol use 0.0 oz/week Comment: rare Family History Problem Relation Age of Onset  Heart Disease Father 59 MI  
 Heart Attack Father 72 MI  
 Stroke Mother  Hypertension Mother  Heart Disease Mother  Cancer Sister   
  stomach Objective:  
 
Visit Vitals  /73  Pulse 65  Temp 98.6 °F (37 °C)  Resp 16  
 Ht 5' 9\" (1.753 m)  Wt 70.3 kg (155 lb)  SpO2 94%  BMI 22.89 kg/m2 Temp (24hrs), Av.6 °F (37 °C), Min:98.6 °F (37 °C), Max:98.6 °F (37 °C) Oxygen Therapy O2 Sat (%): 94 % (18 0344) Pulse via Oximetry: 67 beats per minute (18 0344) O2 Device: Room air (18 2301) Physical Exam: 
General:    Alert, cooperative, no distress, appears stated age. Head:   Normocephalic, without obvious abnormality, atraumatic. Lungs:   Clear to auscultation bilaterally. No Wheezing or Rhonchi. No rales. Heart:   irreregularly rhythm, Abdomen:   Soft, non-tender. Not distended. Bowel sounds normal.  
Extremities: No cyanosis. No edema. No clubbing Skin:     Texture, turgor normal. No rashes or lesions. Not Jaundiced Neurologic: Alert and oriented x 3, no focal deficits Data Review:  
Recent Results (from the past 24 hour(s)) CBC WITH AUTOMATED DIFF Collection Time: 18 11:07 PM  
Result Value Ref Range WBC 11.0 4.3 - 11.1 K/uL  
 RBC 3.12 (L) 4.23 - 5.67 M/uL  
 HGB 10.9 (L) 13.6 - 17.2 g/dL HCT 34.1 (L) 41.1 - 50.3 % .3 (H) 79.6 - 97.8 FL  
 MCH 34.9 (H) 26.1 - 32.9 PG  
 MCHC 32.0 31.4 - 35.0 g/dL  
 RDW 15.8 (H) 11.9 - 14.6 % PLATELET 351 596 - 757 K/uL MPV 9.3 (L) 10.8 - 14.1 FL  
 DF AUTOMATED NEUTROPHILS 78 43 - 78 % LYMPHOCYTES 9 (L) 13 - 44 % MONOCYTES 11 4.0 - 12.0 % EOSINOPHILS 1 0.5 - 7.8 % BASOPHILS 0 0.0 - 2.0 % IMMATURE GRANULOCYTES 1 0.0 - 5.0 %  
 ABS. NEUTROPHILS 8.7 (H) 1.7 - 8.2 K/UL  
 ABS. LYMPHOCYTES 1.0 0.5 - 4.6 K/UL  
 ABS. MONOCYTES 1.2 0.1 - 1.3 K/UL  
 ABS. EOSINOPHILS 0.1 0.0 - 0.8 K/UL  
 ABS. BASOPHILS 0.0 0.0 - 0.2 K/UL  
 ABS. IMM. GRANS. 0.1 0.0 - 0.5 K/UL METABOLIC PANEL, BASIC Collection Time: 07/30/18 11:07 PM  
Result Value Ref Range Sodium 137 136 - 145 mmol/L Potassium 4.8 3.5 - 5.1 mmol/L Chloride 101 98 - 107 mmol/L  
 CO2 25 21 - 32 mmol/L Anion gap 11 7 - 16 mmol/L Glucose 181 (H) 65 - 100 mg/dL BUN 52 (H) 8 - 23 MG/DL Creatinine 8.34 (H) 0.8 - 1.5 MG/DL  
 GFR est AA 8 (L) >60 ml/min/1.73m2 GFR est non-AA 7 (L) >60 ml/min/1.73m2 Calcium 9.2 8.3 - 10.4 MG/DL  
POC TROPONIN-I Collection Time: 07/30/18 11:07 PM  
Result Value Ref Range Troponin-I (POC) 0.03 0.02 - 0.05 ng/ml POC TROPONIN-I Collection Time: 07/31/18  1:24 AM  
Result Value Ref Range Troponin-I (POC) 0.01 (L) 0.02 - 0.05 ng/ml Imaging Soledad Ortega Assessment and Plan: Active Hospital Problems Diagnosis Date Noted  Syncope 07/31/2018  ESRD (end stage renal disease) ([facility] [address] 07/31/2018  Chronic atrial fibrillation ([facility] [address] 07/19/2016  Anemia, unspecified  07/07/2016  
  2nd to CKD  Hypercholesterolemia 08/15/2013  CAD (coronary artery disease) 03/28/2011 S/p stent prior to CABG; CABG 2007 
  
 HTN (hypertension) 03/28/2011 PLAN 
· Admit with Telemetry · Orthostatic BPs are pending · Resume home medications · Consult Nephro in am for HD and congestion seen on CXR · Echo is pending · Cardio consulted in am 
· Code Status: Full Signed By: Anupam Mcdermott MD   
 July 31, 2018

## 2018-07-31 NOTE — ED NOTES
Pt assisted to restroom. Pt ambulatory to restroom without complications. Pt states he has dialysis every tues, thurs, and Saturday.

## 2018-07-31 NOTE — PROGRESS NOTES
Admitted overnight. Seen at bedside. Stable. No more syncopal episodes. BP meds titrated by cardiology under the suspicion of orthostatic hypotension. Had HD today.

## 2018-07-31 NOTE — PROGRESS NOTES
TRANSFER - IN REPORT: 
 
Verbal report received from [place](name) on [employer]  being received from Dialysis(unit) for routine progression of care Report consisted of patients Situation, Background, Assessment and  
Recommendations(SBAR). Information from the following report(s) SBAR, Kardex, Procedure Summary and Recent Results was reviewed with the receiving nurse. Opportunity for questions and clarification was provided. Assessment completed upon patients arrival to unit and care assumed.

## 2018-07-31 NOTE — ED NOTES
Called pharmacy and spoke to Taylor Davis again to inform we still have not received medications. Taylor Davis stated she sent tube to ER, but advised again we have not received them. I checked just before calling. Taylor Davis placed me on hold to check if the tube was returned to their station. Taylor Davis came back to line and I advised Tamiko Alvarez RN came to me and retrieved them from the tube system about 3 minutes ago when they arrived. Call ended.

## 2018-07-31 NOTE — ED NOTES
Pt provided meal tray for breakfast. Tray heated up for pt. Pt provided with coffee from break room as well.

## 2018-08-01 NOTE — PROGRESS NOTES
Discharge instructions given and explained to pt. Pt verbalized understanding. Medication side effects sheet reviewed with pt. Pt to be discharged home by yellow cab.

## 2018-08-01 NOTE — DISCHARGE SUMMARY
Hospitalist Discharge Summary Patient ID: Magen Dangelo 
[id number] 
64 y.o. 
1940 Admit date: 7/30/2018 10:58 PM 
Discharge date and time: 8/1/2018 Attending: No att. providers found PCP:  Arleth Hansen. Jean Larios MD 
Treatment Team: Consulting Provider: Kae Webb MD; Consulting Provider: Yemi Edmonds, RN; Utilization Review: Nahum Ellis RN; Care Manager: Simon Maria RN 
 
Principal Diagnosis Syncope Principal Problem: 
  Syncope (7/31/2018) Active Problems: 
  CAD (coronary artery disease) (3/28/2011) Overview: S/p stent prior to CABG; CABG 2007 HTN (hypertension) (3/28/2011) Anemia, unspecified  (7/7/2016) Overview: 2nd to CKD Chronic atrial fibrillation ([facility] [address] (7/19/2016) Hypercholesterolemia (8/15/2013) ESRD (end stage renal disease) ([facility] [address] (7/31/2018) HPI: 
 
66yo M with PMH of ESRD (on HD T,Th,Sat), CAD sp CABG, Atrial Fib, HTN and BPH presents to ER with syncopal episode. Patient lives alone and reports single syncopal episode after standing in his home. He lost consciousness and denies head trauma. He reports generalized weakness and was not able to get up after he \"came to\". It took him several minutes to crawl to phone and call for EMS. He denies CP, palpitations, SOB or recent illness. EKG shows Atrial fib, rate controlled without ischemia. His Troponin < 0.01 and CXR showed vascular congestion. He denies missing any HD sessions Hospital Course: Pt ws seen by cardiology and BP meds were adjusted due to +ve Orthostatics. TTE was done that showed some worsening of AS from moderate to severe. His symptoms resolved and also seen by nephrology and cleared for discharge. Cards Dr Anton Maria recommended 2 weeks follow up for possible TAVR in future. His meds were adjusted as listed below. He will get HD tomorrow as scheduled outpt. Plan discussed with pt who is in agreement with the plan. All questions answered.  
 
Pt was stable at time of discharge. Follow up as per below. Significant Diagnostic Imaging:  
 
TTE: 
 
LEFT VENTRICLE: Size was normal. Systolic function was at the lower limits of 
normal. Ejection fraction was estimated in the range of 50 % to 55 %. There 
were no regional wall motion abnormalities. Wall thickness was mildly 
increased. Avg. E/e'= 27.85. There is Left ventricular diastolic dysfunction. RIGHT VENTRICLE: The ventricle was moderately dilated. Systolic function was 
mildly reduced. There was severe pulmonary artery hypertension. Estimated  
peak 
pressure was in the range of 65-70 mmHg. LEFT ATRIUM: The atrium was markedly dilated. RIGHT ATRIUM: The atrium was markedly dilated. SYSTEMIC VEINS: IVC: The inferior vena cava was dilated. The respirophasic 
change in diameter was less than 50%. AORTIC VALVE: The valve was probably trileaflet. Leaflets exhibited moderate  
to 
marked calcification and reduced mobility. The peak velocity was 3.98 m/s. The 
mean pressure gradient was 33 mmHg. Di= 0.27. SVi= 37.8. The peak pressure 
gradient was 60 mmHg. There was moderate to severe stenosis. There was mild  
to 
moderate regurgitation. MITRAL VALVE: There was mild diffuse thickening of the anterior and posterior 
leaflets. There was mild calcification of the anterior and posterior  
leaflets, 
involving the leaflet base more than the margin. Transmitral velocity was 
increased. There was no evidence for stenosis. There was moderate to severe 
regurgitation. The regurgitant jet was eccentric and directed posteriorly. TRICUSPID VALVE: The valve structure was normal. There was no evidence for 
stenosis. There was severe regurgitation. PULMONIC VALVE: Not well visualized. There was no evidence for stenosis. There 
was moderate regurgitation. PERICARDIUM: There was no pericardial effusion. There was a pleural effusion 
present. Ascites was noted.  
 
AORTA: The root exhibited normal size. 
 
Labs: Results:  
   
Chemistry Recent Labs 08/01/18 
 7652  07/31/18 
 1656  07/30/18 
 2307 GLU  66  72  181* NA  141  141  137  
K  4.0  4.1  4.8  
CL  101  100  101 CO2  27  29  25 BUN  38*  28*  52* CREA  6.26*  5.30*  8.34* CA  9.0  9.0  9.2 AGAP  13  12  11 ALB  2.8*   --    --   
  
CBC w/Diff Recent Labs 08/01/18 
 7209  07/31/18 
 1656  07/30/18 
 2307 WBC  9.1   --   11.0  
RBC  3.04*   --   3.12* HGB  10.8*  11.4*  10.9* HCT  32.5*  35.8*  34.1*  
PLT  187   --   211 GRANS  73   --   78  
LYMPH  21   --   9*  
EOS  2   --   1 Cardiac Enzymes No results for input(s): CPK, CKND1, KASSI in the last 72 hours. No lab exists for component: [place] Coagulation No results for input(s): PTP, INR, APTT in the last 72 hours. No lab exists for component: INREXT Last A1c Lab Results Component Value Date/Time Hemoglobin A1c 5.2 07/31/2018 04:56 PM  
  
Lipid Panel Lab Results Component Value Date/Time Cholesterol, total 137 03/29/2011 04:21 AM  
 HDL Cholesterol 31 (L) 03/29/2011 04:21 AM  
 LDL, calculated 57.8 03/29/2011 04:21 AM  
 VLDL, calculated 48.2 (H) 03/29/2011 04:21 AM  
 Triglyceride 241 (H) 03/29/2011 04:21 AM  
 CHOL/HDL Ratio 4.4 03/29/2011 04:21 AM  
  
BNP No results for input(s): BNPP in the last 72 hours. Liver Enzymes Recent Labs 08/01/18 
 0435 ALB  2.8* Thyroid Studies No results found for: T4, T3U, TSH, TSHEXT Discharge Exam: 
Patient Vitals for the past 24 hrs: 
 Temp Pulse Resp BP SpO2  
08/01/18 1141 97.9 °F (36.6 °C) 79 16 149/73 94 % 08/01/18 0815 97.8 °F (36.6 °C) 76 16 150/74 95 % 08/01/18 0434 98.5 °F (36.9 °C) 70 14 156/83 93 % 07/31/18 2349 98.7 °F (37.1 °C) 76 16 137/72 92 % 07/31/18 1928 98.2 °F (36.8 °C) 66 17 138/73 97 % 07/31/18 1716 98.4 °F (36.9 °C) 69 18 149/68 92 % Visit Vitals  /73 (BP 1 Location: Right arm, BP Patient Position: Sitting)  Pulse 79  Temp 97.9 °F (36.6 °C)  Resp 16  
 Ht 5' 9\" (1.753 m)  Wt 70.3 kg (155 lb)  SpO2 94%  BMI 22.89 kg/m2 General appearance: alert, cooperative, no distress Lungs: CTABL Heart: IRIR, no m/r/g Abdomen: soft, non-tender. Bowel sounds normal.  
Extremities: no cyanosis. Disposition: Home Discharge Condition: stable Patient Instructions: activity as tolerated Cardiac diet Discharge Medication List as of 8/1/2018  1:10 PM  
  
CONTINUE these medications which have CHANGED Details  
metoprolol succinate (TOPROL-XL) 50 mg XL tablet Take 1 Tab by mouth daily. , Normal, Disp-30 Tab, R-0  
  
  
CONTINUE these medications which have NOT CHANGED Details  
oxyCODONE-acetaminophen (PERCOCET) 5-325 mg per tablet Take 1 Tab by mouth every six (6) hours as needed for Pain. Max Daily Amount: 4 Tabs. every 4-6hrs prn pain, Print, Disp-10 Tab, R-0  
  
predniSONE (DELTASONE) 10 mg tablet Take 1 Tab by mouth as needed., Normal, Disp-10 Tab, R-1  
  
mupirocin (BACTROBAN) 2 % ointment Apply  to affected area daily. , Normal, Disp-22 g, R-0  
  
tamsulosin (FLOMAX) 0.4 mg capsule 1 tablet every bedtime, Historical Med, R-4  
  
clopidogrel (PLAVIX) 75 mg tab Take 75 mg by mouth daily. , Historical Med  
  
finasteride (PROSCAR) 5 mg tablet Take 5 mg by mouth daily. Take / use AM day of surgery  per anesthesia protocols. Indications: benign prostatic hyperplasia with lower urinary tract sx, Historical Med  
  
apixaban (ELIQUIS) 2.5 mg tablet Take 2.5 mg by mouth two (2) times a day. Indications: PREVENT THROMBOEMBOLISM IN CHRONIC ATRIAL FIBRILLATION, Historical Med  
  
calcitRIOL (ROCALTROL) 0.5 mcg capsule Take 0.5 mcg by mouth daily. Take / use AM day of surgery  per anesthesia protocols.   Indications: Renal Osteodystrophy, Historical Med  
  
nitroglycerin (NITROSTAT) 0.4 mg SL tablet ONE TABLET UNDER TONGUE AS NEEDED FOR CHEST PAIN EVERY 5 MINUTES, Normal, Disp-25 Tab, R-6  
  
B.infantis-B.ani-B.long-B.bifi (PROBIOTIC 4X) 10-15 mg TbEC Take  by mouth daily. , Historical Med  
  
cinacalcet (SENSIPAR) 60 mg tab Take 60 mg by mouth nightly. Takes 2 tablets every night  Indications: HYPERPARATHYROIDISM SECONDARY TO CRF WITH DIALYSIS, Historical Med DOCUSATE CALCIUM (STOOL SOFTENER PO) Take  by mouth daily. , Historical Med  
  
multivitamin (ONE A DAY) tablet Take 1 Tab by mouth daily. Special Complex for dialysis patients, Historical Med  
  
dicyclomine (BENTYL) 10 mg capsule Take 10 mg by mouth daily as needed. Indications: Irritable Bowel Syndrome, Historical Med Omeprazole Magnesium 20 mg cpDR Take  by mouth daily. Take / use AM day of surgery  per anesthesia protocols. Indications: gastroesophageal reflux disease, Historical Med  
  
epoetin alcides (EPOGEN;PROCRIT) 10,000 unit/mL injection by SubCUTAneous route as needed. Currently taking abt twice a month at [facility], Historical Med  
  
Magnesium Oxide 500 mg cap Take  by mouth daily. , Historical Med KRILL OIL PO Take  by mouth daily. , Historical Med RENVELA 800 mg Tab tab Take 800 mg by mouth three (3) times daily (with meals). 4 tablets with each meal; 2 tablets with each snack . Indications: 3 with meals, 2 with snacks, Historical Med  
  
  
STOP taking these medications  
  
 dilTIAZem CD (CARDIZEM CD) 180 mg ER capsule Comments:  
Reason for Stopping:   
   
  
 
 
Vaccinations:   Pt screened by nursing for pneumococcal and influenza vaccination needs at discharge, will be ordered if needed and pt consented. Immunization History Administered Date(s) Administered  Tdap 05/06/2015 Follow-up Information Follow up With Details Comments Contact Info Alecia Gerard. Shantanu Wheeler MD Call As needed [facility] [address] 
[phone] Olivia Gomez MD On 8/23/2018 at 10:45 a.m. 2 [address] [facility] [address] 
[phone] Signed By: Maxim Nunez MD   
 August 1, 2018

## 2018-08-01 NOTE — DISCHARGE INSTRUCTIONS
Fainting: Care Instructions  Your Care Instructions    When you faint, or pass out, you lose consciousness for a short time. A brief drop in blood flow to the brain often causes it. When you fall or lie down, more blood flows to your brain and you regain consciousness. Emotional stress, pain, or overheating-especially if you have been standing-can make you faint. In these cases, fainting is usually not serious. But fainting can be a sign of a more serious problem. Your doctor may want you to have more tests to rule out other causes. The treatment you need depends on the reason why you fainted. The doctor has checked you carefully, but problems can develop later. If you notice any problems or new symptoms, get medical treatment right away. Follow-up care is a key part of your treatment and safety. Be sure to make and go to all appointments, and call your doctor if you are having problems. It's also a good idea to know your test results and keep a list of the medicines you take. How can you care for yourself at home? · Drink plenty of fluids to prevent dehydration. If you have kidney, heart, or liver disease and have to limit fluids, talk with your doctor before you increase your fluid intake. When should you call for help? Call 911 anytime you think you may need emergency care. For example, call if:    · You have symptoms of a heart problem. These may include:  ¨ Chest pain or pressure. ¨ Severe trouble breathing. ¨ A fast or irregular heartbeat. ¨ Lightheadedness or sudden weakness. ¨ Coughing up pink, foamy mucus. ¨ Passing out. After you call 911, the  may tell you to chew 1 adult-strength or 2 to 4 low-dose aspirin. Wait for an ambulance. Do not try to drive yourself.     · You have symptoms of a stroke. These may include:  ¨ Sudden numbness, tingling, weakness, or loss of movement in your face, arm, or leg, especially on only one side of your body. ¨ Sudden vision changes.   ¨ Sudden trouble speaking. ¨ Sudden confusion or trouble understanding simple statements. ¨ Sudden problems with walking or balance. ¨ A sudden, severe headache that is different from past headaches.     · You passed out (lost consciousness) again.    Watch closely for changes in your health, and be sure to contact your doctor if:    · You do not get better as expected. Where can you learn more? Go to http://suly-lauren.info/. Enter Z722 in the search box to learn more about \"Fainting: Care Instructions. \"  Current as of: November 20, 2017  Content Version: 11.7  © 7045-3539 [employer]. Care instructions adapted under license by [employer] (which disclaims liability or warranty for this information). If you have questions about a medical condition or this instruction, always ask your healthcare professional. [address] any warranty or liability for your use of this information. DISCHARGE SUMMARY from Nurse    PATIENT INSTRUCTIONS:    After general anesthesia or intravenous sedation, for 24 hours or while taking prescription Narcotics:  · Limit your activities  · Do not drive and operate hazardous machinery  · Do not make important personal or business decisions  · Do  not drink alcoholic beverages  · If you have not urinated within 8 hours after discharge, please contact your surgeon on call. Report the following to your surgeon:  · Excessive pain, swelling, redness or odor of or around the surgical area  · Temperature over 100.5  · Nausea and vomiting lasting longer than 4 hours or if unable to take medications  · Any signs of decreased circulation or nerve impairment to extremity: change in color, persistent  numbness, tingling, coldness or increase pain  · Any questions    What to do at Home:  Recommended activity: Activity as tolerated, resume home diet as tolerated.      *  Please give a list of your current medications to your Primary Care Provider. *  Please update this list whenever your medications are discontinued, doses are      changed, or new medications (including over-the-counter products) are added. *  Please carry medication information at all times in case of emergency situations. These are general instructions for a healthy lifestyle:    No smoking/ No tobacco products/ Avoid exposure to second hand smoke  Surgeon General's Warning:  Quitting smoking now greatly reduces serious risk to your health. Obesity, smoking, and sedentary lifestyle greatly increases your risk for illness    A healthy diet, regular physical exercise & weight monitoring are important for maintaining a healthy lifestyle    You may be retaining fluid if you have a history of heart failure or if you experience any of the following symptoms:  Weight gain of 3 pounds or more overnight or 5 pounds in a week, increased swelling in our hands or feet or shortness of breath while lying flat in bed. Please call your doctor as soon as you notice any of these symptoms; do not wait until your next office visit. Recognize signs and symptoms of STROKE:    F-face looks uneven    A-arms unable to move or move unevenly    S-speech slurred or non-existent    T-time-call 911 as soon as signs and symptoms begin-DO NOT go       Back to bed or wait to see if you get better-TIME IS BRAIN. Warning Signs of HEART ATTACK     Call 911 if you have these symptoms:   Chest discomfort. Most heart attacks involve discomfort in the center of the chest that lasts more than a few minutes, or that goes away and comes back. It can feel like uncomfortable pressure, squeezing, fullness, or pain.  Discomfort in other areas of the upper body. Symptoms can include pain or discomfort in one or both arms, the back, neck, jaw, or stomach.  Shortness of breath with or without chest discomfort.  Other signs may include breaking out in a cold sweat, nausea, or lightheadedness.   Don't wait more than five minutes to call 911 - MINUTES MATTER! Fast action can save your life. Calling 911 is almost always the fastest way to get lifesaving treatment. Emergency Medical Services staff can begin treatment when they arrive -- up to an hour sooner than if someone gets to the hospital by car. The discharge information has been reviewed with the patient. The patient verbalized understanding. Discharge medications reviewed with the patient and appropriate educational materials and side effects teaching were provided.   ___________________________________________________________________________________________________________________________________

## 2018-08-01 NOTE — PROGRESS NOTES
Problem: Falls - Risk of 
Goal: *Absence of Falls Document Ruben President Fall Risk and appropriate interventions in the flowsheet. Outcome: Progressing Towards Goal 
Fall Risk Interventions: 
  
 
  
 
Medication Interventions: Assess postural VS orthostatic hypotension, Bed/chair exit alarm, Patient to call before getting OOB, Teach patient to arise slowly Elimination Interventions: Bed/chair exit alarm, Call light in reach, Patient to call for help with toileting needs, Toilet paper/wipes in reach, Toileting schedule/hourly rounds

## 2018-08-01 NOTE — PROGRESS NOTES
Spoke with patient regarding discharge planning. Alert and oriented x 4 Lives alone in a two story home with 2 step entry. Prior to admission independent with ADL's and driving. Neighbors provide all of his support and assistance if needed. All of his family members live out of town. PCP- Dr. Naima Gill and last seen 3/4 months ago. DME- access to crutches Dialysis- [address] T-Th-Sat Has had previous [facility] services after he had his CABG Patient is not a vet He does not have any problems filling his Rx. He has Medicare Part D. No anticipated needs noted at time of discharge. CM will continue to follow. Care Management Interventions PCP Verified by CM: Yes Mode of Transport at Discharge: Other (see comment) (friends) Transition of Care Consult (CM Consult): Discharge Planning Discharge Durable Medical Equipment: No 
Physical Therapy Consult: No 
Occupational Therapy Consult: No 
Current Support Network: Lives Alone, Own Home Confirm Follow Up Transport: Self Plan discussed with Pt/Family/Caregiver: Yes Freedom of Choice Offered: Yes Discharge Location Discharge Placement: Home with family assistance

## 2018-08-01 NOTE — PROGRESS NOTES
Renal Progress Note Admission Date: 7/30/2018 Subjective:  
  
.sitting up comfortable. No light-headedness ROS:  No chest pain, increased shortness of breath or nausea/vomitting Objective:  
 
Physical Exam:   
Patient Vitals for the past 8 hrs: 
 BP Temp Pulse Resp SpO2  
08/01/18 0815 150/74 97.8 °F (36.6 °C) 76 16 95 % 08/01/18 0434 156/83 98.5 °F (36.9 °C) 70 14 93 % Gen: comfortable , NAD HEENT: moist membranes CV: S1, S2 
Lungs: Clear bilaterally Extem: no edema Current Facility-Administered Medications Medication Dose Route Frequency  alcohol 62% (NOZIN) nasal  1 Ampule  1 Ampule Topical Q12H  
 sodium chloride (NS) flush 5-10 mL  5-10 mL IntraVENous Q8H  
 sodium chloride (NS) flush 5-10 mL  5-10 mL IntraVENous PRN  
 acetaminophen (TYLENOL) tablet 650 mg  650 mg Oral Q4H PRN  
 apixaban (ELIQUIS) tablet 2.5 mg  2.5 mg Oral Q12H  cinacalcet (SENSIPAR) tablet 60 mg  60 mg Oral QHS  calcitRIOL (ROCALTROL) capsule 0.5 mcg  0.5 mcg Oral DAILY  clopidogrel (PLAVIX) tablet 75 mg  75 mg Oral DAILY  senna-docusate (PERICOLACE) 8.6-50 mg per tablet 1 Tab  1 Tab Oral DAILY  sevelamer carbonate (RENVELA) tab 800 mg  800 mg Oral TID WITH MEALS  finasteride (PROSCAR) tablet 5 mg  5 mg Oral DAILY  tamsulosin (FLOMAX) capsule 0.4 mg  0.4 mg Oral QHS  ondansetron (ZOFRAN) injection 4 mg  4 mg IntraVENous Q6H PRN  
 oxyCODONE IR (ROXICODONE) tablet 2.5 mg  2.5 mg Oral Q6H PRN  
 metoprolol succinate (TOPROL-XL) XL tablet 50 mg  50 mg Oral DAILY Data Review:  
 
LABS:  
Recent Results (from the past 12 hour(s)) CBC WITH AUTOMATED DIFF Collection Time: 08/01/18  4:35 AM  
Result Value Ref Range WBC 9.1 4.3 - 11.1 K/uL  
 RBC 3.04 (L) 4.23 - 5.67 M/uL  
 HGB 10.8 (L) 13.6 - 17.2 g/dL HCT 32.5 (L) 41.1 - 50.3 % .9 (H) 79.6 - 97.8 FL  
 MCH 35.5 (H) 26.1 - 32.9 PG  
 MCHC 33.2 31.4 - 35.0 g/dL  
 RDW 15.4 (H) 11.9 - 14.6 % PLATELET 379 283 - 681 K/uL MPV 9.8 (L) 10.8 - 14.1 FL  
 DF AUTOMATED NEUTROPHILS 73 43 - 78 % LYMPHOCYTES 21 13 - 44 % MONOCYTES 4 4.0 - 12.0 % EOSINOPHILS 2 0.5 - 7.8 % BASOPHILS 0 0.0 - 2.0 % IMMATURE GRANULOCYTES 0 0.0 - 5.0 %  
 ABS. NEUTROPHILS 6.5 1.7 - 8.2 K/UL  
 ABS. LYMPHOCYTES 1.9 0.5 - 4.6 K/UL  
 ABS. MONOCYTES 0.4 0.1 - 1.3 K/UL  
 ABS. EOSINOPHILS 0.2 0.0 - 0.8 K/UL  
 ABS. BASOPHILS 0.0 0.0 - 0.2 K/UL  
 ABS. IMM. GRANS. 0.0 0.0 - 0.5 K/UL RENAL FUNCTION PANEL Collection Time: 08/01/18  4:35 AM  
Result Value Ref Range Sodium 141 136 - 145 mmol/L Potassium 4.0 3.5 - 5.1 mmol/L Chloride 101 98 - 107 mmol/L  
 CO2 27 21 - 32 mmol/L Anion gap 13 7 - 16 mmol/L Glucose 66 65 - 100 mg/dL BUN 38 (H) 8 - 23 MG/DL Creatinine 6.26 (H) 0.8 - 1.5 MG/DL  
 GFR est AA 11 (L) >60 ml/min/1.73m2 GFR est non-AA 9 (L) >60 ml/min/1.73m2 Calcium 9.0 8.3 - 10.4 MG/DL Phosphorus 4.5 (H) 2.3 - 3.7 MG/DL Albumin 2.8 (L) 3.2 - 4.6 g/dL Plan:  
 
Principal Problem: 
  Syncope (7/31/2018) Active Problems: 
  CAD (coronary artery disease) (3/28/2011) Overview: S/p stent prior to CABG; CABG 2007 HTN (hypertension) (3/28/2011) Anemia, unspecified  (7/7/2016) Overview: 2nd to CKD Chronic [facility] fibrillation ([facility] (7/19/2016) Hypercholesterolemia (8/15/2013) ESRD (end stage renal disease) ([facility] (7/31/2018) 1. Syncope 2. End-stage renal disease, on chronic maintenance hemodialysis. TTS 3. Atrial fibrillation. 4.  Atherosclerotic coronary artery disease, status post coronary artery bypass graft. If discharging, can dialyze at out pt clinic tomorrow.

## 2018-08-01 NOTE — PROGRESS NOTES
[facility] CARDIOLOGY PROGRESS NOTE 
      
 
8/1/2018 12:40 PM 
 
Admit Date: 7/30/2018 Subjective:  
Feels well. No cp or sob. ROS: 
Cardiovascular:  As noted above Objective:  
  
Vitals:  
 07/31/18 2349 08/01/18 0434 08/01/18 0815 08/01/18 1141 BP: 137/72 156/83 150/74 149/73 Pulse: 76 70 76 79 Resp: 16 14 16 16 Temp: 98.7 °F (37.1 °C) 98.5 °F (36.9 °C) 97.8 °F (36.6 °C) 97.9 °F (36.6 °C) SpO2: 92% 93% 95% 94% Weight:      
Height:      
 
 
Physical Exam: 
General-No Acute Distress Neck- supple, no JVD 
CV- regular rate and rhythm, + as murmur Lung- clear bilaterally Abd- soft, nontender, nondistended Ext- no edema bilaterally. Skin- warm and dry Data Review:  
Recent Labs 08/01/18 
 6191  07/31/18 
 1656  07/30/18 
 2307 NA  141  141  137  
K  4.0  4.1  4.8 BUN  38*  28*  52* CREA  6.26*  5.30*  8.34* GLU  66  72  181* WBC  9.1   --   11.0 HGB  10.8*  11.4*  10.9* HCT  32.5*  35.8*  34.1*  
PLT  187   --   211 TROIQ   --   0.03   -- Assessment/Plan:  
 
Principal Problem: 
  Syncope (7/31/2018) Active Problems: 
  CAD (coronary artery disease) (3/28/2011) HTN (hypertension) (3/28/2011) Anemia, unspecified  (7/7/2016) Chronic atrial fibrillation ([facility] (7/19/2016) Hypercholesterolemia (8/15/2013) ESRD (end stage renal disease) ([facility] (7/31/2018) 
   
/// 
 
Doing well. Progressive valvular heart disease is noted. OK to go.  
 
 
 
 
Leigh Rizo MD 
8/1/2018 12:40 PM

## 2018-08-02 NOTE — PROGRESS NOTES
This note will not be viewable in [address] 1st Attempt to contact patient for [facility] call, no answer, left voicemail for returned call. Will attempt to contact patient again within 24 hours

## 2018-08-03 NOTE — PROGRESS NOTES
This note will not be viewable in [address] 2nd attempt to contact patient for [facility] call, no answer, left  for returned call. Will attempt 3rd call within 5 business days.

## 2018-08-06 NOTE — Clinical Note
Talk to nephrologist at dialysis tomorrow about removing less fluid due to recurrent syncopal episodes with severe aortic stenosis. Increase fluid intake. Stop taking Flomax and Proscar. If symptoms continue, talk to your cardiologist about discontinu ing the Toprol. Return for any worsening or concerning symptoms.

## 2018-08-07 NOTE — ED TRIAGE NOTES
Pt states that he felt like he was going to pass out and sat down and fell forward and hit his head.  Pt is on blood thinners pt was seen last week for having syncopal episodes and needs to have valve replacements pt states he is only here because he hit his head

## 2018-08-07 NOTE — ED NOTES
I have reviewed discharge instructions with the patient. The patient verbalized understanding. Patient left ED via Discharge Method: stretcher to Home with Willi Reeves. Opportunity for questions and clarification provided. Patient given 0 scripts. To continue your aftercare when you leave the hospital, you may receive an automated call from our care team to check in on how you are doing. This is a free service and part of our promise to provide the best care and service to meet your aftercare needs.  If you have questions, or wish to unsubscribe from this service please call [phone]. Thank you for Choosing our [employer] [facility].

## 2018-08-07 NOTE — DISCHARGE INSTRUCTIONS
Aortic Valve Stenosis: Care Instructions  Your Care Instructions    Having aortic valve stenosis means that the valve between your heart and the large blood vessel that carries blood to the body (aorta) has narrowed. That forces the heart to pump harder to get enough blood through the valve. As stenosis gets worse, the valve gets narrower. This can cause symptoms. Symptoms include chest pain, dizziness, fainting, or shortness of breath. If you have mild or moderate stenosis and don't have symptoms, you may not need treatment now. Your doctor will check your heart regularly. You may need treatment if the stenosis gets worse. With severe stenosis, you may need to have the valve replaced. Follow-up care is a key part of your treatment and safety. Be sure to make and go to all appointments, and call your doctor if you are having problems. It's also a good idea to know your test results and keep a list of the medicines you take. How can you care for yourself at home? · Be safe with medicines. Take your medicines exactly as prescribed. Call your doctor if you think you are having a problem with your medicine. You will get more details on the specific medicines your doctor prescribes. · Plan your meals so that you are eating heart-healthy foods. ¨ Eat a variety of foods daily. Fresh fruits and vegetables and whole grains are good choices. ¨ Limit your fat intake, especially saturated and trans fat. ¨ Limit salt (sodium). ¨ Increase fiber in your diet. ¨ Limit alcohol. · Be active. Ask your doctor what type and level of exercise is safe for you. Walking is a good choice. Your doctor may suggest that you join a cardiac rehabilitation program so that you can have help increasing your physical activity safely. · Do not smoke. Smoking can make aortic valve stenosis worse. If you need help quitting, talk to your doctor about stop-smoking programs and medicines.  These can increase your chances of quitting for good.  · Stay at a healthy weight. Lose weight if you need to. · Avoid colds and flu. Get a pneumococcal vaccine shot. If you have had one before, ask your doctor if you need another dose. Get a flu vaccine every year. · Take care of your teeth and gums. Get regular dental checkups. Good dental health is important because bacteria can spread from infected teeth and gums to the heart valves. When should you call for help? Call 911 anytime you think you may need emergency care. For example, call if:    · You passed out (lost consciousness).     · You have symptoms of a heart attack. These may include:  ¨ Chest pain or pressure, or a strange feeling in the chest.  ¨ Sweating. ¨ Shortness of breath. ¨ Nausea or vomiting. ¨ Pain, pressure, or a strange feeling in the back, neck, jaw, or upper belly or in one or both shoulders or arms. ¨ Lightheadedness or sudden weakness. ¨ A fast or irregular heartbeat.    After you call 911, the  may tell you to chew 1 adult-strength or 2 to 4 low-dose aspirin. Wait for an ambulance. Do not try to drive yourself.   Call your doctor now or seek immediate medical care if:    · You develop new symptoms of aortic valve stenosis, such as chest pain, dizziness, or shortness of breath.     · You have new or increased shortness of breath.     · You are dizzy or lightheaded, or you feel like you may faint.     · You have sudden weight gain, such as more than 2 to 3 pounds in a day or 5 pounds in a week. (Your doctor may suggest a different range of weight gain.)     · You have increased swelling in your legs, ankles, or feet.    Watch closely for changes in your health, and be sure to contact your doctor if:    · You have trouble making healthy lifestyle changes.     · You want more information about healthy lifestyle changes. Where can you learn more? Go to http://suly-lauren.info/.   Enter O151 in the search box to learn more about \"Aortic Valve Stenosis: Care Instructions. \"  Current as of: December 6, 2017  Content Version: 11.7  © 8374-5449 [employer]. Care instructions adapted under license by [employer] (which disclaims liability or warranty for this information). If you have questions about a medical condition or this instruction, always ask your healthcare professional. [address] any warranty or liability for your use of this information. Fainting: Care Instructions  Your Care Instructions    When you faint, or pass out, you lose consciousness for a short time. A brief drop in blood flow to the brain often causes it. When you fall or lie down, more blood flows to your brain and you regain consciousness. Emotional stress, pain, or overheating-especially if you have been standing-can make you faint. In these cases, fainting is usually not serious. But fainting can be a sign of a more serious problem. Your doctor may want you to have more tests to rule out other causes. The treatment you need depends on the reason why you fainted. The doctor has checked you carefully, but problems can develop later. If you notice any problems or new symptoms, get medical treatment right away. Follow-up care is a key part of your treatment and safety. Be sure to make and go to all appointments, and call your doctor if you are having problems. It's also a good idea to know your test results and keep a list of the medicines you take. How can you care for yourself at home? · Drink plenty of fluids to prevent dehydration. If you have kidney, heart, or liver disease and have to limit fluids, talk with your doctor before you increase your fluid intake. When should you call for help? Call 911 anytime you think you may need emergency care. For example, call if:    · You have symptoms of a heart problem. These may include:  ¨ Chest pain or pressure. ¨ Severe trouble breathing.   ¨ A fast or irregular heartbeat. ¨ Lightheadedness or sudden weakness. ¨ Coughing up pink, foamy mucus. ¨ Passing out. After you call 911, the  may tell you to chew 1 adult-strength or 2 to 4 low-dose aspirin. Wait for an ambulance. Do not try to drive yourself.     · You have symptoms of a stroke. These may include:  ¨ Sudden numbness, tingling, weakness, or loss of movement in your face, arm, or leg, especially on only one side of your body. ¨ Sudden vision changes. ¨ Sudden trouble speaking. ¨ Sudden confusion or trouble understanding simple statements. ¨ Sudden problems with walking or balance. ¨ A sudden, severe headache that is different from past headaches.     · You passed out (lost consciousness) again.    Watch closely for changes in your health, and be sure to contact your doctor if:    · You do not get better as expected. Where can you learn more? Go to http://suly-lauren.info/. Enter Z252 in the search box to learn more about \"Fainting: Care Instructions. \"  Current as of: November 20, 2017  Content Version: 11.7  © 7005-5033 [employer]. Care instructions adapted under license by [employer] (which disclaims liability or warranty for this information). If you have questions about a medical condition or this instruction, always ask your healthcare professional. [address] any warranty or liability for your use of this information.

## 2018-08-07 NOTE — PROGRESS NOTES
This note will not be viewable in [address] 3rd attempt to contact patient for [facility] Call. No answer, left VM for returned call. Episode will be closed at this time as [address] has been unsuccessful in reaching the patient.

## 2018-08-22 PROBLEM — M54.2 NECK PAIN: Status: ACTIVE | Noted: 2018-01-01

## 2018-08-23 PROBLEM — I35.0 NONRHEUMATIC AORTIC VALVE STENOSIS: Status: ACTIVE | Noted: 2018-01-01

## 2018-08-28 NOTE — PROGRESS NOTES
Patient pre-assessment complete for [facility] poss with Dr Rachael Carrasco scheduled for 18 at 9:30am, arrival time 7:30am. Patient verified using . Patient instructed to bring all home medications in labeled bottles on the day of procedure. NPO status reinforced. Patient informed to take a full dose aspirin 325mg  or 81 mg x 4 on the day of procedure. Patient instructed to HOLD eliquis (last dose 18). Instructed they can take all other medications excluding vitamins & supplements. Patient verbalizes understanding of all instructions & denies any questions at this time.

## 2018-08-29 NOTE — PROGRESS NOTES
Patient received to [address] [place] [address] room # 11  Ambulatory from [facility]. Patient scheduled for [facility] today with Dr Anton Maria. Procedure reviewed & questions answered, voiced good understanding consent obtained & placed on chart. All medications and medical history reviewed. Will prep patient per orders. Patient & family updated on plan of care. The patient has a fraility score of 4-VULNERABLE, based on patient is on dialysis Tue, Thdion, Sat; patient A&Ox3, patient does get SOB with exertion.

## 2018-08-29 NOTE — IP AVS SNAPSHOT
[address] 
[phone] Patient: Laura Little MRN: [id number] PXM:2/4/0282 Discharge Summary 8/29/2018 Laura Little MRN[de-identified]  [id number] Admission Information Provider Pager Service Admission Date Expected D/C Date Sohail Loo MD  CARDIAC CATH LAB 8/29/2018 8/29/2018 Actual LOS Patient Class 0 days OUTPATIENT Follow-up Information Follow up With Details Comments Contact Info Annelise Burns. Soheila Jacobs MD   [address] [facility] [address] 
[phone] Sohail Loo MD  Wednesday, September 12, 2018, at 11:00 a.m. [address] [facility] [address] 
[phone] My Medications ASK your physician about these medications Instructions Each Dose to Equal  
 Morning Noon Evening Bedtime  
 calcitRIOL 0.5 mcg capsule Commonly known as:  ROCALTROL Your last dose was: Your next dose is: Take 0.5 mcg by mouth daily. Indications: Renal Osteodystrophy, five days a week  
 0.5 mcg  
    
   
   
   
  
 clopidogrel 75 mg Tab Commonly known as:  PLAVIX Your last dose was: Your next dose is: TAKE 1 TABLET BY MOUTH EVERY DAY  
     
   
   
   
  
 cyclobenzaprine 5 mg tablet Commonly known as:  FLEXERIL Your last dose was: Your next dose is: Take 0.5 Tabs by mouth daily as needed for Muscle Spasm(s). 2.5 mg  
    
   
   
   
  
 dicyclomine 10 mg capsule Commonly known as:  BENTYL Your last dose was: Your next dose is: Take 10 mg by mouth daily as needed. Indications: Irritable Bowel Syndrome 10 mg  
    
   
   
   
  
 ELIQUIS 2.5 mg tablet Generic drug:  apixaban Your last dose was: Your next dose is: Take 2.5 mg by mouth two (2) times a day.  Indications: PREVENT THROMBOEMBOLISM IN CHRONIC ATRIAL FIBRILLATION  
 2.5 mg  
    
   
   
   
  
 epoetin alcides 10,000 unit/mL injection Commonly known as:  EPOGEN;PROCRIT Your last dose was: Your next dose is:    
   
   
 by SubCUTAneous route as needed. Currently taking abt twice a month at [facility]  
     
   
   
   
  
 finasteride 5 mg tablet Commonly known as:  PROSCAR Your last dose was: Your next dose is: Take 5 mg by mouth daily. Indications: benign prostatic hyperplasia with lower urinary tract sx  
 5 mg KRILL OIL PO Your last dose was: Your next dose is: Take  by mouth daily. Magnesium Oxide 500 mg Cap Your last dose was: Your next dose is: Take  by mouth daily. metoprolol succinate 50 mg XL tablet Commonly known as:  TOPROL-XL Your last dose was: Your next dose is: Take 1 Tab by mouth daily. 50 mg  
    
   
   
   
  
 multivitamin tablet Commonly known as:  ONE A DAY Your last dose was: Your next dose is: Take 1 Tab by mouth daily. Special Complex for dialysis patients 1 Tab  
    
   
   
   
  
 mupirocin 2 % ointment Commonly known as:  [facility] Your last dose was: Your next dose is:    
   
   
 Apply  to affected area daily. nitroglycerin 0.4 mg SL tablet Commonly known as:  NITROSTAT Your last dose was: Your next dose is: ONE TABLET UNDER TONGUE AS NEEDED FOR CHEST PAIN EVERY 5 MINUTES Omeprazole Magnesium 20 mg Cpdr  
   
Your last dose was: Your next dose is: Take  by mouth daily. Indications: gastroesophageal reflux disease  
     
   
   
   
  
 predniSONE 10 mg tablet Commonly known as:  Nahomy Tim Your last dose was: Your next dose is: Take 1 Tab by mouth as needed. 10 mg PROBIOTIC 4X 10-15 mg Tbec Generic drug:  B.infantis-B.ani-B.long-B.bifi Your last dose was: Your next dose is: Take  by mouth daily. RENVELA 800 mg Tab tab Generic drug:  sevelamer carbonate Your last dose was: Your next dose is: Take 800 mg by mouth three (3) times daily (with meals). 4 tablets with each meal; 2 tablets with each snack . Indications: 3 with meals, 2 with snacks 800 mg SENSIPAR 60 mg Tab Generic drug:  cinacalcet Your last dose was: Your next dose is: Take 120 mg by mouth nightly. Indications: HYPERPARATHYROIDISM SECONDARY TO CRF WITH DIALYSIS  
 120 mg STOOL SOFTENER PO Your last dose was: Your next dose is: Take  by mouth daily. General Information Please provide this summary of care documentation to your next provider. Allergies Unspecified:  Nka [No Known Allergies] Current Immunizations  Reviewed on 5/23/2018 Name Date Tdap 5/6/2015 Discharge Instructions Discharge Instructions HEART CATHETERIZATION/ANGIOGRAPHY DISCHARGE INSTRUCTIONS 1. Check puncture site frequently for swelling or bleeding. If there is any bleeding, lie down and apply pressure over the area with a clean towel or washcloth. If bleeding does not stop quickly, call 911 and hold pressure until EMS arrives. Notify your doctor for any redness, swelling, drainage, or oozing from the puncture site. Notify your doctor for any fever or chills. 2. If the extremity becomes cold, numb, or painful call Dr. Patrice Mcgill at [phone]. 
3. Activity should be limited for the next 48 hours.  Climb stairs as little as possible and avoid any stooping, bending, or strenuous activity for 48 hours. No heavy lifting (anything over 10 pounds) for 3 days. 4. You may resume your usual diet. Drink more fluids than usual. 
5. Have a responsible person drive you home and stay with you for at least 24 hours after your heart catheterization/angiography. 6. Remove bandage from your right groin in 24 hours. You may shower in 24 hours. No tub baths, hot tubs, or swimming for 1 week. Do not place any lotions, creams, powders, or ointments over puncture site for 1 week. Keep your right groin puncture site clean, dry, and open to air until site heals which will be in 3-5 days. I have read the above instructions and have had the opportunity to ask questions. Patient: ________________________   Date: 8/29/2018 Witness: _______________________   Date: 8/29/2018 Discharge Orders None  
  
` Patient Signature:  ____________________________________________________________ Date:  ____________________________________________________________  
  
 Wilbur Branch Provider Signature:  ____________________________________________________________ Date:  ____________________________________________________________

## 2018-08-29 NOTE — PROGRESS NOTES
Report received from [place]. Cath Lab RN. Procedural findings communicated. Intra procedural  medication administration reviewed. Progression of care discussed. Patient received into [address] [facility] 7 post sheath removal.  
 
Access site without bleeding or swelling yes Dressing dry and intact yes Patient instructed to limit movement to right lower extremity Routine post procedural vital signs and site assessment initiated yes

## 2018-08-29 NOTE — PROGRESS NOTES
Patient up to bedside, vital signs stable. Patient ambulated to bathroom without difficulty. Patient voided without difficulty. 1513 Discharge instructions and home medications reviewed with patient. Time allowed for questions and answers. 1527  Peripheral IV site dc'd without difficulty with tip intact. [phone] Patient discharged to home with family.

## 2018-08-29 NOTE — PROGRESS NOTES
TRANSFER - OUT REPORT: 
 
R Femoral diagnostic LHC/RHC with Dr Pablo Randle Versed 2 mg Venous right femoral vein Arterial right femoral artery Both sites closed with perclose and covered with tegaderm No bleeding or hematoma noted at sites. Sites soft Verbal report given to Anna(name) katty Hyman Parent  being transferred to CPRU(unit) for routine progression of care Report consisted of patients Situation, Background, Assessment and  
Recommendations(SBAR). Information from the following report(s) Procedure Summary was reviewed with the receiving nurse. Lines:  
Peripheral IV 08/29/18 Right Wrist (Active) Opportunity for questions and clarification was provided. Patient transported with: 
 Registered Nurse

## 2018-08-30 NOTE — PROCEDURES
Glenda Pompa [address] CARDIAC CATH Dorie Magallon 
MR#: [id number] : 1940 ACCOUNT #: [de-identified] DATE OF SERVICE: 2018 PROCEDURE PERFORMED:  Right and left heart catheterization, left ventriculography, coronary angiography and graft angiography. HISTORY OF PRESENT ILLNESS:  This is a 72-year-old gentleman undergoing cardiac catheterization for evaluation of syncope and suspected severe aortic stenosis. He has a history of coronary artery disease, prior CABG and PCI. He has more recently had 2 episodes of syncope. Echocardiography suggests severe aortic stenosis. Cardiac catheterization in preparation for possible TAVR is recommended. DESCRIPTION OF PROCEDURE:  Right and left heart catheterization with left ventriculography, coronary angiography, and graft angiography was carried out from the right femoral artery and right femoral vein. The right heart catheterization was performed with a 7-Syriac [place]-Silvano catheter. Left heart catheterization was performed with a 6-Syriac multipurpose JL4, and dual lumen Joni catheter, which was placed across the aortic valve utilizing an AL1 and exchange technique. At the end of procedure, the sheaths were removed and Perclose was deployed with good hemostasis. FINDINGS:  The pulmonary artery pressure was 75/30 mmHg with a mean of 45 mmHg. The mean right atrial pressure was 14 mmHg. The pulmonary capillary wedge pressure was 20 mmHg. The left ventricular pressure was 150/20 mmHg. The aortic pressure was 145/70 mmHg. The cardiac output is 5.8 liters with an index of 3.1 liters per minute per sq m. The O2 saturation in the pulmonary artery is 68%, right atrium 71%, systemic 78%. The average mean gradient is 16 mmHg with an average valve area of 1.3 sq cm. Left ventriculography reveals inferobasal hypokinesis. The overall ejection fraction is normal at 60%. Coronary angiography reveals a short left main. It divides into LAD and left circumflex. The LAD is diffusely narrowed in its proximal segment. leading into a septal  and diagonal.  The distal vessel fills by antegrade flow and by flow from the LIMA graft. The left circumflex is occluded, and fills via its vein graft. The native right coronary artery has been previously stented from its ostium through its midsegment. No restenosis. The distal vessel fills well by antegrade flow. There is diffuse irregularity with no significant stenosis appreciated. The LIMA graft to the LAD is patent and normal, with good runoff into the middle and distal LAD. The distal vessel is diffusely irregular, but there is no high-grade stenosis appreciated. The saphenous vein graft to the left circumflex is patent and normal with good runoff into the grafted obtuse marginal branch. There is good retrograde filling up into the more proximal marginal branch up to the left circumflex. IMPRESSION: 
1. Normal left ventricular function. 2.  Moderate aortic stenosis. 3.  Severe pulmonary artery hypertension. 4.  Coronary artery disease as described. The LAD is occluded. The LIMA graft to LAD is patent. The left circumflex is occluded. The vein graft to the left circumflex is patent. The right coronary artery has been previously stented with no restenosis. 5.  Two out of 3 patent grafts as described above. The LIMA graft to the LAD and the saphenous vein graft to the left circumflex remained patent. RECOMMENDATIONS:  Medical therapy. His aortic stenosis does not appear to be severe enough to warrant a TAVR. MD ROS Byrne / GOLDY 
D: 08/29/2018 13:08    
T: 08/29/2018 14:51 
JOB #: [id number]
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
## 2018-09-09 PROBLEM — J96.01 ACUTE RESPIRATORY FAILURE WITH HYPOXIA (HCC): Status: ACTIVE | Noted: 2018-01-01

## 2018-09-09 NOTE — DISCHARGE INSTRUCTIONS
Bruises: Care Instructions  Your Care Instructions    Bruises occur when small blood vessels under the skin tear or rupture, most often from a twist, bump, or fall. Blood leaks into tissues under the skin and causes a black-and-blue spot that often turns colors, including purplish black, reddish blue, or yellowish green, as the bruise heals. Bruises hurt, but most are not serious and will go away on their own within 2 to 4 weeks. Sometimes, gravity causes them to spread down the body. A leg bruise usually will take longer to heal than a bruise on the face or arms. Follow-up care is a key part of your treatment and safety. Be sure to make and go to all appointments, and call your doctor if you are having problems. It's also a good idea to know your test results and keep a list of the medicines you take. How can you care for yourself at home? · Take pain medicines exactly as directed. ¨ If the doctor gave you a prescription medicine for pain, take it as prescribed. ¨ If you are not taking a prescription pain medicine, ask your doctor if you can take an over-the-counter medicine. · Put ice or a cold pack on the area for 10 to 20 minutes at a time. Put a thin cloth between the ice and your skin. · If you can, prop up the bruised area on pillows as much as possible for the next few days. Try to keep the bruise above the level of your heart. When should you call for help? Call your doctor now or seek immediate medical care if:    · You have signs of infection, such as:  ¨ Increased pain, swelling, warmth, or redness. ¨ Red streaks leading from the bruise. ¨ Pus draining from the bruise. ¨ A fever.     · You have a bruise on your leg and signs of a blood clot, such as:  ¨ Increasing redness and swelling along with warmth, tenderness, and pain in the bruised area. ¨ Pain in your calf, back of the knee, thigh, or groin.   ¨ Redness and swelling in your leg or groin.     · Your pain gets worse.   Brent Robin closely for changes in your health, and be sure to contact your doctor if:    · You do not get better as expected. Where can you learn more? Go to http://suly-lauren.info/. Enter [phone] [phone] in the search box to learn more about \"Bruises: Care Instructions. \"  Current as of: November 20, 2017  Content Version: 11.7  © 5070-1350 [employer]. Care instructions adapted under license by [employer] (which disclaims liability or warranty for this information). If you have questions about a medical condition or this instruction, always ask your healthcare professional. [place] [address] any warranty or liability for your use of this information. Contusion: Care Instructions  Your Care Instructions  Contusion is the medical term for a bruise. It is the result of a direct blow or an impact, such as a fall. Contusions are common sports injuries. Most people think of a bruise as a black-and-blue spot. This happens when small blood vessels get torn and leak blood under the skin. But bones, muscles, and organs can also get bruised. This may damage deep tissues but not cause a bruise you can see. The doctor will do a physical exam to find the location of your contusion. You may also have tests to make sure you do not have a more serious injury, such as a broken bone or nerve damage. These may include X-rays or other imaging tests like a CT scan or MRI. Deep-tissue contusions may cause pain and swelling. But if there is no serious damage, they will often get better in a few weeks with home treatment. The doctor has checked you carefully, but problems can develop later. If you notice any problems or new symptoms, get medical treatment right away. Follow-up care is a key part of your treatment and safety. Be sure to make and go to all appointments, and call your doctor if you are having problems.  It's also a good idea to know your test results and keep a list of the medicines you take. How can you care for yourself at home? · Put ice or a cold pack on the sore area for 10 to 20 minutes at a time to stop swelling. Put a thin cloth between the ice pack and your skin. · Be safe with medicines. Read and follow all instructions on the label. ¨ If the doctor gave you a prescription medicine for pain, take it as prescribed. ¨ If you are not taking a prescription pain medicine, ask your doctor if you can take an over-the-counter medicine. · If you can, prop up the sore area on pillows as much as possible for the next few days. Try to keep the sore area above the level of your heart. When should you call for help? Call your doctor now or seek immediate medical care if:  · Your pain gets worse. · You have new or worse swelling. · You have tingling, weakness, or numbness in the area near the contusion. · The area near the contusion is cold or pale. Watch closely for changes in your health, and be sure to contact your doctor if:  · You do not get better as expected. Where can you learn more? Go to Best[employer].be  Enter [id number] in the search box to learn more about \"Contusion: Care Instructions. \"   © 0135-5152 [employer]. Care instructions adapted under license by [address] [employer] (which disclaims liability or warranty for this information). This care instruction is for use with your licensed healthcare professional. If you have questions about a medical condition or this instruction, always ask your healthcare professional. [place] [address] any warranty or liability for your use of this information. Content Version: 79.2.163451; Current as of: May 22, 2015             Fainting: Care Instructions  Your Care Instructions    When you faint, or pass out, you lose consciousness for a short time. A brief drop in blood flow to the brain often causes it.  When you fall or lie down, more blood flows to your brain and you regain consciousness. Emotional stress, pain, or overheating-especially if you have been standing-can make you faint. In these cases, fainting is usually not serious. But fainting can be a sign of a more serious problem. Your doctor may want you to have more tests to rule out other causes. The treatment you need depends on the reason why you fainted. The doctor has checked you carefully, but problems can develop later. If you notice any problems or new symptoms, get medical treatment right away. Follow-up care is a key part of your treatment and safety. Be sure to make and go to all appointments, and call your doctor if you are having problems. It's also a good idea to know your test results and keep a list of the medicines you take. How can you care for yourself at home? · Drink plenty of fluids to prevent dehydration. If you have kidney, heart, or liver disease and have to limit fluids, talk with your doctor before you increase your fluid intake. When should you call for help? Call 911 anytime you think you may need emergency care. For example, call if:    · You have symptoms of a heart problem. These may include:  ¨ Chest pain or pressure. ¨ Severe trouble breathing. ¨ A fast or irregular heartbeat. ¨ Lightheadedness or sudden weakness. ¨ Coughing up pink, foamy mucus. ¨ Passing out. After you call 911, the  may tell you to chew 1 adult-strength or 2 to 4 low-dose aspirin. Wait for an ambulance. Do not try to drive yourself.     · You have symptoms of a stroke. These may include:  ¨ Sudden numbness, tingling, weakness, or loss of movement in your face, arm, or leg, especially on only one side of your body. ¨ Sudden vision changes. ¨ Sudden trouble speaking. ¨ Sudden confusion or trouble understanding simple statements. ¨ Sudden problems with walking or balance.   ¨ A sudden, severe headache that is different from past headaches.     · You passed out (lost consciousness) again.   Garrett Power closely for changes in your health, and be sure to contact your doctor if:    · You do not get better as expected. Where can you learn more? Go to http://suly-lauren.info/. Enter T813 in the search box to learn more about \"Fainting: Care Instructions. \"  Current as of: November 20, 2017  Content Version: 11.7  © 7807-4019 [employer]. Care instructions adapted under license by [employer] (which disclaims liability or warranty for this information). If you have questions about a medical condition or this instruction, always ask your healthcare professional. [place] [address] any warranty or liability for your use of this information. Broken Hand: Care Instructions  Your Care Instructions  A hand can break (fracture) during sports, a fall, or other accidents. The break may happen when your hand twists, is hit, or is used to protect you in a fall. Fractures can range from a small, hairline crack, to a bone or bones broken into two or more pieces. Your treatment depends on how bad the break is. Your doctor may have put your hand in a brace, splint, or cast to allow it to heal or to keep it stable until you see another doctor. It may take weeks or months for your hand to heal. You can help it heal with some care at home. You heal best when you take good care of yourself. Eat a variety of healthy foods, and don't smoke. Follow-up care is a key part of your treatment and safety. Be sure to make and go to all appointments, and call your doctor if you are having problems. It's also a good idea to know your test results and keep a list of the medicines you take. How can you care for yourself at home? · Put ice or a cold pack on your hand for 10 to 20 minutes at a time. Try to do this every 1 to 2 hours for the next 3 days (when you are awake). Put a thin cloth between the ice and your cast or splint. Keep your cast or splint dry.   · Follow the cast care instructions your doctor gives you. If you have a splint, do not take it off unless your doctor tells you to. · Be safe with medicines. Take pain medicines exactly as directed. ¨ If the doctor gave you a prescription medicine for pain, take it as prescribed. ¨ If you are not taking a prescription pain medicine, ask your doctor if you can take an over-the-counter medicine. · Prop up your hand on pillows when you sit or lie down in the first few days after the injury. Keep your hand higher than the level of your heart. This will help reduce swelling. · Follow instructions for exercises to keep your arm strong. · Wiggle your uninjured fingers often to reduce swelling and stiffness, but do not use that hand to grasp or carry anything. When should you call for help? Call your doctor now or seek immediate medical care if:    · You have new or worse pain.     · Your hand or fingers are cool or pale or change color.     · Your cast or splint feels too tight.     · You have tingling, weakness, or numbness in your hand or fingers.    Watch closely for changes in your health, and be sure to contact your doctor if:    · You do not get better as expected.     · You have problems with your cast or splint. Where can you learn more? Go to http://suly-lauren.info/. Enter [phone] [phone] in the search box to learn more about \"Broken Hand: Care Instructions. \"  Current as of: November 29, 2017  Content Version: 11.7  © 0132-9740 [employer]. Care instructions adapted under license by [employer] (which disclaims liability or warranty for this information). If you have questions about a medical condition or this instruction, always ask your healthcare professional. [place] [address] any warranty or liability for your use of this information.

## 2018-09-09 NOTE — ED NOTES
I have reviewed discharge instructions with the patient. The patient verbalized understanding. Patient left ED via Discharge Method: stretcher to Home with [facility]. Opportunity for questions and clarification provided. Patient given 1 scripts. To continue your aftercare when you leave the hospital, you may receive an automated call from our care team to check in on how you are doing. This is a free service and part of our promise to provide the best care and service to meet your aftercare needs.  If you have questions, or wish to unsubscribe from this service please call [phone]. Thank you for Choosing our [facility] Emergency Department.
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
## 2018-09-09 NOTE — ED NOTES
Fiberglass splint applied per orders at this time with assistance of Helder Song. PMS present in all digits left extremity. Skin tears cleaned and bandaged prior to splint placement.

## 2018-09-09 NOTE — ROUTINE PROCESS
TRANSFER - OUT REPORT: 
 
Verbal report given to 7th floor RN on Racheal Mccauley  being transferred to 7th floor for routine progression of care Report consisted of patients Situation, Background, Assessment and  
Recommendations(SBAR). Information from the following report(s) SBAR was reviewed with the receiving nurse. Lines:  
Peripheral IV 09/09/18 Right Antecubital (Active) Site Assessment Clean, dry, & intact 9/9/2018 11:09 AM  
Phlebitis Assessment 0 9/9/2018 11:09 AM  
Infiltration Assessment 0 9/9/2018 11:09 AM  
Dressing Status Clean, dry, & intact 9/9/2018 11:09 AM  
  
 
Opportunity for questions and clarification was provided. Patient transported with: 
 [employer]
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
## 2018-09-09 NOTE — CONSULTS
2 HALO2CLOUD [facility], [address] [place] [address] PHONE: [phone] 
 
 
09/09/18 Delora Lanes Schnittman 
1940 SUBJECTIVE:  
Sanjuana Aguillon is a 66 y.o. male seen for syncope regarding the following: Chief Complaint Patient presents with  Syncope HPI: 
This is a 68yo male known to Dr. Derrek Sargent for our office for CAD, prior CABG and last PCI 4/2017, moderate AS and severe pHTN by [facility] and [facility] on 8/29/18, normal EF who is in the ER for recurrent syncope. He has pAF, on NOAC and plavix. This syncope has been an ongoing issue, had sherley syncope while driving to  his dog yesterday, after AM dialysis. Was seen in the ER and sent home. Went home, sat on the couch and rested, then got up and passed out while in the kitchen. No prodrome, no CP, no palpitations. Layed on the ground for 6 hrs and then was able to alert EMS. He has no recent angina, CP, ALBERT, SOB. No edema. Gets HD on Tues, Thurs and Sat. Had normal HD yesterday AM. Recent arm fx after non-syncopal mechanical fall at home. Lives home alone. Past Medical History, Past Surgical History, Family history, Social History, and Medications were all reviewed with the patient today and updated as necessary. CURRENT MEDS: 
 
No current facility-administered medications for this encounter. Current Outpatient Prescriptions Medication Sig  
 HYDROcodone-acetaminophen (NORCO) 5-325 mg per tablet Take 1 Tab by mouth every six (6) hours as needed for Pain. Max Daily Amount: 4 Tabs.  cyclobenzaprine (FLEXERIL) 5 mg tablet Take 0.5 Tabs by mouth daily as needed for Muscle Spasm(s).  clopidogrel (PLAVIX) 75 mg tab TAKE 1 TABLET BY MOUTH EVERY DAY  metoprolol succinate (TOPROL-XL) 50 mg XL tablet Take 1 Tab by mouth daily.  predniSONE (DELTASONE) 10 mg tablet Take 1 Tab by mouth as needed.  finasteride (PROSCAR) 5 mg tablet Take 5 mg by mouth daily.  Indications: benign prostatic hyperplasia with lower urinary tract sx  apixaban (ELIQUIS) 2.5 mg tablet Take 2.5 mg by mouth two (2) times a day. Indications: PREVENT THROMBOEMBOLISM IN CHRONIC ATRIAL FIBRILLATION  calcitRIOL (ROCALTROL) 0.5 mcg capsule Take 0.5 mcg by mouth daily. Indications: Renal Osteodystrophy, five days a week  nitroglycerin (NITROSTAT) 0.4 mg SL tablet ONE TABLET UNDER TONGUE AS NEEDED FOR CHEST PAIN EVERY 5 MINUTES  
 B.infantis-B.ani-B.long-B.bifi (PROBIOTIC 4X) 10-15 mg TbEC Take  by mouth daily.  cinacalcet (SENSIPAR) 60 mg tab Take 120 mg by mouth nightly. Indications: HYPERPARATHYROIDISM SECONDARY TO CRF WITH DIALYSIS  
 DOCUSATE CALCIUM (STOOL SOFTENER PO) Take  by mouth daily.  multivitamin (ONE A DAY) tablet Take 1 Tab by mouth daily. Special Complex for dialysis patients  dicyclomine (BENTYL) 10 mg capsule Take 10 mg by mouth daily as needed. Indications: Irritable Bowel Syndrome  Omeprazole Magnesium 20 mg cpDR Take  by mouth daily. Indications: gastroesophageal reflux disease  epoetin alcides (EPOGEN;PROCRIT) 10,000 unit/mL injection by SubCUTAneous route as needed. Currently taking abt [facility] a [facility] at [facility]  Magnesium Oxide 500 mg cap Take  by mouth daily.  KRILL OIL PO Take  by mouth daily.  RENVELA 800 mg Tab tab Take 800 mg by mouth three (3) times daily (with meals). 4 tablets with each meal; 2 tablets with each snack . Indications: 3 with meals, 2 with snacks Allergies Allergen Reactions  Nka [No Known Allergies] Unknown (comments) Hospital Problems  Date Reviewed: 8/22/2018 None PMH: 
CAD 
AS 
pHTN 
ESRD 
HTN 
pAF Past Surgical History:  
Procedure Laterality Date  ABDOMEN SURGERY PROC UNLISTED    
 perineal cath  CARDIAC SURG PROCEDURE UNLIST CABG x 2 in 2007; PTCA WITH STENTS  
 CREAT AV FISTULA,AUTOGENOUS GRAFT    
 HX COLONOSCOPY  2011  HX CORONARY ARTERY BYPASS GRAFT  HX CORONARY STENT PLACEMENT    
 new stent placement 2017  HX CSF SHUNT  HX HEART CATHETERIZATION    
 HX HERNIA REPAIR    
 bilateral  
 HX ORTHOPAEDIC    
 rt shoulder rotater cuff,lt knee  HX VASCULAR ACCESS  2010 L u arm  
 OTHER CELL    
 LEFT KNEE SURGERY  VASCULAR SURGERY PROCEDURE UNLIST    
 cabg x2 Family History Problem Relation Age of Onset  Heart Disease Father 59 MI  
 Heart Attack Father 72 MI  
 Stroke Mother  Hypertension Mother  Heart Disease Mother  Cancer Sister   
  stomach Social History Substance Use Topics  Smoking status: Current Every Day Smoker Last attempt to quit: 1/1/1980  Smokeless tobacco: Never Used Comment: CIGAR DAILY FOR 10 YEARS  
 Alcohol use 0.0 oz/week Comment: rare ROS: 
 
Constitutional:   Negative for fevers and unexplained weight loss. Eyes:   Negative for visual disturbance. ENT:   Negative for significant hearing loss and tinnitus. Respiratory:   Negative for hemoptysis. Cardiovascular:   Negative except as noted in HPI. Gastrointestinal:   Negative for melena and abdominal pain. Genitourinary:   Negative for hematuria, renal stones. Integumentary:   Negative for rash or non-healing wounds Hematologic/Lymphatic:   Negative for excessive bleeding hx or clotting disorder. Musculoskeletal:  Negative for active, unexplained/severe joint pain. Neurological:   Negative for stroke. Behavioral/Psych:   Negative for suicidal ideations. Endocrine:   Negative for uncontrolled diabetic symptoms including polyuria, polydipsia and poor wound healing. PHYSICAL EXAM: 
 
  
Visit Vitals  /72  Pulse 95  Temp 97.8 °F (36.6 °C)  Resp 25  
 Ht 5' 10\" (1.778 m)  Wt 70.3 kg (155 lb)  SpO2 (!) 82%  BMI 22.24 kg/m2 General/Constitutional:   Alert and oriented x 3, no acute distress HEENT:   normocephalic, atraumatic, moist mucous membranes Neck:   No JVD or carotid bruits bilaterally Cardiovascular:   irreg irreg, 3/6 SM Pulmonary:   clear to auscultation bilaterally, no respiratory distress Abdomen:   soft, non-tender, non-distended Ext:   No sig LE edema bilaterally, left arm with cast 
Skin:  warm and dry, no obvious rashes seen Neuro:   no obvious sensory or motor deficits Psychiatric:   normal mood and affect EKG Today: Afib, normal intervals and non-specific ST/T wave changes. Imaging reviewed. Medical problems, medical history and test results were reviewed with the patient today. Recent Results (from the past 168 hour(s)) EKG, 12 LEAD, INITIAL Collection Time: 09/08/18  7:13 PM  
Result Value Ref Range Ventricular Rate 89 BPM  
 Atrial Rate 117 BPM  
 QRS Duration 84 ms Q-T Interval 372 ms QTC Calculation (Bezet) 452 ms Calculated R Axis 53 degrees Calculated T Axis 173 degrees Diagnosis    
  !! AGE AND GENDER SPECIFIC ECG ANALYSIS !! Atrial fibrillation Anteroseptal infarct (cited on or before 30-JUL-2018) ST & T wave abnormality, consider inferolateral ischemia or digitalis effect Abnormal ECG When compared with ECG of 07-AUG-2018 00:03, 
T wave inversion less evident in Inferior leads Confirmed by Anjelica Raines ([id number]) on 9/9/2018 10:10:13 AM 
  
CBC WITH AUTOMATED DIFF Collection Time: 09/08/18  7:13 PM  
Result Value Ref Range WBC 11.7 (H) 4.3 - 11.1 K/uL  
 RBC 3.14 (L) 4.23 - 5.6 M/uL  
 HGB 11.0 (L) 13.6 - 17.2 g/dL HCT 35.0 (L) 41.1 - 50.3 % .5 (H) 79.6 - 97.8 FL  
 MCH 35.0 (H) 26.1 - 32.9 PG  
 MCHC 31.4 31.4 - 35.0 g/dL  
 RDW 16.3 % PLATELET 874 (L) 163 - 450 K/uL MPV 10.4 9.4 - 12.3 FL ABSOLUTE NRBC 0.00 0.0 - 0.2 K/uL  
 DF AUTOMATED NEUTROPHILS 78 43 - 78 % LYMPHOCYTES 9 (L) 13 - 44 % MONOCYTES 11 4.0 - 12.0 % EOSINOPHILS 1 0.5 - 7.8 % BASOPHILS 0 0.0 - 2.0 % IMMATURE GRANULOCYTES 1 0.0 - 5.0 % ABS. NEUTROPHILS 9.0 (H) 1.7 - 8.2 K/UL  
 ABS. LYMPHOCYTES 1.1 0.5 - 4.6 K/UL  
 ABS. MONOCYTES 1.3 0.1 - 1.3 K/UL  
 ABS. EOSINOPHILS 0.1 0.0 - 0.8 K/UL  
 ABS. BASOPHILS 0.1 0.0 - 0.2 K/UL  
 ABS. IMM. GRANS. 0.1 0.0 - 0.5 K/UL METABOLIC PANEL, COMPREHENSIVE Collection Time: 09/08/18  7:13 PM  
Result Value Ref Range Sodium 138 136 - 145 mmol/L Potassium 3.9 3.5 - 5.1 mmol/L Chloride 99 98 - 107 mmol/L  
 CO2 27 21 - 32 mmol/L Anion gap 12 7 - 16 mmol/L Glucose 132 (H) 65 - 100 mg/dL BUN 25 (H) 8 - 23 MG/DL Creatinine 5.21 (H) 0.8 - 1.5 MG/DL  
 GFR est AA 14 (L) >60 ml/min/1.73m2 GFR est non-AA 11 (L) >60 ml/min/1.73m2 Calcium 8.4 8.3 - 10.4 MG/DL Bilirubin, total 0.7 0.2 - 1.1 MG/DL  
 ALT (SGPT) 13 12 - 65 U/L  
 AST (SGOT) 18 15 - 37 U/L Alk. phosphatase 107 50 - 136 U/L Protein, total 7.1 6.3 - 8.2 g/dL Albumin 3.0 (L) 3.2 - 4.6 g/dL Globulin 4.1 (H) 2.3 - 3.5 g/dL A-G Ratio 0.7 (L) 1.2 - 3.5 POC TROPONIN-I Collection Time: 09/08/18  7:13 PM  
Result Value Ref Range Troponin-I (POC) 0.1 (H) 0.02 - 0.05 ng/ml CBC WITH AUTOMATED DIFF Collection Time: 09/09/18 10:15 AM  
Result Value Ref Range WBC 14.9 (H) 4.3 - 11.1 K/uL  
 RBC 3.36 (L) 4.23 - 5.6 M/uL  
 HGB 12.0 (L) 13.6 - 17.2 g/dL HCT 38.2 (L) 41.1 - 50.3 % .7 (H) 79.6 - 97.8 FL  
 MCH 35.7 (H) 26.1 - 32.9 PG  
 MCHC 31.4 31.4 - 35.0 g/dL  
 RDW 16.6 % PLATELET 790 135 - 287 K/uL MPV 10.1 9.4 - 12.3 FL ABSOLUTE NRBC 0.04 0.0 - 0.2 K/uL  
 DF AUTOMATED NEUTROPHILS 80 (H) 43 - 78 % LYMPHOCYTES 7 (L) 13 - 44 % MONOCYTES 10 4.0 - 12.0 % EOSINOPHILS 1 0.5 - 7.8 % BASOPHILS 1 0.0 - 2.0 % IMMATURE GRANULOCYTES 1 0.0 - 5.0 %  
 ABS. NEUTROPHILS 11.8 (H) 1.7 - 8.2 K/UL  
 ABS. LYMPHOCYTES 1.1 0.5 - 4.6 K/UL  
 ABS. MONOCYTES 1.6 (H) 0.1 - 1.3 K/UL  
 ABS. EOSINOPHILS 0.2 0.0 - 0.8 K/UL  
 ABS. BASOPHILS 0.1 0.0 - 0.2 K/UL ABS. IMM. GRANS. 0.1 0.0 - 0.5 K/UL METABOLIC PANEL, COMPREHENSIVE Collection Time: 09/09/18 10:15 AM  
Result Value Ref Range Sodium 137 136 - 145 mmol/L Potassium 5.0 3.5 - 5.1 mmol/L Chloride 97 (L) 98 - 107 mmol/L  
 CO2 25 21 - 32 mmol/L Anion gap 15 7 - 16 mmol/L Glucose 119 (H) 65 - 100 mg/dL BUN 34 (H) 8 - 23 MG/DL Creatinine 6.37 (H) 0.8 - 1.5 MG/DL  
 GFR est AA 11 (L) >60 ml/min/1.73m2 GFR est non-AA 9 (L) >60 ml/min/1.73m2 Calcium 8.9 8.3 - 10.4 MG/DL Bilirubin, total 1.2 (H) 0.2 - 1.1 MG/DL  
 ALT (SGPT) 16 12 - 65 U/L  
 AST (SGOT) 37 15 - 37 U/L Alk. phosphatase 111 50 - 136 U/L Protein, total 8.1 6.3 - 8.2 g/dL Albumin 3.4 3.2 - 4.6 g/dL Globulin 4.7 (H) 2.3 - 3.5 g/dL A-G Ratio 0.7 (L) 1.2 - 3.5 POC TROPONIN-I Collection Time: 09/09/18 10:28 AM  
Result Value Ref Range Troponin-I (POC) 0.17 (H) 0.02 - 0.05 ng/ml CK WITH MB  
 Collection Time: 09/09/18 10:45 AM  
Result Value Ref Range  (H) 21 - 215 U/L  
 CK - MB 7.4 (H) 0.5 - 3.6 ng/ml CK-MB Index 2.5 (H) <2.5 % EKG, 12 LEAD, INITIAL Collection Time: 09/09/18 11:01 AM  
Result Value Ref Range Ventricular Rate 88 BPM  
 Atrial Rate 250 BPM  
 QRS Duration 86 ms  
 Q-T Interval 366 ms  
 QTC Calculation (Bezet) 442 ms Calculated R Axis 102 degrees Calculated T Axis -173 degrees Diagnosis    
  !! AGE AND GENDER SPECIFIC ECG ANALYSIS !! Atrial fibrillation Rightward axis Septal infarct (cited on or before 30-JUL-2018) ST & T wave abnormality, consider inferolateral ischemia or digitalis effect Abnormal ECG When compared with ECG of 08-SEP-2018 19:13, No significant change was found Confirmed by Thanh Yee ([id number]) on 9/9/2018 12:45:41 PM 
  
 
Lab Results Component Value Date/Time  Cholesterol, total 137 03/29/2011 04:21 AM  
 HDL Cholesterol 31 (L) 03/29/2011 04:21 AM  
 LDL, calculated 57.8 03/29/2011 04:21 AM  
 VLDL, calculated 48.2 (H) 03/29/2011 04:21 AM  
 Triglyceride 241 (H) 03/29/2011 04:21 AM  
 CHOL/HDL Ratio 4.4 03/29/2011 04:21 AM  
 
 
 
ASSESSMENT: 
 
1. Syncope 2. Moderate AS 3. CAD, prior CABG 4. pAF 5. ESRD 6. Pulm HTN 
 
PLAN:  
 
1. Syncope: unclear etiology, AS is moderate and not significant by itself to explain the syncope. But, he could be volume depleted after HD and then develops orthostatic like sx that are more pronounced given the moderate AS. Hospitalist to admit, follow on tele. May need to reduce the BB dose, talk with renal about taking off less volume with HD. He has no angina at all, this does not seem ischemic driven. We will follow. 2. AS: moderate by recent LHC, not candidate for TAVR. 3. CAD:  No angina now, recent LHC reviewed. Trop noted, but tough to interpret such with ESRD and the fact that he layed on the floor for 6hrs last night. Check AM trop, follow. 4. PAF:  Falling more, may be watchman candidate soon. On NOAC now. Continue BB. Follow HR and BP. Follow on tele for slow rates. SSS? ?\ 
 
5. ESRD: consult renal, per hospitalist.  
 
We will follow as consultant. Thanks Mary Lou Sherman DO 
9/9/2018  1:30pm

## 2018-09-09 NOTE — ED NOTES
Patient in bed, alert and oriented, answering questions appropriately. Denies cp, sob, dizziness currently. Currently dialysis patient and states he has cardiac hx. VSS.

## 2018-09-10 NOTE — PROGRESS NOTES
Spiritual Care Visit, initial visit. Visited with patient and his wife at bedside. Prayed for patient's healing and health. Visit by Sherrilyn Alexandria Tobin Osgood, Staff .  Meghana., Th.B., B.A.

## 2018-09-10 NOTE — PROGRESS NOTES
Hospitalist Progress Note 9/10/2018 Admit Date: 2018 10:11 AM  
NAME: Juan Nguyen :  1940 MRN:  [id number] Attending: Gama Corona MD 
PCP:  Torres Snow. Regina Polo MD 
 
SUBJECTIVE:  
Patient sitting up in bed, no acute events. Complaining of soreness to head, neck but tylenol adequate pain relief. Accompanied by son. Main concern is if he will be able to go back to living independently due to need to drive to dialysis three times weekly. Review of Systems negative with exception of pertinent positives noted above PHYSICAL EXAM  
 
Visit Vitals  /69 (BP 1 Location: Right arm, BP Patient Position: At rest)  Pulse 96  Temp 98.1 °F (36.7 °C)  Resp 18  Ht 5' 10\" (1.778 m)  Wt 70.3 kg (155 lb)  SpO2 99%  BMI 22.24 kg/m2 Temp (24hrs), Av.2 °F (36.8 °C), Min:97.8 °F (36.6 °C), Max:98.6 °F (37 °C) Oxygen Therapy O2 Sat (%): 99 % (09/10/18 0448) Pulse via Oximetry: 81 beats per minute (18 1400) O2 Device: Room air (18 1011) Intake/Output Summary (Last 24 hours) at 09/10/18 4952 Last data filed at 18 1723 Gross per 24 hour Intake              240 ml Output                0 ml Net              240 ml General: Alert, cooperative, no distress, appears stated age. Head: occipital scalp lac Lungs: CTAB, No Wheezing or Rhonchi. No rales. Heart:  Irregularly irregular, 3/6SM Abdomen: Soft, non-tender. Not distended. Bowel sounds normal.  
Extremities: No cyanosis. No edema. No clubbing. Brace on L hand. Skin: multiple abrasions and ecchymoses Neurologic: Alert and oriented x 3, no focal deficits ASSESSMENT Active Hospital Problems Diagnosis Date Noted  Acute respiratory failure with hypoxia ([facility] [address] 2018  Syncope 2018  Gastroesophageal reflux disease without esophagitis 10/18/2017  End-stage renal disease ([facility] [address] 2016 Last Assessment & Plan: Lab work from [facility] looks good BP looks good Losing weight Feels good Hernia acting up - will se  Dr Kamille Sarabia prn 2nd to glomerulonephritis  Chronic atrial fibrillation ([facility] [address] 07/19/2016  Anemia, unspecified  07/07/2016  
  2nd to CKD  Hypercholesterolemia 08/15/2013  CAD (coronary artery disease) 03/28/2011 S/p stent prior to CABG; CABG 2007 
  
 HTN (hypertension) 03/28/2011 Plan: 
 
Syncope: cards following, recommendations appreciated. Telemetry. Considering aortic stenosis, arrythmia, post-dialysis hypotension. AS, CAD, pAfib: per cards. Home BB, eliquis. Mild troponin elevation more likely ESRD related. Has recent cath.   
 
hypoxia: CXR with stable effusion (likely related to dialysis, afib). Hypoxia improved.  
  
ESRD: nephro managing - t/th/s. They are increasing dry weight to try to address possible dehydration component of syncope. Anemia of chronic disease: stable 
  
DVT PPx: anticoagulated on elequis Code Status: DNR Signed By: Betty Johnston MD   
 September 10, 2018

## 2018-09-10 NOTE — PROGRESS NOTES
[facility] PROGRESS NOTE Follow up for: 
 
Subjective:  
Patient seen and examined. Chart, notes, labs, imaging, results all reviewed. Feels ok. No sob, cp, n/v/d/c, abdominal pain. For HD in am.  
 
ROS: 
Gen - no fever, no chills, appetite okay CV - no chest pain, no orthopnea Lung - no shortness of breath, no cough Abd - no tenderness, no nausea, no vomiting Ext - no edema Skin - multiple abrasions and bruises LUE AVF +thrill+bruit Objective:  
Exam: 
Vitals:  
 09/09/18 1537 09/09/18 2318 09/10/18 0448 09/10/18 5641 BP: 112/62 102/62 122/64 117/69 Pulse: 86 80 85 96 Resp: 16 16 16 18 Temp: 98 °F (36.7 °C) 98.4 °F (36.9 °C) 98.6 °F (37 °C) 98.1 °F (36.7 °C) SpO2: 90% 98% 99% Weight:      
Height:      
 
 
 
Intake/Output Summary (Last 24 hours) at 09/10/18 0940 Last data filed at 09/09/18 1723 Gross per 24 hour Intake              240 ml Output                0 ml Net              240 ml  
 
 
Current Facility-Administered Medications Medication Dose Route Frequency  apixaban (ELIQUIS) tablet 2.5 mg  2.5 mg Oral BID  cinacalcet (SENSIPAR) tablet 120 mg  120 mg Oral QHS  clopidogrel (PLAVIX) tablet 75 mg  75 mg Oral DAILY  dicyclomine (BENTYL) capsule 10 mg  10 mg Oral DAILY PRN  
 finasteride (PROSCAR) tablet 5 mg  5 mg Oral DAILY  nitroglycerin (NITROSTAT) tablet 0.4 mg  0.4 mg SubLINGual PRN  
 sevelamer carbonate (RENVELA) tab 800 mg  800 mg Oral TID WITH MEALS  magnesium oxide (MAG-OX) tablet 400 mg  400 mg Oral DAILY  sodium chloride (NS) flush 5-10 mL  5-10 mL IntraVENous Q8H  
 sodium chloride (NS) flush 5-10 mL  5-10 mL IntraVENous PRN  
 acetaminophen (TYLENOL) tablet 650 mg  650 mg Oral Q4H PRN  
 HYDROcodone-acetaminophen (NORCO) 5-325 mg per tablet 1 Tab  1 Tab Oral Q4H PRN  
 naloxone (NARCAN) injection 0.4 mg  0.4 mg IntraVENous PRN  
 bisacodyl (DULCOLAX) tablet 5 mg  5 mg Oral DAILY PRN  
  metoprolol succinate (TOPROL-XL) XL tablet 50 mg  50 mg Oral DAILY EXAM 
GEN - Alert, oriented, in no distress CV - S1, S2, RRR, no rub, murmur, or gallop Lung - clear to auscultation bilaterally Abd - soft, nontender, BS present Ext - no edema Recent Labs  
   09/09/18 21   09/08/18 
 1913 WBC  14.9*  11.7* HGB  12.0*  11.0*  
HCT  38.2*  35.0*  
PLT  174  139* Recent Labs  
   09/09/18 21   09/08/18 
 1913 NA  137  138  
K  5.0  3.9 CL  97*  99  
CO2  25  27 BUN  34*  25* CREA  6.37*  5.21* CA  8.9  8.4 GLU  119*  132* Assessment and Plan:  
ESRD on HD [facility] 
- HD tomorrow per routine for clearance and volume. See below. Will increase dry weight for now with recurrent syncope - LUE AVF 
- has had no issues with hypotension or syncope while on dialysis Recurrent Syncope - syncope has been an ongoing issue 
- etiology unclear 
- cardiology following - Cardiology withconcerns of volume depletion exacerbating orthostatic symptoms that are more pronounced given his moderate AS so dry weight will need to be increased for now. However, review of records indicate that his prior ER visits for syncope on 7/31 and 8/7 actually preceded his dialysis sessions and thus he would have been less likely volume depleted at that time. Moderate AS and severe pHTN - not a candidate for TAVR 
 
CAD s/p CABG, last PCI 4/2017 PAF - rate controlled on BB. No documented pauses or bradycardia. On Eliquis. Concern for falls Katelynn Del Real, MISTYP

## 2018-09-10 NOTE — PROGRESS NOTES
Problem: Interdisciplinary Rounds Goal: Interdisciplinary Rounds Outcome: Progressing Towards Goal 
Interdisciplinary team rounds were held 9/10/2018 with the following team members:Care Management, Physical Therapy, Physician and . Plan of care discussed. See clinical pathway and/or care plan for interventions and desired outcomes.

## 2018-09-10 NOTE — PROGRESS NOTES
Spiritual Care Visit, initial visit. Attempted visit with patient at bedside. Patient was sleeping and did not respond. Visit by Lazaro Holt, Staff .  Amina, Shannon., B.A.

## 2018-09-10 NOTE — PROGRESS NOTES
CM met with patient regarding consult to assist with transportation needs. Patient's son was also present as he is visit from [place] [id number]. CM provided patient with information regarding [employer] for transportation to and from medical appointments. Also discussed available transportation options including taxi / [employer]. Patient inquired about [employer], and CM educated patient about needing to have Medicaid to qualify for this service. Patient does not presently qualify for [employer]. Patient confirmed no social changes since last admission, aside from ability to drive himself. Please see previous CM notes. No other anticipated DC needs. CM will continue to follow.

## 2018-09-10 NOTE — CONSULTS
[address], [address] PHONE: [phone] 
 
 
09/10/18 Juan Carlos Perales 
1940 SUBJECTIVE:  
Melissa Bell is a 66 y.o. male seen for syncope regarding the following: Chief Complaint Patient presents with  Syncope HPI: 
This is a 70yo male known to Dr. Ricco Huntley for our office for CAD, prior CABG and last PCI 4/2017, moderate AS and severe pHTN by [facility] on 8/29/18, normal EF who is in the ER for recurrent syncope. He has pAF, on NOAC and plavix. This syncope has been an ongoing issue, had sherley syncope while driving to  his dog yesterday, after AM dialysis. Was seen in the ER and sent home. Went home, sat on the couch and rested, then got up and passed out while in the kitchen. No prodrome, no CP, no palpitations. Layed on the ground for 6 hrs and then was able to alert EMS. He has no recent angina, CP, ALBERT, SOB. No edema. Gets HD on Tues, Thurs and Sat. Had normal HD yesterday AM. Recent arm fx after non-syncopal mechanical fall at home. Lives home alone. Appears to have rate controlled AF on monitor. No pauses. No paul. Past Medical History, Past Surgical History, Family history, Social History, and Medications were all reviewed with the patient today and updated as necessary. CURRENT MEDS: 
 
Current Facility-Administered Medications Medication Dose Route Frequency  apixaban (ELIQUIS) tablet 2.5 mg  2.5 mg Oral BID  cinacalcet (SENSIPAR) tablet 120 mg  120 mg Oral QHS  clopidogrel (PLAVIX) tablet 75 mg  75 mg Oral DAILY  dicyclomine (BENTYL) capsule 10 mg  10 mg Oral DAILY PRN  
 finasteride (PROSCAR) tablet 5 mg  5 mg Oral DAILY  nitroglycerin (NITROSTAT) tablet 0.4 mg  0.4 mg SubLINGual PRN  
 sevelamer carbonate (RENVELA) tab 800 mg  800 mg Oral TID WITH MEALS  magnesium oxide (MAG-OX) tablet 400 mg  400 mg Oral DAILY  sodium chloride (NS) flush 5-10 mL  5-10 mL IntraVENous Q8H  
 sodium chloride (NS) flush 5-10 mL  5-10 mL IntraVENous PRN  
 acetaminophen (TYLENOL) tablet 650 mg  650 mg Oral Q4H PRN  
 HYDROcodone-acetaminophen (NORCO) 5-325 mg per tablet 1 Tab  1 Tab Oral Q4H PRN  
 naloxone (NARCAN) injection 0.4 mg  0.4 mg IntraVENous PRN  
 bisacodyl (DULCOLAX) tablet 5 mg  5 mg Oral DAILY PRN  
 metoprolol succinate (TOPROL-XL) XL tablet 50 mg  50 mg Oral DAILY Allergies Allergen Reactions  Nka [No Known Allergies] Unknown (comments) Hospital Problems  Date Reviewed: 8/22/2018 Codes Class Noted POA Acute respiratory failure with hypoxia [facility]) ICD-10-CM: J96.01 
ICD-9-CM: 518.81  9/9/2018 Yes * (Principal)Syncope ICD-10-CM: R55 
ICD-9-CM: 780.2  7/31/2018 Yes Gastroesophageal reflux disease without esophagitis ICD-10-CM: K21.9 ICD-9-CM: 530.81  10/18/2017 Yes End-stage renal disease ([facility] [address] ICD-10-CM: N18.6 ICD-9-CM: 585.6  11/18/2016 Yes Overview Addendum 10/18/2017  1:32 PM by Yolanda Collier Last Assessment & Plan:  
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  
  2nd to CKD Hypercholesterolemia ICD-10-CM: E78.00 ICD-9-CM: 272.0  8/15/2013 Yes CAD (coronary artery disease) (Chronic) ICD-10-CM: I25.10 ICD-9-CM: 414.00  3/28/2011 Yes Overview Signed 1/6/2017 11:29 AM by Connie Gan MD  
  S/p stent prior to CABG; CABG 2007 HTN (hypertension) (Chronic) ICD-10-CM: I10 
ICD-9-CM: 401.9  3/28/2011 Yes PMH: 
CAD 
AS 
pHTN 
ESRD 
HTN 
pAF Past Surgical History: Procedure Laterality Date  ABDOMEN SURGERY PROC UNLISTED    
 perineal cath  CARDIAC SURG PROCEDURE UNLIST CABG x 2 in 2007; PTCA WITH STENTS  
 CREAT AV FISTULA,AUTOGENOUS GRAFT    
 HX COLONOSCOPY  2011  HX CORONARY ARTERY BYPASS GRAFT    
 HX CORONARY STENT PLACEMENT    
 new stent placement 2017  HX CSF SHUNT  HX HEART CATHETERIZATION    
 HX HERNIA REPAIR    
 bilateral  
 HX ORTHOPAEDIC    
 rt shoulder rotater cuff,lt knee  HX VASCULAR ACCESS  2010 L u arm  
 OTHER CELL    
 LEFT KNEE SURGERY  VASCULAR SURGERY PROCEDURE UNLIST    
 cabg x2 Family History Problem Relation Age of Onset  Heart Disease Father 59 MI  
 Heart Attack Father 72 MI  
 Stroke Mother  Hypertension Mother  Heart Disease Mother  Cancer Sister   
  stomach Social History Substance Use Topics  Smoking status: Current Every Day Smoker Last attempt to quit: 1/1/1980  Smokeless tobacco: Never Used Comment: CIGAR DAILY FOR 10 YEARS  
 Alcohol use 0.0 oz/week Comment: rare ROS: 
 
Constitutional:   Negative for fevers and unexplained weight loss. Eyes:   Negative for visual disturbance. ENT:   Negative for significant hearing loss and tinnitus. Respiratory:   Negative for hemoptysis. Cardiovascular:   Negative except as noted in HPI. Gastrointestinal:   Negative for melena and abdominal pain. Genitourinary:   Negative for hematuria, renal stones. Integumentary:   Negative for rash or non-healing wounds Hematologic/Lymphatic:   Negative for excessive bleeding hx or clotting disorder. Musculoskeletal:  Negative for active, unexplained/severe joint pain. Neurological:   Negative for stroke. Behavioral/Psych:   Negative for suicidal ideations. Endocrine:   Negative for uncontrolled diabetic symptoms including polyuria, polydipsia and poor wound healing. PHYSICAL EXAM: 
 
  
Visit Vitals  /64  Pulse 85  Temp 98.6 °F (37 °C)  Resp 16  
 Ht 5' 10\" (1.778 m)  Wt 70.3 kg (155 lb)  SpO2 99%  BMI 22.24 kg/m2 General/Constitutional:   Alert and oriented x 3, no acute distress HEENT:   normocephalic, atraumatic, moist mucous membranes Neck:   No JVD or carotid bruits bilaterally Cardiovascular:   irreg irreg, 3/6 SM Pulmonary:   clear to auscultation bilaterally, no respiratory distress Abdomen:   soft, non-tender, non-distended Ext:   No sig LE edema bilaterally, left arm with cast 
Skin:  warm and dry, no obvious rashes seen Neuro:   no obvious sensory or motor deficits Psychiatric:   normal mood and affect EKG Today: Afib, normal intervals and non-specific ST/T wave changes. Imaging reviewed. Medical problems, medical history and test results were reviewed with the patient today. Recent Results (from the past 168 hour(s)) EKG, 12 LEAD, INITIAL Collection Time: 09/08/18  7:13 PM  
Result Value Ref Range Ventricular Rate 89 BPM  
 Atrial Rate 117 BPM  
 QRS Duration 84 ms Q-T Interval 372 ms QTC Calculation (Bezet) 452 ms Calculated R Axis 53 degrees Calculated T Axis 173 degrees Diagnosis    
  !! AGE AND GENDER SPECIFIC ECG ANALYSIS !! Atrial fibrillation Anteroseptal infarct (cited on or before 30-JUL-2018) ST & T wave abnormality, consider inferolateral ischemia or digitalis effect Abnormal ECG When compared with ECG of 07-AUG-2018 00:03, 
T wave inversion less evident in Inferior leads Confirmed by Pepe Nielsen ([id number]) on 9/9/2018 10:10:13 AM 
  
CBC WITH AUTOMATED DIFF Collection Time: 09/08/18  7:13 PM  
Result Value Ref Range WBC 11.7 (H) 4.3 - 11.1 K/uL  
 RBC 3.14 (L) 4.23 - 5.6 M/uL  
 HGB 11.0 (L) 13.6 - 17.2 g/dL HCT 35.0 (L) 41.1 - 50.3 % .5 (H) 79.6 - 97.8 FL  
 MCH 35.0 (H) 26.1 - 32.9 PG  
 MCHC 31.4 31.4 - 35.0 g/dL  
 RDW 16.3 % PLATELET 711 (L) 394 - 450 K/uL  MPV 10.4 9.4 - 12.3 FL  
 ABSOLUTE NRBC 0.00 0.0 - 0.2 K/uL  
 DF AUTOMATED NEUTROPHILS 78 43 - 78 % LYMPHOCYTES 9 (L) 13 - 44 % MONOCYTES 11 4.0 - 12.0 % EOSINOPHILS 1 0.5 - 7.8 % BASOPHILS 0 0.0 - 2.0 % IMMATURE GRANULOCYTES 1 0.0 - 5.0 %  
 ABS. NEUTROPHILS 9.0 (H) 1.7 - 8.2 K/UL  
 ABS. LYMPHOCYTES 1.1 0.5 - 4.6 K/UL  
 ABS. MONOCYTES 1.3 0.1 - 1.3 K/UL  
 ABS. EOSINOPHILS 0.1 0.0 - 0.8 K/UL  
 ABS. BASOPHILS 0.1 0.0 - 0.2 K/UL  
 ABS. IMM. GRANS. 0.1 0.0 - 0.5 K/UL METABOLIC PANEL, COMPREHENSIVE Collection Time: 09/08/18  7:13 PM  
Result Value Ref Range Sodium 138 136 - 145 mmol/L Potassium 3.9 3.5 - 5.1 mmol/L Chloride 99 98 - 107 mmol/L  
 CO2 27 21 - 32 mmol/L Anion gap 12 7 - 16 mmol/L Glucose 132 (H) 65 - 100 mg/dL BUN 25 (H) 8 - 23 MG/DL Creatinine 5.21 (H) 0.8 - 1.5 MG/DL  
 GFR est AA 14 (L) >60 ml/min/1.73m2 GFR est non-AA 11 (L) >60 ml/min/1.73m2 Calcium 8.4 8.3 - 10.4 MG/DL Bilirubin, total 0.7 0.2 - 1.1 MG/DL  
 ALT (SGPT) 13 12 - 65 U/L  
 AST (SGOT) 18 15 - 37 U/L Alk. phosphatase 107 50 - 136 U/L Protein, total 7.1 6.3 - 8.2 g/dL Albumin 3.0 (L) 3.2 - 4.6 g/dL Globulin 4.1 (H) 2.3 - 3.5 g/dL A-G Ratio 0.7 (L) 1.2 - 3.5 POC TROPONIN-I Collection Time: 09/08/18  7:13 PM  
Result Value Ref Range Troponin-I (POC) 0.1 (H) 0.02 - 0.05 ng/ml CBC WITH AUTOMATED DIFF Collection Time: 09/09/18 10:15 AM  
Result Value Ref Range WBC 14.9 (H) 4.3 - 11.1 K/uL  
 RBC 3.36 (L) 4.23 - 5.6 M/uL  
 HGB 12.0 (L) 13.6 - 17.2 g/dL HCT 38.2 (L) 41.1 - 50.3 % .7 (H) 79.6 - 97.8 FL  
 MCH 35.7 (H) 26.1 - 32.9 PG  
 MCHC 31.4 31.4 - 35.0 g/dL  
 RDW 16.6 % PLATELET 642 703 - 529 K/uL MPV 10.1 9.4 - 12.3 FL ABSOLUTE NRBC 0.04 0.0 - 0.2 K/uL  
 DF AUTOMATED NEUTROPHILS 80 (H) 43 - 78 % LYMPHOCYTES 7 (L) 13 - 44 % MONOCYTES 10 4.0 - 12.0 % EOSINOPHILS 1 0.5 - 7.8 %  BASOPHILS 1 0.0 - 2.0 %  
 IMMATURE GRANULOCYTES 1 0.0 - 5.0 %  
 ABS. NEUTROPHILS 11.8 (H) 1.7 - 8.2 K/UL  
 ABS. LYMPHOCYTES 1.1 0.5 - 4.6 K/UL  
 ABS. MONOCYTES 1.6 (H) 0.1 - 1.3 K/UL  
 ABS. EOSINOPHILS 0.2 0.0 - 0.8 K/UL  
 ABS. BASOPHILS 0.1 0.0 - 0.2 K/UL  
 ABS. IMM. GRANS. 0.1 0.0 - 0.5 K/UL METABOLIC PANEL, COMPREHENSIVE Collection Time: 09/09/18 10:15 AM  
Result Value Ref Range Sodium 137 136 - 145 mmol/L Potassium 5.0 3.5 - 5.1 mmol/L Chloride 97 (L) 98 - 107 mmol/L  
 CO2 25 21 - 32 mmol/L Anion gap 15 7 - 16 mmol/L Glucose 119 (H) 65 - 100 mg/dL BUN 34 (H) 8 - 23 MG/DL Creatinine 6.37 (H) 0.8 - 1.5 MG/DL  
 GFR est AA 11 (L) >60 ml/min/1.73m2 GFR est non-AA 9 (L) >60 ml/min/1.73m2 Calcium 8.9 8.3 - 10.4 MG/DL Bilirubin, total 1.2 (H) 0.2 - 1.1 MG/DL  
 ALT (SGPT) 16 12 - 65 U/L  
 AST (SGOT) 37 15 - 37 U/L Alk. phosphatase 111 50 - 136 U/L Protein, total 8.1 6.3 - 8.2 g/dL Albumin 3.4 3.2 - 4.6 g/dL Globulin 4.7 (H) 2.3 - 3.5 g/dL A-G Ratio 0.7 (L) 1.2 - 3.5 POC TROPONIN-I Collection Time: 09/09/18 10:28 AM  
Result Value Ref Range Troponin-I (POC) 0.17 (H) 0.02 - 0.05 ng/ml CK WITH MB  
 Collection Time: 09/09/18 10:45 AM  
Result Value Ref Range  (H) 21 - 215 U/L  
 CK - MB 7.4 (H) 0.5 - 3.6 ng/ml CK-MB Index 2.5 (H) <2.5 % EKG, 12 LEAD, INITIAL Collection Time: 09/09/18 11:01 AM  
Result Value Ref Range Ventricular Rate 88 BPM  
 Atrial Rate 250 BPM  
 QRS Duration 86 ms  
 Q-T Interval 366 ms  
 QTC Calculation (Bezet) 442 ms Calculated R Axis 102 degrees Calculated T Axis -173 degrees Diagnosis    
  !! AGE AND GENDER SPECIFIC ECG ANALYSIS !! Atrial fibrillation Rightward axis Septal infarct (cited on or before 30-JUL-2018) ST & T wave abnormality, consider inferolateral ischemia or digitalis effect Abnormal ECG When compared with ECG of 08-SEP-2018 19:13, No significant change was found Confirmed by Beryl Rick ([id number]) on 9/9/2018 12:45:41 PM 
  
 
Lab Results Component Value Date/Time Cholesterol, total 137 03/29/2011 04:21 AM  
 HDL Cholesterol 31 (L) 03/29/2011 04:21 AM  
 LDL, calculated 57.8 03/29/2011 04:21 AM  
 VLDL, calculated 48.2 (H) 03/29/2011 04:21 AM  
 Triglyceride 241 (H) 03/29/2011 04:21 AM  
 CHOL/HDL Ratio 4.4 03/29/2011 04:21 AM  
 
 
 
ASSESSMENT: 
 
1. Syncope 2. Moderate AS 3. CAD, prior CABG 4. pAF 5. ESRD 6. Pulm HTN 
 
PLAN:  
 
1. Syncope: unclear etiology, AS is moderate and not significant by itself to explain the syncope. But, he could be volume depleted after HD and then develops orthostatic like sx that are more pronounced given the moderate AS. Hospitalist to admit, follow on tele. May need to reduce the BB dose, talk with renal about taking off less volume with HD. He has no angina at all, this does not seem ischemic driven. We will follow. 2. AS: moderate by recent LHC, not candidate for TAVR. 3. CAD:  No angina now, recent LHC reviewed. Trop noted, but tough to interpret such with ESRD and the fact that he layed on the floor for 6hrs last night. Check AM trop, follow. 4. PAF:  Falling more, may be watchman candidate soon. On NOAC now. Continue BB. Follow HR and BP. Follow on tele for slow rates. SSS? ?\ 
 
5. ESRD: consult renal, per hospitalist.  
 
No apparent heart rate related reasons for syncope. Continue monitor while in hospital and call if pt has pauses or paul Aj Milligan MD 
9/10/2018  1:30pm

## 2018-09-10 NOTE — PROGRESS NOTES
CM attempted to meet with patient regarding consult, however, patient was presently working with volunteer. CM will attempt again at a later time.

## 2018-09-11 NOTE — PROGRESS NOTES
Hospitalist Progress Note 2018 Admit Date: 2018 10:11 AM  
NAME: Citlalli Cowan :  1940 MRN:  [id number] Attending: Anup Ramires MD 
PCP:  Margarita Christiansen. Norma Calderon MD 
 
SUBJECTIVE:  
Pt admitted with ESRD admitted for Syncope, Fall, Left 5th Metacarpal fracture. Seen by Cards and Nephro. : Feels better, still has pain in left hand, denies dizziness. Afebrile. Nursing notes and chart reviewed. Review of Systems negative with exception of pertinent positives noted above. Switch to inpatient ROS and [address] [place] [address] Nw reviewed and there have been no changes, Please refer to admission H&P in EMR dated 18 PHYSICAL EXAM  
 
Visit Vitals  /64  Pulse 83  Temp 98.6 °F (37 °C)  Resp 19  
 Ht 5' 10\" (1.778 m)  Wt 73.2 kg (161 lb 6.4 oz)  SpO2 96%  BMI 23.16 kg/m2 Temp (24hrs), Av.1 °F (36.7 °C), Min:97.3 °F (36.3 °C), Max:98.6 °F (37 °C) Oxygen Therapy O2 Sat (%): 96 % (18 0400) Pulse via Oximetry: 81 beats per minute (18 1400) O2 Device: Room air (18 1011) Intake/Output Summary (Last 24 hours) at 18 1203 Last data filed at 18 1056 Gross per 24 hour Intake                0 ml Output              500 ml Net             -500 ml General: No acute distress. Alert.   
Head:  Occipital area bandage due to small laceration Lungs:  CTABL. Heart:  RRR, no murmur, rub, or gallop Abdomen: Soft, non-distended, non-tender, +bs Extremities: No cyanosis or clubbing. Left hand splint in place. Neurologic:  No focal deficits. Moves all extremities. Skin:  No Obvious Rash Psych:  Normal affect. LABS AND STUDIES: 
Personally reviewed all labs, meds, and studies for past 24hrs. ASSESSMENT Active Hospital Problems Diagnosis Date Noted  Acute respiratory failure with hypoxia ([address] 2018  Syncope 2018  Gastroesophageal reflux disease without esophagitis 10/18/2017  End-stage renal disease ([facility] 11/18/2016 Last Assessment & Plan:  
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  
  2nd to CKD  Hypercholesterolemia 08/15/2013  CAD (coronary artery disease) 03/28/2011 S/p stent prior to CABG; CABG 2007 
  
 HTN (hypertension) 03/28/2011 PLAN: 
 
Syncope: cards following, recommendations appreciated.  Telemetry. Considering aortic stenosis, arrythmia, post-dialysis hypotension. Eval for loop per EP in am. NPO after midnight. 
  
AS, CAD, pAfib: per cards. Home BB, eliquis.  Mild troponin elevation more likely ESRD related. Has recent cath.   
  
Hypoxia: CXR with stable effusion (likely related to dialysis, afib). Hypoxia improved.  
  
ESRD: nephro managing - t/th/s. They are increasing dry weight to try to address possible dehydration component of syncope. 
  
Anemia of chronic disease: stable Left Hand Fracture: Ortho eval in am.  
   
DVT PPx:  on eliquis Code Status: DNR Dispo: PT/OT eval, needs rehab. Ppd placed. Discussed plan with pt and son who is in agreement. All questions answered. Signed By: Jairon Spears MD   
 September 11, 2018

## 2018-09-11 NOTE — PROGRESS NOTES
Pt with recurrent syncope, primary requesting loop. Will make NPO after midnight and reassess in AM. CAMERON Wills

## 2018-09-11 NOTE — PHYSICIAN ADVISORY
Letter of Determination: Upgrade from Observation to Inpatient Status This patient was originally hospitalized as Outpatient Status with Observation Services on 9/9/2018 for syncope. This patient now meets for Inpatient Admission in accordance with CMS regulation Section 43 .3. Specifically, patient's stay is now over Two Midnights and was medically necessary. The patient's stay was medically necessary based on coronary artery disease, end stage renal disease on hemodialysis, chest x-ray demonstrating right pleural effusion. Consistent with CMS guidelines, patient meets for inpatient status. It is our recommendation that this patient's hospitalization status should be upgraded from OBSERVATION to INPATIENT status.  
  
The final decision regarding the patient's hospitalization status depends on the attending physician's judgement. Petra Fitzgerald MD, PANDA, Physician Advisor [facility].

## 2018-09-11 NOTE — PROGRESS NOTES
Problem: Falls - Risk of 
Goal: *Absence of Falls Document Elicia Gibson Fall Risk and appropriate interventions in the flowsheet. Outcome: Progressing Towards Goal 
Fall Risk Interventions: 
Mobility Interventions: Communicate number of staff needed for ambulation/transfer, Bed/chair exit alarm, Patient to call before getting OOB Medication Interventions: Bed/chair exit alarm, Patient to call before getting OOB, Teach patient to arise slowly Elimination Interventions: Bed/chair exit alarm, Call light in reach, Patient to call for help with toileting needs History of Falls Interventions: Bed/chair exit alarm, Consult care management for discharge planning, Door open when patient unattended, Investigate reason for fall

## 2018-09-11 NOTE — DIALYSIS
TRANSFER OUT -DIALYSIS Hemodialysis treatment completed without complications. Patient alert and VS stable  /64  P 83   
 
 0.5 Kgs removed. Needles X2 removed from access and manual pressure held until hemostasis complete and pressure dressing applied. Meds given none Patient to 717 after dialysis.

## 2018-09-11 NOTE — PROGRESS NOTES
CM followed up with patient's son in room regarding patient's transportation needs. Per son, patient has ability and access to [employer] and [employer] services for his transportation needs. Son also informed family is considering transitioning patient to [facility] in the near future. Patient's daughter, Carin Gan, is reportedly primary person arranging penitentiary plans, but she will be out of the country for the next 2-3 weeks. CM provided son with contact information for [employer] to assist family in planning. Also provided son with information regarding Optional State Supplementation Program (OSS) for assistance with [facility] costs. No other needs at this time.  
Anticipate DC to home with OP dialysis services continuing as normal.

## 2018-09-11 NOTE — PROGRESS NOTES
TRANSFER IN - DIALYSIS Received patient in dialysis unit from 71 (unit) for ordered procedure. Consent verified for renal replacement therapy. Patient alert and oriented and vital signs stable. /70 P89 Hemodialysis initiated using L AVF and 15 g needles. Machine settings per MD order. Will monitor during treatment.

## 2018-09-11 NOTE — PROGRESS NOTES
Problem: Interdisciplinary Rounds Goal: Interdisciplinary Rounds Outcome: Progressing Towards Goal 
Interdisciplinary team rounds were held 9/11/2018 with the following team members:Care Management, Physical Therapy, Physician and  and the patient. Anticipate discharge to [facility] Friday. Plan of care discussed. See clinical pathway and/or care plan for interventions and desired outcomes.

## 2018-09-11 NOTE — PROGRESS NOTES
In accordance with Medicare Guidelines, a MOON letter was provided to the Medicare patients son. Oral explanation was provided/all questions answered. The MOON document was signed by patients son & was placed in the medical record under media tab. A copy was provided to patients son. Care managers notified.

## 2018-09-11 NOTE — PROGRESS NOTES
[facility] NEPHROLOGY PROGRESS NOTE Follow up for: 
 
Subjective:  
Patient seen and examined. Chart, notes, labs, imaging, results all reviewed. Seen on dialysis. Goal UF 1/2 kg. No syncope episodes. Feeling ok. Denies sob, cp, n/v. No edema. ROS: 
Gen - no fever, no chills, appetite okay CV - no chest pain, no orthopnea Lung - no shortness of breath, no cough Abd - no tenderness, no nausea, no vomiting Ext - no edema Skin - multiple abrasions and bruises LUE AVF +thrill+bruit Objective:  
Exam: 
Vitals:  
 09/11/18 5330 09/11/18 4424 09/11/18 7287 09/11/18 4005 BP: 121/70 113/60 122/70 132/68 Pulse: 89 91 87 91 Resp:      
Temp:      
SpO2:      
Weight:      
Height:      
 
 
No intake or output data in the 24 hours ending 09/11/18 0906 Current Facility-Administered Medications Medication Dose Route Frequency  influenza vaccine 2018-19 (6 mos+)(PF) (FLUARIX QUAD/FLULAVAL QUAD) injection 0.5 mL  0.5 mL IntraMUSCular PRIOR TO DISCHARGE  apixaban (ELIQUIS) tablet 2.5 mg  2.5 mg Oral BID  cinacalcet (SENSIPAR) tablet 120 mg  120 mg Oral QHS  clopidogrel (PLAVIX) tablet 75 mg  75 mg Oral DAILY  dicyclomine (BENTYL) capsule 10 mg  10 mg Oral DAILY PRN  
 finasteride (PROSCAR) tablet 5 mg  5 mg Oral DAILY  nitroglycerin (NITROSTAT) tablet 0.4 mg  0.4 mg SubLINGual PRN  
 sevelamer carbonate (RENVELA) tab 800 mg  800 mg Oral TID WITH MEALS  magnesium oxide (MAG-OX) tablet 400 mg  400 mg Oral DAILY  sodium chloride (NS) flush 5-10 mL  5-10 mL IntraVENous Q8H  
 sodium chloride (NS) flush 5-10 mL  5-10 mL IntraVENous PRN  
 acetaminophen (TYLENOL) tablet 650 mg  650 mg Oral Q4H PRN  
 HYDROcodone-acetaminophen (NORCO) 5-325 mg per tablet 1 Tab  1 Tab Oral Q4H PRN  
 naloxone (NARCAN) injection 0.4 mg  0.4 mg IntraVENous PRN  
 bisacodyl (DULCOLAX) tablet 5 mg  5 mg Oral DAILY PRN  
 metoprolol succinate (TOPROL-XL) XL tablet 50 mg  50 mg Oral DAILY EXAM 
 GEN - Alert, oriented, in no distress CV - S1, S2, RRR, no rub, murmur, or gallop Lung - clear to auscultation bilaterally Abd - soft, nontender, BS present Ext - no edema Recent Labs  
   09/11/18 0447 09/09/18 21   09/08/18 
 1913 WBC  13.0*  14.9*  11.7* HGB  9.7*  12.0*  11.0*  
HCT  30.4*  38.2*  35.0*  
PLT  177  174  139* Recent Labs  
   09/11/18 0447 09/09/18 21   09/08/18 
 1913 NA  136  137  138  
K  5.1  5.0  3.9 CL  98  97*  99  
CO2  24  25  27 BUN  65*  34*  25* CREA  8.82*  6.37*  5.21* CA  7.5*  8.9  8.4 GLU  99  119*  132* Assessment and Plan:  
ESRD on HD TTS [facility] 
- HD today per routine for clearance and volume. See below. Will increase dry weight for now with recurrent syncope - LUE AVF 
- has had no issues with hypotension or syncope while on dialysis Recurrent Syncope - syncope has been an ongoing issue 
- etiology unclear 
- cardiology following - Cardiology withconcerns of volume depletion exacerbating orthostatic symptoms that are more pronounced given his moderate AS so dry weight will need to be increased for now. However, review of records indicate that his prior ER visits for syncope on 7/31 and 8/7 actually preceded his dialysis sessions and thus he would have been less likely volume depleted at that time. Moderate AS and severe pHTN - not a candidate for TAVR 
 
CAD s/p CABG, last PCI 4/2017 PAF - rate controlled on BB. No documented pauses or bradycardia. On Eliquis. Concern for falls Anemia - hgb drop from 12 to 9.7 over last 3 days. Monitor. Resume Epogen. Check iron stores [facility]UL[facility] [place] Patient seen and examined with Marika Casanova NP. Plans as per Orlando Phillips below. Seen on HD, well tolerated.

## 2018-09-11 NOTE — PROGRESS NOTES
MCR patient status changed from OBS to IP. Important Letter to Medicare signed by patient's son and put in patient's chart; patient's son provided with copy of Important Letter to Medicare. Patient's Care Managers notified.

## 2018-09-12 NOTE — PROGRESS NOTES
Identified as HRRP. In hospital visit with patient this am. Patient in bed alert and oriented. Discussed HAC and RN CCM services with patient. Patient in agreement with RN CCM services but prefers to follow up with his PCP upon discharge. RRAT: 17-2 ED visits and 4 admissions including present since 4/30/18. PCP- Dr. Teena Estrada- [facility] 
PMH: Chronic A-fib, ESRD (HD-T,T,S with Chelle Theodore), HTN, Internal Carotid Stenosis, Syncope, TIA, Gout, Debility. Plan: Pending PT/OT patient may require STR. PT/OT to determine after on Ortho consult for 5th metacarpal fracture. If patient doesn't meet requirements for STR, the plan is to discharge home possibly with [facility]. Patient's family resides out of state. Family is in the process of [facility] placement. Daughter is out of the country for a few weeks and will resume process when she returns. Will link with RN CCM for follow up upon discharge. This note will not be viewable in [address]
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
## 2018-09-12 NOTE — PROGRESS NOTES
OT Note:   OT evaluation received and chart reviewed. Pt to have ortho consult for L hand, back and knee pain. Will hold OT evaluation until after ortho assessment. Thank you.   
Jacky Nair, OT

## 2018-09-12 NOTE — PROGRESS NOTES
Problem: Falls - Risk of 
Goal: *Absence of Falls Document Tim Cartre Fall Risk and appropriate interventions in the flowsheet. Outcome: Progressing Towards Goal 
Fall Risk Interventions: 
Mobility Interventions: Bed/chair exit alarm, Communicate number of staff needed for ambulation/transfer, OT consult for ADLs, Patient to call before getting OOB, PT Consult for mobility concerns, PT Consult for assist device competence Medication Interventions: Assess postural VS orthostatic hypotension, Bed/chair exit alarm, Evaluate medications/consider consulting pharmacy, Patient to call before getting OOB, Teach patient to arise slowly Elimination Interventions: Bed/chair exit alarm, Call light in reach, Elevated toilet seat, Patient to call for help with toileting needs, Toilet paper/wipes in reach, Toileting schedule/hourly rounds History of Falls Interventions: Bed/chair exit alarm, Consult care management for discharge planning, Door open when patient unattended, Evaluate medications/consider consulting pharmacy, Investigate reason for fall, Room close to nurse's station

## 2018-09-12 NOTE — PROGRESS NOTES
PT note: PT evaluation received and chart reviewed. Pt to have ortho consult for L hand, back and knee pain. Will hold PT evaluation until after ortho assessment. Thank you. Ryland Gibbs, PT 
9/12/2018

## 2018-09-12 NOTE — CONSULTS
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 Medications Medication Dose Route Frequency  [START ON 9/13/2018] epoetin alcides (EPOGEN;PROCRIT) injection 4,000 Units  4,000 Units IntraVENous DIALYSIS TUE, THU & SAT  tuberculin injection 5 Units  5 Units IntraDERMal ONCE  
 influenza vaccine 2018-19 (6 mos+)(PF) (FLUARIX QUAD/FLULAVAL  
QUAD) injection 0.5 mL  0.5 mL IntraMUSCular PRIOR TO DISCHARGE  cinacalcet (SENSIPAR) tablet 120 mg  120 mg Oral QHS  clopidogrel (PLAVIX) tablet 75 mg  75 mg Oral DAILY  dicyclomine (BENTYL) capsule 10 mg  10 mg Oral DAILY PRN  
 finasteride (PROSCAR) tablet 5 mg  5 mg Oral DAILY  nitroglycerin (NITROSTAT) tablet 0.4 mg  0.4 mg SubLINGual PRN  
 sevelamer carbonate (RENVELA) tab 800 mg  800 mg Oral TID WITH MEALS  magnesium oxide (MAG-OX) tablet 400 mg  400 mg Oral DAILY  sodium chloride (NS) flush 5-10 mL  5-10 mL IntraVENous Q8H  
 sodium chloride (NS) flush 5-10 mL  5-10 mL IntraVENous PRN  
 acetaminophen (TYLENOL) tablet 650 mg  650 mg Oral Q4H PRN  
 HYDROcodone-acetaminophen (NORCO) 5-325 mg per tablet 1 Tab  1 Tab Oral Q4H PRN  
 naloxone (NARCAN) injection 0.4 mg  0.4 mg IntraVENous PRN  
 bisacodyl (DULCOLAX) tablet 5 mg  5 mg Oral DAILY PRN  
 metoprolol succinate (TOPROL-XL) XL tablet 50 mg  50 mg Oral  
DAILY Allergies Allergen Reactions  Nka [No Known Allergies] Unknown (comments) Social History Substance Use Topics  Smoking status: Current Every Day Smoker Last attempt to quit: 1/1/1980  Smokeless tobacco: Never Used Comment: CIGAR DAILY FOR 10 YEARS  
 Alcohol use 0.0 oz/week Comment: rare Family History Problem Relation Age of Onset  Heart Disease Father 59 MI  
 Heart Attack Father 72 MI  
 Stroke Mother  Hypertension Mother  Heart Disease Mother  Cancer Sister   
  stomach Objective: Vitals:  
 09/11/18 3964 09/12/18 0350 09/12/18 0825 09/12/18 0251 BP: 115/63 123/72  124/72 Pulse: 84 83  90 Resp: 18 18 19 Temp: 97.9 °F (36.6 °C) 97.7 °F (36.5 °C)  98.2 °F (36.8 °C) SpO2: 93% 94%  96% Weight:   72.5 kg (159 lb 13.3 oz) Height:      
 
 
Intake/Output Summary (Last 24 hours) at 09/12/18 6882 Last data filed at 09/11/18 1056 Gross per 24 hour Intake                0 ml Output              500 ml Net             -500 ml Wt Readings from Last 3 Encounters:  
09/12/18 72.5 kg (159 lb 13.3 oz) 09/08/18 70.3 kg (155 lb)  
08/29/18 70.3 kg (155 lb) GEN - in no distress, alert and oriented Neck - trachea midline CV - S1, S2, no rub Lung - clear bilaterally, lungs expand symmetrically Chest wall - normal appearance Abd - soft, nontender Ext - no edema Neurologic - nonfocal 
Genitourinary - bladder nonpalpable Skin: multiple abrasions/ wounds Access: LUE AVF +thrill and bruit Data Review:  
 
Recent Labs  
   09/12/18 
 0502  09/11/18 
 0447  09/09/18 [address] WBC  12.9*  13.0*  14.9* HGB  10.9*  9.7*  12.0*  
HCT  34.9*  30.4*  38.2*  
PLT  189  177  174 Recent Labs  
   09/12/18 
 0502  09/11/18 
 0447  09/09/18 [address] NA  138  136  137  
K  4.6  5.1  5.0  
CL  98  98  97* CO2  28  24  25 BUN  42*  65*  34* CREA  6.47*  8.82*  6.37* CA  7.8*  7.5*  8.9 GLU  86  99  119* MG  2.5*   --    --   
 
 
 
Assessment/Plan:  
 
Principal Problem: 
  Syncope (7/31/2018) Active Problems: 
  CAD (coronary artery disease) (3/28/2011) Overview: S/p stent prior to CABG; CABG 2007 HTN (hypertension) (3/28/2011) Anemia, unspecified  (7/7/2016) Overview: 2nd to CKD Chronic atrial fibrillation ([facility] (7/19/2016) End-stage renal disease ([facility] (11/18/2016) Overview: Last Assessment & Plan:  
    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  
(10/18/2017) Acute respiratory failure with hypoxia ([facility] [address] (9/9/2018) Assessment and Plan: 
66year old male patient with medical history significant for ESRD presenting with syncope. ESRD on HD TTS [facility] 
- HD tomorrow per routine for clearance and volume. See below. Will increase dry weight for now with recurrent syncope - LUE AVF 
- has had no issues with hypotension or syncope while on dialysis 
  
Recurrent Syncope - syncope has been an ongoing issue 
- etiology unclear 
- cardiology following - Cardiology withconcerns of volume depletion exacerbating  
orthostatic symptoms that are more pronounced given his moderate AS so dry weight will need to be increased for now. [facility],  
review of records indicate that his prior ER visits for syncope  
on 7/31 and 8/7 actually preceded his dialysis sessions and thus  
he would have been less likely volume depleted at that time. - eval for loop per EP pending 
  
Moderate AS and severe pHTN - not a candidate for TAVR 
  
CAD s/p CABG, last PCI 4/2017 
  
PAF - rate controlled on BB. No documented pauses or bradycardia. On Eliquis. Concern for falls 
  
Anemia - hgb down from admit. Iron stores ok. Resumed epogen POC TROPONIN-I Result Value Ref Range Troponin-I (POC) 0.17 (H) 0.02 - 0.05 ng/ml EKG, 12 LEAD, INITIAL Result Value Ref Range Ventricular Rate 88 BPM  
 Atrial Rate 250 BPM  
 QRS Duration 86 ms  
 Q-T Interval 366 ms  
 QTC Calculation (Bezet) 442 ms Calculated R Axis 102 degrees Calculated T Axis -173 degrees Diagnosis    
  !! AGE AND GENDER SPECIFIC ECG ANALYSIS !! Atrial fibrillation Rightward axis Septal infarct (cited on or before 30-JUL-2018) ST & T wave abnormality, consider inferolateral ischemia or digitalis effect Abnormal ECG When compared with ECG of 08-SEP-2018 19:13, No significant change was found Confirmed by Yayo Davidson ([id number]) on 9/9/2018 12:45:41 PM 
  
 
 
Lab Results Component Value Date/Time Potassium 4.6 09/12/2018 05:02 AM  
 Potassium, POC 4.3 06/13/2018 06:58 AM  
 
Lab Results Component Value Date/Time Creatinine 6.47 (H) 09/12/2018 05:02 AM  
 
Lab Results Component Value Date/Time HGB 10.9 (L) 09/12/2018 05:02 AM  
 
Lab Results Component Value Date/Time  INR 1.1 08/23/2018 12:07 PM  
 INR 1.0 04/21/2017 10:04 AM  
 INR 1.0 03/28/2011 04:45 PM  
 Prothrombin time 14.2 08/23/2018 12:07 PM  
 Prothrombin time 11.3 04/21/2017 10:04 AM  
 Prothrombin time 9.8 03/28/2011 04:45 PM

## 2018-09-12 NOTE — PROGRESS NOTES
Hospitalist Progress Note 2018 Admit Date: 2018 10:11 AM  
NAME: Ani Smith :  1940 MRN:  [id number] Attending: Logan Ro MD 
PCP:  Velia Wright. Adrienne Salinas MD 
 
SUBJECTIVE:  
Pt admitted with ESRD admitted for Syncope, Fall, Left 5th Metacarpal fracture. Seen by Cards and Nephro. : Feels better, Awaiting cards eval and is NPO, wants to eat, still has pain in left hand, denies dizziness. Afebrile. Nursing notes and chart reviewed. Review of Systems negative with exception of pertinent positives noted above. PHYSICAL EXAM  
 
Visit Vitals  /72 (BP 1 Location: Left arm, BP Patient Position: At rest)  Pulse 83  Temp 97.7 °F (36.5 °C)  Resp 18  Ht 5' 10\" (1.778 m)  Wt 73.2 kg (161 lb 6.4 oz)  SpO2 94%  BMI 23.16 kg/m2 Temp (24hrs), Av.1 °F (36.7 °C), Min:97.7 °F (36.5 °C), Max:98.5 °F (36.9 °C) Oxygen Therapy O2 Sat (%): 94 % (18 0350) Pulse via Oximetry: 81 beats per minute (18 1400) O2 Device: Room air (18 1011) Intake/Output Summary (Last 24 hours) at 18 6366 Last data filed at 18 1056 Gross per 24 hour Intake                0 ml Output              500 ml Net             -500 ml General: No acute distress. Alert.   
Head:  Occipital area bandage due to small laceration Lungs:  CTABL. Heart:  RRR, no murmur, rub, or gallop Abdomen: Soft, non-distended, non-tender, +bs Extremities: No cyanosis or clubbing. Left hand splint in place. Neurologic:  No focal deficits. Moves all extremities. Skin:  No Obvious Rash Psych:  Normal affect. LABS AND STUDIES: 
Personally reviewed all labs, meds, and studies for past 24hrs. ASSESSMENT Active Hospital Problems Diagnosis Date Noted  Acute respiratory failure with hypoxia ([address] 2018  Syncope 2018  Gastroesophageal reflux disease without esophagitis 10/18/2017  End-stage renal disease ([facility] [address] 11/18/2016 Last Assessment & Plan:  
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  
  2nd to CKD  Hypercholesterolemia 08/15/2013  CAD (coronary artery disease) 03/28/2011 S/p stent prior to CABG; CABG 2007 
  
 HTN (hypertension) 03/28/2011 PLAN: 
 
Syncope: cards following, recommendations appreciated.  Telemetry. Considering aortic stenosis, arrythmia, post-dialysis hypotension. Eval for loop per EP, Pt is NPO. Check Orthostatics.  
  
AS, CAD, pAfib: per cards. Home BB, eliquis.  Mild troponin elevation more likely ESRD related. Hypoxia: CXR with stable effusion (likely related to dialysis, afib). Hypoxia improved.  
  
ESRD: nephro managing - t/th/s. They are increasing dry weight to try to address possible dehydration component of syncope.  
  
Anemia of chronic disease: stable Left Hand Fracture: Ortho eval pending.  
   
DVT PPx:  on eliquis Code Status: DNR Dispo: PT/OT eval, needs rehab. Ppd placed. Discussed plan with pt and son who is in agreement. All questions answered. Signed By: Rosa Alex MD   
 September 12, 2018
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
## 2018-09-12 NOTE — PROGRESS NOTES
Problem: Interdisciplinary Rounds Goal: Interdisciplinary Rounds Outcome: Progressing Towards Goal 
Interdisciplinary team rounds were held 9/12/2018 with the following team members:Care Management, Physical Therapy, Physician and  and the patient. Plan of care discussed. See clinical pathway and/or care plan for interventions and desired outcomes.
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
## 2018-09-12 NOTE — PROCEDURES
Attending: Carmel Ewing. Gavin Luna MD 
Referring MD: Annice Mortimer, MD 
Procedure:     Injectable loop recorder implant (ILR-Linq) Indication:      Cryptogenic stroke Complications: None EBL: N/A Implanted Device: Medtronic Model [id number]; SN [id number] After informed consent was obtained the patient was brought to the electrophysiology lab in an unsedated state. The left parasternal region was prepped and draped in a sterile fashion. The fourth intercostal spaced was identified and that region was locally anesthetized with approximately 10-20 cc of 1% lidocaine solution. A 1 cm punch incision was made along the left sternal border at the 4th intercostal space using the Medtronic Linq scalpel tool. Next, the Linq implantable loop recorder (ILR) was percutaneously injected via the punch incision under the skin and above the pectoralis muscle. The implant tool was removed and the ILR was stably located under the skin. The ILR was interrogated and demonstrated adequate sensing. It was programmed per clinical specifications. The incision was closed with poly-cyanocrylate solution (dermabond) solution after hemostasis was confirmed and a sterile dressing was placed. he tolerated the procedure well and there were no complications. Device Information Monitor Model #  Manfacturer  Serial #  Location Reveal LINQ [id number]  Medtronic  [id number]  left pectoral, subcutaneous Settings were performed per 's recommendations. AT/AF Detection: ON Sensing: sensitivity 0.035 mV (35 uV)
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
## 2018-09-13 NOTE — DIALYSIS
TRANSFER OUT -DIALYSIS Hemodialysis treatment completed without complications. Patient alert and orientated and VS stable  /72  P 81   
 
 1.5 Kgs removed. Needles X2 removed from access and manual pressure held until hemostasis complete and pressure dressing applied. Meds given Epogen 4000units. Patient to 717 after dialysis.

## 2018-09-13 NOTE — PROGRESS NOTES
[facility] CARDIOLOGY PROGRESS NOTE 
      
 
9/13/2018 7:57 AM 
 
Admit Date: 9/9/2018 Admit Diagnosis: Syncope;Syncope Subjective: No complaints this AM, no chest pain or shortness of breath, appropriate post op device pocket pain Interval History: (History of pertinent interval events obtained from nursing staff) Cardiac device placed yesterday, no events overnight, no immediate complications. ROS: 
GEN:  No fever or chills Cardiovascular:  As noted above Pulmonary:  As noted above Neuro:  No new focal motor or sensory loss Objective:  
 
Vitals:  
 09/13/18 4051 09/13/18 0636 09/13/18 0715 09/13/18 5591 BP: 116/67  122/66 111/64 Pulse: 80  89 77 Resp: 17 Temp: 97.5 °F (36.4 °C) SpO2: 96% Weight:  161 lb 6.4 oz (73.2 kg) Height:      
 
 
Physical Exam: 
Rolena Lucas, Well Nourished, No Acute Distress, Alert & Oriented x 3, appropriate mood. CV- regular rate and rhythm no MRG, left parasternal pocket with dressing in place, no evidence of hematoma, redness, warmth or excessive drainage Lung- clear bilaterally Abd- soft, nontender, nondistended Ext- no edema bilaterally. Current Facility-Administered Medications Medication Dose Route Frequency  epoetin alcides (EPOGEN;PROCRIT) injection 4,000 Units  4,000 Units IntraVENous DIALYSIS TUE, THU & SAT  influenza vaccine 2018-19 (6 mos+)(PF) (FLUARIX QUAD/FLULAVAL QUAD) injection 0.5 mL  0.5 mL IntraMUSCular PRIOR TO DISCHARGE  cinacalcet (SENSIPAR) tablet 120 mg  120 mg Oral QHS  clopidogrel (PLAVIX) tablet 75 mg  75 mg Oral DAILY  dicyclomine (BENTYL) capsule 10 mg  10 mg Oral DAILY PRN  
 finasteride (PROSCAR) tablet 5 mg  5 mg Oral DAILY  nitroglycerin (NITROSTAT) tablet 0.4 mg  0.4 mg SubLINGual PRN  
 sevelamer carbonate (RENVELA) tab 800 mg  800 mg Oral TID WITH MEALS  magnesium oxide (MAG-OX) tablet 400 mg  400 mg Oral DAILY  sodium chloride (NS) flush 5-10 mL  5-10 mL IntraVENous Q8H  
 sodium chloride (NS) flush 5-10 mL  5-10 mL IntraVENous PRN  
 acetaminophen (TYLENOL) tablet 650 mg  650 mg Oral Q4H PRN  
 HYDROcodone-acetaminophen (NORCO) 5-325 mg per tablet 1 Tab  1 Tab Oral Q4H PRN  
 naloxone (NARCAN) injection 0.4 mg  0.4 mg IntraVENous PRN  
 bisacodyl (DULCOLAX) tablet 5 mg  5 mg Oral DAILY PRN  
 metoprolol succinate (TOPROL-XL) XL tablet 50 mg  50 mg Oral DAILY Data Review:  
Recent Results (from the past 24 hour(s)) PLEASE READ & DOCUMENT PPD TEST IN 24 HRS Collection Time: 09/12/18  9:10 PM  
Result Value Ref Range PPD Negative Negative  
 mm Induration 0 mm EKG:  (EKG has been independently visualized by me with interpretation below) Assessment:  
 
Principal Problem: 
  Syncope (7/31/2018) Active Problems: 
  CAD (coronary artery disease) (3/28/2011) Overview: S/p stent prior to CABG; CABG 2007 HTN (hypertension) (3/28/2011) Anemia, unspecified  (7/7/2016) Overview: 2nd to CKD Chronic atrial fibrillation ([facility] (7/19/2016) End-stage renal disease ([facility] (11/18/2016) Overview: Last Assessment & Plan:  
    Lab work from [facility] looks good BP looks good Losing weight Feels good Hernia acting up - will se  Dr Catalina Le prn 2nd to glomerulonephritis Hypercholesterolemia (8/15/2013) Gastroesophageal reflux disease without esophagitis (10/18/2017) Acute respiratory failure with hypoxia ([facility] (9/9/2018) Plan: 1. Cardiac device implantation: His wound is stable and his device is functioning appropriately. He is stable for discharge from my perspective. He will need a device clinic follow up in 1-2 weeks at the [facility] office. Thank you for allowing me to participate in the electrophysiologic care of Mr. Jessica Arguello. Please contact me if any questions or concerns were to arise. Artemio Anguiano.  Mirna Galicia MD, MS 
 Clinical Cardiac Electrophysiology [facility] Cardiology

## 2018-09-13 NOTE — PROGRESS NOTES
[facility] NEPHROLOGY PROGRESS NOTE Follow up for: 
 
Subjective:  
Patient seen and examined. Chart, notes, labs, imaging, results all reviewed. Seen on dialysis. Goal UF 1.5 kg. /69, HR 89. No syncope episodes. Feeling ok. Denies sob, cp, n/v. No edema. ROS: 
Gen - no fever, no chills, appetite okay CV - no chest pain, no orthopnea Lung - no shortness of breath, no cough Abd - no tenderness, no nausea, no vomiting Ext - no edema Skin - multiple abrasions and bruises LUE AVF +thrill+bruit Objective:  
Exam: 
Vitals:  
 09/13/18 5888 09/13/18 0715 09/13/18 4546 09/13/18 1704 BP:  122/66 111/64 110/69 Pulse:  89 77 89 Resp:      
Temp:      
SpO2:      
Weight: 73.2 kg (161 lb 6.4 oz) Height:      
 
 
No intake or output data in the 24 hours ending 09/13/18 0843 Current Facility-Administered Medications Medication Dose Route Frequency  epoetin alcides (EPOGEN;PROCRIT) injection 4,000 Units  4,000 Units IntraVENous DIALYSIS TUE, THU & SAT  influenza vaccine 2018-19 (6 mos+)(PF) (FLUARIX QUAD/FLULAVAL QUAD) injection 0.5 mL  0.5 mL IntraMUSCular PRIOR TO DISCHARGE  cinacalcet (SENSIPAR) tablet 120 mg  120 mg Oral QHS  clopidogrel (PLAVIX) tablet 75 mg  75 mg Oral DAILY  dicyclomine (BENTYL) capsule 10 mg  10 mg Oral DAILY PRN  
 finasteride (PROSCAR) tablet 5 mg  5 mg Oral DAILY  nitroglycerin (NITROSTAT) tablet 0.4 mg  0.4 mg SubLINGual PRN  
 sevelamer carbonate (RENVELA) tab 800 mg  800 mg Oral TID WITH MEALS  magnesium oxide (MAG-OX) tablet 400 mg  400 mg Oral DAILY  sodium chloride (NS) flush 5-10 mL  5-10 mL IntraVENous Q8H  
 sodium chloride (NS) flush 5-10 mL  5-10 mL IntraVENous PRN  
 acetaminophen (TYLENOL) tablet 650 mg  650 mg Oral Q4H PRN  
 HYDROcodone-acetaminophen (NORCO) 5-325 mg per tablet 1 Tab  1 Tab Oral Q4H PRN  
 naloxone (NARCAN) injection 0.4 mg  0.4 mg IntraVENous PRN  
 bisacodyl (DULCOLAX) tablet 5 mg  5 mg Oral DAILY PRN  
  metoprolol succinate (TOPROL-XL) XL tablet 50 mg  50 mg Oral DAILY EXAM 
GEN - Alert, oriented, in no distress CV - S1, S2, RRR, no rub, murmur, or gallop Lung - clear to auscultation bilaterally Abd - soft, nontender, BS present Ext - no edema Recent Labs  
   09/12/18 
 0502  09/11/18 
 0447 WBC  12.9*  13.0* HGB  10.9*  9.7* HCT  34.9*  30.4* PLT  189  177 Recent Labs  
   09/12/18 
 0502  09/11/18 
 0447 NA  138  136  
K  4.6  5.1 CL  98  98 CO2  28  24 BUN  42*  65* CREA  6.47*  8.82* CA  7.8*  7.5*  
GLU  86  99 MG  2.5*   --   
 
 
Assessment and Plan:  
 
ESRD on HD [facility] 
- HD today per routine for clearance and volume. Will increase dry weight for now with recurrent syncope - LUE AVF 
- has had no issues with hypotension or syncope while on dialysis 
  
Recurrent Syncope - syncope has been an ongoing issue. Does not seem to correlate with dialysis treatments 
- etiology unclear 
- cardiology following 
- cardiac loop recorder implanted 9/12/18 
  
Moderate AS and severe pHTN - not a candidate for TAVR 
  
CAD s/p CABG, last PCI 4/2017 
  
PAF - rate controlled on BB. No documented pauses or bradycardia. On Eliquis. Concern for falls 
  
Anemia - on epogen KULWINDER Ward Patient seen and examined with Jagjit Sanchez NP. Plans as per Chasidy Burgos below. Seen on HD, well tolerted.

## 2018-09-13 NOTE — PROGRESS NOTES
Hospitalist Progress Note 2018 Admit Date: 2018 10:11 AM  
NAME: Hi Lowe :  1940 MRN:  [id number] Attending: Rosie Baires MD 
PCP:  Clista Carrel. Zbigniew Mann MD 
 
SUBJECTIVE:  
Pt admitted with ESRD admitted for Syncope, Fall, Left 5th Metacarpal fracture. Seen by Cards and Nephro. : Feels better, still has pain in left hand, denies dizziness. Afebrile. Nursing notes and chart reviewed. Review of Systems negative with exception of pertinent positives noted above. PHYSICAL EXAM  
 
Visit Vitals  /66  Pulse 91  Temp 97.5 °F (36.4 °C)  Resp 17  Ht 5' 10\" (1.778 m)  Wt 73.2 kg (161 lb 6.4 oz)  SpO2 96%  BMI 23.16 kg/m2 Temp (24hrs), Av.8 °F (36.6 °C), Min:97.5 °F (36.4 °C), Max:98.3 °F (36.8 °C) Oxygen Therapy O2 Sat (%): 96 % (18 0506) Pulse via Oximetry: 81 beats per minute (18 1400) O2 Device: Room air (18 1340) No intake or output data in the 24 hours ending 18 5102 General: No acute distress. Alert.   
Head:  Occipital area bandage due to small laceration Lungs:  CTABL. Heart:  RRR, no murmur, rub, or gallop Abdomen: Soft, non-distended, non-tender, +bs Extremities: No cyanosis or clubbing. Left hand splint in place. Neurologic:  No focal deficits. Moves all extremities. Skin:  No Obvious Rash Psych:  Normal affect. LABS AND STUDIES: 
Personally reviewed all labs, meds, and studies for past 24hrs. ASSESSMENT Active Hospital Problems Diagnosis Date Noted  Acute respiratory failure with hypoxia ([facility] 2018  Syncope 2018  Gastroesophageal reflux disease without esophagitis 10/18/2017  End-stage renal disease ([facility] 2016 Last Assessment & Plan:  
Lab work from [facility] looks good BP looks good Losing weight Feels good Hernia acting up - will se  Dr Guerda Alphonso prn 2nd to glomerulonephritis  Chronic [facility] fibrillation ([facility] 2016  Anemia, unspecified  07/07/2016  
  2nd to CKD  Hypercholesterolemia 08/15/2013  CAD (coronary artery disease) 03/28/2011 S/p stent prior to CABG; CABG 2007 
  
 HTN (hypertension) 03/28/2011 PLAN: 
 
Syncope: cards following, recommendations appreciated.  Telemetry. Considering aortic stenosis, arrythmia, post-dialysis hypotension. S/p  loop per EP.  
  
AS, CAD, pAfib: per cards. Home BB, eliquis.  Mild troponin elevation more likely ESRD related. Hypoxia: CXR with stable effusion (likely related to dialysis, afib). Hypoxia improved.  
  
ESRD: nephro managing - t/th/s. They are increasing dry weight to try to address possible dehydration component of syncope.  
  
Anemia of chronic disease: stable Left Hand Fracture: Ortho eval done, out pt f/u with hand ortho  
   
DVT PPx:  on eliquis, will ? With cards as high risk due to falls Code Status: DNR Dispo: PT/OT eval, needs rehab. Ppd placed. Discussed plan with pt and son who is in agreement. All questions answered. Signed By: Rupa Quinonez MD   
 September 13, 2018

## 2018-09-13 NOTE — PROGRESS NOTES
Problem: Falls - Risk of 
Goal: *Absence of Falls Document Ed Ahumada Fall Risk and appropriate interventions in the flowsheet. Outcome: Progressing Towards Goal 
Fall Risk Interventions: 
Mobility Interventions: Bed/chair exit alarm, OT consult for ADLs, Patient to call before getting OOB, PT Consult for mobility concerns, PT Consult for assist device competence Medication Interventions: Bed/chair exit alarm, Patient to call before getting OOB, Evaluate medications/consider consulting pharmacy Elimination Interventions: Bed/chair exit alarm, Call light in reach, Patient to call for help with toileting needs History of Falls Interventions: Bed/chair exit alarm, Consult care management for discharge planning, Door open when patient unattended
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
## 2018-09-13 NOTE — PROGRESS NOTES
Patient appears to have need of STR after discharge. SNF list provided to patient and son on this date. They have selected [facility], [facility], and [facility] for referrals to be sent. CM placed referrals in [place] on this date. Awaiting responses.

## 2018-09-13 NOTE — DIALYSIS
TRANSFER IN - DIALYSIS Received patient in dialysis unit from [facility] (unit) for ordered procedure. Consent verified for renal replacement therapy. Patient alert and orientated and vital signs stable. /66 P89 Hemodialysis initiated using left upper arm AVF and 15 g needles. Machine settings per MD order. Will monitor during treatment.

## 2018-09-13 NOTE — WOUND CARE
Ring finger and Left upper forearm with abrasion, skin tear with skin flap removed, cleansed with NS, apply xeroform and erendira, change daily. Will monitor.

## 2018-09-13 NOTE — PROGRESS NOTES
PT Daily Note Attempted to see patient for physical therapy this morning but patient is off the floor in dialysis. Will check back on patient later today if schedule permits.  
Thank you, 
Lui Cuellar, PTA

## 2018-09-14 NOTE — PROGRESS NOTES
[facility] Subjective: ESRD  No new complaints Review of Systems -  
General ROS: negative for - fever, chills Respiratory ROS: no SOB, cough, ALBERT Cardiovascular ROS: no CP, palpitations Gastrointestinal ROS: no N&V, abdominal pain, diarrhea Genito-Urinary ROS: no difficulty voiding, dysuria Neurological ROS: no seizures, focal weekness Objective: 
 
Vitals:  
 09/13/18 1919 09/13/18 2315 09/14/18 0451 09/14/18 0271 BP: 108/60 117/70 136/79 114/70 Pulse: 81 86 90 72 Resp: 18 16 18 19 Temp: 99.1 °F (37.3 °C) 97.8 °F (36.6 °C) 97.2 °F (36.2 °C) 97.5 °F (36.4 °C) SpO2: 94% 93% 90% 97% Weight:      
Height:      
 
 
PE 
Gen: in no acute distress CV:reg rate Chest:clear Abd: soft Ext/Access: av fistula left arm ok Ankit [place] LAB Recent Labs  
   09/12/18 
 0502 WBC  12.9* HGB  10.9* HCT  34.9*  
PLT  189 Recent Labs  
   09/14/18 
 0515  09/12/18 
 0502 NA  138  138  
K  4.8  4.6 CL  98  98 CO2  28  28 GLU  104*  86 BUN  45*  42* CREA  6.15*  6.47* MG  2.6*  2.5*  
CA  7.1*  7.8* Radiology A/P:  
Patient Active Problem List  
Diagnosis Code  CAD (coronary artery disease) I25.10  
 HTN (hypertension) I10  Benign non-nodular prostatic hyperplasia with lower urinary tract symptoms N40.1  GOUT, UNSPECIFIED M10.9  Anemia, unspecified  D64.9  Chronic atrial fibrillation (HCC) I48.2  End-stage renal disease (HCC) N18.6  History of TIA (transient ischemic attack) Z86.73  
 Hypercholesterolemia E78.00  Internal carotid artery stenosis I65.29  
 Gastroesophageal reflux disease without esophagitis K21.9  Impacted cerumen of left ear H61.22  
 Debility R53.81  
 Skin lesion L98.9  Syncope R55  ESRD (end stage renal disease) ([facility] [address] N18.6  Neck pain M54.2  Nonrheumatic aortic valve stenosis I35.0  Acute respiratory failure with hypoxia (HCC) J96.01  
 
ESRD - for dialysis tomorrow Syncope - workup in progress AFib ASHD Anemia Adwoa Ceballos MD

## 2018-09-14 NOTE — PROGRESS NOTES
Problem: Interdisciplinary Rounds Goal: Interdisciplinary Rounds Outcome: Progressing Towards Goal 
Interdisciplinary team rounds were held 9/14/2018 with the following team members:Care Management, Occupational Therapy, Physician and  and the patient. Possibility of discharge to rehab tomorrow. Plan of care discussed. See clinical pathway and/or care plan for interventions and desired outcomes.

## 2018-09-14 NOTE — PROGRESS NOTES
Problem: Falls - Risk of 
Goal: *Absence of Falls Document Tariq Avila Fall Risk and appropriate interventions in the flowsheet. Outcome: Progressing Towards Goal 
Fall Risk Interventions: 
Mobility Interventions: Bed/chair exit alarm, OT consult for ADLs, Patient to call before getting OOB, PT Consult for mobility concerns Medication Interventions: Bed/chair exit alarm, Patient to call before getting OOB, Evaluate medications/consider consulting pharmacy Elimination Interventions: Bed/chair exit alarm, Call light in reach, Patient to call for help with toileting needs History of Falls Interventions: Bed/chair exit alarm, Door open when patient unattended, Investigate reason for fall
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
## 2018-09-14 NOTE — PROGRESS NOTES
Hospitalist Progress Note 2018 Admit Date: 2018 10:11 AM  
NAME: Tom Neal :  1940 MRN:  [id number] Attending: Martita Sidhu MD 
PCP:  María Elena Em. Rafael Yo MD 
 
SUBJECTIVE:  
Pt admitted with ESRD admitted for Syncope, Fall, Left 5th Metacarpal fracture. Seen by Cards, ortho and Nephro. : Pt seen, Feels better, mild in left hand, Afebrile. No more dizziness. Nursing notes and chart reviewed. Review of Systems negative with exception of pertinent positives noted above. PHYSICAL EXAM  
 
Visit Vitals  /73  Pulse 66  Temp 97.4 °F (36.3 °C)  Resp 18  Ht 5' 10\" (1.778 m)  Wt 73.2 kg (161 lb 6.4 oz)  SpO2 95%  BMI 23.16 kg/m2 Temp (24hrs), Av.9 °F (36.6 °C), Min:97.2 °F (36.2 °C), Max:99.1 °F (37.3 °C) Oxygen Therapy O2 Sat (%): 95 % (18 1134) Pulse via Oximetry: 81 beats per minute (18 1400) O2 Device: Room air (18 1340) No intake or output data in the 24 hours ending 18 1222 General: No acute distress. Alert.   
Head:  Occipital area bandage due to small laceration Lungs:  CTABL. Heart:  RRR, no murmur, rub, or gallop Abdomen: Soft, non-distended, non-tender, +bs Extremities: No cyanosis or clubbing. Left hand splint in place. Neurologic:  No focal deficits. Moves all extremities. Skin:  No Obvious Rash Psych:  Normal affect. LABS AND STUDIES: 
Personally reviewed all labs, meds, and studies for past 24hrs. ASSESSMENT Active Hospital Problems Diagnosis Date Noted  Acute respiratory failure with hypoxia ([facility] 2018  Syncope 2018  Gastroesophageal reflux disease without esophagitis 10/18/2017  End-stage renal disease ([facility] 2016 Last Assessment & Plan:  
Lab work from [facility] looks good BP looks good Losing weight Feels good Hernia acting up - will se  Dr Rachel Chavez prn 2nd to glomerulonephritis  Chronic atrial fibrillation ([facility] 07/19/2016  Anemia, unspecified  07/07/2016  
  2nd to CKD  Hypercholesterolemia 08/15/2013  CAD (coronary artery disease) 03/28/2011 S/p stent prior to CABG; CABG 2007 
  
 HTN (hypertension) 03/28/2011 PLAN: 
 
Syncope: cards following, recommendations appreciated. Considering aortic stenosis, arrythmia, post-dialysis hypotension. S/p  loop per EP.  
  
AS, CAD, pAfib: per cards. Home BB. DC'd Yuridiaquis after discussion with Dr. Justyn Moncada, will leave on Plavix. Mild troponin elevation more likely ESRD related. Hypoxia: CXR with stable effusion (likely related to dialysis, afib). Hypoxia improved.  
  
ESRD: nephro managing - t/th/s. They are increasing dry weight to try to address possible dehydration component of syncope.  
  
Anemia of chronic disease: stable Left Hand Fracture: Ortho eval done, out pt f/u with hand ortho  
   
DVT PPx:  hsq Code Status: DNR Dispo: PT/OT eval, needs rehab. Ppd placed. Discussed plan with pt and son who is in agreement. All questions answered. Signed By: Lubna Leon MD   
 September 14, 2018

## 2018-09-15 NOTE — DIALYSIS
TRANSFER OUT -DIALYSIS Hemodialysis treatment completed without complications. Patient alert and oriented x 4 and VS stalbe  /75  P 82   
 
 0 Kgs removed, not able to remove fluid due to severe drop in BP at beginning of tx. Needles X2 removed from access and manual pressure held until hemostasis complete and pressure dressing applied. Meds given epogen. Patient to room 717 after dialysis.

## 2018-09-15 NOTE — DIALYSIS
Patient blood pressure dropped and patient symptomatic, NS given with improvement. Total of 400 cc NS given. Patient alert and oriented x 4.

## 2018-09-15 NOTE — PROGRESS NOTES
Hospitalist Progress Note 9/15/2018 Admit Date: 2018 10:11 AM  
NAME: Dez Alvarez :  1940 MRN:  [id number] Attending: Derek Nolan MD 
PCP:  Renay Junior. Melissa Dubois MD 
 
SUBJECTIVE:  
Pt admitted with ESRD admitted for Syncope, Fall, Left 5th Metacarpal fracture. Seen by Cards, ortho and Nephro. 9/15: Pt seen, Feels better, had low BP and near syncope in HD, Afebrile. Metoprolol held. Nursing notes and chart reviewed. Review of Systems negative with exception of pertinent positives noted above. PHYSICAL EXAM  
 
Visit Vitals  /59 (BP 1 Location: Right arm, BP Patient Position: At rest)  Pulse 65  Temp 97 °F (36.1 °C)  Resp 19  
 Ht 5' 10\" (1.778 m)  Wt 75.2 kg (165 lb 12.6 oz)  SpO2 97%  BMI 23.79 kg/m2 Temp (24hrs), Av.6 °F (36.4 °C), Min:97 °F (36.1 °C), Max:97.9 °F (36.6 °C) Oxygen Therapy O2 Sat (%): 97 % (09/15/18 1121) Pulse via Oximetry: 81 beats per minute (18 1400) O2 Device: Room air (09/15/18 1121) Intake/Output Summary (Last 24 hours) at 09/15/18 1245 Last data filed at 09/15/18 1129 Gross per 24 hour Intake              120 ml Output                0 ml Net              120 ml General: No acute distress. Alert.   
Head:  Occipital area bandage due to small laceration Lungs:  CTABL. Heart:  RRR, no murmur, rub, or gallop Abdomen: Soft, non-distended, non-tender, +bs Extremities: No cyanosis or clubbing. Left hand splint in place. Neurologic:  No focal deficits. Moves all extremities. Skin:  No Obvious Rash Psych:  Normal affect. LABS AND STUDIES: 
Personally reviewed all labs, meds, and studies for past 24hrs. ASSESSMENT Active Hospital Problems Diagnosis Date Noted  Acute respiratory failure with hypoxia ([address] 2018  Syncope 2018  Gastroesophageal reflux disease without esophagitis 10/18/2017  End-stage renal disease ([facility] 2016 Last Assessment & Plan:  
Lab work from [facility] looks good BP looks good Losing weight Feels good Hernia acting up - will se  Dr Ontiveros Pacer prn 2nd to glomerulonephritis  Chronic atrial fibrillation ([facility] 07/19/2016  Anemia, unspecified  07/07/2016  
  2nd to CKD  Hypercholesterolemia 08/15/2013  CAD (coronary artery disease) 03/28/2011 S/p stent prior to CABG; CABG 2007 
  
 HTN (hypertension) 03/28/2011 PLAN: 
 
Syncope: cards recommendations appreciated. Considering aortic stenosis, arrythmia, post-dialysis hypotension. S/p  loop per EP. Will get interrogated.  
  
AS, CAD, pAfib: DC'd Eliquis after discussion with Dr. Werner Thomson, will leave on Plavix. Mild troponin elevation more likely ESRD related. Hypotension: decrease metoprolol to 25mg, monitor BP closely, started florinef. Hypoxia: CXR with stable effusion (likely related to dialysis, afib). Hypoxia improved.  
  
ESRD: nephro managing - t/th/s. They are increasing dry weight to try to address possible dehydration component of syncope.  
  
Anemia of chronic disease: stable Left Hand Fracture: Ortho eval done, out pt f/u with hand ortho  
   
DVT PPx:  hsq Code Status: DNR Dispo: PT/OT eval, needs rehab. Ppd placed. Discussed plan with pt and son who is in agreement. All questions answered. Signed By: Darnell Srivastava MD   
 September 15, 2018

## 2018-09-15 NOTE — MANAGEMENT PLAN
EP note I was asked about patient's use of Eliquis. The choice to stay on [facility] is based on the patient's risk of having a stroke and a shared decision between the physician and patient. Plavix will not protect this patient from stroke in AF nearly as well as Eliquis. This patient should be highly considered for a Watchman device as his risk of a disabling stroke remains significantly elevated. A referral should be made for consideration of Watchman at discharge. Please discuss with Chris Talavera [facility] in [place]) prior to patient discharge as she is the [address] [place] [address] coordinator. Routine ILR monitoring as an outpatient. Thank you for allowing me to participate in the electrophysiologic care of Mr. Isaac Girard. Please contact me if any questions or concerns were to arise. Dionte Jose. Gio Bailey MD, MS Clinical Cardiac Electrophysiology [facility]

## 2018-09-15 NOTE — PROGRESS NOTES
Problem: Falls - Risk of 
Goal: *Absence of Falls Document Ed Ahumada Fall Risk and appropriate interventions in the flowsheet. Outcome: Progressing Towards Goal 
Fall Risk Interventions: 
Mobility Interventions: Bed/chair exit alarm, Patient to call before getting OOB, PT Consult for mobility concerns Medication Interventions: Evaluate medications/consider consulting pharmacy, Bed/chair exit alarm, Patient to call before getting OOB Elimination Interventions: Bed/chair exit alarm, Call light in reach, Patient to call for help with toileting needs History of Falls Interventions: Bed/chair exit alarm, Door open when patient unattended, Investigate reason for fall

## 2018-09-15 NOTE — DIALYSIS
TRANSFER IN - DIALYSIS Received patient in dialysis unit from 71 (unit) for ordered procedure. Consent verified for renal replacement therapy. Patient alert and oriented x4 and vital signs stable. /54 P 80 Hemodialysis initiated using left upper arm AVF and 15 g needles. Machine settings per MD order. No Heparin. Will monitor during treatment.

## 2018-09-15 NOTE — PROGRESS NOTES
Problem: Falls - Risk of 
Goal: *Absence of Falls Document Kristin Bennett Fall Risk and appropriate interventions in the flowsheet. Outcome: Progressing Towards Goal 
Fall Risk Interventions: 
Mobility Interventions: Bed/chair exit alarm, Communicate number of staff needed for ambulation/transfer, Patient to call before getting OOB Medication Interventions: Bed/chair exit alarm, Evaluate medications/consider consulting pharmacy, Patient to call before getting OOB Elimination Interventions: Bed/chair exit alarm, Call light in reach, Patient to call for help with toileting needs History of Falls Interventions: Bed/chair exit alarm, Evaluate medications/consider consulting pharmacy, Investigate reason for fall

## 2018-09-15 NOTE — PROGRESS NOTES
[address] Nephrology Subjective: ESRD  Pt seen on dialysis. He had a near syncope episode during the first five minutes of dialysis. We had not taken any fluid off yet and he became transiently hypotensive. His rhythm remained A Fib with rate in the 80's during this time. Review of Systems -  
General ROS: negative for - fever, chills Respiratory ROS: no SOB, cough, ALBERT Cardiovascular ROS: no CP, palpitations Gastrointestinal ROS: no N&V, abdominal pain, diarrhea Genito-Urinary ROS: no difficulty voiding, dysuria Neurological ROS: no seizures, focal weekness Objective: 
 
Vitals:  
 09/15/18 2027 09/15/18 2104 09/15/18 8473 09/15/18 6999 BP: (!) 77/54 (!) 54/42 111/42 132/71 Pulse: 72 (!) 58 (!) 54 83 Resp:      
Temp:      
SpO2:      
Weight:      
Height:      
 
 
PE 
Gen: in no acute distress CV:reg rate Chest:clear Abd: soft Ext/Access: av fistula left arm ok [facility] LAB Recent Labs  
   09/15/18 
 0502 WBC  12.7* HGB  10.3* HCT  33.1*  
PLT  233 Recent Labs  
   09/15/18 
 0502  09/14/18 
 0515 NA  137  138  
K  4.9  4.8  
CL  97*  98  
CO2  27  28 GLU  92  104* BUN  60*  45* CREA  7.42*  6.15* MG   --   2.6* PHOS  4.9*   --   
CA  6.6*  7.1* ALB  2.6*   --   
 
 
 
 
Radiology A/P:  
Patient Active Problem List  
Diagnosis Code  CAD (coronary artery disease) I25.10  
 HTN (hypertension) I10  Benign non-nodular prostatic hyperplasia with lower urinary tract symptoms N40.1  GOUT, UNSPECIFIED M10.9  Anemia, unspecified  D64.9  Chronic atrial fibrillation (HCC) I48.2  End-stage renal disease (HCC) N18.6  History of TIA (transient ischemic attack) Z86.73  
 Hypercholesterolemia E78.00  Internal carotid artery stenosis I65.29  
 Gastroesophageal reflux disease without esophagitis K21.9  Impacted cerumen of left ear H61.22  
 Debility R53.81  
 Skin lesion L98.9  Syncope R55  ESRD (end stage renal disease) ([facility] N18.6  Neck pain M54.2  Nonrheumatic aortic valve stenosis I35.0  Acute respiratory failure with hypoxia (HCC) J96.01  
 
ESRD - on dialysis for clearance. No fluid removal today. Syncope - workup in progress AFib ASHD Anemia Layo Edmonds MD

## 2018-09-16 NOTE — PROGRESS NOTES
[facility] Subjective: ESRD   No new spells since yesterday am 
 
Review of Systems -  
General ROS: negative for - fever, chills Respiratory ROS: no SOB, cough, ALBERT Cardiovascular ROS: no CP, palpitations Gastrointestinal ROS: no N&V, abdominal pain, diarrhea Genito-Urinary ROS: no difficulty voiding, dysuria Neurological ROS: no seizures, focal weekness Objective: 
 
Vitals:  
 09/15/18 2040 09/15/18 2259 09/16/18 0345 09/16/18 3685 BP: 120/68 127/62 130/62 147/68 Pulse: 91 84 83 (!) 110 Resp: 18 18 18 16 Temp: 98.2 °F (36.8 °C) 98.4 °F (36.9 °C) 97.8 °F (36.6 °C) 97.7 °F (36.5 °C) SpO2: 98% 92% 98% 100% Weight:      
Height:      
 
 
PE 
Gen: in no acute distress CV:reg rate Chest:clear Abd: soft Ext/Access: av fistula [facility] Recent Labs  
   09/15/18 
 0502 WBC  12.7* HGB  10.3* HCT  33.1*  
PLT  233 Recent Labs  
   09/15/18 
 0502  09/14/18 
 0515 NA  137  138  
K  4.9  4.8  
CL  97*  98  
CO2  27  28 GLU  92  104* BUN  60*  45* CREA  7.42*  6.15* MG   --   2.6* PHOS  4.9*   --   
CA  6.6*  7.1* ALB  2.6*   --   
 
 
 
 
Radiology A/P:  
Patient Active Problem List  
Diagnosis Code  CAD (coronary artery disease) I25.10  
 HTN (hypertension) I10  Benign non-nodular prostatic hyperplasia with lower urinary tract symptoms N40.1  GOUT, UNSPECIFIED M10.9  Anemia, unspecified  D64.9  Chronic atrial fibrillation (HCC) I48.2  End-stage renal disease (HCC) N18.6  History of TIA (transient ischemic attack) Z86.73  
 Hypercholesterolemia E78.00  Internal carotid artery stenosis I65.29  
 Gastroesophageal reflux disease without esophagitis K21.9  Impacted cerumen of left ear H61.22  
 Debility R53.81  
 Skin lesion L98.9  Syncope R55  ESRD (end stage renal disease) ([facility] N18.6  Neck pain M54.2  Nonrheumatic aortic valve stenosis I35.0  Acute respiratory failure with hypoxia ([facility] J96.01  
 
ESRD - Next dialysis on Tues 9/18/18. Syncope - workup in progress AFib ASHD Anemia Adwoa Ceballos MD

## 2018-09-16 NOTE — PROGRESS NOTES
Hospitalist Progress Note 2018 Admit Date: 2018 10:11 AM  
NAME: Shena Rangel :  1940 MRN:  [id number] Attending: Frantz Reilly MD 
PCP:  Jakob Scott MD 
 
SUBJECTIVE:  
Pt admitted with ESRD admitted for Syncope, Fall, Left 5th Metacarpal fracture. Seen by Cards, ortho and Nephro. : Pt seen, Feels better, better BP but HR was 110 this am, Afebrile. Nursing notes and chart reviewed. Review of Systems negative with exception of pertinent positives noted above. PHYSICAL EXAM  
 
Visit Vitals  /68 (BP 1 Location: Right arm, BP Patient Position: At rest)  Pulse (!) 110  Temp 97.7 °F (36.5 °C)  Resp 16  
 Ht 5' 10\" (1.778 m)  Wt 75.2 kg (165 lb 12.6 oz)  SpO2 100%  BMI 23.79 kg/m2 Temp (24hrs), Av.9 °F (36.6 °C), Min:97 °F (36.1 °C), Max:98.4 °F (36.9 °C) Oxygen Therapy O2 Sat (%): 100 % (18 0723) Pulse via Oximetry: 81 beats per minute (18 1400) O2 Device: Room air (09/15/18 1620) Intake/Output Summary (Last 24 hours) at 18 9856 Last data filed at 09/15/18 1129 Gross per 24 hour Intake              120 ml Output                0 ml Net              120 ml General: No acute distress. Alert.   
Head:  Occipital area bandage due to small laceration Lungs:  CTABL. Heart:  RRR, no murmur, rub, or gallop Abdomen: Soft, non-distended, non-tender, +bs Extremities: No cyanosis or clubbing. Left hand splint in place. Neurologic:  No focal deficits. Moves all extremities. Skin:  No Obvious Rash Psych:  Normal affect. LABS AND STUDIES: 
Personally reviewed all labs, meds, and studies for past 24hrs. ASSESSMENT Active Hospital Problems Diagnosis Date Noted  Acute respiratory failure with hypoxia ([address] 2018  Syncope 2018  Gastroesophageal reflux disease without esophagitis 10/18/2017  End-stage renal disease ([facility] [address] 2016 Last Assessment & Plan: Lab work from [facility] looks good BP looks good Losing weight Feels good Hernia acting up - will se  Dr Yumiko Hui prn 2nd to glomerulonephritis  Chronic atrial fibrillation ([facility] [address] 07/19/2016  Anemia, unspecified  07/07/2016  
  2nd to CKD  Hypercholesterolemia 08/15/2013  CAD (coronary artery disease) 03/28/2011 S/p stent prior to CABG; CABG 2007 
  
 HTN (hypertension) 03/28/2011 PLAN: 
 
Syncope: Cards recommendations appreciated. Considering aortic stenosis, arrythmia, post-dialysis hypotension. S/p loop per EP. -ve interrogation 9/15.  
  
AS, CAD, pAfib: DC'd Eliquis after discussion with Dr. Tamie Don, will leave on Plavix. Mild troponin elevation more likely ESRD related. Also had NSVT 17 beats run overnight. Monitor on Metoprolol. Hypotension: decreased metoprolol to 25mg, monitor BP closely, started florinef. Hypoxia: CXR with stable effusion (likely related to dialysis, afib). Hypoxia improved.  
  
ESRD: nephro managing - t/th/s. They are increasing dry weight to try to address possible dehydration component of syncope.  
  
Anemia of chronic disease: stable Left Hand Fracture: Ortho eval done, out pt f/u with hand ortho  
   
DVT PPx:  hsq Code Status: DNR Dispo: PT/OT eval, needs rehab. Ppd placed. Discussed plan with pt and son who is in agreement. All questions answered. Signed By: Ashely Townsend MD   
 September 16, 2018

## 2018-09-16 NOTE — PROGRESS NOTES
Problem: Falls - Risk of 
Goal: *Absence of Falls Document Ronel Molina Fall Risk and appropriate interventions in the flowsheet. Outcome: Progressing Towards Goal 
Fall Risk Interventions: 
Mobility Interventions: Bed/chair exit alarm, PT Consult for mobility concerns Medication Interventions: Bed/chair exit alarm, Assess postural VS orthostatic hypotension, Evaluate medications/consider consulting pharmacy, Patient to call before getting OOB, Teach patient to arise slowly Elimination Interventions: Call light in reach, Bed/chair exit alarm, Elevated toilet seat, Patient to call for help with toileting needs, Toilet paper/wipes in reach, Toileting schedule/hourly rounds History of Falls Interventions: Bed/chair exit alarm, Consult care management for discharge planning, Door open when patient unattended, Investigate reason for fall

## 2018-09-16 NOTE — PROGRESS NOTES
Problem: Falls - Risk of 
Goal: *Absence of Falls Document Nas Chavez Fall Risk and appropriate interventions in the flowsheet. Outcome: Progressing Towards Goal 
Fall Risk Interventions: 
Mobility Interventions: Bed/chair exit alarm, OT consult for ADLs, PT Consult for mobility concerns, Patient to call before getting OOB Medication Interventions: Bed/chair exit alarm, Evaluate medications/consider consulting pharmacy, Patient to call before getting OOB Elimination Interventions: Bed/chair exit alarm, Call light in reach, Patient to call for help with toileting needs History of Falls Interventions: Bed/chair exit alarm, Evaluate medications/consider consulting pharmacy, Investigate reason for fall

## 2018-09-16 NOTE — PROGRESS NOTES
Monitor room called and said pt had a run of 17 beats of v-tach. Pt asymptomatic. Hospitalist notified. No new orders at this time. Pt currently A-fib at 82 bpm. Will continue to monitor.

## 2018-09-16 NOTE — MED STUDENT NOTES
Hospitalist Progress Note 2018 Admit Date: 2018 10:11 AM  
NAME: Sherly Kat :  1940 MRN:  [id number] Attending: Mickey Galvan MD 
PCP:  Nathaly Siu MD 
 
SUBJECTIVE:  
 
65 y/o male with a PMH of ESRD, Afib, Anemia secondary to CKD, and CAD presented to the ED for a syncopal episode and fall on 18 where he was found to have a 5th left metacarpal fracture. He denies pain in his hand. He states that he feels \"so-so\" due to not being able to sleep well attributed to an uncomfortable bed. He also states right sided upper chest pain that radiates around around to his right shoulder and right upper back that is constant since his fall x8 days ago. He also reports constant pain in his left knee, which makes him apprehensive to talk long walks. He is able to walk with assistance and has PT visit which he states he able to tolerate well. He reports he is eating well. While at dialysis yesterday, he states he had an episode of hypotensions, but was asymptomatic denying a headache, vision changes, nausea or any other symptoms. He was told he had an episode of hypotension by the dialysis staff. He received dialysis as planned otherwise with only filtration, to try and increase his volume status. His blood pressure is at 147/68 this morning. He reports x3 episodes of diarrhea yesterday. He denies any shortness of breath at this time. Nursing notes and chart reviewed. Review of Systems negative with exception of pertinent positives noted above. PHYSICAL EXAM  
 
Visit Vitals  /68 (BP 1 Location: Right arm, BP Patient Position: At rest)  Pulse (!) 110  Temp 97.7 °F (36.5 °C)  Resp 16  
 Ht 5' 10\" (1.778 m)  Wt 75.2 kg (165 lb 12.6 oz)  SpO2 100%  BMI 23.79 kg/m2 Temp (24hrs), Av.9 °F (36.6 °C), Min:97 °F (36.1 °C), Max:98.4 °F (36.9 °C) Oxygen Therapy O2 Sat (%): 100 % (18 0723) Pulse via Oximetry: 81 beats per minute (18 1400) O2 Device: Room air (09/15/18 1620) Intake/Output Summary (Last 24 hours) at 09/16/18 1002 Last data filed at 09/15/18 1129 Gross per 24 hour Intake              120 ml Output                0 ml Net              120 ml General: No acute distress. Alert.   
Head:  AT/NC Lungs:  Right sided lower field with decreased breath sounds, otherwise CTA in all other fields. Heart:  RRR, no murmur, rub, or gallop Abdomen: Soft, Bloated, non-tender, +bs Extremities: No cyanosis or clubbing. MSK:               Tenderness to palpation in right upper chest and shoulder. Neurologic:  No focal deficits. Moves all extremities. Skin:  No Obvious Rash Psych:  Normal affect. LABS AND STUDIES: 
Personally reviewed all labs, meds, and studies for past 24hrs. ASSESSMENT Active Hospital Problems Diagnosis Date Noted  [facility] 09/09/2018  Syncope 07/31/2018  Gastroesophageal reflux disease without esophagitis 10/18/2017  End-stage renal disease ([facility] [facility] 11/18/2016 Last Assessment & Plan:  
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  
  2nd to CKD  Hypercholesterolemia 08/15/2013  CAD (coronary artery disease) 03/28/2011 S/p stent prior to CABG; CABG 2007 
  
 HTN (hypertension) 03/28/2011 PLAN: 
· Syncope · ? Etiology - volume status, aortic stenosis, arhythmia, dialysis induced · Cardiology monitoring possibly cardiac etiology · Diarrhea · Bisacodyl will be D/C'ed · Acute Respiratory failure with hypoxia · Resolved. O2 Saturation at 100% on room air. · CXR reveals stable right sided pleural effusion · Hypotension · Resolved after Metoprolol decreased from 50mg to 25mg · Left hand fracture · Ortho eval complete. Stable at this time. Pt is instructed to follow-up as outpatient. · ESRD 
· On T/Th/S schedule, will receive next therapy on Tuesday as scheduled Dispo: DVT ppx:   
Discussed plan with pt who is in agreement. All questions answered. Signed By: Jovana Tellez September 16, 2018 *ATTENTION:  This note has been created by a medical student for educational purposes only. Please do not refer to the content of this note for clinical decision-making, billing, or other purposes. Please see attending physicians note to obtain clinical information on this patient. *
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
## 2018-09-17 NOTE — PROGRESS NOTES
Attempted therapy this PM but pt refused, stating he is \"beat\" and tired, and he is hurting due to previous testing done earlier in the day. Will attempt therapy later as available. Philly Banks, DPT 
9/17/2018

## 2018-09-17 NOTE — PROGRESS NOTES
Problem: Falls - Risk of 
Goal: *Absence of Falls Document Segundo Bliss Fall Risk and appropriate interventions in the flowsheet. Outcome: Progressing Towards Goal 
Fall Risk Interventions: 
Mobility Interventions: Bed/chair exit alarm, Communicate number of staff needed for ambulation/transfer, PT Consult for mobility concerns Medication Interventions: Bed/chair exit alarm, Evaluate medications/consider consulting pharmacy, Patient to call before getting OOB, Teach patient to arise slowly Elimination Interventions: Bed/chair exit alarm, Call light in reach, Elevated toilet seat, Patient to call for help with toileting needs, Toilet paper/wipes in reach, Toileting schedule/hourly rounds, Urinal in reach History of Falls Interventions: Investigate reason for fall, Consult care management for discharge planning, Bed/chair exit alarm

## 2018-09-17 NOTE — PROGRESS NOTES
Problem: Falls - Risk of 
Goal: *Absence of Falls Document Justin Veronica Fall Risk and appropriate interventions in the flowsheet. Outcome: Progressing Towards Goal 
Fall Risk Interventions: 
Mobility Interventions: Patient to call before getting OOB, Bed/chair exit alarm, PT Consult for mobility concerns Medication Interventions: Bed/chair exit alarm, Evaluate medications/consider consulting pharmacy, Patient to call before getting OOB Elimination Interventions: Bed/chair exit alarm, Call light in reach, Patient to call for help with toileting needs History of Falls Interventions: Bed/chair exit alarm, Door open when patient unattended, Investigate reason for fall

## 2018-09-17 NOTE — PROGRESS NOTES
Bed offers from several of the facilities chosen, pt given options and has picked [facility] [facility] Selected in CC link.

## 2018-09-17 NOTE — PROGRESS NOTES
Problem: Interdisciplinary Rounds Goal: Interdisciplinary Rounds Outcome: Resolved/Met Date Met: 09/17/18 Interdisciplinary team rounds were held 9/17/2018 with the following team members:Care Management, Physical Therapy, Physician and  and the patient. Anticipate discharge to [facility] tomorrow. Plan of care discussed. See clinical pathway and/or care plan for interventions and desired outcomes.

## 2018-09-17 NOTE — PROGRESS NOTES
[facility] Subjective: ESRD Review of Systems -  
General ROS: negative for - fever, chills Respiratory ROS: no SOB, cough, ALBERT Cardiovascular ROS: no CP, palpitations Gastrointestinal ROS: no N&V, abdominal pain, diarrhea Genito-Urinary ROS: no difficulty voiding, dysuria Neurological ROS: no seizures, focal weekness Objective: 
 
Vitals:  
 09/17/18 1945 09/17/18 1308 09/17/18 0949 09/17/18 0950 BP:  114/65 110/67 123/69 Pulse:  78 89 89 Resp:      
Temp:      
SpO2:  96% 96% 97% Weight: 75.3 kg (166 lb 0.1 oz) Height:      
 
 
PE 
Gen: in no acute distress CV:reg rate Chest:clear Abd: soft Ext/Access: av fistula left arm ok Liliana Rodriguez LAB Recent Labs  
   09/15/18 
 0502 WBC  12.7* HGB  10.3* HCT  33.1*  
PLT  233 Recent Labs  
   09/17/18 
 0508  09/15/18 
 0502 NA  138  137  
K  4.9  4.9 CL  97*  97* CO2  25  27 GLU  84  92 BUN  59*  60* CREA  6.83*  7.42* MG  2.6*   --   
PHOS   --   4.9*  
CA  6.2*  6.6* ALB   --   2.6* Radiology A/P:  
Patient Active Problem List  
Diagnosis Code  CAD (coronary artery disease) I25.10  
 HTN (hypertension) I10  Benign non-nodular prostatic hyperplasia with lower urinary tract symptoms N40.1  GOUT, UNSPECIFIED M10.9  Anemia, unspecified  D64.9  Chronic atrial fibrillation (HCC) I48.2  End-stage renal disease (HCC) N18.6  History of TIA (transient ischemic attack) Z86.73  
 Hypercholesterolemia E78.00  Internal carotid artery stenosis I65.29  
 Gastroesophageal reflux disease without esophagitis K21.9  Impacted cerumen of left ear H61.22  
 Debility R53.81  
 Skin lesion L98.9  Syncope R55  ESRD (end stage renal disease) ([facility] N18.6  Neck pain M54.2  Nonrheumatic aortic valve stenosis I35.0  Acute respiratory failure with hypoxia ([facility] J96.01  
 
ESRD - Next dialysis on Tues 9/18/18. Syncope - workup in progress AFib ASHD Anemia Gareth Bernard MD

## 2018-09-17 NOTE — MED STUDENT NOTES
Hospitalist Progress Note 2018 Admit Date: 2018 10:11 AM  
NAME: Dimitri Davidson :  1940 MRN:  [id number] Attending: Danisha Ruiz MD 
PCP:  Devon Silva. Kristin Carney MD 
 
SUBJECTIVE:  
 
67 y/o male with a PMH of ESRD, Afib, Anemia secondary to CKD, and CAD presented to the ED for a syncopal episode and fall on 18 where he was found to have a 5th left metacarpal fracture. Today, he still denies pain in his hand. He reports he slept much better last night compared to the previous night. He is an extremely motivated patient. PT visited the pt yesterday which he states he tolerated very well with no episodes of light headedness, dizziness, or syncopal symptoms. His BP is maintained at 125/67 this morning. PT gave him a resistance band for his right back and shoulder pain which is still present, unchanged. He states he uses it throughout the day and tolerates it well. He denies any other episodes throughout the day. He still reports mild left knee pain, unchanged from previous. Today he was challenged by orthostatics which he tolerated well with no symptoms. Nursing notes and chart reviewed. Review of Systems negative with exception of pertinent positives noted above. PHYSICAL EXAM  
 
Visit Vitals  /67 (BP 1 Location: Right arm, BP Patient Position: At rest)  Pulse 95  Temp 97.8 °F (36.6 °C)  Resp 16  
 Ht 5' 10\" (1.778 m)  Wt 75.3 kg (166 lb 0.1 oz)  SpO2 99%  BMI 23.82 kg/m2 Temp (24hrs), Av.9 °F (36.6 °C), Min:97.4 °F (36.3 °C), Max:98.6 °F (37 °C) Oxygen Therapy O2 Sat (%): 99 % (18 0845) Pulse via Oximetry: 81 beats per minute (18 1400) O2 Device: Room air (09/15/18 1620) No intake or output data in the 24 hours ending 18 0767 Orthostatics - Laying- 114/65 BP; 91 HR; 96% O2 Sat 
 - Standing- 123/69 BP; 89 HR; 96% O2 Sat 
 -The pt was asymptomatic throughout the challenge and was able to stand with little assistance General: No acute distress. Alert.   
Head:  AT/NC Lungs:  Right sided lower field with decreased breath sounds, otherwise CTA in all other fields. Heart:  RRR, no murmur, rub, or gallop Abdomen: Soft, Bloated, non-tender, +bs MSK:   Tenderness to palpation in right anterior chest wall, superior shoulder, and right upper back/neck Extremities: No cyanosis or clubbing. Neurologic:  No focal deficits. Moves all extremities. Skin:  No Obvious Rash Psych:  Normal affect. LABS AND STUDIES: 
Personally reviewed all labs, meds, and studies for past 24hrs. ASSESSMENT Active Hospital Problems Diagnosis Date Noted  Acute respiratory failure with hypoxia ([facility] 09/09/2018  Syncope 07/31/2018  Gastroesophageal reflux disease without esophagitis 10/18/2017  End-stage renal disease ([facility] 11/18/2016 Last Assessment & Plan:  
Lab work from [facility] looks good BP looks good Losing weight Feels good Hernia acting up - will se  Dr Rachel Chavez prn 2nd to glomerulonephritis  Chronic atrial fibrillation ([facility] 07/19/2016  Anemia, unspecified  07/07/2016  
  2nd to CKD  Hypercholesterolemia 08/15/2013  CAD (coronary artery disease) 03/28/2011 S/p stent prior to CABG; CABG 2007 
  
 HTN (hypertension) 03/28/2011 PLAN: 
 
-Acute Respiratory Failure with Hypoxia - Resolved. O2 96% on RA. -Syncope - Cardiology monitoring with loop recorder. 
 - Hopeful the monitor will capture abnormality to confirm recurrent syncopal episode etiology 
 
- ESRD 
 - Managed by Nephrology. - Scheduled for Dialysis tomorrow on his regular T/Th/Sa schedule - Hypotension 
 -Resolved - Left hand fracture - Ortho eval complete. Stable at this time. Pt is instructed to follow-up as outpatient. Dispo: DVT ppx:   
Discussed plan with pt who is in agreement. All questions answered. Signed By: Kary Tavares September 17, 2018 *ATTENTION:  This note has been created by a medical student for educational purposes only. Please do not refer to the content of this note for clinical decision-making, billing, or other purposes. Please see attending physicians note to obtain clinical information on this patient. *

## 2018-09-17 NOTE — PROGRESS NOTES
Hospitalist Progress Note 2018 Admit Date: 2018 10:11 AM  
NAME: Juan Nguyen :  1940 MRN:  [id number] Attending: Gama Corona MD 
PCP:  Torres Snow. Regina Polo MD 
 
SUBJECTIVE:  
Pt admitted with ESRD admitted for Syncope, Fall, Left 5th Metacarpal fracture. Seen by Cards, ortho and Nephro. : Pt seen, Feels better, better BP, Not orthostatic anymore, Afebrile. Nursing notes and chart reviewed. Review of Systems negative with exception of pertinent positives noted above. PHYSICAL EXAM  
 
Visit Vitals  /73 (BP 1 Location: Right arm, BP Patient Position: At rest)  Pulse 60  Temp 98 °F (36.7 °C)  Resp 18  Ht 5' 10\" (1.778 m)  Wt 75.3 kg (166 lb 0.1 oz)  SpO2 92%  BMI 23.82 kg/m2 Temp (24hrs), Av °F (36.7 °C), Min:97.6 °F (36.4 °C), Max:98.6 °F (37 °C) Oxygen Therapy O2 Sat (%): 92 % (18 1702) Pulse via Oximetry: 81 beats per minute (18 1400) O2 Device: Room air (09/15/18 1620) No intake or output data in the 24 hours ending 18 1928 General: No acute distress. Alert.   
Head:  Occipital area bandage due to small laceration Lungs:  CTABL. Heart:  RRR, no murmur, rub, or gallop Abdomen: Soft, non-distended, non-tender, +bs Extremities: No cyanosis or clubbing. Left hand splint in place. Neurologic:  No focal deficits. Moves all extremities. Skin:  No Obvious Rash Psych:  Normal affect. LABS AND STUDIES: 
Personally reviewed all labs, meds, and studies for past 24hrs. ASSESSMENT Active Hospital Problems Diagnosis Date Noted  Acute respiratory failure with hypoxia ([facility] 2018  Syncope 2018  Gastroesophageal reflux disease without esophagitis 10/18/2017  End-stage renal disease ([facility] 2016 Last Assessment & Plan:  
Lab work from [facility] looks good BP looks good Losing weight Feels good Hernia acting up - will se  Dr Bambi Willard prn 2nd to glomerulonephritis  Chronic atrial fibrillation ([facility] 07/19/2016  Anemia, unspecified  07/07/2016  
  2nd to CKD  Hypercholesterolemia 08/15/2013  CAD (coronary artery disease) 03/28/2011 S/p stent prior to CABG; CABG 2007 
  
 HTN (hypertension) 03/28/2011 PLAN: 
 
Syncope: Cards recommendations appreciated. Considering aortic stenosis, arrythmia, post-dialysis hypotension. S/p loop per EP. -ve interrogation 9/15.  
  
AS, CAD, pAfib: DC'd Eliquis after discussion with Dr. Reno Eduardo, will leave on Plavix. Mild troponin elevation more likely ESRD related. Hypotension: decreased metoprolol to 25mg, monitor BP closely, started florinef. Hypoxia: CXR with stable effusion (likely related to dialysis, afib). Hypoxia improved.  
  
ESRD: nephro managing - t/th/s. They are increasing dry weight to try to address possible dehydration component of syncope.  
  
Anemia of chronic disease: stable Left Hand Fracture: Ortho eval done, out pt f/u with hand ortho  
   
DVT PPx:  hsq Code Status: DNR Dispo: Has bed at rehab, DC after HD in am if pt is stable post dialysis. Discussed plan with pt who is in agreement. All questions answered. Signed By: Jasmina Suero MD   
 September 17, 2018

## 2018-09-18 NOTE — PROGRESS NOTES
TRANSFER OUT -DIALYSIS Hemodialysis treatment completed without complications. Patient alert and oriented and VS stable  /78  P 86   
 
 .9 Kgs removed. Needles X2 removed from access and manual pressure held until hemostasis complete and pressure dressing applied. Meds given, epogen. Patient to 717 after dialysis.

## 2018-09-18 NOTE — PROGRESS NOTES
TRANSFER - OUT REPORT: 
 
Verbal report given to [facility] on [employer]  being transferred to [facility] for routine progression of care Report consisted of patients Situation, Background, Assessment and  
Recommendations(SBAR). Information from the following report(s) SBAR, Kardex, ED Summary, Procedure Summary, Intake/Output, MAR and Recent Results was reviewed with the receiving nurse. Lines:  
 
Opportunity for questions and clarification was provided. Patient transported with: 
[facility]

## 2018-09-18 NOTE — PROGRESS NOTES
Pt is medically ready for DC to [facility] today, pt made aware of pending DC transport, states will make family aware of this as well. Pt will be going to room 319 B report number [phone], EMS transport arranged with [facility] for 430 pm today.

## 2018-09-18 NOTE — PROGRESS NOTES
END OF SHIFT NOTE: 
 
Intake/Output 
09/17 1901 - 09/18 0700 In: 360 [P.O.:360] Out: -   
Voiding: NO 
Catheter: NO 
Drain:   
 
 
 
 
 
Stool:  1 occurrences. Stool Assessment Stool Appearance: Formed (09/16/18 0735) Emesis:  0 occurrences. VITAL SIGNS Patient Vitals for the past 12 hrs: 
 Temp Pulse Resp BP SpO2  
09/18/18 0343 97.9 °F (36.6 °C) 85 18 119/73 98 % 09/17/18 2318 97.8 °F (36.6 °C) 100 18 122/70 100 % 09/17/18 1941 97.8 °F (36.6 °C) 99 18 116/71 96 % Pain Assessment Pain 1 Pain Scale 1: Numeric (0 - 10) (09/18/18 0235) Pain Intensity 1: 0 (09/18/18 0235) Patient Stated Pain Goal: 0 (09/18/18 0235) Pain Reassessment 1: Yes (09/18/18 0235) Pain Onset 1: prior to adm (09/18/18 0135) Pain Location 1: Shoulder;Knee (09/18/18 0135) Pain Orientation 1: Right;Left (09/18/18 0135) Pain Description 1: Aching; Sore (09/18/18 0135) Pain Intervention(s) 1: Medication (see MAR); Rest (09/18/18 0135) Ambulating Yes;w/ assist to the [facility] Additional Information:  
Did not sleep til early AM;HD today & plan for D/C after if stable. Shift report given to oncoming nurse Valencia RN at the bedside.  
 
Nathaly Snyder RN

## 2018-09-18 NOTE — DIALYSIS
TRANSFER IN - DIALYSIS Received patient in dialysis unit from [facility] (unit) for ordered procedure. Consent verified for renal replacement therapy. Patient alert and orientated and vital signs stable. /73 P93 Hemodialysis initiated using left upper arm AVF and 15 g needles. Machine settings per MD order. Will monitor during treatment.

## 2018-09-18 NOTE — PROGRESS NOTES
[facility] PROGRESS NOTE Follow up for: 
 
Subjective:  
Patient seen and examined. Chart, notes, labs, imaging, results all reviewed. Feels ok. No sob, cp, n/v/d/c, abdominal pain. Seen on dialysis. Goal UF 1/2 kg. Tolerating well. /68. LUE AVF Qb 400 ml/min. ROS: 
Gen - no fever, no chills, appetite okay CV - no chest pain, no orthopnea Lung - no shortness of breath, no cough Abd - no tenderness, no nausea, no vomiting Ext - no edema Skin - multiple abrasions and bruises LUE AVF +thrill+bruit Objective:  
Exam: 
Vitals:  
 09/18/18 3725 09/18/18 7799 09/18/18 7260 09/18/18 8515 BP:  135/73 (!) 84/68 129/68 Pulse:  93 91 92 Resp:      
Temp:      
SpO2:      
Weight: 76.7 kg (169 lb) Height:      
 
 
 
Intake/Output Summary (Last 24 hours) at 09/18/18 7854 Last data filed at 09/18/18 2268 Gross per 24 hour Intake              360 ml Output                0 ml Net              360 ml  
 
 
Current Facility-Administered Medications Medication Dose Route Frequency  zolpidem (AMBIEN) tablet 5 mg  5 mg Oral QHS PRN  
 metoprolol succinate (TOPROL-XL) XL tablet 25 mg  25 mg Oral DAILY  fludrocortisone (FLORINEF) tablet 100 mcg  100 mcg Oral DAILY  epoetin alcides (EPOGEN;PROCRIT) injection 4,000 Units  4,000 Units IntraVENous DIALYSIS TUE, THU & SAT  influenza vaccine 2018-19 (6 mos+)(PF) (FLUARIX QUAD/FLULAVAL QUAD) injection 0.5 mL  0.5 mL IntraMUSCular PRIOR TO DISCHARGE  cinacalcet (SENSIPAR) tablet 120 mg  120 mg Oral QHS  clopidogrel (PLAVIX) tablet 75 mg  75 mg Oral DAILY  dicyclomine (BENTYL) capsule 10 mg  10 mg Oral DAILY PRN  
 finasteride (PROSCAR) tablet 5 mg  5 mg Oral DAILY  nitroglycerin (NITROSTAT) tablet 0.4 mg  0.4 mg SubLINGual PRN  
 sevelamer carbonate (RENVELA) tab 800 mg  800 mg Oral TID WITH MEALS  sodium chloride (NS) flush 5-10 mL  5-10 mL IntraVENous Q8H  
  sodium chloride (NS) flush 5-10 mL  5-10 mL IntraVENous PRN  
 acetaminophen (TYLENOL) tablet 650 mg  650 mg Oral Q4H PRN  
 HYDROcodone-acetaminophen (NORCO) 5-325 mg per tablet 1 Tab  1 Tab Oral Q4H PRN  
 naloxone (NARCAN) injection 0.4 mg  0.4 mg IntraVENous PRN  
 bisacodyl (DULCOLAX) tablet 5 mg  5 mg Oral DAILY PRN  
 
 
EXAM 
GEN - Alert, oriented, in no distress CV - S1, S2, RRR, no rub, murmur, or gallop Lung - clear to auscultation bilaterally Abd - soft, nontender, BS present Ext - no edema No results for input(s): WBC, HGB, HCT, PLT, HGBEXT, HCTEXT, PLTEXT, HGBEXT, HCTEXT, PLTEXT in the last 72 hours. Recent Labs  
   09/17/18 
 0314 NA  138  
K  4.9 CL  97* CO2  25 BUN  59* CREA  6.83* CA  6.2*  
GLU  84  
MG  2.6* Assessment and Plan:  
ESRD on HD [facility] 
- HD today per routine for clearance and volume 
- LUE AVF Recurrent Syncope 
- aortic stenosis, arrhythmia, hypotension - s/p loop recorder device per EP 
- cardiology following 
- decreased metoprolol, on florinef, increased dry weight Moderate AS and severe pHTN - not a candidate for TAVR 
 
CAD s/p CABG, last PCI 4/2017 PAF - rate controlled on BB. No documented pauses or bradycardia. Off Eliquis with concern for falls. ? Watchman device at some point Dispo - to rehab today after HD [facility]

## 2018-09-18 NOTE — DISCHARGE SUMMARY
Hospitalist Discharge Summary Patient ID: Cameron Martin 
[id number] 
47 y.o. 
1940 Admit date: 9/9/2018 10:11 AM 
Discharge date and time: 9/18/2018 Attending: Korin Majano MD 
PCP:  Ayesha Astudillo. Yanna Pool MD 
Treatment Team: Attending Provider: Korin Majano MD; Consulting Provider: Per Gorman MD; Utilization Review: Shravan Schwartz RN; Care Manager: Winston Vincent; Consulting Provider: Gisela Patel MD; Consulting Provider: Jessi Jean-Baptiste MD; Charge Nurse: Jeni Vail, RN; Charge Nurse: Emely Rojas Principal Diagnosis Syncope Principal Problem: 
  Syncope (7/31/2018) Active Problems: 
  CAD (coronary artery disease) (3/28/2011) Overview: S/p stent prior to CABG; CABG 2007 HTN (hypertension) (3/28/2011) Anemia, unspecified  (7/7/2016) Overview: 2nd to CKD Chronic atrial fibrillation ([facility] (7/19/2016) End-stage renal disease ([facility] (11/18/2016) Overview: Last Assessment & Plan:  
    Lab work from [facility] looks good BP looks good Losing weight Feels good Hernia acting up - will se  Dr Fran Melara prn 2nd to glomerulonephritis Hypercholesterolemia (8/15/2013) Gastroesophageal reflux disease without esophagitis (10/18/2017) Acute respiratory failure with hypoxia ([facility] (9/9/2018) Hospital Course: 
Please refer to the admission H&P for details of presentation. In summary, the patient is a 67 yo male with pmh ESRD on HD, A Fib, CAD who presented to ER 09/11 with recurrent syncopal episodes x 1-2 months. Cards consulted and pt now has loop recorder device. He is in A Fib, now off Eliquis due to frequent falls. Remains on plavix. His Metoprolol was decreased to 25 mg and Florinef started. Also his dry weight for HD has been adjsuted. Today pt has completed HD without complications. Vitals stable, no further syncope. He is stable to d/c to STR. He will follow up with PCP and Cardiology next week. Significant Diagnostic Studies:  
 
CT HeadIMPRESSION: No CT evidence of acute intracranial abnormality. Chest X Ray IMPRESSION: Stable right pleural effusion Labs: Results:  
   
Chemistry Recent Labs  
   09/17/18 
 4467 GLU  84 NA  138  
K  4.9 CL  97* CO2  25 BUN  59* CREA  6.83* CA  6.2* AGAP  16 CBC w/Diff No results for input(s): WBC, RBC, HGB, HCT, PLT, GRANS, LYMPH, EOS, HGBEXT, HCTEXT, PLTEXT in the last 72 hours. Cardiac Enzymes No results for input(s): CPK, CKND1, KASSI in the last 72 hours. No lab exists for component: Marlyse Maker Coagulation No results for input(s): PTP, INR, APTT in the last 72 hours. No lab exists for component: INREXT Lipid Panel Lab Results Component Value Date/Time Cholesterol, total 137 03/29/2011 04:21 AM  
 HDL Cholesterol 31 (L) 03/29/2011 04:21 AM  
 LDL, calculated 57.8 03/29/2011 04:21 AM  
 VLDL, calculated 48.2 (H) 03/29/2011 04:21 AM  
 Triglyceride 241 (H) 03/29/2011 04:21 AM  
 CHOL/HDL Ratio 4.4 03/29/2011 04:21 AM  
  
BNP No results for input(s): BNPP in the last 72 hours. Liver Enzymes No results for input(s): TP, ALB, TBIL, AP, SGOT, GPT in the last 72 hours. No lab exists for component: DBIL Thyroid Studies No results found for: T4, T3U, TSH, TSHEXT Discharge Exam: 
Visit Vitals  /64 (BP 1 Location: Right arm, BP Patient Position: At rest)  Pulse 97  Temp 97.5 °F (36.4 °C)  Resp 19  
 Ht 5' 10\" (1.778 m)  Wt 76.7 kg (169 lb)  SpO2 94%  BMI 24.25 kg/m2 General appearance: alert, cooperative, no distress, appears stated age Lungs: clear to auscultation bilaterally Heart: regular rate and rhythm, S1, S2 normal, no murmur, click, rub or gallop Abdomen: soft, non-tender. Bowel sounds normal. No masses,  no organomegaly Extremities: no cyanosis or edema, LUE AV fistula Neurologic: Grossly normal 
 
Disposition: STR Discharge Condition: stable Patient Instructions: Current Discharge Medication List  
  
START taking these medications Details  
fludrocortisone (FLORINEF) 0.1 mg tablet Take 1 Tab by mouth daily. Qty: 30 Tab, Refills: 0 CONTINUE these medications which have CHANGED Details  
metoprolol succinate (TOPROL-XL) 25 mg XL tablet Take 1 Tab by mouth daily. Qty: 30 Tab, Refills: 0 CONTINUE these medications which have NOT CHANGED Details HYDROcodone-acetaminophen (NORCO) 5-325 mg per tablet Take 1 Tab by mouth every six (6) hours as needed for Pain. Max Daily Amount: 4 Tabs. Qty: 17 Tab, Refills: 0 Associated Diagnoses: Closed nondisplaced fracture of fifth metacarpal bone of left hand, unspecified portion of metacarpal, initial encounter  
  
cyclobenzaprine (FLEXERIL) 5 mg tablet Take 0.5 Tabs by mouth daily as needed for Muscle Spasm(s). Qty: 10 Tab, Refills: 0 Associated Diagnoses: Neck pain  
  
clopidogrel (PLAVIX) 75 mg tab TAKE 1 TABLET BY MOUTH EVERY DAY Qty: 30 Tab, Refills: 0  
  
predniSONE (DELTASONE) 10 mg tablet Take 1 Tab by mouth as needed. Qty: 10 Tab, Refills: 1 Associated Diagnoses: Idiopathic chronic gout of foot without tophus, unspecified laterality  
  
finasteride (PROSCAR) 5 mg tablet Take 5 mg by mouth daily. Indications: benign prostatic hyperplasia with lower urinary tract sx  
  
calcitRIOL (ROCALTROL) 0.5 mcg capsule Take 0.5 mcg by mouth daily. Indications: Renal Osteodystrophy, five days a week  
  
nitroglycerin (NITROSTAT) 0.4 mg SL tablet ONE TABLET UNDER TONGUE AS NEEDED FOR CHEST PAIN EVERY 5 MINUTES Qty: 25 Tab, Refills: 6 B.infantis-B.ani-B.long-B.bifi (PROBIOTIC 4X) 10-15 mg TbEC Take  by mouth daily. cinacalcet (SENSIPAR) 60 mg tab Take 120 mg by mouth nightly. Indications: HYPERPARATHYROIDISM SECONDARY TO CRF WITH DIALYSIS  
  
DOCUSATE CALCIUM (STOOL SOFTENER PO) Take  by mouth daily. multivitamin (ONE A DAY) tablet Take 1 Tab by mouth daily.  Special Complex for dialysis patients  
  
dicyclomine (BENTYL) 10 mg capsule Take 10 mg by mouth daily as needed. Indications: Irritable Bowel Syndrome Omeprazole Magnesium 20 mg cpDR Take  by mouth daily. Indications: gastroesophageal reflux disease  
  
epoetin alcides (EPOGEN;PROCRIT) 10,000 unit/mL injection by SubCUTAneous route as needed. Currently taking abt twice a month at [facility] Magnesium Oxide 500 mg cap Take  by mouth daily. KRILL OIL PO Take  by mouth daily. RENVELA 800 mg Tab tab Take 800 mg by mouth three (3) times daily (with meals). 4 tablets with each meal; 2 tablets with each snack . Indications: 3 with meals, 2 with snacks STOP taking these medications  
  
 apixaban (ELIQUIS) 2.5 mg tablet Comments:  
Reason for Stopping:   
   
  
 
 
Activity: Regular Diet: DIET RENAL Regular Follow-up PCP in 1 week for hosp follow up including 5th metatarsal fx, A Fib off [address] [place] [address] Cardiology in 1 week Follow-up Information Follow up With Details Comments Contact Info [facility] CARDIOLOGY [place] OFFICE On 9/25/2018 Follow-up appointment 11:30 a.m. [address] 
[address] [id number] [facility] 
[phone] [address] [address] [phone] Velia Wright. Adrienne Salinas MD   [address] [place] [id number] [phone] Time spent to discharge patient greater than 30 minutes Signed: 
Shira Glover NP 
9/18/2018 
2:42 PM

## 2018-09-18 NOTE — PROGRESS NOTES
Problem: Falls - Risk of 
Goal: *Absence of Falls Document Conner Márquez Fall Risk and appropriate interventions in the flowsheet. Fall Risk Interventions: 
Mobility Interventions: Strengthening exercises (ROM-active/passive), PT Consult for assist device competence Medication Interventions: Teach patient to arise slowly, Patient to call before getting OOB Elimination Interventions: Call light in reach, Patient to call for help with toileting needs, Toilet paper/wipes in reach, Toileting schedule/hourly rounds History of Falls Interventions: Room close to nurse's station

## 2018-09-24 NOTE — PROGRESS NOTES
Pt stated he was doing great and is in rehab. Has scheduled apt to see us in November. He stated he would call sooner if he needed us. Pt was appreciative of call.

## 2018-10-11 PROBLEM — I34.0 MITRAL REGURGITATION: Status: ACTIVE | Noted: 2018-01-01

## 2018-10-11 PROBLEM — I35.0 AORTIC STENOSIS: Status: ACTIVE | Noted: 2018-01-01
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
## 2018-10-11 NOTE — ED TRIAGE NOTES
Per ems report patient present to the ed from [place] dialysis due to unconscious person (syncopal episode). Per ems they did 6 minutes of CPR at the facility. Upon ems arrival staff was doing compressions on patient but he was awake and had an organized rhythm with palpable pulse. Patient complains of pain to chest where compressions were done and state he has had generalized weakness. They were unable to do dialysis treatment due to syncopal episode. Patient is currently alert and oriented at this time. Speaking in full sentences, speech clear. Patient states he was here last week due to syncope at that time.

## 2018-10-11 NOTE — ED PROVIDER NOTES
HPI Comments: 79-year-old male with history of aortic stenosis, ESRD on HD TThS, CAD status post CABG presents from [facility] due to witnessed syncopal episode w/ positive LOC. Per EMS, staff at [facility] performed close to 6 minutes of CPR at the facility. Uncertain if the staff checked her pulse appropriately. Upon EMS arrival,  staff were performing chest compressions on the patient with patient awake, alert and oriented asking staff to stop administering chest compressions. Patient with organized rhythm and palpable pulse on arrival.  Patient complains of chest discomfort from work and persons were delivered. Patient was unable to complete dialysis treatment due to syncopal episode. Denies any seizure-like activity. Denies bowel or bladder incontinence, tongue biting. Patient is asymptomatic this time. Patient with numerous recent admissions due to recurrent syncopal episodes. States last dialysis was on this Tuesday. Patient is a 66 y.o. male presenting with syncope. The history is provided by the patient. No  was used. Syncope This is a recurrent problem. The current episode started 1 to 2 hours ago. The problem occurs constantly. The problem has not changed since onset. He lost consciousness for a period of less than one minute. The problem is associated with nothing. Associated symptoms include chest pain. Pertinent negatives include no visual change, no palpitations, no clumsiness, no confusion, no fever, no malaise/fatigue, no abdominal pain, no bowel incontinence, no nausea, no vomiting, no bladder incontinence, no congestion, no headaches, no back pain, no dizziness, no focal weakness, no light-headedness, no seizures, no slurred speech and no weakness. He has tried nothing for the symptoms. The treatment provided no relief. His past medical history is significant for syncope. Past Medical History:  
Diagnosis Date  Anemia, unspecified  7/7/2016  Arrhythmia IRREG. HEART BEAT- pt denies a fib - found on cardiac note 5/3/13- pt states he feels flutter at times  Arthritis  ASCAD - artery bypass graft 7/7/2016  ASCAD - artery bypass graft 7/7/2016  CAD (coronary artery disease) CABG 2007, stented 1995  Chronic kidney disease STAGE 3 CKD, dialysis - Tu-Th- Sat  Chronic prostatitis 11/25/2013  Diverticulitis  Diverticulosis 7/7/2016  GERD (gastroesophageal reflux disease)   
 takes omeprazole  Glomerulonephritis 7/7/2016  GOUT, UNSPECIFIED 7/7/2016  Hypercholesteremia   
 pt states is under control  Hypertension  Hypertrophy of prostate with urinary obstruction and other lower urinary tract symptoms (LUTS) 11/25/2013  Kidney failure  Paroxysmal atrial fibrillation ([address] 7/7/2016  TIA (transient ischemic attack) 07/07/2016  
 no residual weakness Past Surgical History:  
Procedure Laterality Date  ABDOMEN SURGERY PROC UNLISTED    
 perineal cath  CARDIAC SURG PROCEDURE UNLIST CABG x 2 in 2007; PTCA WITH STENTS  
 CREAT AV FISTULA,AUTOGENOUS GRAFT    
 HX COLONOSCOPY  2011  HX CORONARY ARTERY BYPASS GRAFT    
 HX CORONARY STENT PLACEMENT    
 new stent placement 2017  HX CSF SHUNT  HX HEART CATHETERIZATION    
 HX HERNIA REPAIR    
 bilateral  
 HX ORTHOPAEDIC    
 rt shoulder rotater cuff,lt knee  HX VASCULAR ACCESS  2010 L u arm  
 OTHER CELL    
 LEFT KNEE SURGERY  VASCULAR SURGERY PROCEDURE UNLIST    
 cabg x2 Family History:  
Problem Relation Age of Onset  Heart Disease Father 59 MI  
 Heart Attack Father 72 MI  
 Stroke Mother  Hypertension Mother  Heart Disease Mother  Cancer Sister   
  stomach Social History Social History  Marital status:  Spouse name: N/A  
 Number of children: 3  
 Years of education: N/A Occupational History  marketing management Social History Main Topics  Smoking status: Current Every Day Smoker Last attempt to quit: 1/1/1980  Smokeless tobacco: Never Used Comment: CIGAR DAILY FOR 10 YEARS  
 Alcohol use 0.0 oz/week Comment: rare  Drug use: No  
 Sexual activity: No  
 
Other Topics Concern  Not on file Social History Narrative ALLERGIES: Nka [no known allergies] Review of Systems Constitutional: Negative for chills, fever and malaise/fatigue. HENT: Negative for congestion and facial swelling. Respiratory: Negative for cough and shortness of breath. Cardiovascular: Positive for chest pain and syncope. Negative for palpitations and leg swelling. Gastrointestinal: Negative for abdominal pain, bowel incontinence, nausea and vomiting. Genitourinary: Negative for bladder incontinence and dysuria. Musculoskeletal: Negative for back pain, myalgias, neck pain and neck stiffness. Skin: Negative for pallor and wound. Neurological: Positive for syncope. Negative for dizziness, focal weakness, seizures, facial asymmetry, weakness, light-headedness, numbness and headaches. Psychiatric/Behavioral: Negative for confusion. Vitals:  
 10/11/18 1140 10/11/18 1159 BP: 118/61 Pulse: 90 Resp: 18 Temp:  97.7 °F (36.5 °C) Physical Exam  
Constitutional: He is oriented to person, place, and time. Patient well-appearing in no acute distress. HENT:  
Head: Normocephalic. MMM. Neck: Normal range of motion. No JVD present. No tracheal deviation present. Cardiovascular: Normal rate, regular rhythm, normal heart sounds and intact distal pulses. Radial pulses 2+ and equal bilaterally. LUE AV fistula w/ + thrill/bruit. Pulmonary/Chest: Effort normal and breath sounds normal. He has no wheezes. He has no rales. CTAB. No chest wall TTP. Abdominal: Soft. Soft, NTND. No rebound or guarding. No CVAT. Musculoskeletal: Normal range of motion. 1-2+ pitting edema to bilateral LEs. Neurological: He is alert and oriented to person, place, and time. No cranial nerve deficit. Coordination normal.  
Skin: Skin is warm and dry. No rash. Psychiatric: He has a normal mood and affect. Judgment normal.  
Nursing note and vitals reviewed. MDM Number of Diagnoses or Management Options Syncope and collapse: new and requires workup Diagnosis management comments: Cardiology consulted. Recommend consultation with [employer] for interrogation of loop recorder and hospitalist admission. [employer] contacted and they will be coming in to interrogate loop recorder. Patient is hemodynamically stable this time. Hospitalist consulted for admission. Amount and/or Complexity of Data Reviewed Clinical lab tests: ordered and reviewed Tests in the radiology section of CPT®: ordered and reviewed Tests in the medicine section of CPT®: ordered and reviewed Review and summarize past medical records: yes Independent visualization of images, tracings, or specimens: yes Risk of Complications, Morbidity, and/or Mortality Presenting problems: high Diagnostic procedures: moderate Management options: moderate Patient Progress Patient progress: stable ED Course EKG Date/Time: 10/11/2018 1:38 PM 
Performed by: Hayder Zavala Authorized by: ELVIS Veliz  
 
ECG reviewed by ED Physician in the absence of a cardiologist: yes Rate:  
  ECG rate:  85 ECG rate assessment: normal   
Ectopy:  
  Ectopy: none QRS:  
  QRS intervals: Wide Conduction:  
  Conduction: abnormal   
  Abnormal conduction: complete RBBB   
ST segments: ST segments:  Normal 
T waves:  
  T waves: inverted Inverted:  V1, V2 and V3 Results Include: 
 
Recent Results (from the past 24 hour(s)) EKG, 12 LEAD, INITIAL Collection Time: 10/11/18 11:46 AM  
Result Value Ref Range Ventricular Rate 85 BPM  
 Atrial Rate 72 BPM  
 QRS Duration 138 ms Q-T Interval 432 ms QTC Calculation (Bezet) 514 ms Calculated P Axis -162 degrees Calculated R Axis 117 degrees Calculated T Axis -174 degrees Diagnosis    
  !! AGE AND GENDER SPECIFIC ECG ANALYSIS !! 
Unusual P axis, possible ectopic atrial rhythm with A-V dissociation and Wide QRS rhythm with Premature supraventricular 
complexes Right bundle branch block Left posterior fascicular block 
!!! Bifascicular block !!! 
T wave abnormality, consider inferolateral ischemia Abnormal ECG When compared with ECG of 09-SEP-2018 11:01, Wide QRS rhythm has replaced Atrial fibrillation CBC WITH AUTOMATED DIFF Collection Time: 10/11/18 11:49 AM  
Result Value Ref Range WBC 11.7 (H) 4.3 - 11.1 K/uL  
 RBC 3.31 (L) 4.23 - 5.6 M/uL  
 HGB 11.8 (L) 13.6 - 17.2 g/dL HCT 38.0 (L) 41.1 - 50.3 % .8 (H) 79.6 - 97.8 FL  
 MCH 35.6 (H) 26.1 - 32.9 PG  
 MCHC 31.1 (L) 31.4 - 35.0 g/dL  
 RDW 17.7 % PLATELET 557 (L) 958 - 450 K/uL MPV 10.7 9.4 - 12.3 FL ABSOLUTE NRBC 0.04 0.0 - 0.2 K/uL  
 DF AUTOMATED NEUTROPHILS 76 43 - 78 % LYMPHOCYTES 9 (L) 13 - 44 % MONOCYTES 11 4.0 - 12.0 % EOSINOPHILS 1 0.5 - 7.8 % BASOPHILS 0 0.0 - 2.0 % IMMATURE GRANULOCYTES 2 0.0 - 5.0 %  
 ABS. NEUTROPHILS 8.9 (H) 1.7 - 8.2 K/UL  
 ABS. LYMPHOCYTES 1.1 0.5 - 4.6 K/UL  
 ABS. MONOCYTES 1.3 0.1 - 1.3 K/UL  
 ABS. EOSINOPHILS 0.2 0.0 - 0.8 K/UL  
 ABS. BASOPHILS 0.1 0.0 - 0.2 K/UL  
 ABS. IMM. GRANS. 0.2 0.0 - 0.5 K/UL METABOLIC PANEL, COMPREHENSIVE Collection Time: 10/11/18 11:49 AM  
Result Value Ref Range Sodium 135 (L) 136 - 145 mmol/L Potassium 5.2 (H) 3.5 - 5.1 mmol/L Chloride 100 98 - 107 mmol/L  
 CO2 23 21 - 32 mmol/L Anion gap 12 7 - 16 mmol/L Glucose 134 (H) 65 - 100 mg/dL BUN 60 (H) 8 - 23 MG/DL Creatinine 7.73 (H) 0.8 - 1.5 MG/DL  
 GFR est AA 9 (L) >60 ml/min/1.73m2 GFR est non-AA 7 (L) >60 ml/min/1.73m2 Calcium 7.2 (L) 8.3 - 10.4 MG/DL Bilirubin, total 0.6 0.2 - 1.1 MG/DL  
 ALT (SGPT) 21 12 - 65 U/L  
 AST (SGOT) 28 15 - 37 U/L Alk. phosphatase 160 (H) 50 - 136 U/L Protein, total 6.9 6.3 - 8.2 g/dL Albumin 2.7 (L) 3.2 - 4.6 g/dL Globulin 4.2 (H) 2.3 - 3.5 g/dL A-G Ratio 0.6 (L) 1.2 - 3.5    
TROPONIN I Collection Time: 10/11/18 11:49 AM  
Result Value Ref Range Troponin-I, Qt. 0.03 0.02 - 0.05 NG/ML  
MAGNESIUM Collection Time: 10/11/18 11:49 AM  
Result Value Ref Range Magnesium 2.4 1.8 - 2.4 mg/dL Carlos Sebastian MD; 10/11/2018 @1:38 PM Voice dictation software was used during the making of this note. This software is not perfect and grammatical and other typographical errors may be present.   This note has not been proofread for errors. 
===================================================================

## 2018-10-11 NOTE — PROGRESS NOTES
Dialysis de accessed. Manual pressure held for 15 minutes with minimal bleeding noted. Pressure dressing applied with gauze and tape. Positive thrill and bruit noted to site. Pt tolerate well. Pt instructed to notify staff for s/s of bleeding at site. Primary RN notified.

## 2018-10-11 NOTE — H&P
HOSPITALIST H&P/CONSULT 
NAME:  Meri Jansen Age:  66 y.o. 
:   1940 MRN:   [id number] PCP: Jose David Orr. Earl Frankel, MD 
Consulting MD: Treatment Team: Primary Nurse: Joanne Starks RN 
HPI:  
 
Pt is a 65 yo male with pmh ESRD on HD, A Fib, CAD who presented to ER this afternoon from HD due to syncopal episode. Pt was noted to be unconscious by staff at [facility] who initiated CPR although this did not last long as it was noted that pt was alert, strong pulse and stable BP. Pt has a history of recurrent syncopal episodes, without known cause. He has significant heart history including CAD s/p CABG, aortic stenosis, mitral regurg, A Fib. He has a loop recorder device which has been evaluated with no found arrhythmias other than known  A Fib. On arrival to ER vitals are stable. Pt's biggest complaint is sternal pain after chest compressions from CPR. Due to event he did not undergo his dialysis treatment today. He discusses frustration with his recurrent syncopal episodes and we discussed uncertain of further work up. He reports that he would like to have a more palliative approach in the future, hopeful to avoid hospitalizations. He is hopeful for d/c home tomorrow. Complete ROS done and is as stated in HPI or otherwise negative Past Medical History:  
Diagnosis Date  Anemia, unspecified  2016  Arrhythmia IRREG. HEART BEAT- pt denies a fib - found on cardiac note 5/3/13- pt states he feels flutter at times  Arthritis  ASCAD - artery bypass graft 2016  ASCAD - artery bypass graft 2016  CAD (coronary artery disease) CABG , stented   Chronic kidney disease STAGE 3 CKD, dialysis - - Sat  Chronic prostatitis 2013  Diverticulitis  Diverticulosis 2016  GERD (gastroesophageal reflux disease)   
 takes omeprazole  Glomerulonephritis 2016  GOUT, UNSPECIFIED 2016  Hypercholesteremia pt states is under control  Hypertension  Hypertrophy of prostate with urinary obstruction and other lower urinary tract symptoms (LUTS) 11/25/2013  Kidney failure  Paroxysmal atrial fibrillation ([address] 7/7/2016  TIA (transient ischemic attack) 07/07/2016  
 no residual weakness Past Surgical History:  
Procedure Laterality Date  ABDOMEN SURGERY PROC UNLISTED    
 perineal cath  CARDIAC SURG PROCEDURE UNLIST CABG x 2 in 2007; PTCA WITH STENTS  
 CREAT AV FISTULA,AUTOGENOUS GRAFT    
 HX COLONOSCOPY  2011  HX CORONARY ARTERY BYPASS GRAFT    
 HX CORONARY STENT PLACEMENT    
 new stent placement 2017  HX CSF SHUNT  HX HEART CATHETERIZATION    
 HX HERNIA REPAIR    
 bilateral  
 HX ORTHOPAEDIC    
 rt shoulder rotater cuff,lt knee  HX VASCULAR ACCESS  2010 L u arm  
 OTHER CELL    
 LEFT KNEE SURGERY  VASCULAR SURGERY PROCEDURE UNLIST    
 cabg x2 Prior to Admission Medications Prescriptions Last Dose Informant Patient Reported? Taking? B.infantis-B.ani-B.long-B.bifi (PROBIOTIC 4X) 10-15 mg TbEC   Yes No  
Sig: Take  by mouth daily. DOCUSATE CALCIUM (STOOL SOFTENER PO)   Yes No  
Sig: Take  by mouth daily. HYDROcodone-acetaminophen (NORCO) 5-325 mg per tablet   No No  
Sig: Take 1 Tab by mouth every six (6) hours as needed for Pain. Max Daily Amount: 4 Tabs. KRILL OIL PO   Yes No  
Sig: Take  by mouth daily. Magnesium Oxide 500 mg cap   Yes No  
Sig: Take  by mouth daily. Omeprazole Magnesium 20 mg cpDR   Yes No  
Sig: Take  by mouth daily. Indications: gastroesophageal reflux disease RENVELA 800 mg Tab tab   Yes No  
Sig: Take 800 mg by mouth three (3) times daily (with meals). 4 tablets with each meal; 2 tablets with each snack . Indications: 3 with meals, 2 with snacks  
calcitRIOL (ROCALTROL) 0.5 mcg capsule   Yes No  
Sig: Take 0.5 mcg by mouth daily. Indications: Renal Osteodystrophy, five days a week cinacalcet (SENSIPAR) 60 mg tab   Yes No  
Sig: Take 120 mg by mouth nightly. Indications: HYPERPARATHYROIDISM SECONDARY TO CRF WITH DIALYSIS  
clopidogrel (PLAVIX) 75 mg tab   No No  
Sig: TAKE 1 TABLET BY MOUTH EVERY DAY  
cyclobenzaprine (FLEXERIL) 5 mg tablet   No No  
Sig: Take 0.5 Tabs by mouth daily as needed for Muscle Spasm(s). dicyclomine (BENTYL) 10 mg capsule   Yes No  
Sig: Take 10 mg by mouth daily as needed. Indications: Irritable Bowel Syndrome  
epoetin alcides (EPOGEN;PROCRIT) 10,000 unit/mL injection   Yes No  
Sig: by SubCUTAneous route as needed. Currently taking abt twice a [facility] at [facility]  
finasteride (PROSCAR) 5 mg tablet   Yes No  
Sig: Take 5 mg by mouth daily. Indications: benign prostatic hyperplasia with lower urinary tract sx  
fludrocortisone (FLORINEF) 0.1 mg tablet   No No  
Sig: Take 1 Tab by mouth daily. metoprolol succinate (TOPROL-XL) 25 mg XL tablet   No No  
Sig: Take 1 Tab by mouth daily. multivitamin (ONE A DAY) tablet   Yes No  
Sig: Take 1 Tab by mouth daily. Special Complex for dialysis patients  
nitroglycerin (NITROSTAT) 0.4 mg SL tablet   No No  
Sig: ONE TABLET UNDER TONGUE AS NEEDED FOR CHEST PAIN EVERY 5 MINUTES  
predniSONE (DELTASONE) 10 mg tablet   No No  
Sig: Take 1 Tab by mouth as needed. Facility-Administered Medications: None Allergies Allergen Reactions  Nka [No Known Allergies] Unknown (comments) Social History Substance Use Topics  Smoking status: Current Every Day Smoker Last attempt to quit: 1/1/1980  Smokeless tobacco: Never Used Comment: CIGAR DAILY FOR 10 YEARS  
 Alcohol use 0.0 oz/week Comment: rare Family History Problem Relation Age of Onset  Heart Disease Father 59 MI  
 Heart Attack Father 72 MI  
 Stroke Mother  Hypertension Mother  Heart Disease Mother  Cancer Sister   
  stomach Objective:  
 
Visit Vitals  /76  Pulse 82  Temp 97.6 °F (36.4 °C)  Resp 20  SpO2 100% Temp (24hrs), Av.7 °F (36.5 °C), Min:97.6 °F (36.4 °C), Max:97.7 °F (36.5 °C) Oxygen Therapy O2 Sat (%): 100 % (10/11/18 1539) Pulse via Oximetry: 89 beats per minute (10/11/18 1539) Physical Exam: 
General:    Alert, cooperative, no distress, appears stated age. Head:   Normocephalic, without obvious abnormality, atraumatic. Nose:  Nares normal. No drainage or sinus tenderness. Lungs:   Clear to auscultation bilaterally. No Wheezing or Rhonchi. No rales. Heart:   Regular rate and rhythm,  no murmur, rub or gallop. Abdomen:   Soft, non-tender. Not distended. Bowel sounds normal.  
Extremities: No cyanosis. No edema. No clubbing Skin:     Texture, turgor normal. No rashes or lesions. Not Jaundiced Neurologic: Alert and oriented x 3, no focal deficits Data Review:  
Recent Results (from the past 24 hour(s)) EKG, 12 LEAD, INITIAL Collection Time: 10/11/18 11:46 AM  
Result Value Ref Range Ventricular Rate 85 BPM  
 Atrial Rate 72 BPM  
 QRS Duration 138 ms Q-T Interval 432 ms QTC Calculation (Bezet) 514 ms Calculated P Axis -162 degrees Calculated R Axis 117 degrees Calculated T Axis -174 degrees Diagnosis    
  !! AGE AND GENDER SPECIFIC ECG ANALYSIS !! 
Unusual P axis, possible ectopic atrial rhythm with A-V dissociation and Wide QRS rhythm with Premature supraventricular 
complexes Right bundle branch block Left posterior fascicular block 
!!! Bifascicular block !!! 
T wave abnormality, consider inferolateral ischemia Abnormal ECG When compared with ECG of 09-SEP-2018 11:01, Wide QRS rhythm has replaced Atrial fibrillation CBC WITH AUTOMATED DIFF Collection Time: 10/11/18 11:49 AM  
Result Value Ref Range WBC 11.7 (H) 4.3 - 11.1 K/uL  
 RBC 3.31 (L) 4.23 - 5.6 M/uL  
 HGB 11.8 (L) 13.6 - 17.2 g/dL HCT 38.0 (L) 41.1 - 50.3 %  .8 (H) 79.6 - 97.8 FL  
 MCH 35.6 (H) 26.1 - 32.9 PG  
 MCHC 31.1 (L) 31.4 - 35.0 g/dL  
 RDW 17.7 % PLATELET 718 (L) 680 - 450 K/uL MPV 10.7 9.4 - 12.3 FL ABSOLUTE NRBC 0.04 0.0 - 0.2 K/uL  
 DF AUTOMATED NEUTROPHILS 76 43 - 78 % LYMPHOCYTES 9 (L) 13 - 44 % MONOCYTES 11 4.0 - 12.0 % EOSINOPHILS 1 0.5 - 7.8 % BASOPHILS 0 0.0 - 2.0 % IMMATURE GRANULOCYTES 2 0.0 - 5.0 %  
 ABS. NEUTROPHILS 8.9 (H) 1.7 - 8.2 K/UL  
 ABS. LYMPHOCYTES 1.1 0.5 - 4.6 K/UL  
 ABS. MONOCYTES 1.3 0.1 - 1.3 K/UL  
 ABS. EOSINOPHILS 0.2 0.0 - 0.8 K/UL  
 ABS. BASOPHILS 0.1 0.0 - 0.2 K/UL  
 ABS. IMM. GRANS. 0.2 0.0 - 0.5 K/UL METABOLIC PANEL, COMPREHENSIVE Collection Time: 10/11/18 11:49 AM  
Result Value Ref Range Sodium 135 (L) 136 - 145 mmol/L Potassium 5.2 (H) 3.5 - 5.1 mmol/L Chloride 100 98 - 107 mmol/L  
 CO2 23 21 - 32 mmol/L Anion gap 12 7 - 16 mmol/L Glucose 134 (H) 65 - 100 mg/dL BUN 60 (H) 8 - 23 MG/DL Creatinine 7.73 (H) 0.8 - 1.5 MG/DL  
 GFR est AA 9 (L) >60 ml/min/1.73m2 GFR est non-AA 7 (L) >60 ml/min/1.73m2 Calcium 7.2 (L) 8.3 - 10.4 MG/DL Bilirubin, total 0.6 0.2 - 1.1 MG/DL  
 ALT (SGPT) 21 12 - 65 U/L  
 AST (SGOT) 28 15 - 37 U/L Alk. phosphatase 160 (H) 50 - 136 U/L Protein, total 6.9 6.3 - 8.2 g/dL Albumin 2.7 (L) 3.2 - 4.6 g/dL Globulin 4.2 (H) 2.3 - 3.5 g/dL A-G Ratio 0.6 (L) 1.2 - 3.5    
TROPONIN I Collection Time: 10/11/18 11:49 AM  
Result Value Ref Range Troponin-I, Qt. 0.03 0.02 - 0.05 NG/ML  
MAGNESIUM Collection Time: 10/11/18 11:49 AM  
Result Value Ref Range Magnesium 2.4 1.8 - 2.4 mg/dL Imaging Nikolas Grant Bolds Assessment and Plan: Active Hospital Problems Diagnosis Date Noted  Aortic stenosis 10/11/2018  Mitral regurgitation 10/11/2018  Syncope 07/31/2018  End-stage renal disease ([facility] [address] 11/18/2016 Last Assessment & Plan:  
Lab work from [facility] looks good BP looks good Losing weight Feels good Hernia acting up - will se  Dr Bry Aguero prn 
 2nd to glomerulonephritis  Chronic atrial fibrillation ([facility] [address] 07/19/2016  CAD (coronary artery disease) 03/28/2011 S/p stent prior to CABG; CABG 2007 A/P: 
 
Recurrent syncope- Unknown etiology. Loop recorder evaluated, no arrhythmias other than known A Fib. Cont Lopressor, off OAC due to frequent falls. Recent echo and carotid duplex, no need to repeat at this time. Discussed uncertain of further work up and pt seems he would like a more palliative approach to this. Sternal pain- Due to compressions from CPR. PRN Ultram, Norco 
 
CAD, valvular disease, p HTN- Cont current regimen Plavix, Toprol, Florinef. Cards following. ESRD- Missed dialysis tx today, nephrology aware with plans to run tomorrow. DC planning- Hopeful home tomorrow. Plan is for pt to move to independent living in 1-2 weeks. DVT Prophylaxis:  Heparin Code Status: Full Anticipated discharge: 48-72 hours Signed By: Nida Fernando NP October 11, 2018 Patient seen and examined. Recurrent syncope in setting of CAD, AFib and AS. Had ILR placed a few months ago and has been in AF. Was taken off Eliquis due to falls and syncopal episodes. Went to HD today and was in the chair then woke up on the floor. Apparently several minutes of CPR was done but unlikely the patient ever lost pulse as he regained consciousness during it. Says he hasn't had syncope in the last month. ILR interrogated in ED and showed AF rate 80s, trop negative. Had right/left heart caths two months ago. Exam: lungs clear, CV irreg/irreg no m/g/r, LE edema (baseline), neuro exam non-focal.  
 
Cardiology has evaluated the patient and does not suspect a cardiac origin for his syncope. Symptoms not entirely consistent with orthostasis or vasovagal, but will check orthostatics. He had previously been on Florinef but says he doesn't think he's been taking it.  Pt is at risk for embolic events since he's off Eliquis, but it would be highly unusual and improbable to have recurrent TIAs/CVAs with the only symptom being syncope and without any neurological deficits at any point. Could offer patient brain imaging to pursue this but I'm not convinced that's worthwhile. Will observe tonight. Patient seems fed up with hospitalizations and hopes to go home tomorrow.  
 
Stephane Gallo MD

## 2018-10-11 NOTE — ED NOTES
TRANSFER - OUT REPORT: 
 
Verbal report given to Chelsea Candelario RN(name) on Racheal Mccauley  being transferred to [facility](unit) for routine progression of care Report consisted of patients Situation, Background, Assessment and  
Recommendations(SBAR). Information from the following report(s) SBAR, ED Summary and Recent Results was reviewed with the receiving nurse. Lines:  
Peripheral IV 10/11/18 Right Forearm (Active) Site Assessment Clean, dry, & intact 10/11/2018 11:47 AM  
Phlebitis Assessment 0 10/11/2018 11:47 AM  
Infiltration Assessment 0 10/11/2018 11:47 AM  
Dressing Status Clean, dry, & intact 10/11/2018 11:47 AM  
  
 
Opportunity for questions and clarification was provided.

## 2018-10-11 NOTE — IP AVS SNAPSHOT
Liudmila Carmen 
 
 
 [address] [facility] [address] [facility] 
[phone] Patient: Theodora Garza MRN: [id number] [facility]:[birth year] About your hospitalization You were admitted on:  October 11, 2018 You last received care in the:  [facility] 7 MED SURG You were discharged on:  October 12, 2018 Why you were hospitalized Your primary diagnosis was:  Syncope Your diagnoses also included:  Cad (Coronary Artery Disease), Chronic Atrial Fibrillation (Hcc), End-Stage Renal Disease (Hcc), Aortic Stenosis, Mitral Regurgitation Follow-up Information Follow up With Details Comments Contact Info Amilcar Mcknight. Nataliia Eng MD In 1 week office to call with appointment  date and time [address] [facility] [address] [phone] Your Scheduled Appointments Wednesday October 17, 2018 11:15 AM EDT Office Visit with Amilcar Mcknight. Nataliia Eng [address] [place] [phone] Monday November 26, 2018 11:00 AM EST Office Visit with Amilcar Mcknight. Nataliia Eng [address] [place] [phone] Discharge Orders None A check bianka indicates which time of day the medication should be taken. My Medications START taking these medications Instructions Each Dose to Equal  
 Morning Noon Evening Bedtime  
 traMADol 50 mg tablet Commonly known as:  ULTRAM  
Your next dose is: Take today if needed Take 1 Tab by mouth every six (6) hours as needed for Pain. Max Daily Amount: 200 mg.  
 50 mg CHANGE how you take these medications Instructions Each Dose to Equal  
 Morning Noon Evening Bedtime HYDROcodone-acetaminophen 5-325 mg per tablet Commonly known as:  Anna Ash What changed:  when to take this Your next dose is: Take today if needed Take 1 Tab by mouth every eight (8) hours as needed for Pain. Max Daily Amount: 3 Tabs. 1 Tab CONTINUE taking these medications Instructions Each Dose to Equal  
 Morning Noon Evening Bedtime  
 calcitRIOL 0.5 mcg capsule Commonly known as:  ROCALTROL Your last dose was: Today 10/12 Your next dose is:   Tomorrow morning 10/13 Take 0.5 mcg by mouth daily. Indications: Renal Osteodystrophy, five days a week  
 0.5 mcg  
    
  
   
   
   
  
 clopidogrel 75 mg Tab Commonly known as:  PLAVIX Your last dose was: Today 10/12 Your next dose is:   Tomorrow morning 10/13 TAKE 1 TABLET BY MOUTH EVERY DAY  
     
  
   
   
   
  
 cyclobenzaprine 5 mg tablet Commonly known as:  FLEXERIL Your next dose is: Take today if needed Take 0.5 Tabs by mouth daily as needed for Muscle Spasm(s). 2.5 mg  
    
   
   
   
  
 dicyclomine 10 mg capsule Commonly known as:  BENTYL Your next dose is: Take today if needed Take 10 mg by mouth daily as needed. Indications: Irritable Bowel Syndrome 10 mg  
    
   
   
   
  
 epoetin alcides 10,000 unit/mL injection Commonly known as:  EPOGEN;PROCRIT  
   
 by SubCUTAneous route as needed. Currently taking abt twice a month at [facility]  
     
   
   
   
  
 finasteride 5 mg tablet Commonly known as:  PROSCAR Your next dose is:   Tomorrow morning 10/13 Take 5 mg by mouth daily. Indications: benign prostatic hyperplasia with lower urinary tract sx  
 5 mg  
    
  
   
   
   
  
 fludrocortisone 0.1 mg tablet Commonly known as:  FLORINEF Your last dose was: Today 10/12 Your next dose is:   Tomorrow morning 10/13 Take 1 Tab by mouth daily. 0.1 mg  
    
  
   
   
   
  
 KRILL OIL PO Your next dose is:   Tomorrow morning 10/13 Take  by mouth daily. Magnesium Oxide 500 mg Cap Your next dose is:   Tomorrow morning 10/13 Take  by mouth daily. metoprolol succinate 25 mg XL tablet Commonly known as:  TOPROL-XL Your last dose was: Today 10/12 Your next dose is:   Tomorrow morning 10/13 Take 1 Tab by mouth daily. 25 mg  
    
  
   
   
   
  
 multivitamin tablet Commonly known as:  ONE A DAY Your next dose is:   Tomorrow morning 10/13 Take 1 Tab by mouth daily. Special Complex for dialysis patients 1 Tab  
    
  
   
   
   
  
 nitroglycerin 0.4 mg SL tablet Commonly known as:  NITROSTAT Your next dose is: Take today if needed ONE TABLET UNDER TONGUE AS NEEDED FOR CHEST PAIN EVERY 5 MINUTES Omeprazole Magnesium 20 mg Cpdr  
Your next dose is:   Tomorrow morning 10/13 Take  by mouth daily. Indications: gastroesophageal reflux disease  
     
  
   
   
   
  
 predniSONE 10 mg tablet Commonly known as:  Elaine Gong Your next dose is: Take today if needed Take 1 Tab by mouth as needed. 10 mg PROBIOTIC 4X 10-15 mg Tbec Generic drug:  B.infantis-B.ani-B.long-B.bifi Your next dose is:   Tomorrow morning 10/13 Take  by mouth daily. RENVELA 800 mg Tab tab Generic drug:  sevelamer carbonate Your next dose is: This afternoon and evening 10/12 Take 800 mg by mouth three (3) times daily (with meals). 4 tablets with each meal; 2 tablets with each snack . Indications: 3 with meals, 2 with snacks 800 mg SENSIPAR 60 mg Tab Generic drug:  cinacalcet Your last dose was: Last night 10/11 Your next dose is:  Last night 10/12 Take 120 mg by mouth nightly. Indications: HYPERPARATHYROIDISM SECONDARY TO CRF WITH DIALYSIS  
 120 mg STOOL SOFTENER PO Your next dose is:   Tomorrow morning 10/13 Take  by mouth daily. Where to Get Your Medications Information on where to get these meds will be given to you by the nurse or doctor. ! Ask your nurse or doctor about these medications HYDROcodone-acetaminophen 5-325 mg per tablet  
 traMADol 50 mg tablet Opioid Education Prescription Opioids: What You Need to Know: 
 
Prescription opioids can be used to help relieve moderate-to-severe pain and are often prescribed following a surgery or injury, or for certain health conditions. These medications can be an important part of treatment but also come with serious risks. Opioids are strong pain medicines. Examples include hydrocodone, oxycodone, fentanyl, and morphine. Heroin is an example of an illegal opioid. It is important to work with your health care provider to make sure you are getting the safest, most effective care. WHAT ARE THE RISKS AND SIDE EFFECTS OF OPIOID USE? Prescription opioids carry serious risks of addiction and overdose, especially with prolonged use. An opioid overdose, often marked by slow breathing, can cause sudden death. The use of prescription opioids can have a number of side effects as well, even when taken as directed. · Tolerance-meaning you might need to take more of a medication for the same pain relief · Physical dependence-meaning you have symptoms of withdrawal when the medication is stopped. Withdrawal symptoms can include nausea, sweating, chills, diarrhea, stomach cramps, and muscle aches. Withdrawal can last up to several weeks, depending on which drug you took and how long you took it. · Increased sensitivity to pain · Constipation · Nausea, vomiting, and dry mouth · Sleepiness and dizziness · Confusion · Depression · Low levels of testosterone that can result in lower sex drive, energy, and strength · Itching and sweating RISKS ARE GREATER WITH:      
· History of drug misuse, substance use disorder, or overdose · Mental health conditions (such as depression or anxiety) · Sleep apnea · Older age (72 years or older) · Pregnancy Avoid alcohol while taking prescription opioids. Also, unless specifically advised by your health care provider, medications to avoid include: · Benzodiazepines (such as Xanax or Valium) · Muscle relaxants (such as Soma or Flexeril) · Hypnotics (such as Ambien or Lunesta) · Other prescription opioids KNOW YOUR OPTIONS Talk to your health care provider about ways to manage your pain that don't involve prescription opioids. Some of these options may actually work better and have fewer risks and side effects. Consult your physician before adding or stopping any medications, treatments, or physical activity. Options may include: 
· Pain relievers such as acetaminophen, ibuprofen, and naproxen · Some medications that are also used for depression or seizures · Physical therapy and exercise · Counseling to help patients learn how to cope better with triggers of pain and stress. · Application of heat or cold compress · Massage therapy · Relaxation techniques Be Informed Make sure you know the name of your medication, how much and how often to take it, and its potential risks & side effects. IF YOU ARE PRESCRIBED OPIOIDS FOR PAIN: 
· Never take opioids in greater amounts or more often than prescribed. Remember the goal is not to be pain-free but to manage your pain at a tolerable level. · Follow up with your primary care provider to: · Work together to create a plan on how to manage your pain. · Talk about ways to help manage your pain that don't involve prescription opioids. · Talk about any and all concerns and side effects. · Help prevent misuse and abuse. · Never sell or share prescription opioids · Help prevent misuse and abuse. · Store prescription opioids in a secure place and out of reach of others (this may include visitors, children, friends, and family). · Safely dispose of unused/unwanted prescription opioids: Find your community drug take-back program or your pharmacy mail-back program, or flush them down the toilet, following guidance from the Food and Drug Administration (www.fda.gov/Drugs/ResourcesForYou). · Visit www.cdc.gov/drugoverdose to learn about the risks of opioid abuse and overdose. · If you believe you may be struggling with addiction, tell your health care provider and ask for guidance or call Lamar Juarez at 3-452-918-MCAQ. Discharge Instructions Avoid driving or heavy machinery due to recurrent syncope Fainting: Care Instructions Your Care Instructions When you faint, or pass out, you lose consciousness for a short time. A brief drop in blood flow to the brain often causes it. When you fall or lie down, more blood flows to your brain and you regain consciousness. Emotional stress, pain, or overheatingespecially if you have been standingcan make you faint. In these cases, fainting is usually not serious. But fainting can be a sign of a more serious problem. Your doctor may want you to have more tests to rule out other causes. The treatment you need depends on the reason why you fainted. The doctor has checked you carefully, but problems can develop later. If you notice any problems or new symptoms, get medical treatment right away. Follow-up care is a key part of your treatment and safety. Be sure to make and go to all appointments, and call your doctor if you are having problems. It's also a good idea to know your test results and keep a list of the medicines you take. How can you care for yourself at home? · Drink plenty of fluids to prevent dehydration. If you have kidney, heart, or liver disease and have to limit fluids, talk with your doctor before you increase your fluid intake. When should you call for help? Call 911 anytime you think you may need emergency care. For example, call if: 
  · You have symptoms of a heart problem. These may include: ¨ Chest pain or pressure. ¨ Severe trouble breathing. ¨ A fast or irregular heartbeat. ¨ Lightheadedness or sudden weakness. ¨ Coughing up pink, foamy mucus. ¨ Passing out. After you call 911, the  may tell you to chew 1 adult-strength or 2 to 4 low-dose aspirin. Wait for an ambulance. Do not try to drive yourself.  
  · You have symptoms of a stroke. These may include: 
¨ Sudden numbness, tingling, weakness, or loss of movement in your face, arm, or leg, especially on only one side of your body. ¨ Sudden vision changes. ¨ Sudden trouble speaking. ¨ Sudden confusion or trouble understanding simple statements. ¨ Sudden problems with walking or balance. ¨ A sudden, severe headache that is different from past headaches.  
  · You passed out (lost consciousness) again.  
 Watch closely for changes in your health, and be sure to contact your doctor if: 
  · You do not get better as expected. Where can you learn more? Go to http://sulyBullGuardlauren.info/. Enter F485 in the search box to learn more about \"Fainting: Care Instructions. \" Current as of: November 20, 2017 Content Version: 11.8 © 2125-0458 [employer]. Care instructions adapted under license by [employer] (which disclaims liability or warranty for this information). If you have questions about a medical condition or this instruction, always ask your healthcare professional. [place] [address] any warranty or liability for your use of this information. DISCHARGE SUMMARY from Nurse PATIENT INSTRUCTIONS: 
 
 
Recognize signs and symptoms of STROKE: 
 
 F-face looks uneven A-arms unable to move or move unevenly S-speech slurred or non-existent T-time-call 911 as soon as signs and symptoms begin-DO NOT go Back to bed or wait to see if you get better-TIME IS BRAIN. Warning Signs of HEART ATTACK Call 911 if you have these symptoms: 
? Chest discomfort. Most heart attacks involve discomfort in the center of the chest that lasts more than a few minutes, or that goes away and comes back. It can feel like uncomfortable pressure, squeezing, fullness, or pain. ? Discomfort in other areas of the upper body. Symptoms can include pain or discomfort in one or both arms, the back, neck, jaw, or stomach. ? Shortness of breath with or without chest discomfort. ? Other signs may include breaking out in a cold sweat, nausea, or lightheadedness. Don't wait more than five minutes to call [address]! Fast action can save your life. Calling 911 is almost always the fastest way to get lifesaving treatment. Emergency Medical Services staff can begin treatment when they arrive  up to an hour sooner than if someone gets to the hospital by car. The discharge information has been reviewed with the patient. The patient verbalized understanding. Discharge medications reviewed with the patient and appropriate educational materials and side effects teaching were provided. ___________________________________________________________________________________________________________________________________ ACO Transitions of Care Introducing [employer] [address] offers a voluntary care coordination program to provide high quality service and care to [facility] fee-for-service beneficiaries. Sd Pope was designed to help you enhance your health and well-being through the following services: ? Transitions of Care  support for individuals who are transitioning from one care setting to another (example: Hospital to home). ? Chronic and Complex Care Coordination  support for individuals and caregivers of those with serious or chronic illnesses or with more than one chronic (ongoing) condition and those who take a number of different medications. If you meet specific medical criteria, a [facility] [address] may call you directly to coordinate your care with your primary care physician and your other care providers. For questions about the [facility] programs, please, contact your physicians office. For general questions or additional information about Accountable Care Organizations: 
Please visit www.medicare.gov/acos. html or call 1-800-MEDICARE (0-268.316.4616) [employer] users should call 6-197.192.8560. Noosh Announcement We are excited to announce that we are making your provider's discharge notes available to you in Noosh. You will see these notes when they are completed and signed by the physician that discharged you from your recent hospital stay. If you have any questions or concerns about any information you see in Noosh, please call the Health Information Department where you were seen or reach out to your Primary Care Provider for more information about your plan of care. Introducing [facility]! Dear Shari Marr: Thank you for requesting a Noosh account. Our records indicate that you already have an active Noosh account. You can access your account anytime at [email or web address] Did you know that you can access your hospital and ER discharge instructions at any time in Noosh? You can also review all of your test results from your hospital stay or ER visit. Additional Information If you have questions, please visit the Frequently Asked Questions section of the Noosh website at [email or web address] Remember, MyChart is NOT to be used for urgent needs. For medical emergencies, dial 911. Now available from your iPhone and Android! Introducing Amando Guillory As a [facility] [employer] [facility] patient, I wanted to make you aware of our electronic visit tool called Amando Guillory. [employer] allows you to connect within minutes with a medical provider 24 hours a day, seven days a week via a mobile device or tablet or logging into a secure website from your computer. You can access Amando Guillory from anywhere in the [place]. A virtual visit might be right for you when you have a simple condition and feel like you just dont want to get out of bed, or cant get away from work for an appointment, when your regular [facility] provider is not available (evenings, weekends or holidays), or when youre out of town and need minor care. Electronic visits cost only $49 and if the [employer] [facility] provider determines a prescription is needed to treat your condition, one can be electronically transmitted to a nearby pharmacy*. Please take a moment to enroll today if you have not already done so. The enrollment process is free and takes just a few minutes. To enroll, please download the [employer] rasheed to your tablet or phone, or visit [email or web address] to enroll on your computer. And, as an [address] [place] [address] patient with a [employer] account, the results of your visits will be scanned into your electronic medical record and your primary care provider will be able to view the scanned results. We urge you to continue to see your regular [facility] provider for your ongoing medical care. And while your primary care provider may not be the one available when you seek a Amando Guillory virtual visit, the peace of mind you get from getting a real diagnosis real time can be priceless.    
 
For more information on Amando Guillory, view our Frequently Asked Questions (FAQs) at [email or web address]. Sincerely, 
 
Tiny Rangel MD 
Chief Medical Officer [address] *:  certain medications cannot be prescribed via Amando Guillory Providers Seen During Your Hospitalization Provider Specialty Primary office phone [address] [facility] [phone] Anderson Pinto MD Internal Medicine [phone] Your Primary Care Physician (PCP) Primary Care Physician Office Phone Office Fax Connor Wu, [address] [facility] [phone] You are allergic to the following Allergen Reactions Nka (No Known Allergies) Unknown (comments) Recent Documentation Smoking Status Current Every Day Smoker Emergency Contacts Name Discharge Info Relation Home Work Mobile [address] [facility] CAREGIVER [3] Son [22] [phone] Patient Belongings The following personal items are in your possession at time of discharge: 
     Visual Aid: Glasses             Clothing: At bedside Please provide this summary of care documentation to your next provider. Signatures-by signing, you are acknowledging that this After Visit Summary has been reviewed with you and you have received a copy. Patient Signature:  ____________________________________________________________ Date:  ____________________________________________________________  
  
Yvette Oka Provider Signature:  ____________________________________________________________ Date:  ____________________________________________________________

## 2018-10-11 NOTE — IP AVS SNAPSHOT
[place] 
 
 
 [address] [facility] [address] [facility] 
[phone] Patient: Hima Iglesias MRN: [id number] UIN:[birth year] A check bianka indicates which time of day the medication should be taken. My Medications START taking these medications Instructions Each Dose to Equal  
 Morning Noon Evening Bedtime  
 traMADol 50 mg tablet Commonly known as:  ULTRAM  
Your next dose is: Take today if needed Take 1 Tab by mouth every six (6) hours as needed for Pain. Max Daily Amount: 200 mg.  
 50 mg CHANGE how you take these medications Instructions Each Dose to Equal  
 Morning Noon Evening Bedtime HYDROcodone-acetaminophen 5-325 mg per tablet Commonly known as:  Samantha Shields What changed:  when to take this Your next dose is: Take today if needed Take 1 Tab by mouth every eight (8) hours as needed for Pain. Max Daily Amount: 3 Tabs. 1 Tab CONTINUE taking these medications Instructions Each Dose to Equal  
 Morning Noon Evening Bedtime  
 calcitRIOL 0.5 mcg capsule Commonly known as:  ROCALTROL Your last dose was: Today 10/12 Your next dose is:   Tomorrow morning 10/13 Take 0.5 mcg by mouth daily. Indications: Renal Osteodystrophy, five days a week  
 0.5 mcg  
    
  
   
   
   
  
 clopidogrel 75 mg Tab Commonly known as:  PLAVIX Your last dose was: Today 10/12 Your next dose is:   Tomorrow morning 10/13 TAKE 1 TABLET BY MOUTH EVERY DAY  
     
  
   
   
   
  
 cyclobenzaprine 5 mg tablet Commonly known as:  FLEXERIL Your next dose is: Take today if needed Take 0.5 Tabs by mouth daily as needed for Muscle Spasm(s). 2.5 mg  
    
   
   
   
  
 dicyclomine 10 mg capsule Commonly known as:  BENTYL Your next dose is: Take today if needed Take 10 mg by mouth daily as needed. Indications: Irritable Bowel Syndrome 10 mg  
    
   
   
   
  
 epoetin alcides 10,000 unit/mL injection Commonly known as:  EPOGEN;PROCRIT  
   
 by SubCUTAneous route as needed. Currently taking abt twice a month at [facility]  
     
   
   
   
  
 finasteride 5 mg tablet Commonly known as:  PROSCAR Your next dose is:   Tomorrow morning 10/13 Take 5 mg by mouth daily. Indications: benign prostatic hyperplasia with lower urinary tract sx  
 5 mg  
    
  
   
   
   
  
 fludrocortisone 0.1 mg tablet Commonly known as:  FLORINEF Your last dose was: Today 10/12 Your next dose is:   Tomorrow morning 10/13 Take 1 Tab by mouth daily. 0.1 mg  
    
  
   
   
   
  
 KRILL OIL PO Your next dose is:   Tomorrow morning 10/13 Take  by mouth daily. Magnesium Oxide 500 mg Cap Your next dose is:   Tomorrow morning 10/13 Take  by mouth daily. metoprolol succinate 25 mg XL tablet Commonly known as:  TOPROL-XL Your last dose was: Today 10/12 Your next dose is:   Tomorrow morning 10/13 Take 1 Tab by mouth daily. 25 mg  
    
  
   
   
   
  
 multivitamin tablet Commonly known as:  ONE A DAY Your next dose is:   Tomorrow morning 10/13 Take 1 Tab by mouth daily. Special Complex for dialysis patients 1 Tab  
    
  
   
   
   
  
 nitroglycerin 0.4 mg SL tablet Commonly known as:  NITROSTAT Your next dose is: Take today if needed ONE TABLET UNDER TONGUE AS NEEDED FOR CHEST PAIN EVERY 5 MINUTES Omeprazole Magnesium 20 mg Cpdr  
Your next dose is:   Tomorrow morning 10/13 Take  by mouth daily. Indications: gastroesophageal reflux disease  
     
  
   
   
   
  
 predniSONE 10 mg tablet Commonly known as:  Lennart Spearing Your next dose is: Take today if needed Take 1 Tab by mouth as needed. 10 mg PROBIOTIC 4X 10-15 mg Tbec Generic drug:  B.infantis-B.ani-B.long-B.bifi Your next dose is:   Tomorrow morning 10/13 Take  by mouth daily. RENVELA 800 mg Tab tab Generic drug:  sevelamer carbonate Your next dose is: This afternoon and evening 10/12 Take 800 mg by mouth three (3) times daily (with meals). 4 tablets with each meal; 2 tablets with each snack . Indications: 3 with meals, 2 with snacks 800 mg SENSIPAR 60 mg Tab Generic drug:  cinacalcet Your last dose was: Last night 10/11 Your next dose is:  Last night 10/12 Take 120 mg by mouth nightly. Indications: HYPERPARATHYROIDISM SECONDARY TO CRF WITH DIALYSIS  
 120 mg STOOL SOFTENER PO Your next dose is:   Tomorrow morning 10/13 Take  by mouth daily. Where to Get Your Medications Information on where to get these meds will be given to you by the nurse or doctor. ! Ask your nurse or doctor about these medications HYDROcodone-acetaminophen 5-325 mg per tablet  
 traMADol 50 mg tablet

## 2018-10-12 NOTE — PROGRESS NOTES
CM attempted to speak to patient regarding discharge planning. Patient is in dialysis at this time. Will reattempt later today.

## 2018-10-12 NOTE — PROGRESS NOTES
Subjective:  
Daily Progress Note: 10/12/2018 9:38 AM 
 
Cofortable on HD Current Facility-Administered Medications Medication Dose Route Frequency  finasteride (PROSCAR) tablet 5 mg  5 mg Oral DAILY  clopidogrel (PLAVIX) tablet 75 mg  75 mg Oral DAILY  cinacalcet (SENSIPAR) tablet 120 mg  120 mg Oral QHS  calcitRIOL (ROCALTROL) capsule 0.5 mcg  0.5 mcg Oral DAILY  fludrocortisone (FLORINEF) tablet 100 mcg  0.1 mg Oral DAILY  HYDROcodone-acetaminophen (NORCO) 5-325 mg per tablet 1 Tab  1 Tab Oral Q6H PRN  
 metoprolol succinate (TOPROL-XL) XL tablet 25 mg  25 mg Oral DAILY  sodium chloride (NS) flush 5-10 mL  5-10 mL IntraVENous Q8H  
 sodium chloride (NS) flush 5-10 mL  5-10 mL IntraVENous PRN  
 acetaminophen (TYLENOL) tablet 650 mg  650 mg Oral Q4H PRN  
 ondansetron (ZOFRAN) injection 4 mg  4 mg IntraVENous Q4H PRN  
 heparin (porcine) injection 5,000 Units  5,000 Units SubCUTAneous Q8H  
 alcohol 62% (NOZIN) nasal  1 Ampule  1 Ampule Topical Q12H Objective:  
 
Visit Vitals  /71  Pulse 99  Temp 98 °F (36.7 °C)  Resp 17  SpO2 96% Temp (24hrs), Av.8 °F (36.6 °C), Min:97.4 °F (36.3 °C), Max:98 °F (36.7 °C) Visit Vitals  /71  Pulse 99  Temp 98 °F (36.7 °C)  Resp 17  SpO2 96% Head: Normocephalic, without obvious abnormality Neck: no JVD Lungs: clear to auscultation bilaterally Heart: regular rate and rhythm Abdomen: soft, non-tender. Extremities: no edema Data Review Recent Results (from the past 48 hour(s)) EKG, 12 LEAD, INITIAL Collection Time: 10/11/18 11:46 AM  
Result Value Ref Range Ventricular Rate 85 BPM  
 Atrial Rate 72 BPM  
 QRS Duration 138 ms Q-T Interval 432 ms QTC Calculation (Bezet) 514 ms Calculated P Axis -162 degrees Calculated R Axis 117 degrees Calculated T Axis -174 degrees Diagnosis !! AGE AND GENDER SPECIFIC ECG ANALYSIS !! Atrial fibrillation Right bundle branch block Left posterior fascicular block 
!!! Bifascicular block !!! 
T wave abnormality, consider inferolateral ischemia Abnormal ECG When compared with ECG of 09-SEP-2018 11:01, 
rbbb is new Confirmed by CULLEN ANDUJAR (), Dali Hudson ([id number]) on 10/11/2018 4:16:54 PM 
  
CBC WITH AUTOMATED DIFF Collection Time: 10/11/18 11:49 AM  
Result Value Ref Range WBC 11.7 (H) 4.3 - 11.1 K/uL  
 RBC 3.31 (L) 4.23 - 5.6 M/uL  
 HGB 11.8 (L) 13.6 - 17.2 g/dL HCT 38.0 (L) 41.1 - 50.3 % .8 (H) 79.6 - 97.8 FL  
 MCH 35.6 (H) 26.1 - 32.9 PG  
 MCHC 31.1 (L) 31.4 - 35.0 g/dL  
 RDW 17.7 % PLATELET 116 (L) 175 - 450 K/uL MPV 10.7 9.4 - 12.3 FL ABSOLUTE NRBC 0.04 0.0 - 0.2 K/uL  
 DF AUTOMATED NEUTROPHILS 76 43 - 78 % LYMPHOCYTES 9 (L) 13 - 44 % MONOCYTES 11 4.0 - 12.0 % EOSINOPHILS 1 0.5 - 7.8 % BASOPHILS 0 0.0 - 2.0 % IMMATURE GRANULOCYTES 2 0.0 - 5.0 %  
 ABS. NEUTROPHILS 8.9 (H) 1.7 - 8.2 K/UL  
 ABS. LYMPHOCYTES 1.1 0.5 - 4.6 K/UL  
 ABS. MONOCYTES 1.3 0.1 - 1.3 K/UL  
 ABS. EOSINOPHILS 0.2 0.0 - 0.8 K/UL  
 ABS. BASOPHILS 0.1 0.0 - 0.2 K/UL  
 ABS. IMM. GRANS. 0.2 0.0 - 0.5 K/UL METABOLIC PANEL, COMPREHENSIVE Collection Time: 10/11/18 11:49 AM  
Result Value Ref Range Sodium 135 (L) 136 - 145 mmol/L Potassium 5.2 (H) 3.5 - 5.1 mmol/L Chloride 100 98 - 107 mmol/L  
 CO2 23 21 - 32 mmol/L Anion gap 12 7 - 16 mmol/L Glucose 134 (H) 65 - 100 mg/dL BUN 60 (H) 8 - 23 MG/DL Creatinine 7.73 (H) 0.8 - 1.5 MG/DL  
 GFR est AA 9 (L) >60 ml/min/1.73m2 GFR est non-AA 7 (L) >60 ml/min/1.73m2 Calcium 7.2 (L) 8.3 - 10.4 MG/DL Bilirubin, total 0.6 0.2 - 1.1 MG/DL  
 ALT (SGPT) 21 12 - 65 U/L  
 AST (SGOT) 28 15 - 37 U/L Alk. phosphatase 160 (H) 50 - 136 U/L Protein, total 6.9 6.3 - 8.2 g/dL Albumin 2.7 (L) 3.2 - 4.6 g/dL Globulin 4.2 (H) 2.3 - 3.5 g/dL A-G Ratio 0.6 (L) 1.2 - 3.5    
TROPONIN I Collection Time: 10/11/18 11:49 AM  
Result Value Ref Range Troponin-I, Qt. 0.03 0.02 - 0.05 NG/ML  
MAGNESIUM Collection Time: 10/11/18 11:49 AM  
Result Value Ref Range Magnesium 2.4 1.8 - 2.4 mg/dL METABOLIC PANEL, BASIC Collection Time: 10/12/18  7:13 AM  
Result Value Ref Range Sodium 134 (L) 136 - 145 mmol/L Potassium 6.1 (HH) 3.5 - 5.1 mmol/L Chloride 100 98 - 107 mmol/L  
 CO2 21 21 - 32 mmol/L Anion gap 13 7 - 16 mmol/L Glucose 102 (H) 65 - 100 mg/dL BUN 67 (H) 8 - 23 MG/DL Creatinine 8.92 (H) 0.8 - 1.5 MG/DL  
 GFR est AA 7 (L) >60 ml/min/1.73m2 GFR est non-AA 6 (L) >60 ml/min/1.73m2 Calcium 6.9 (L) 8.3 - 10.4 MG/DL  
CBC W/O DIFF Collection Time: 10/12/18  7:13 AM  
Result Value Ref Range WBC 11.7 (H) 4.3 - 11.1 K/uL  
 RBC 3.23 (L) 4.23 - 5.6 M/uL  
 HGB 11.5 (L) 13.6 - 17.2 g/dL HCT 35.9 (L) 41.1 - 50.3 % .1 (H) 79.6 - 97.8 FL  
 MCH 35.6 (H) 26.1 - 32.9 PG  
 MCHC 32.0 31.4 - 35.0 g/dL  
 RDW 17.9 % PLATELET 022 (L) 856 - 450 K/uL MPV 10.6 9.4 - 12.3 FL ABSOLUTE NRBC 0.04 0.0 - 0.2 K/uL Assessment Patient Active Problem List  
 Diagnosis Date Noted  Aortic stenosis 10/11/2018  Mitral regurgitation 10/11/2018  Acute respiratory failure with hypoxia ([facility] 09/09/2018  Nonrheumatic aortic valve stenosis 08/23/2018  Neck pain 08/22/2018  Syncope 07/31/2018  ESRD (end stage renal disease) ([facility] 07/31/2018  Impacted cerumen of left ear 05/23/2018  Debility 05/23/2018  Skin lesion 05/23/2018  Gastroesophageal reflux disease without esophagitis 10/18/2017  History of TIA (transient ischemic attack) 01/06/2017  End-stage renal disease ([facility] 11/18/2016  Chronic atrial fibrillation ([facility] 07/19/2016  GOUT, UNSPECIFIED 07/07/2016  Anemia, unspecified  07/07/2016  Benign non-nodular prostatic hyperplasia with lower urinary tract symptoms 11/25/2013  Hypercholesterolemia 08/15/2013  Internal carotid artery stenosis 08/15/2013  CAD (coronary artery disease) 03/28/2011  
 HTN (hypertension) 03/28/2011 Problems Addressed by Nephrology ESRD Plan Seen on dialysis. Well tolerated. Anticipate discharge today.

## 2018-10-12 NOTE — PROGRESS NOTES
Reviewed notes Will follow as needed Binta Rudolph, staff [phone], [id number] [facility] Jameel  /   [email or web address]

## 2018-10-12 NOTE — DIALYSIS
TRANSFER OUT -DIALYSIS Hemodialysis treatment completed without complications. Patient alert and VS stable  /69  P 94   
 
 1 Kgs removed. Needles X2 removed from access and manual pressure held until hemostasis complete and pressure dressing applied. Meds given Norco. 
 
Patient to 725 after dialysis.

## 2018-10-12 NOTE — DISCHARGE INSTRUCTIONS
Avoid driving or heavy machinery due to recurrent syncope            Fainting: Care Instructions  Your Care Instructions    When you faint, or pass out, you lose consciousness for a short time. A brief drop in blood flow to the brain often causes it. When you fall or lie down, more blood flows to your brain and you regain consciousness. Emotional stress, pain, or overheating--especially if you have been standing--can make you faint. In these cases, fainting is usually not serious. But fainting can be a sign of a more serious problem. Your doctor may want you to have more tests to rule out other causes. The treatment you need depends on the reason why you fainted. The doctor has checked you carefully, but problems can develop later. If you notice any problems or new symptoms, get medical treatment right away. Follow-up care is a key part of your treatment and safety. Be sure to make and go to all appointments, and call your doctor if you are having problems. It's also a good idea to know your test results and keep a list of the medicines you take. How can you care for yourself at home? · Drink plenty of fluids to prevent dehydration. If you have kidney, heart, or liver disease and have to limit fluids, talk with your doctor before you increase your fluid intake. When should you call for help? Call 911 anytime you think you may need emergency care. For example, call if:    · You have symptoms of a heart problem. These may include:  ¨ Chest pain or pressure. ¨ Severe trouble breathing. ¨ A fast or irregular heartbeat. ¨ Lightheadedness or sudden weakness. ¨ Coughing up pink, foamy mucus. ¨ Passing out. After you call 911, the  may tell you to chew 1 adult-strength or 2 to 4 low-dose aspirin. Wait for an ambulance. Do not try to drive yourself.     · You have symptoms of a stroke.  These may include:  ¨ Sudden numbness, tingling, weakness, or loss of movement in your face, arm, or leg, especially on only one side of your body. ¨ Sudden vision changes. ¨ Sudden trouble speaking. ¨ Sudden confusion or trouble understanding simple statements. ¨ Sudden problems with walking or balance. ¨ A sudden, severe headache that is different from past headaches.     · You passed out (lost consciousness) again.    Watch closely for changes in your health, and be sure to contact your doctor if:    · You do not get better as expected. Where can you learn more? Go to http://suly-lauren.info/. Enter J273 in the search box to learn more about \"Fainting: Care Instructions. \"  Current as of: November 20, 2017  Content Version: 11.8  © 9987-5734 Personal. Care instructions adapted under license by [employer] (which disclaims liability or warranty for this information). If you have questions about a medical condition or this instruction, always ask your healthcare professional. [place] [address] any warranty or liability for your use of this information. DISCHARGE SUMMARY from Nurse    PATIENT INSTRUCTIONS:    After general anesthesia or intravenous sedation, for 24 hours or while taking prescription Narcotics:  · Limit your activities  · Do not drive and operate hazardous machinery  · Do not make important personal or business decisions  · Do  not drink alcoholic beverages  · If you have not urinated within 8 hours after discharge, please contact your surgeon on call.     Report the following to your surgeon:  · Excessive pain, swelling, redness or odor of or around the surgical area  · Temperature over 100.5  · Nausea and vomiting lasting longer than 4 hours or if unable to take medications  · Any signs of decreased circulation or nerve impairment to extremity: change in color, persistent  numbness, tingling, coldness or increase pain  · Any questions    What to do at Home:  Recommended activity:   Avoid driving or heavy machinery due to recurrent syncope   If you experience any of the following symptoms se discharge instructions, please follow up with primary care. *  Please give a list of your current medications to your Primary Care Provider. *  Please update this list whenever your medications are discontinued, doses are      changed, or new medications (including over-the-counter products) are added. *  Please carry medication information at all times in case of emergency situations. These are general instructions for a healthy lifestyle:    No smoking/ No tobacco products/ Avoid exposure to second hand smoke  Surgeon General's Warning:  Quitting smoking now greatly reduces serious risk to your health. Obesity, smoking, and sedentary lifestyle greatly increases your risk for illness    A healthy diet, regular physical exercise & weight monitoring are important for maintaining a healthy lifestyle    You may be retaining fluid if you have a history of heart failure or if you experience any of the following symptoms:  Weight gain of 3 pounds or more overnight or 5 pounds in a week, increased swelling in our hands or feet or shortness of breath while lying flat in bed. Please call your doctor as soon as you notice any of these symptoms; do not wait until your next office visit. Recognize signs and symptoms of STROKE:    F-face looks uneven    A-arms unable to move or move unevenly    S-speech slurred or non-existent    T-time-call 911 as soon as signs and symptoms begin-DO NOT go       Back to bed or wait to see if you get better-TIME IS BRAIN. Warning Signs of HEART ATTACK     Call 911 if you have these symptoms:   Chest discomfort. Most heart attacks involve discomfort in the center of the chest that lasts more than a few minutes, or that goes away and comes back. It can feel like uncomfortable pressure, squeezing, fullness, or pain.  Discomfort in other areas of the upper body.  Symptoms can include pain or discomfort in one or both arms, the back, neck, jaw, or stomach.  Shortness of breath with or without chest discomfort.  Other signs may include breaking out in a cold sweat, nausea, or lightheadedness. Don't wait more than five minutes to call 911 - MINUTES MATTER! Fast action can save your life. Calling 911 is almost always the fastest way to get lifesaving treatment. Emergency Medical Services staff can begin treatment when they arrive -- up to an hour sooner than if someone gets to the hospital by car. The discharge information has been reviewed with the patient. The patient verbalized understanding. Discharge medications reviewed with the patient and appropriate educational materials and side effects teaching were provided.   ___________________________________________________________________________________________________________________________________

## 2018-10-12 NOTE — PROGRESS NOTES
10/11/18 1630 Dual Skin Pressure Injury Assessment Dual Skin Pressure Injury Assessment [facility] Second Care Provider (Based on [address] [place] [address]) Kimberly Marley RN

## 2018-10-12 NOTE — PROGRESS NOTES
Physical Therapy Note: 
 
Orders received, chart reviewed and patient noted to be in HD. Will attempt later as patient is available. Thank you, BERNY ObrienT

## 2018-10-12 NOTE — DISCHARGE SUMMARY
Hospitalist Discharge Summary Patient ID: Orpha Flies 
[id number] 
48 y.o. 
1940 Admit date: 10/11/2018 11:36 AM 
Discharge date and time: 10/12/2018 Attending: Abhinav Tamayo MD 
PCP:  David Marinelli. Dany Levine MD 
Treatment Team: Attending Provider: Abhinav Tamayo MD; Consulting Provider: Annalisa Avendaño MD; Charge Nurse: Ghada Avery; Utilization Review: Nikki Torres RN 
 
Principal Diagnosis Syncope Principal Problem: 
  Syncope (7/31/2018) Active Problems: 
  CAD (coronary artery disease) (3/28/2011) Overview: S/p stent prior to CABG; CABG 2007 Chronic atrial fibrillation ([facility] [address] (7/19/2016) End-stage renal disease ([facility] [address] (11/18/2016) Overview: Last Assessment & Plan:  
    Lab work from [facility] looks good BP looks good Losing weight Feels good Hernia acting up - will se  Dr Mirian Christensen prn 2nd to glomerulonephritis Aortic stenosis (10/11/2018) Mitral regurgitation (10/11/2018) Hospital Course: 
Please refer to the admission H&P for details of presentation. In summary, the patient is Pt is a 65 yo male with pmh ESRD on HD, A Fib, CAD who presented to ER 10/11 from HD due to syncopal episode. Pt was noted to be unconscious by staff at \[facility]\"" Dialysis who initiated CPR although this did not last long as it was noted that pt was alert, strong pulse and stable BP. Pt has a history of recurrent syncopal episodes, without known cause. He has significant heart history including CAD s/p CABG, aortic stenosis, mitral regurg, A Fib. He has a loop recorder device which has been evaluated with no found arrhythmias other than known  A Fib. On arrival to ER vitals are stable. Pt's biggest complaint is sternal pain after chest compressions from CPR. Due to event he did not undergo his dialysis treatment today. He discusses frustration with his recurrent syncopal episodes and we discussed uncertain of further work up.   He reports that he would like to have a more palliative approach in the future, hopeful to avoid hospitalizations. While inpatient pt had no further syncopal or presyncopal episodes. A Fib, rate controlled on telemetry. Vitals stable. He verbalizes desire for DNR. He is stable for d/c home. Will work with PT/OT prior to leaving today. Significant Diagnostic Studies: CXR IMPRESSION: Stable moderate right pleural effusion with right basilar opacity. Labs: Results:  
   
Chemistry Recent Labs 10/12/18 
 0713  10/11/18 
 1149 GLU  102*  134* NA  134*  135* K  6.1*  5.2*  
CL  100  100 CO2  21  23 BUN  67*  60* CREA  8.92*  7.73* CA  6.9*  7.2* AGAP  13  12 AP   --   160* TP   --   6.9 ALB   --   2.7*  
GLOB   --   4.2* AGRAT   --   0.6* CBC w/Diff Recent Labs 10/12/18 
 0713  10/11/18 
 1149 WBC  11.7*  11.7*  
RBC  3.23*  3.31* HGB  11.5*  11.8* HCT  35.9*  38.0*  
PLT  127*  124* GRANS   --   76  
LYMPH   --   9* EOS   --   1 Cardiac Enzymes No results for input(s): CPK, CKND1, KASSI in the last 72 hours. No lab exists for component: [place] Devin Coagulation No results for input(s): PTP, INR, APTT in the last 72 hours. No lab exists for component: INREXT Lipid Panel Lab Results Component Value Date/Time Cholesterol, total 137 03/29/2011 04:21 AM  
 HDL Cholesterol 31 (L) 03/29/2011 04:21 AM  
 LDL, calculated 57.8 03/29/2011 04:21 AM  
 VLDL, calculated 48.2 (H) 03/29/2011 04:21 AM  
 Triglyceride 241 (H) 03/29/2011 04:21 AM  
 CHOL/HDL Ratio 4.4 03/29/2011 04:21 AM  
  
BNP No results for input(s): BNPP in the last 72 hours. Liver Enzymes Recent Labs 10/11/18 
 1149 TP  6.9 ALB  2.7* AP  160* SGOT  28 Thyroid Studies No results found for: T4, T3U, TSH, TSHEXT Discharge Exam: 
Visit Vitals  /88  Pulse 96  Temp 98 °F (36.7 °C)  Resp 17  SpO2 96% General appearance: alert, cooperative, no distress, appears stated age Lungs: clear to auscultation bilaterally Heart: regular rate and rhythm, S1, S2 normal, no murmur, click, rub or gallop Abdomen: soft, non-tender. Bowel sounds normal. No masses,  no organomegaly Extremities: no cyanosis or edema Neurologic: Grossly normal 
 
Disposition: Home Discharge Condition: stable Patient Instructions: Avoid driving or heavy machinery due to recurrent syncope Current Discharge Medication List  
  
CONTINUE these medications which have CHANGED Details HYDROcodone-acetaminophen (NORCO) 5-325 mg per tablet Take 1 Tab by mouth every eight (8) hours as needed for Pain. Max Daily Amount: 3 Tabs. Qty: 20 Tab, Refills: 0 Associated Diagnoses: Closed nondisplaced fracture of fifth metacarpal bone of left hand, unspecified portion of metacarpal, initial encounter CONTINUE these medications which have NOT CHANGED Details  
metoprolol succinate (TOPROL-XL) 25 mg XL tablet Take 1 Tab by mouth daily. Qty: 30 Tab, Refills: 0  
  
fludrocortisone (FLORINEF) 0.1 mg tablet Take 1 Tab by mouth daily. Qty: 30 Tab, Refills: 0  
  
cyclobenzaprine (FLEXERIL) 5 mg tablet Take 0.5 Tabs by mouth daily as needed for Muscle Spasm(s). Qty: 10 Tab, Refills: 0 Associated Diagnoses: Neck pain  
  
clopidogrel (PLAVIX) 75 mg tab TAKE 1 TABLET BY MOUTH EVERY DAY Qty: 30 Tab, Refills: 0  
  
predniSONE (DELTASONE) 10 mg tablet Take 1 Tab by mouth as needed. Qty: 10 Tab, Refills: 1 Associated Diagnoses: Idiopathic chronic gout of foot without tophus, unspecified laterality  
  
finasteride (PROSCAR) 5 mg tablet Take 5 mg by mouth daily. Indications: benign prostatic hyperplasia with lower urinary tract sx  
  
calcitRIOL (ROCALTROL) 0.5 mcg capsule Take 0.5 mcg by mouth daily.  Indications: Renal Osteodystrophy, five days a week  
  
nitroglycerin (NITROSTAT) 0.4 mg SL tablet ONE TABLET UNDER TONGUE AS NEEDED FOR CHEST PAIN EVERY 5 MINUTES Qty: 25 Tab, Refills: 6 B.infantis-B.ani-B.long-B.bifi (PROBIOTIC 4X) 10-15 mg TbEC Take  by mouth daily. cinacalcet (SENSIPAR) 60 mg tab Take 120 mg by mouth nightly. Indications: HYPERPARATHYROIDISM SECONDARY TO CRF WITH DIALYSIS  
  
DOCUSATE CALCIUM (STOOL SOFTENER PO) Take  by mouth daily. multivitamin (ONE A DAY) tablet Take 1 Tab by mouth daily. Special Complex for dialysis patients  
  
dicyclomine (BENTYL) 10 mg capsule Take 10 mg by mouth daily as needed. Indications: Irritable Bowel Syndrome Omeprazole Magnesium 20 mg cpDR Take  by mouth daily. Indications: gastroesophageal reflux disease  
  
epoetin alcides (EPOGEN;PROCRIT) 10,000 unit/mL injection by SubCUTAneous route as needed. Currently taking abt twice a month at [facility] Magnesium Oxide 500 mg cap Take  by mouth daily. KRILL OIL PO Take  by mouth daily. RENVELA 800 mg Tab tab Take 800 mg by mouth three (3) times daily (with meals). 4 tablets with each meal; 2 tablets with each snack . Indications: 3 with meals, 2 with snacks Activity: Regular as tolerated Diet: DIET CARDIAC Regular Follow-up PCP in 1 week for hosp follow up Follow-up Information Follow up With Details Comments Contact Info Aneesh Hong. Gustavo Mcgee MD   [address] [place] [id number] [phone] Time spent to discharge patient greater than 30 minutes Signed: 
Hans Douglass NP 
10/12/2018 
8:43 AM

## 2018-10-12 NOTE — PROGRESS NOTES
LATE NOTE: In accordance with Medicare Guidelines, Care manager attempted to deliver Medicare Outpatient Observation Notice to the patient but patient was unavailable for signature. Unsigned form left at patient's bedside. Unsigned copy placed in patient's chart. Care managers notified.

## 2018-10-12 NOTE — DIALYSIS
TRANSFER IN - DIALYSIS Received patient in dialysis unit from [facility] (unit) for ordered procedure. Consent verified for renal replacement therapy. Patient alert and vital signs stable. /69 P 78 Hemodialysis initiated using left upper arm AVF and 15 g needles. Machine settings per MD order. Will monitor during treatment.

## 2018-10-12 NOTE — PROGRESS NOTES
Problem: Falls - Risk of 
Goal: *Absence of Falls Document Manohar James Fall Risk and appropriate interventions in the flowsheet. Outcome: Progressing Towards Goal 
Fall Risk Interventions: 
  
 
  
 
Medication Interventions: Assess postural VS orthostatic hypotension History of Falls Interventions: Bed/chair exit alarm

## 2018-10-12 NOTE — PROGRESS NOTES
Discharge instructions  all new medications,medication side effects sheet, follow up appointment and  prescriptions reviewed and explained to the patient. Patient verbalizes understanding of instructions. A copy of discharge instructions   have been given to patient. Opportunity for questions provided. Waiting for pain prescriptions to be signed. Patient to be discharged when ride arrives at 1400

## 2018-10-15 NOTE — PROGRESS NOTES
Call from pt to [facility] since he missed my call earlier. States he is feeling better, but still very sore. Disappointed in the Dialysis clinic doing CPR and causing so much discomfort, \"they should be better trained\". Son reports pt would like to switch to a different clinic. Pt agreeable to outreach and home visit next week to review meds, answer any questions etc, Plan to f/u next week. This note will not be viewable in [address]
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
## 2018-10-22 NOTE — PROGRESS NOTES
RNCM call to pt. States moved into [facility]. He agreed to home visit Wed 10/24 at 10am to review meds etc. 
Plan to f/u then. This note will not be viewable in [address]

## 2018-10-24 NOTE — PROGRESS NOTES
Complex Case Management Initial Assessment Questionnaire Date/Time of Call: Home visit 10/24/18 1000 Referral source? Referral reason? (if known) HRRP Recent hospitalization/ED visit? 
(if so multiple ED or hospitalizations in past 12 months?) [facility] 10/11-10/12/18 Fall Risk Screening - Falls in past 12 months? If yes  were injuries incurred? (Complete falls risk screening tool in [facility]) ACP: 
 
Does the patient have ACP in place? If so, are these one file? Is the patient interested in ACP information if they do not have this in place? Scanned into [facility] Ambulatory? Independent? Current use of DME for ambulation? Does the patient still drive? Yes Yes No 
 
Can drive, but does not Medication Reconciliation Issues/side effects? Able to obtain all prescribed meds? Understand all medication dosing instructions? Understand what meds are for and why they are taking them? Date Completed:  Pt declines med reconciliation for now, states \" my doctor is going to look at my meds on Mon 10/29 and hopefully get rid of some\". This RN did give pt a pillbox and he understands use. Diabetic patient? If yes  most recent HgA1C result? Is DM well controlled? Education on medications needed? Nutrition consult warranted? Annual foot and eye exam scheduled? No  
Diagnosis of hypertension? If yes  medication prescribed? BP well controlled? Education on medication needed? Nutrition consult warranted? Daily BPs ordered? If yes  does patient have a BP cuff at home and keeping a log of BPs? No  
Are home services ordered (nursing, PT, OT, ST, etc.)? Assist with coordination if necessary. No 
  
DME needs present? 
(Assisted with coordination if necessary) None Does the patient have any problems in performing ADLs? (If patient is unable to perform ADLs  what is the limiting factor(s)? Do they have a support system that can assist? If no support system is present, discuss possible assistance that they may be able to obtain.) No needs Social needs present? Support system? Transportation issues? Financial issues in obtaining meds &/or healthcare? Nutritional needs met? Children X 3, actively support pt though they live out of town Uses Facility transport or taxi No financial issues Yes Health Maintenance Due? 
( to check [facility] for health maintenance due items and assist in coordination and scheduling as appropriate) a. Colorectal cancer screening status? 
b. Flu vaccine? 
c. Pneumonia vaccine? 
d. Mammogram (if applicable) Will assess Depression Screening Completed (complete the depression screening tool in [facility]  RN to complete PHQ2- refer to SW if PHQ2 is positive  SW to complete PHQ9 and notify PCP) None per tool Does the patient have all follow-up appointments scheduled? Has transportation been arranged? 
(for [facility] Pulmonary follow-up should be within 7 days of discharge; all others should have PCP follow-up within 7 days of discharge; follow-ups with other specialists as appropriate or ordered.) Attended PCP appmt 10/22 Any other questions or concerns expressed by the patient? None Schedule next appointment with RN Case Manager/  as appropriate. Plan to f/u next week after PCP appmt CCM outreach Completed By: 
 
 Jerad Wise RN Home visit to [facility]. Agreeable to CCM outreach. Plan to f/u next week. This note will not be viewable in [address]

## 2018-11-07 NOTE — PROGRESS NOTES
Call from pt to this RN. He reports he had to cancel PCP f/u on 10/29 due to a \"virus\". Has been trying to reschedule but has been playing phone tag with [facility]. This RN will contact [facility] today and make pt an appmt, pt agreeable and will call him back this afternoon. States soreness getting better. Having PT eval this am at facility. RNCM call to pt, left message and pt returned call. Updated on appmt made for 11/9 at 11am w/ Dr Rhona Ellis. Encouraged to take meds to appmt to review with MD. Pt continues to hope MD will d/c some of them. Plan to f/u next week or before. This note will not be viewable in [address]

## 2018-11-07 NOTE — PROGRESS NOTES
RNCM has tried multiple times to reach pt, messages left, no return calls. Plan to attempt again next week and possibly discharge if no response. This note will not be viewable in [address]
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
## 2018-11-09 NOTE — ED NOTES
I have reviewed discharge instructions with the patient. The patient verbalized understanding. Patient left ED via Discharge Method: stretcher to Home with throne. Opportunity for questions and clarification provided. Patient given 1 scripts. To continue your aftercare when you leave the hospital, you may receive an automated call from our care team to check in on how you are doing. This is a free service and part of our promise to provide the best care and service to meet your aftercare needs.  If you have questions, or wish to unsubscribe from this service please call [phone]. Thank you for Choosing our [facility] Emergency Department.

## 2018-11-09 NOTE — DISCHARGE INSTRUCTIONS
Hematoma: Care Instructions  Your Care Instructions    A hematoma is a bad bruise. It happens when an injury causes blood to collect and pool under the skin. The pooling blood gives the skin a spongy, rubbery, lumpy feel. A hematoma usually is not a cause for concern. It is not the same thing as a blood clot in a vein, and it does not cause blood clots. Follow-up care is a key part of your treatment and safety. Be sure to make and go to all appointments, and call your doctor if you are having problems. It's also a good idea to know your test results and keep a list of the medicines you take. How can you care for yourself at home? · Rest and protect the bruised area. · Put ice or a cold pack on the area for 10 to 20 minutes at a time. · Prop up the bruised area on a pillow when you ice it or anytime you sit or lie down during the next 3 days. Try to keep it above the level of your heart. This will help reduce swelling. · Wrapping the bruised area with an elastic bandage such as an Ace wrap will help decrease swelling. Don't wrap it too tightly, as this can cause more swelling below the affected area. · Take an over-the-counter pain medicine, such as acetaminophen (Tylenol), ibuprofen (Advil, Motrin), or naproxen (Aleve). · Do not take two or more pain medicines at the same time unless the doctor told you to. Many pain medicines have acetaminophen, which is Tylenol. Too much acetaminophen (Tylenol) can be harmful. When should you call for help? Call your doctor now or seek immediate medical care if:    · You have signs of skin infection, such as:  ? Increased pain, swelling, warmth, or redness. ? Red streaks leading from the area. ? Pus draining from the area. ? A fever.    Watch closely for changes in your health, and be sure to contact your doctor if:    · The bruise lasts longer than 4 weeks.     · The bruise gets bigger or becomes more painful.     · You do not get better as expected.    Where can you learn more? Go to http://suly-lauren.info/. Enter P911 in the search box to learn more about \"Hematoma: Care Instructions. \"  Current as of: November 20, 2017  Content Version: 11.8  © 4088-2158 [employer]. Care instructions adapted under license by [employer] (which disclaims liability or warranty for this information). If you have questions about a medical condition or this instruction, always ask your healthcare professional. [address] any warranty or liability for your use of this information. Contusion: Care Instructions  Your Care Instructions  Contusion is the medical term for a bruise. It is the result of a direct blow or an impact, such as a fall. Contusions are common sports injuries. Most people think of a bruise as a black-and-blue spot. This happens when small blood vessels get torn and leak blood under the skin. But bones, muscles, and organs can also get bruised. This may damage deep tissues but not cause a bruise you can see. The doctor will do a physical exam to find the location of your contusion. You may also have tests to make sure you do not have a more serious injury, such as a broken bone or nerve damage. These may include X-rays or other imaging tests like a CT scan or MRI. Deep-tissue contusions may cause pain and swelling. But if there is no serious damage, they will often get better in a few weeks with home treatment. The doctor has checked you carefully, but problems can develop later. If you notice any problems or new symptoms, get medical treatment right away. Follow-up care is a key part of your treatment and safety. Be sure to make and go to all appointments, and call your doctor if you are having problems. It's also a good idea to know your test results and keep a list of the medicines you take. How can you care for yourself at home?   · Put ice or a cold pack on the sore area for 10 to 20 minutes at a time to stop swelling. Put a thin cloth between the ice pack and your skin. · Be safe with medicines. Read and follow all instructions on the label. ¨ If the doctor gave you a prescription medicine for pain, take it as prescribed. ¨ If you are not taking a prescription pain medicine, ask your doctor if you can take an over-the-counter medicine. · If you can, prop up the sore area on pillows as much as possible for the next few days. Try to keep the sore area above the level of your heart. When should you call for help? Call your doctor now or seek immediate medical care if:  · Your pain gets worse. · You have new or worse swelling. · You have tingling, weakness, or numbness in the area near the contusion. · The area near the contusion is cold or pale. Watch closely for changes in your health, and be sure to contact your doctor if:  · You do not get better as expected. Where can you learn more? Go to [employer]  Enter [id number] in the search box to learn more about \"Contusion: Care Instructions. \"   © 8914-2684 [employer]. Care instructions adapted under license by [employer] (which disclaims liability or warranty for this information). This care instruction is for use with your licensed healthcare professional. If you have questions about a medical condition or this instruction, always ask your healthcare professional. [address] any warranty or liability for your use of this information.   Content Version: 65.5.557616; Current as of: May 22, 2015

## 2018-11-09 NOTE — ED TRIAGE NOTES
Pt arrived by ems from home. Pt tripped over a basket of newspapers around 0600  in his room this morning and pt hit his left knee and the top of hs head. Pt denies no LOC or back/neck pain. Pt on plavix but has not taken it in 3 weeks. /60. HR 20. Dialysis pt, access on L side and going to dialysis tomorrow. Pt has not active bleeding and patient was able to put a little bit of weight on left knee. Pt states 10/10 pain in his left knee, knee is also very swollen and bruised.

## 2018-11-12 NOTE — PROGRESS NOTES
Community Care Management  Follow up Outreach Note Outreach type: Phone call: Yes 
  
Date/Time of Outreach: 11/12/18, 8104 Reason for follow-up: 
 Continued outreach, ED f/u Disease specific complaints/issues:  
Sore knee from fall 11/9 - ED visit Patient progress towards goals set from last contact: 
 Stable Has patient attended any PCP or specialist follow-up appointments since last contact? What was outcome of appointment? When is next follow-up scheduled? Dr Getachew Locke at [facility] 11/12 Review medications. Any medication changes since last outreach? Does patient have any questions or issues related to their medications? Tramadol d/c'd per ED MD,  ordered [place] 5 Q 6hrs Home health active? If yes  any issue? Progress? No  
Referrals needed? 
(SW, Diabetes education, HH, etc. ) None Other issues/Miscellaneous? (Transportation, access to meals, ability to perform ADLs, adequate caregiver support, etc.) None Next Outreach Scheduled: Next week Next Steps/Goals: 
 Continue outreach Community Care Manager: Mason Gaines RN  
 
RNCM call to pt. Pam Ramos at home Friday and went to ED. Missed PCP appmt. He reports visit to Ortho today, states knee is sore, but feeling some better, not needing walker and also participated in PT today. Will have PT 2-3 X/ week. Plans to attend PCP appmt already scheduled for 11/26 instead of rescheduling again. Plan to f/u next week, pt agreeable. This note will not be viewable in [address]

## 2018-11-19 PROBLEM — G93.41 ACUTE METABOLIC ENCEPHALOPATHY: Status: ACTIVE | Noted: 2018-01-01

## 2018-11-19 NOTE — ED PROVIDER NOTES
80-year-old male presents to the ED with complaint of altered mental status. Patient states that he's been on oxycodone for over a month for variety of different problems. He is states he wanted to come off of the oxycodone so he stopped taking them and since his head  \"fuzzy thinking mild confusion difficulty concentrating\" patient has been on oxycodone in the past for short period time and had no problems stopping. Patient is in stage renal disease on dialysis Tuesdays Thursdays Saturdays he has not missed any visits recently. Patient's had intermittent lower extremity edema off and on since he was diagnosed with renal failure. He does not feel that this is much worse than usual.   
 
 
The history is provided by the patient. Altered mental status This is a new problem. The current episode started more than 2 days ago. The problem has not changed since onset. Associated symptoms include confusion. Mental status baseline is normal.  His past medical history is significant for diabetes, hypertension, COPD and heart disease. His past medical history does not include CVA or TIA. Past Medical History:  
Diagnosis Date  Anemia, unspecified  7/7/2016  Arrhythmia IRREG. HEART BEAT- pt denies a fib - found on cardiac note 5/3/13- pt states he feels flutter at times  Arthritis  ASCAD - artery bypass graft 7/7/2016  ASCAD - artery bypass graft 7/7/2016  CAD (coronary artery disease) CABG 2007, stented 1995  Chronic kidney disease STAGE 3 CKD, dialysis - Tu-Th- Sat  Chronic prostatitis 11/25/2013  Diverticulitis  Diverticulosis 7/7/2016  GERD (gastroesophageal reflux disease)   
 takes omeprazole  Glomerulonephritis 7/7/2016  GOUT, UNSPECIFIED 7/7/2016  Hypercholesteremia   
 pt states is under control  Hypertension  Hypertrophy of prostate with urinary obstruction and other lower urinary tract symptoms (LUTS) 11/25/2013  Kidney failure  Paroxysmal atrial fibrillation ([facility] [address] 2016  TIA (transient ischemic attack) 2016  
 no residual weakness Past Surgical History:  
Procedure Laterality Date  ABDOMEN SURGERY PROC UNLISTED    
 perineal cath  CARDIAC SURG PROCEDURE UNLIST CABG x 2 in ; PTCA WITH STENTS  
 CREAT AV FISTULA,AUTOGENOUS GRAFT    
 HX COLONOSCOPY    HX CORONARY ARTERY BYPASS GRAFT    
 HX CORONARY STENT PLACEMENT    
 new stent placement 2017  HX CSF SHUNT  HX HEART CATHETERIZATION    
 HX HERNIA REPAIR    
 bilateral  
 HX ORTHOPAEDIC    
 rt shoulder rotater cuff,lt knee  HX VASCULAR ACCESS  2010 L u arm  
 OTHER CELL    
 LEFT KNEE SURGERY  VASCULAR SURGERY PROCEDURE UNLIST    
 cabg x2 Family History:  
Problem Relation Age of Onset  Heart Disease Father 59 MI  
 Heart Attack Father 72 MI  
 Stroke Mother  Hypertension Mother  Heart Disease Mother  Cancer Sister   
     stomach Social History Socioeconomic History  Marital status:  Spouse name: Not on file  Number of children: 3  
 Years of education: Not on file  Highest education level: Not on file Social Needs  Financial resource strain: Not on file  Food insecurity - worry: Not on file  Food insecurity - inability: Not on file  Transportation needs - medical: Not on file  Transportation needs - non-medical: Not on file Occupational History  Occupation: marketing management Tobacco Use  Smoking status: Current Every Day Smoker Last attempt to quit: 1980 Years since quittin.9  Smokeless tobacco: Never Used  Tobacco comment: CIGAR DAILY FOR 10 YEARS Substance and Sexual Activity  Alcohol use: Yes Alcohol/week: 0.0 oz  
  Comment: rare  Drug use: No  
 Sexual activity: No  
Other Topics Concern  Not on file Social History Narrative  Not on file ALLERGIES: Nka [no known allergies] Review of Systems Constitutional: Negative. Negative for activity change. HENT: Negative. Eyes: Negative. Respiratory: Negative. Cardiovascular: Negative. Gastrointestinal: Negative. Genitourinary: Negative. Musculoskeletal: Negative. Skin: Negative. Neurological: Negative. Psychiatric/Behavioral: Positive for confusion and decreased concentration. All other systems reviewed and are negative. Vitals:  
 11/19/18 1628 BP: 156/79 Pulse: (!) 115 Resp: 18 Temp: 97.2 °F (36.2 °C) SpO2: 96% Weight: 74.8 kg (165 lb) Height: 5' 9\" (1.753 m) Physical Exam  
Constitutional: He is oriented to person, place, and time. He appears well-developed and well-nourished. No distress. A little disheveled unshaven HENT:  
Head: Normocephalic and atraumatic. Right Ear: External ear normal.  
Left Ear: External ear normal.  
Nose: Nose normal.  
Eyes: Conjunctivae and EOM are normal. Pupils are equal, round, and reactive to light. Right eye exhibits no discharge. Left eye exhibits no discharge. No scleral icterus. Neck: Normal range of motion. Cardiovascular: Regular rhythm. Pulmonary/Chest: Effort normal and breath sounds normal. No stridor. No respiratory distress. He has no wheezes. He has no rales. Abdominal: Soft. Bowel sounds are normal. He exhibits no distension. There is no tenderness. Musculoskeletal: Normal range of motion. He exhibits edema. Neurological: He is alert and oriented to person, place, and time. He exhibits normal muscle tone. Coordination normal.  
Skin: Skin is warm and dry. No rash noted. Psychiatric: He is agitated. MDM Number of Diagnoses or Management Options Encephalopathy acute:  
Malaise:  
Diagnosis management comments: Patient believes his symptoms are related to coming off the oxycodone which is probably contributing to his symptoms. However will try to rule out other things such as infectious process or neurological injury. patient is a renal failure patient on dialysis however electrolytes. Within normal limits. Patient has had a hemoglobin drop however his rectal exam is negative for acute or occult blood could be due to his renal disease. Discussed with nephrology come see the patient and decide disposition Jasper Gardner MD 11:40 AM  Patient signed out to me by Dr. Gabriel Frost contacted nephrology did not see any thing wrong from their standpoint. I was going to discharge him after talking with him he seemed fairly clear. With his speech and thought process. During the discharge process patient was having trouble formulating a text message to his son picking up could not remember his address and had an unsteady gait. He is very frustrated by his confusion. I redid a neuro exam and do not see any focal deficits that he is confused I have spoken with hospitalist for further care and as I do not feel he is safe to go home. Amount and/or Complexity of Data Reviewed Clinical lab tests: ordered and reviewed Tests in the radiology section of CPT®: ordered and reviewed Tests in the medicine section of CPT®: ordered and reviewed Discuss the patient with other providers: yes Risk of Complications, Morbidity, and/or Mortality Presenting problems: high Diagnostic procedures: high Management options: high Procedures

## 2018-11-19 NOTE — PROGRESS NOTES
Initial visit to assess pt's spiritual needs in ER. Pt was agitated and restless. Advance Directive consult. Pt stated HCPOA was not needed. Ministry of presence & prayer to demonstrate caring & concern, convey emotional & spiritual support.  
 
keara Quinonez MDiv,[facility],PhD

## 2018-11-19 NOTE — DISCHARGE INSTRUCTIONS
Learning About Positive Thinking  What is positive thinking? Positive thinking, or healthy thinking, is a way to help you stay well or cope with a health problem by changing how you think. It's based on research that shows that you can change how you think. And how you think affects how you feel. Cognitive-behavioral therapy, also called CBT, is a therapy that is often used to help people think in a healthy way. It focuses on thought (cognitive) and action (behavioral). How can positive thinking help you? If you think in a positive way, you may be more able to care for yourself and handle life's challenges. You will feel better. And you may be more able to avoid or cope with stress, anxiety, and depression. CBT may be able to help you sleep better and lose weight. How can you get started with positive thinking? CBT involves techniques that you can practice every day so that healthy thinking comes naturally. Here are the steps for one technique. 1. Stop. When you notice a negative thought, stop it in its tracks and write it down. 2. Ask. Look at that thought and ask yourself whether it is helpful or unhelpful right now. 3. Choose. Choose a new, helpful thought to replace a negative one. Here's an example of how this might work:  · In a job review, your boss praised several things about your work. But you're feeling down because she had one small criticism. You might even think, \"I'm no good at my job\" or \"She doesn't like me. I must be bad. \" These are negative thoughts. You want to stop them. · Ask yourself questions about the situation and your negative thoughts. You might ask, \"What did my boss say exactly? \" \"Were there positive comments? \" \"Why do I focus only on one criticism? \" Your answers can help you find more accurate and helpful statements. · Now choose a helpful thought to replace the negative thoughts.  For example, you might think, \"I've done a lot of good work this year, and my boss noticed it. She thought there was one area I can improve. So I'll think of some things I can do to get stronger in that area. \"  With time and practice, you can learn to see that the harsh things you say to yourself may keep you from enjoying your life and work. You can replace them with more helpful thoughts. Where can you learn more? Go to http://suly-lauren.info/. Enter J334 in the search box to learn more about \"Learning About Positive Thinking. \"  Current as of: December 7, 2017  Content Version: 11.8  © 7029-1637 [employer]. Care instructions adapted under license by [facility] [employer] [facility] (which disclaims liability or warranty for this information). If you have questions about a medical condition or this instruction, always ask your healthcare professional. [address] any warranty or liability for your use of this information.

## 2018-11-19 NOTE — ED NOTES
TRANSFER - OUT REPORT: 
 
Verbal report given to Ann Piedra RN (name) on Flonnie Dial  being transferred to [id number] (unit) for routine progression of care Report consisted of patients Situation, Background, Assessment and  
Recommendations(SBAR). Information from the following report(s) SBAR, ED Summary, Procedure Summary, Intake/Output, MAR and Recent Results was reviewed with the receiving nurse. Lines:    
 
Opportunity for questions and clarification was provided. Patient transported with: 
 Transport

## 2018-11-19 NOTE — ACP (ADVANCE CARE PLANNING)
Initial visit to assess pt's spiritual needs in ER. Pt was agitated and restless. Advance Directive consult. Pt stated HCPOA was not needed.    
Chaplain Tucker Goncalves MDiv,ThM,PhD

## 2018-11-19 NOTE — H&P
H&P 
 
 
Patient: Meli Armas               Sex: male             MRN: [id number] YOB: 1940      Age:  66 y.o. Chief Complaint:  AMS 
 
HPI This is a 69-year-old with a past medical history of coronary artery disease, diabetes mellitus, COPD, end-stage renal disease on hemodialysis, gout, hypertension, paroxysmal A. fib, TIA comes into the emergency room with altered mental status. As per the patient was recently started on oxycodone which she has been taking for last 1 month now for different multiple issues. And he suddenly weaned off the pain medication and since then he was having difficulty concentrating, fuzzy thinking and difficulty remembering things. I spoke with the son on the phone and he does confirm the history. Patient denies any fever, no headaches, no neck stiffness, does not make any urine, no cough. Patient lives at the independent living facility and he was not clear whether he was taking all of his medications or not In the ER evaluation patient was agitated and confused, he was having unsteady gait, noted some speech abnormality, head CT was done which did not show any evidence of acute  abnormality, currently being admitted for further evaluation Review of Systems Unable to do Comprehensive 10 point ROS secondary to AMS Past Medical History:  
Diagnosis Date  Anemia, unspecified  7/7/2016  Arrhythmia IRREG. HEART BEAT- pt denies a fib - found on cardiac note 5/3/13- pt states he feels flutter at times  Arthritis  ASCAD - artery bypass graft 7/7/2016  ASCAD - artery bypass graft 7/7/2016  CAD (coronary artery disease) CABG 2007, stented 1995  Chronic kidney disease STAGE 3 CKD, dialysis - Tu-Th- Sat  Chronic prostatitis 11/25/2013  Diverticulitis  Diverticulosis 7/7/2016  GERD (gastroesophageal reflux disease)   
 takes omeprazole  Glomerulonephritis 7/7/2016  GOUT, UNSPECIFIED 7/7/2016  Hypercholesteremia   
 pt states is under control  Hypertension  Hypertrophy of prostate with urinary obstruction and other lower urinary tract symptoms (LUTS) 2013  Kidney failure  Paroxysmal atrial fibrillation ([address] 2016  TIA (transient ischemic attack) 2016  
 no residual weakness Past Surgical History:  
Procedure Laterality Date  ABDOMEN SURGERY PROC UNLISTED    
 perineal cath  CARDIAC SURG PROCEDURE UNLIST CABG x 2 in ; PTCA WITH STENTS  
 CREAT AV FISTULA,AUTOGENOUS GRAFT    
 HX COLONOSCOPY    HX CORONARY ARTERY BYPASS GRAFT    
 HX CORONARY STENT PLACEMENT    
 new stent placement   HX CSF SHUNT  HX HEART CATHETERIZATION    
 HX HERNIA REPAIR    
 bilateral  
 HX ORTHOPAEDIC    
 rt shoulder rotater cuff,lt knee  HX VASCULAR ACCESS   L u arm  
 OTHER CELL    
 LEFT KNEE SURGERY  VASCULAR SURGERY PROCEDURE UNLIST    
 cabg x2 Family History Problem Relation Age of Onset  Heart Disease Father 59 MI  
 Heart Attack Father 72 MI  
 Stroke Mother  Hypertension Mother  Heart Disease Mother  Cancer Sister   
     stomach Social History Socioeconomic History  Marital status:  Spouse name: Not on file  Number of children: 3  
 Years of education: Not on file  Highest education level: Not on file Social Needs  Financial resource strain: Not on file  Food insecurity - worry: Not on file  Food insecurity - inability: Not on file  Transportation needs - medical: Not on file  Transportation needs - non-medical: Not on file Occupational History  Occupation: marketing management Tobacco Use  Smoking status: Current Every Day Smoker Last attempt to quit: 1980 Years since quittin.9  Smokeless tobacco: Never Used  Tobacco comment: CIGAR DAILY FOR 10 YEARS Substance and Sexual Activity  Alcohol use:  Yes  
 Alcohol/week: 0.0 oz  
  Comment: rare  Drug use: No  
 Sexual activity: No  
Other Topics Concern  Not on file Social History Narrative  Not on file Allergies Allergen Reactions  Nka [No Known Allergies] Unknown (comments) Prior to Admission medications Medication Sig Start Date End Date Taking? Authorizing Provider HYDROcodone-acetaminophen (NORCO) 5-325 mg per tablet Take 1-2 Tabs by mouth every six (6) hours as needed for Pain. Max Daily Amount: 8 Tabs. 11/9/18   Hoffert, Marsa Severin, MD  
metoprolol succinate (TOPROL-XL) 25 mg XL tablet Take 1 Tab by mouth daily. 10/22/18   Hernandez Gallagher MD  
finasteride (PROSCAR) 5 mg tablet Take 1 Tab by mouth daily. 10/22/18   Hernandez Gallagher MD  
fludrocortisone (FLORINEF) 0.1 mg tablet Take 1 Tab by mouth daily. 9/19/18   Jennifer Nelson NP  
cyclobenzaprine (FLEXERIL) 5 mg tablet Take 0.5 Tabs by mouth daily as needed for Muscle Spasm(s). 8/22/18   Hernandez Gallagher MD  
clopidogrel (PLAVIX) 75 mg tab TAKE 1 TABLET BY MOUTH EVERY DAY 8/3/18   Damon Vaughan MD  
predniSONE (DELTASONE) 10 mg tablet Take 1 Tab by mouth as needed. 5/23/18   Hernandez Gallagher MD  
calcitRIOL (ROCALTROL) 0.5 mcg capsule Take 0.5 mcg by mouth daily. Indications: Renal Osteodystrophy, five days a week    Provider, Historical  
nitroglycerin (NITROSTAT) 0.4 mg SL tablet ONE TABLET UNDER TONGUE AS NEEDED FOR CHEST PAIN EVERY 5 MINUTES 4/18/17   Damon Vaughan MD  
B.infantis-B.ani-B.long-B.bifi (PROBIOTIC 4X) 10-15 mg TbEC Take  by mouth daily. Provider, Historical  
cinacalcet (SENSIPAR) 60 mg tab Take 120 mg by mouth nightly. Indications: HYPERPARATHYROIDISM SECONDARY TO CRF WITH DIALYSIS    Provider, Historical  
DOCUSATE CALCIUM (STOOL SOFTENER PO) Take  by mouth daily. Provider, Historical  
multivitamin (ONE A DAY) tablet Take 1 Tab by mouth daily.  Special Complex for dialysis patients    Provider, Historical  
 dicyclomine (BENTYL) 10 mg capsule Take 10 mg by mouth daily as needed. Indications: Irritable Bowel Syndrome    Provider, Historical  
Omeprazole Magnesium 20 mg cpDR Take  by mouth daily. Indications: gastroesophageal reflux disease    Provider, Historical  
epoetin alcides (EPOGEN;PROCRIT) 10,000 unit/mL injection by SubCUTAneous route as needed. Currently taking abt twice a month at [facility]    Provider, Historical  
Magnesium Oxide 500 mg cap Take  by mouth daily. Provider, Historical  
KRILL OIL PO Take  by mouth daily. Provider, Historical  
RENVELA 800 mg Tab tab Take 800 mg by mouth three (3) times daily (with meals). 4 tablets with each meal; 2 tablets with each snack . Indications: 3 with meals, 2 with snacks 13   Provider, Historical  
 
 
 
Physical Exam  
 
Visit Vitals /63 Pulse (!) 114 Temp 97.2 °F (36.2 °C) Resp 18 Ht 5' 9\" (1.753 m) Wt 74.8 kg (165 lb) SpO2 97% BMI 24.37 kg/m² Temp (24hrs), Av.2 °F (36.2 °C), Min:97.2 °F (36.2 °C), Max:97.2 °F (36.2 °C) Oxygen Therapy O2 Sat (%): 97 % (18 1159) Pulse via Oximetry: 114 beats per minute (18 1159) O2 Device: Room air (18 0226) No intake or output data in the 24 hours ending 18 1205 General:- Conscious, No acute distress Eyes:- No pallor/icterus HENT- Oral Mucosa is Moist, Neck:- Supple, No JVD Lungs- CTA Bilaterally, No significant wheezing Heart:- S1 S2 regular Abdomen:- Soft, Positive bowel sounds, NTND, No guarding/rigidity/rebound tend. Extremities:-No pedal edema Neurologic: - AOX2, No acute FND, Confused Skin: - No acute rashes Musculoskeletal: No Acute findings Psych: - Appropriate mood LAB Recent Results (from the past 24 hour(s)) CBC WITH AUTOMATED DIFF Collection Time: 18  2:35 AM  
Result Value Ref Range WBC 9.4 4.3 - 11.1 K/uL  
 RBC 2.79 (L) 4.23 - 5.6 M/uL HGB 9.4 (L) 13.6 - 17.2 g/dL HCT 30.6 (L) 41.1 - 50.3 % .7 (H) 79.6 - 97.8 FL  
 MCH 33.7 (H) 26.1 - 32.9 PG  
 MCHC 30.7 (L) 31.4 - 35.0 g/dL  
 RDW 16.5 % PLATELET 078 297 - 468 K/uL MPV 10.1 9.4 - 12.3 FL ABSOLUTE NRBC 0.00 0.0 - 0.2 K/uL  
 DF AUTOMATED NEUTROPHILS 66 43 - 78 % LYMPHOCYTES 15 13 - 44 % MONOCYTES 15 (H) 4.0 - 12.0 % EOSINOPHILS 2 0.5 - 7.8 % BASOPHILS 0 0.0 - 2.0 % IMMATURE GRANULOCYTES 1 0.0 - 5.0 %  
 ABS. NEUTROPHILS 6.2 1.7 - 8.2 K/UL  
 ABS. LYMPHOCYTES 1.4 0.5 - 4.6 K/UL  
 ABS. MONOCYTES 1.4 (H) 0.1 - 1.3 K/UL  
 ABS. EOSINOPHILS 0.2 0.0 - 0.8 K/UL  
 ABS. BASOPHILS 0.0 0.0 - 0.2 K/UL  
 ABS. IMM. GRANS. 0.1 0.0 - 0.5 K/UL METABOLIC PANEL, COMPREHENSIVE Collection Time: 11/19/18  2:35 AM  
Result Value Ref Range Sodium 137 136 - 145 mmol/L Potassium 3.8 3.5 - 5.1 mmol/L Chloride 100 98 - 107 mmol/L  
 CO2 25 21 - 32 mmol/L Anion gap 12 7 - 16 mmol/L Glucose 84 65 - 100 mg/dL BUN 42 (H) 8 - 23 MG/DL Creatinine 6.35 (H) 0.8 - 1.5 MG/DL  
 GFR est AA 11 (L) >60 ml/min/1.73m2 GFR est non-AA 9 (L) >60 ml/min/1.73m2 Calcium 8.4 8.3 - 10.4 MG/DL Bilirubin, total 0.6 0.2 - 1.1 MG/DL  
 ALT (SGPT) 12 12 - 65 U/L  
 AST (SGOT) 17 15 - 37 U/L Alk. phosphatase 135 50 - 136 U/L Protein, total 7.5 6.3 - 8.2 g/dL Albumin 3.0 (L) 3.2 - 4.6 g/dL Globulin 4.5 (H) 2.3 - 3.5 g/dL A-G Ratio 0.7 (L) 1.2 - 3.5 LIPASE Collection Time: 11/19/18  2:35 AM  
Result Value Ref Range Lipase 387 73 - 393 U/L MAGNESIUM Collection Time: 11/19/18  5:47 AM  
Result Value Ref Range Magnesium 2.2 1.8 - 2.4 mg/dL POC LACTIC ACID Collection Time: 11/19/18  5:52 AM  
Result Value Ref Range Lactic Acid (POC) 1.21 0.5 - 1.9 mmol/L IMAGING:  
 
Ct Head Wo Cont Result Date: 11/19/2018 IMPRESSION: 1. No evidence of acute intracranial abnormality. No hydrocephalus. 2. Chronic small vessel changes. Xr Chest [facility] Result Date: 11/19/2018 IMPRESSION:   Chronic blunting right costophrenic angle. Heart is enlarged. Pulmonary vasculature and interstitial markings mildly prominent, possibly chronic. There are sternal wires. All Micro Results None Assessment/Plan Active Problems: * No active hospital problems. * 
 
 
 
#Acute metabolic encephalopathy #End-stage renal disease on hemodialysis Tuesday Thursday Saturday #Hypertension #Diabetes mellitus Not on any meds #Paroxysmal A. fib #COPD Plan Confusion is mostly related to stopping the hydrocodone and could be related to withdrawal symptoms but given his symptoms are acute onset rule out acute stroke. Will order MRI, patient does not have any focal neurological deficits except the confusion. If his confusion still has persistent neurology evaluation Continue with his home medications metoprolol 25 mg once daily, Proscar 5 mg once daily, Florinef 0.1 mg once daily, Flexeril 5 mg daily as needed, Plavix 75 mill grams once daily Consult nephrology for continuation of hemodialysis, he gets dialysis Tuesday Continue rest of other home medications Monitor FSG and cover with slidning scale DVT Prophylaxis with heparin Emergency Contact- Son INSKIP [phone] Van Chandler MD 
November 19, 2018

## 2018-11-19 NOTE — PROGRESS NOTES
TRANSFER - IN REPORT: 
 
Verbal report received from NEAL Graves(name) on [employer]  being received from ER(unit) for routine progression of care Report consisted of patients Situation, Background, Assessment and  
Recommendations(SBAR). Information from the following report(s) SBAR was reviewed with the receiving nurse. Opportunity for questions and clarification was provided. Assessment completed upon patients arrival to unit and care assumed.

## 2018-11-19 NOTE — ED TRIAGE NOTES
Pt states that he is foggy and hazy in his thinking and he states it from the pain medication we all have been giving him. pt denies having HI\SI states he just needs some help to get off the pain medication

## 2018-11-19 NOTE — PROGRESS NOTES
11/19/18 1511 Dual Skin Pressure Injury Assessment Dual Skin Pressure Injury Assessment X Second Care Provider (Based on [facility]) [facility], [address] [facility] Sacrum  Mid 
(redness) Dual skin assessment completed with [facility], RN. No breakdown noted. Patient has multiple abrasions on his BLE and sacrum  with bruising to the left upper thigh and knee with a rash to the back. Patient oriented to the room and the use of the call light with verbal and demonstrated feedback. Foam dressing placed on patient. Will continue to monitor patient.

## 2018-11-19 NOTE — ED NOTES
Went to pt room to get address for ride home as pt unable to walk to door alone, balance issues noted, pt states not normal. Upon talking with pt, pt unable to form sentences and is confused. Spoke with Marlyn Seaman RN who previously was responsible for pt, pt attempting to text son to tell him he is discharged and texts read \"Ibastss\" @[id number]. Pt unable to formulate thoughts, clear speech noted. MD Marah to evaluate pt again before discharge. [facility] performed at this time. Pt to be admitted to the hospital for altered mental status. Pt crying at bedside, frustrated that he cannot talk or form words.

## 2018-11-19 NOTE — PROGRESS NOTES
11/19/18 1644 Vital Signs Temp 98.2 °F (36.8 °C) Temp Source Oral  
Pulse (Heart Rate) (!) 130 Resp Rate 17  
O2 Sat (%) 95 % Level of Consciousness Alert  
BP (!) 154/130 MAP (Calculated) 138 MEWS Score 4 Dr. Dasha Fung notified regarding patient's V/S. Dr. Missy Jaffe ordered a STAT EKG and place remote telemetry on the patient. Will continue to monitor patient.

## 2018-11-19 NOTE — ED NOTES
I have reviewed discharge instructions with the patient. The patient verbalized understanding. Patient left ED via Discharge Method: stretcher to Home with rohan ). Opportunity for questions and clarification provided. Patient given 0 scripts. To continue your aftercare when you leave the hospital, you may receive an automated call from our care team to check in on how you are doing. This is a free service and part of our promise to provide the best care and service to meet your aftercare needs.  If you have questions, or wish to unsubscribe from this service please call [phone]. Thank you for Choosing our [employer] Emergency Department.

## 2018-11-20 NOTE — DIALYSIS
TRANSFER IN - DIALYSIS Received patient in dialysis unit from [facility] (unit) for ordered procedure. Consent verified for renal replacement therapy. Patient alert and vital signs stable. /72 P 111 Hemodialysis initiated using Left UAF and 15 g needles. Machine settings per MD order. Heparin 2000 unit bolus. Will monitor during treatment.

## 2018-11-20 NOTE — PROGRESS NOTES
CM spoke with pt's son, Ata Goodwin [phone]. Pt currently lives at [facility]. DME in the home includes a walker and raised toilet seat. PCP and insurance verified. Plan at [place] is for pt to return to [facility] once medically stable. He participates in OT/PT there. Pt's son will be in town tomorrow. He is driving from [facility]. Ata Goodwin requested that Dr. Rigo Reddy call him after rounds today. Dr. Rigo Reddy provided with Thomas's number. [facility] [id number] [facility] [address] Management Interventions PCP Verified by CM: Yes Transition of Care Consult (CM Consult): Discharge Planning Physical Therapy Consult: Yes Occupational Therapy Consult: Yes Current Support Network: Lives Alone Confirm Follow Up Transport: Family Plan discussed with Pt/Family/Caregiver: Yes Freedom of Choice Offered: Yes Discharge Location Discharge Placement: Home

## 2018-11-20 NOTE — PROGRESS NOTES
Hourly rounds complete this shift,patient c/o: head pain, medication given, patient very confused this evening, Bed alarm on, bed in low, locked position, call light and bedside table within reach,  all needs met. Will continue to monitor Report to day shift nurse.

## 2018-11-20 NOTE — PROGRESS NOTES
In accordance with Medicare Guidelines, Care manager attempted to deliver Medicare Outpatient Observation Notice to the patient but the patient was unavailable for signature. Unsigned form was left at patient's bedside. Unsigned copy placed in patient's chart. Care managers notified.

## 2018-11-20 NOTE — PROGRESS NOTES
BG 65 after arriving from MRI. Ate breakfast, rechecked at 52. D50 given will recheck before going to dialysis.

## 2018-11-20 NOTE — PROGRESS NOTES
Pt back from dialysis BG 60 pt ate lunch, rechecked BG 55. D50 given. MD paged to make aware. Will continue to monitor.

## 2018-11-20 NOTE — PROGRESS NOTES
PT order received and chart reviewed. PT assessment attempted but nursing reports that the patient's blood sugar is too low to participate at this time with therapy and he has increased confusion. PT will check back tomorrow for the PT assessment.  
 
BERNY EspinozaT

## 2018-11-20 NOTE — PROGRESS NOTES
responded to Rapid Response. Team was working with pt when  arrived. Pt responded to Team efforts. Pt will remain in room. No family present. Rn Supervisor contacted pt's son letting him know of the small event.  prayed for Team and pt.  provided spiritual care through presence, prayer, and working with staff.

## 2018-11-20 NOTE — PROGRESS NOTES
Problem: Self Care Deficits Care Plan (Adult) Goal: *Acute Goals and Plan of Care (Insert Text) 1. Patient will complete lower body bathing and dressing with modified independence and adaptive equipment as needed. 2. Patient will complete toileting with modified independence. 3. Patient will tolerate 20 minutes of OT treatment with 1-2 rest breaks to increase activity tolerance for ADLs. 4. Patient will complete functional transfers with modified independence and adaptive equipment as needed. Timeframe: 7 visits OCCUPATIONAL THERAPY: Initial Assessment 11/20/2018OBSERVATION: Hospital Day: 2 [place] / Product Type: Medicare /  
  
NAME/AGE/GENDER: Angella Harman is a 66 y.o. male PRIMARY DIAGNOSIS:  Acute metabolic encephalopathy <principal problem not specified> <principal problem not specified> 
 
  
ICD-10: Treatment Diagnosis:  
 · Generalized Muscle Weakness (M62.81) · Other lack of cordination (R27.8) Precautions/Allergies: 
   Ellouise Pat known allergies] ASSESSMENT:  
Mr. Calitsa Iverson  is a 66 y.o. male admitted for altered mental status after recently weaning off oxycodone at home. At baseline, patient lives alone in a prison community and is typically independent with ADLs and facility provides meals, housekeeping, and medication management. (Patient is a questionable historian and no family or visitors are present.) Patient uses a walker for mobility indoors and outdoors, patient was not driving at baseline. Angella Harman found sleeping in recliner chair, but responds to voice and is agreeable to OT evaluation. Reports 0/10 pain. Patient is alert to self and year only, disoriented to location and situation. Patient is unable to track stimulus in left and right visual field, demonstrates impaired motor planning and gross motor coordination to complete functional tasks.  Patient able to stand with min assist and fair static standing balance. Patient's deficits include impaired strength, attention to task, motor planning, motor coordination, cardio endurance, and balance. BUE's are 3/5 strength. Angella Harman is currently functioning below baseline for ADLs and would benefit from acute OT to increase independence and safety with ADLs. Will follow for duration of hospital stay to address the above goals. Patient was educated on role and plan of care of occupational therapy. This section established at most recent assessment PROBLEM LIST (Impairments causing functional limitations): 1. Decreased Strength 2. Decreased ADL/Functional Activities 3. Decreased Transfer Abilities 4. Decreased Ambulation Ability/Technique 5. Decreased Balance 6. Decreased Activity Tolerance 7. Decreased Work Simplification/Energy Conservation Techniques 8. Decreased Knowledge of Precautions 9. Decreased Cognition INTERVENTIONS PLANNED: (Benefits and precautions of occupational therapy have been discussed with the patient.) 1. ADL training 2. Mobility training 3. Transfer training 4. Therapeutic exercises 5. Therapeutic activities 6. Education TREATMENT PLAN: Frequency/Duration: Follow patient 3x/week to address above goals. Rehabilitation Potential For Stated Goals: Good RECOMMENDED REHABILITATION/EQUIPMENT: (at time of discharge pending progress): Due to the probability of continued deficits (see above) this patient will likely need continued skilled occupational therapy after discharge. Equipment:  
? None at this time OCCUPATIONAL PROFILE AND HISTORY:  
History of Present Injury/Illness (Reason for Referral): 
See H&P.  
Past Medical History/Comorbidities:  
Mr. Calista Iverson  has a past medical history of Anemia, unspecified , Arrhythmia, Arthritis, ASCAD - artery bypass graft, ASCAD - artery bypass graft, CAD (coronary artery disease), Chronic kidney disease, Chronic prostatitis, Diverticulitis, Diverticulosis, GERD (gastroesophageal reflux disease), Glomerulonephritis, GOUT, UNSPECIFIED, Hypercholesteremia, Hypertension, Hypertrophy of prostate with urinary obstruction and other lower urinary tract symptoms (LUTS), Kidney failure, Paroxysmal atrial fibrillation ([address] and TIA (transient ischemic attack). Mr. Salvador Zuniga  has a past surgical history that includes vascular surgery procedure unlist; other cell; pr creat av fistula,autogenous graft; hx vascular access (2010); hx csf shunt; hx colonoscopy (2011); pr abdomen surgery proc unlisted; hx hernia repair; hx coronary artery bypass graft; pr cardiac surg procedure unlist; hx heart catheterization; hx orthopaedic; hx coronary stent placement; PERITONEAL DIALYSIS CATHETER REMOVAL (N/A, 6/13/2018); THORACENTESIS (Right, 4/30/2018); ULTRASOUND (Bilateral, 4/30/2018); THORACENTESIS (Right, 1/31/2018); ULTRASOUND (Bilateral, 1/31/2018); ULTRASOUND  ** needs new pt consult** (Bilateral, 1/4/2018); THORACENTESIS (Right, 1/4/2018); and PERITONEAL CATHETER INSERTION (N/A, 5/13/2013). Social History/Living Environment:  
Home Environment: Private residence # Steps to Enter: 0 One/Two Story Residence: One story Living Alone: No 
Support Systems: Friends \ neighbors, Other (comments)(long-term community) Patient Expects to be Discharged to[de-identified] Private residence Current DME Used/Available at Home: None Tub or Shower Type: Shower Prior Level of Function/Work/Activity: 
See above. Personal Factors:   
      Sex:  male Age:  66 y.o. Number of Personal Factors/Comorbidities that affect the Plan of Care: Expanded review of therapy/medical records (1-2):  MODERATE COMPLEXITY ASSESSMENT OF OCCUPATIONAL PERFORMANCE[de-identified]  
Activities of Daily Living:  
Basic ADLs (From Assessment) Complex ADLs (From Assessment) Feeding: Stand-by assistance Oral Facial Hygiene/Grooming: Stand-by assistance Bathing: Minimum assistance Upper Body Dressing: Minimum assistance Lower Body Dressing: Minimum assistance Toileting: Minimum assistance Grooming/Bathing/Dressing Activities of Daily Living Cognitive Retraining Safety/Judgement: Decreased awareness of environment;Decreased awareness of need for assistance Bed/Mat Mobility Sit to Stand: Minimum assistance Most Recent Physical Functioning:  
Gross Assessment: 
AROM: Generally decreased, functional 
PROM: Generally decreased, functional 
Strength: Generally decreased, functional 
Coordination: Generally decreased, functional 
Sensation: Impaired Posture: 
  
Balance: 
Sitting: Impaired Sitting - Static: Good (unsupported) Sitting - Dynamic: Fair (occasional) Standing: Impaired Standing - Static: Fair Bed Mobility: 
  
Wheelchair Mobility: 
  
Transfers: 
Sit to Stand: Minimum assistance Stand to Sit: Minimum assistance Patient Vitals for the past 6 hrs: 
 BP SpO2 Pulse 11/20/18 1028 120/69  (!) 118  
11/20/18 1059 121/64  (!) 103  
11/20/18 1126 110/70  86  
11/20/18 1155 119/67  (!) 112  
11/20/18 1240 130/65  (!) 104  
11/20/18 1305 137/58  (!) 112  
11/20/18 1429 122/64 95 %  Mental Status Neurologic State: Alert, Lethargic, Eyes open to voice Orientation Level: Disoriented to place, Disoriented to situation, Oriented to time, Oriented to person Cognition: Memory loss, Follows commands Perception: Cues to attend left visual field, Cues to attend right visual field Safety/Judgement: Decreased awareness of environment, Decreased awareness of need for assistance Physical Skills Involved: 1. Range of Motion 2. Balance 3. Strength 4. Activity Tolerance 5. Sensation 6. Fine Motor Control 7. Gross Motor Control Cognitive Skills Affected (resulting in the inability to perform in a timely and safe manner): 1. Perception 2. Executive Function 3. Immediate Memory 4. Short Term Recall 5. Long Term Memory Psychosocial Skills Affected: 1. Habits/Routines 2. Environmental Adaptation 3. Social Interaction 4. Self-Awareness Number of elements that affect the Plan of Care: 3-5:  MODERATE COMPLEXITY CLINICAL DECISION MAKIN[address] [place] [address] AM-PAC 6 Clicks Daily Activity Inpatient Short Form How much help from another person does the patient currently need. .. Total A Lot A Little None 1. Putting on and taking off regular lower body clothing? [] 1   [] 2   [x] 3   [] 4  
2. Bathing (including washing, rinsing, drying)? [] 1   [] 2   [x] 3   [] 4  
3. Toileting, which includes using toilet, bedpan or urinal?   [] 1   [] 2   [x] 3   [] 4  
4. Putting on and taking off regular upper body clothing? [] 1   [] 2   [x] 3   [] 4  
5. Taking care of personal grooming such as brushing teeth? [] 1   [] 2   [x] 3   [] 4  
6. Eating meals? [] 1   [] 2   [x] 3   [] 4  
© 2007, Trustees of [address] [place] [address], under license to [employer]. All rights reserved Score:  Initial: 18 Most Recent: X (Date: -- ) Interpretation of Tool:  Represents activities that are increasingly more difficult (i.e. Bed mobility, Transfers, Gait). Score 24 23 22-20 19-15 14-10 9-7 6 Modifier CH CI CJ CK CL CM CN   
 
? Self Care:  
  - CURRENT STATUS: CK - 40%-59% impaired, limited or restricted  - GOAL STATUS: CJ - 20%-39% impaired, limited or restricted  - D/C STATUS:  ---------------To be determined--------------- [place] / [place] B / Product Type: Medicare /   
 
Medical Necessity:    
· Patient demonstrates good rehab potential due to higher previous functional level. Reason for Services/Other Comments: 
· Patient continues to require modification of therapeutic interventions to increase complexity of exercises. Use of outcome tool(s) and clinical judgement create a POC that gives a: MODERATE COMPLEXITY TREATMENT:  
(In addition to Assessment/Re-Assessment sessions the following treatments were rendered) Pre-treatment Symptoms/Complaints:   
Pain: Initial:  
Pain Intensity 1: 0  Post Session:  0 Assessment/Reassessment only, no treatment provided today Braces/Orthotics/Lines/Etc:  
· O2 Device: Room air Treatment/Session Assessment:   
· Response to Treatment:  Sitting up in recliner chair with needs in reach. · Interdisciplinary Collaboration:  
o Occupational Therapist 
o Registered Nurse · After treatment position/precautions:  
o Up in chair 
o Bed alarm/tab alert on 
o Bed/Chair-wheels locked 
o Call light within reach 
o RN notified · Compliance with Program/Exercises: Will assess as treatment progresses. · Recommendations/Intent for next treatment session: \"Next visit will focus on advancements to more challenging activities\". Total Treatment Duration: OT Patient Time In/Time Out Time In: 0051 Time Out: 8341 Ghada Webb, OT

## 2018-11-20 NOTE — CONSULTS
[facility] CONSULT NOTE Admission Date: 
11/19/2018 Admission Diagnosis: 
Acute metabolic encephalopathy Consulting physician: Daniel Bolden MD 
 
Reason for consult: ESRD Subjective:  
History of Present Illness:  Sami Craig is a pleasant 66year old  male with ESRD on HD on TTS at [facility]. He has history of DM, HTN, CAD, COPD, gout, a-fib, and historyof TIA. He presented to the ED with AMS 11/19/20 after he suddenly stopped taking his oxycodone. He did not feel right and came to the ED for evaluation. He has had trouble with his memory and concentration. Head CT was negative for any acute changes. Workup was unrevealing. He was ready for discharge home with his family but when he went to use his cell phone he became more confused and agitated and was therefore admitted for further observation and evaluation. Nephrology is consulted for ESRD. He had dialysis on Sunday 11/18/18 in his outpatient clinic on a special holiday schedule due to Thanksgiving and is due for his routine dialysis again today. He has had an MRI of his brain without contrast showing cerebral volume loss and white matter findings compatible with chronic small vessel ischemic disease. He remains confused. Patient seen and examined on dialysis. Goal UF2 kg. Tolerating well. LUE AVF Ab 400 ml/min. Past Medical History:  
Diagnosis Date  Anemia, unspecified  7/7/2016  Arrhythmia IRREG. HEART BEAT- pt denies a fib - found on cardiac note 5/3/13- pt states he feels flutter at times  Arthritis  ASCAD - artery bypass graft 7/7/2016  ASCAD - artery bypass graft 7/7/2016  CAD (coronary artery disease) CABG 2007, stented 1995  Chronic kidney disease STAGE 3 CKD, dialysis - Tu-Th- Sat  Chronic prostatitis 11/25/2013  Diverticulitis  Diverticulosis 7/7/2016  GERD (gastroesophageal reflux disease)   
 takes omeprazole  Glomerulonephritis 7/7/2016  GOUT, UNSPECIFIED 7/7/2016  Hypercholesteremia   
 pt states is under control  Hypertension  Hypertrophy of prostate with urinary obstruction and other lower urinary tract symptoms (LUTS) 11/25/2013  Kidney failure  Paroxysmal atrial fibrillation ([address] 7/7/2016  TIA (transient ischemic attack) 07/07/2016  
 no residual weakness Past Surgical History:  
Procedure Laterality Date  ABDOMEN SURGERY PROC UNLISTED    
 perineal cath  CARDIAC SURG PROCEDURE UNLIST CABG x 2 in 2007; PTCA WITH STENTS  
 CREAT AV FISTULA,AUTOGENOUS GRAFT    
 HX COLONOSCOPY  2011  HX CORONARY ARTERY BYPASS GRAFT    
 HX CORONARY STENT PLACEMENT    
 new stent placement 2017  HX CSF SHUNT  HX HEART CATHETERIZATION    
 HX HERNIA REPAIR    
 bilateral  
 HX ORTHOPAEDIC    
 rt shoulder rotater cuff,lt knee  HX VASCULAR ACCESS  2010 L u arm  
 OTHER CELL    
 LEFT KNEE SURGERY  VASCULAR SURGERY PROCEDURE UNLIST    
 cabg x2 Current Facility-Administered Medications Medication Dose Route Frequency  tuberculin injection 5 Units  5 Units IntraDERMal ONCE  
 oxyCODONE IR (ROXICODONE) tablet 2.5 mg  2.5 mg Oral Q12H PRN  
 sodium chloride (NS) flush 5-10 mL  5-10 mL IntraVENous Q8H  
 sodium chloride (NS) flush 5-10 mL  5-10 mL IntraVENous PRN  
 acetaminophen (TYLENOL) tablet 650 mg  650 mg Oral Q4H PRN  
 naloxone (NARCAN) injection 0.4 mg  0.4 mg IntraVENous PRN  
 ondansetron (ZOFRAN) injection 4 mg  4 mg IntraVENous Q4H PRN  
 bisacodyl (DULCOLAX) tablet 5 mg  5 mg Oral DAILY PRN  
 insulin lispro (HUMALOG) injection   SubCUTAneous AC&HS  
 dextrose 40% (GLUTOSE) oral gel 1 Tube  15 g Oral PRN  
 glucagon (GLUCAGEN) injection 1 mg  1 mg IntraMUSCular PRN  
 dextrose (D50W) injection syrg 12.5-25 g  25-50 mL IntraVENous PRN  
 heparin (porcine) injection 5,000 Units  5,000 Units SubCUTAneous Q8H  
  alcohol 62% (NOZIN) nasal  1 Ampule  1 Ampule Topical Q12H  
 atorvastatin (LIPITOR) tablet 20 mg  20 mg Oral DAILY  clopidogrel (PLAVIX) tablet 75 mg  75 mg Oral DAILY  cyclobenzaprine (FLEXERIL) tablet 2.5 mg  2.5 mg Oral DAILY PRN  
 fludrocortisone (FLORINEF) tablet 100 mcg  0.1 mg Oral DAILY  sevelamer carbonate (RENVELA) tab 800 mg  800 mg Oral TID WITH MEALS  metoprolol succinate (TOPROL-XL) XL tablet 25 mg  25 mg Oral DAILY Allergies Allergen Reactions  Nka [No Known Allergies] Unknown (comments) Social History Tobacco Use  Smoking status: Current Every Day Smoker Last attempt to quit: 1980 Years since quittin.9  Smokeless tobacco: Never Used  Tobacco comment: CIGAR DAILY FOR 10 YEARS Substance Use Topics  Alcohol use: Yes Alcohol/week: 0.0 oz  
  Comment: rare Family History Problem Relation Age of Onset  Heart Disease Father 59 MI  
 Heart Attack Father 72 MI  
 Stroke Mother  Hypertension Mother  Heart Disease Mother  Cancer Sister   
     stomach Review of Systems Unable to obtain - confused Objective:  
Vitals:  
 18 2109 18 0215 18 2257 18 2988 BP: 103/74 131/72 131/66 141/77 Pulse: (!) 122 (!) 118 78 (!) 125 Resp:  Temp: 98 °F (36.7 °C) 97.8 °F (36.6 °C) 98.6 °F (37 °C) 98.1 °F (36.7 °C) SpO2: 91% 95% 96% Weight:    72.8 kg (160 lb 9.6 oz) Height:      
 
 
Intake/Output Summary (Last 24 hours) at 2018 2975 Last data filed at 2018 3805 Gross per 24 hour Intake 480 ml Output  Net 480 ml Physical Exam 
GEN :in no distress, alert but confused HEENT: anicteric sclerae, eomi. Oropharynx without lesions. Mucous membranes are moist. 
Neck - supple without JVD, no thyromegaly. No lymphadenopathy. CV - regular rate and rhythm, no murmur, no rub Lung - clear bilaterally, lungs expand symmetrically Chest wall - normal appearance Abd - soft, nontender, bowel sounds present, no hepatosplenomegaly Ext - no clubbing, no cyanosis, 1+LE edema Neurologic - nonfocal 
Genitourinary - bladder nonpalpable Skin - no rashes, no purpura, no ecchymoses Psychiatric: confused LUE AVF +thrill+bruit Data Review:  
Recent Labs 11/19/18 
0235 WBC 9.4 HGB 9.4* HCT 30.6*  Recent Labs 11/19/18 
030 70 25 13 11/19/18 
0235 NA  --  137 K  --  3.8 CL  --  100 CO2  --  25 BUN  --  42* CREA  --  6.35* GLU  --  84  
CA  --  8.4 MG 2.2  -- No results for input(s): PH, PCO2, PO2, PCO2 in the last 72 hours. Problem List:  
 
Patient Active Problem List  
 Diagnosis Date Noted  Acute metabolic encephalopathy 18/34/2895  Aortic stenosis 10/11/2018  Mitral regurgitation 10/11/2018  Acute respiratory failure with hypoxia ([address] 09/09/2018  Nonrheumatic aortic valve stenosis 08/23/2018  Neck pain 08/22/2018  Syncope 07/31/2018  ESRD (end stage renal disease) ([address] 07/31/2018  Impacted cerumen of left ear 05/23/2018  Debility 05/23/2018  Skin lesion 05/23/2018  Gastroesophageal reflux disease without esophagitis 10/18/2017  History of TIA (transient ischemic attack) 01/06/2017  End-stage renal disease ([address] 11/18/2016  Chronic atrial fibrillation ([address] 07/19/2016  GOUT, UNSPECIFIED 07/07/2016  Anemia, unspecified  07/07/2016  Benign non-nodular prostatic hyperplasia with lower urinary tract symptoms 11/25/2013  Hypercholesterolemia 08/15/2013  Internal carotid artery stenosis 08/15/2013  CAD (coronary artery disease) 03/28/2011  
 HTN (hypertension) 03/28/2011 Assessment and Plan: 
ESRD on HD TTS [address] 
- on holiday schedule Sun, Tues, Fri this week for Thanksgiving 
- HD today per routine for clearance and volume - tolerating well AMS 
- likely medication related -?withdrawal symptoms - CT and MRI of head negative for acute changes HTN 
- controlled on BB 
 
DM 
- per primary team 
 
Anemia of ESRD 
- resume epogen A-fib 
- on metoprolol Carlos Willis, MISTYP

## 2018-11-20 NOTE — DIALYSIS
TRANSFER OUT -DIALYSIS Hemodialysis treatment completed without complications. Patient alert and VS stable  /67  P 110   
 
 2.0 Kgs removed. Needles X2 removed from access and manual pressure held until hemostasis complete and pressure dressing applied. Patient to 602 after dialysis.

## 2018-11-20 NOTE — H&P
H&P 
 
 
Patient: Sami Craig               Sex: male             MRN: [id number] YOB: 1940      Age:  66 y.o. Chief Complaint:  AMS HPI Patient was seen at bedside. Had HD today. He has been stable until this afternoon when he developed a syncopal episode. He became unresponsive while he was seated. His VS remained stable and the telemetry room reported NO events during the syncope apart from the Afib with mild RVR which has been there since admission. He has had some episodes of hypoglycemia and was placed on a low rate D10 but his BS was stable during the syncopal episode. Will ask cards to see him tomorrow as he has a history of moderate to severe AS. Review of Systems Unable to do Comprehensive 10 point ROS secondary to AMS Past Medical History:  
Diagnosis Date  Anemia, unspecified  7/7/2016  Arrhythmia IRREG. HEART BEAT- pt denies a fib - found on cardiac note 5/3/13- pt states he feels flutter at times  Arthritis  ASCAD - artery bypass graft 7/7/2016  ASCAD - artery bypass graft 7/7/2016  CAD (coronary artery disease) CABG 2007, stented 1995  Chronic kidney disease STAGE 3 CKD, dialysis - Tu-Th- Sat  Chronic prostatitis 11/25/2013  Diverticulitis  Diverticulosis 7/7/2016  GERD (gastroesophageal reflux disease)   
 takes omeprazole  Glomerulonephritis 7/7/2016  GOUT, UNSPECIFIED 7/7/2016  Hypercholesteremia   
 pt states is under control  Hypertension  Hypertrophy of prostate with urinary obstruction and other lower urinary tract symptoms (LUTS) 11/25/2013  Kidney failure  Paroxysmal atrial fibrillation ([address] 7/7/2016  TIA (transient ischemic attack) 07/07/2016  
 no residual weakness Past Surgical History:  
Procedure Laterality Date  ABDOMEN SURGERY PROC UNLISTED    
 perineal cath  CARDIAC SURG PROCEDURE UNLIST  CABG x 2 in 2007; PTCA WITH STENTS  
  CREAT AV FISTULA,AUTOGENOUS GRAFT    
 HX COLONOSCOPY  2011  HX CORONARY ARTERY BYPASS GRAFT    
 HX CORONARY STENT PLACEMENT    
 new stent placement 2017  HX CSF SHUNT  HX HEART CATHETERIZATION    
 HX HERNIA REPAIR    
 bilateral  
 HX ORTHOPAEDIC    
 rt shoulder rotater cuff,lt knee  HX VASCULAR ACCESS  2010 L u arm  
 OTHER CELL    
 LEFT KNEE SURGERY  VASCULAR SURGERY PROCEDURE UNLIST    
 cabg x2 Family History Problem Relation Age of Onset  Heart Disease Father 59 MI  
 Heart Attack Father 72 MI  
 Stroke Mother  Hypertension Mother  Heart Disease Mother  Cancer Sister   
     stomach Allergies Allergen Reactions  Nka [No Known Allergies] Unknown (comments) Prior to Admission medications Medication Sig Start Date End Date Taking? Authorizing Provider HYDROcodone-acetaminophen (NORCO) 5-325 mg per tablet Take 1-2 Tabs by mouth every six (6) hours as needed for Pain. Max Daily Amount: 8 Tabs. 11/9/18   Ian Jimenez MD  
metoprolol succinate (TOPROL-XL) 25 mg XL tablet Take 1 Tab by mouth daily. 10/22/18   Harsh Escalera MD  
finasteride (PROSCAR) 5 mg tablet Take 1 Tab by mouth daily. 10/22/18   Harsh Escalera MD  
fludrocortisone (FLORINEF) 0.1 mg tablet Take 1 Tab by mouth daily. 9/19/18   Myra Kasper NP  
cyclobenzaprine (FLEXERIL) 5 mg tablet Take 0.5 Tabs by mouth daily as needed for Muscle Spasm(s). 8/22/18   Harsh Escalera MD  
clopidogrel (PLAVIX) 75 mg tab TAKE 1 TABLET BY MOUTH EVERY DAY 8/3/18   Almaz Tineo MD  
predniSONE (DELTASONE) 10 mg tablet Take 1 Tab by mouth as needed. 5/23/18   Harsh Escalera MD  
calcitRIOL (ROCALTROL) 0.5 mcg capsule Take 0.5 mcg by mouth daily.  Indications: Renal Osteodystrophy, five days a week    Provider, Historical  
nitroglycerin (NITROSTAT) 0.4 mg SL tablet ONE TABLET UNDER TONGUE AS NEEDED FOR CHEST PAIN EVERY 5 MINUTES 17   MD Mary Bishop-B.long-Yelitzai (PROBIOTIC 4X) 10-15 mg TbEC Take  by mouth daily. Provider, Historical  
cinacalcet (SENSIPAR) 60 mg tab Take 120 mg by mouth nightly. Indications: HYPERPARATHYROIDISM SECONDARY TO CRF WITH DIALYSIS    Provider, Historical  
DOCUSATE CALCIUM (STOOL SOFTENER PO) Take  by mouth daily. Provider, Historical  
multivitamin (ONE A DAY) tablet Take 1 Tab by mouth daily. Special Complex for dialysis patients    Provider, Historical  
dicyclomine (BENTYL) 10 mg capsule Take 10 mg by mouth daily as needed. Indications: Irritable Bowel Syndrome    Provider, Historical  
Omeprazole Magnesium 20 mg cpDR Take  by mouth daily. Indications: gastroesophageal reflux disease    Provider, Historical  
epoetin alcides (EPOGEN;PROCRIT) 10,000 unit/mL injection by SubCUTAneous route as needed. Currently taking abt twice a month at [facility]    Provider, Historical  
Magnesium Oxide 500 mg cap Take  by mouth daily. Provider, Historical  
KRILL OIL PO Take  by mouth daily. Provider, Historical  
RENVELA 800 mg Tab tab Take 800 mg by mouth three (3) times daily (with meals). 4 tablets with each meal; 2 tablets with each snack . Indications: 3 with meals, 2 with snacks 13   Provider, Historical  
 
 
 
Physical Exam  
 
Visit Vitals /66 (BP 1 Location: Right arm, BP Patient Position: At rest;Supine) Pulse 99 Temp 97.6 °F (36.4 °C) Resp 18 Ht 5' 9\" (1.753 m) Wt 72.8 kg (160 lb 9.6 oz) SpO2 97% BMI 23.04 kg/m² Temp (24hrs), Av.3 °F (36.3 °C), Min:95 °F (35 °C), Max:98.1 °F (36.7 °C) Oxygen Therapy O2 Sat (%): 97 % (18) Pulse via Oximetry: 107 beats per minute (18 1602) O2 Device: Room air (18 0226) Intake/Output Summary (Last 24 hours) at 2018 1856 Last data filed at 2018 1240 Gross per 24 hour Intake 720 ml Output  Net 720 ml  
  
 
 
 General:- Conscious, No acute distress Eyes:- No pallor/icterus HENT- Oral Mucosa is Moist, Neck:- Supple, No JVD Lungs- CTA Bilaterally, No significant wheezing Heart:- S1 S2 regular Abdomen:- Soft, Positive bowel sounds, NTND, No guarding/rigidity/rebound tend. Extremities:-No pedal edema Neurologic: - AOX2, No acute FND Skin: - No acute rashes Musculoskeletal: No Acute findings Psych: - Appropriate mood LAB Recent Results (from the past 24 hour(s)) GLUCOSE, POC Collection Time: 11/19/18  9:52 PM  
Result Value Ref Range Glucose (POC) 86 65 - 100 mg/dL LIPID PANEL Collection Time: 11/20/18  5:14 AM  
Result Value Ref Range LIPID PROFILE Cholesterol, total 123 <200 MG/DL Triglyceride 157 (H) 35 - 150 MG/DL  
 HDL Cholesterol 40 40 - 60 MG/DL  
 LDL, calculated 51.6 <100 MG/DL VLDL, calculated 31.4 (H) 6.0 - 23.0 MG/DL  
 CHOL/HDL Ratio 3.1 GLUCOSE, POC Collection Time: 11/20/18  8:39 AM  
Result Value Ref Range Glucose (POC) 59 (L) 65 - 100 mg/dL GLUCOSE, POC Collection Time: 11/20/18  9:06 AM  
Result Value Ref Range Glucose (POC) 52 (L) 65 - 100 mg/dL GLUCOSE, POC Collection Time: 11/20/18  9:31 AM  
Result Value Ref Range Glucose (POC) 165 (H) 65 - 100 mg/dL GLUCOSE, POC Collection Time: 11/20/18  2:28 PM  
Result Value Ref Range Glucose (POC) 60 (L) 65 - 100 mg/dL GLUCOSE, POC Collection Time: 11/20/18  2:55 PM  
Result Value Ref Range Glucose (POC) 55 (L) 65 - 100 mg/dL GLUCOSE, POC Collection Time: 11/20/18  3:25 PM  
Result Value Ref Range Glucose (POC) 191 (H) 65 - 100 mg/dL GLUCOSE, POC Collection Time: 11/20/18  5:17 PM  
Result Value Ref Range Glucose (POC) 126 (H) 65 - 100 mg/dL EKG, 12 LEAD, INITIAL Collection Time: 11/20/18  5:46 PM  
Result Value Ref Range Ventricular Rate 104 BPM  
 Atrial Rate 104 BPM  
 QRS Duration 88 ms Q-T Interval 366 ms  
 QTC Calculation (Bezet) 481 ms Calculated R Axis 87 degrees Calculated T Axis -155 degrees Diagnosis Atrial fibrillation with rapid ventricular response with premature  
ventricular or aberrantly conducted complexes T wave abnormality, consider inferolateral ischemia Abnormal ECG When compared with ECG of 19-NOV-2018 17:12, No significant change was found Confirmed by CULLEN ANDUJAR (), Gabriel Rosen ([id number]) on 11/20/2018 6:29:31 PM 
  
 
 
IMAGING:  
 
Mri Brain Wo Cont Result Date: 11/20/2018 IMPRESSION: 1. Motion artifact. 2. Cerebral volume loss. 3. White matter findings compatible with chronic small vessel ischemic disease. Ct Head Wo Cont Result Date: 11/20/2018 IMPRESSION:  Negative for acute intracranial abnormality. Chronic changes. Ct Head Wo Cont Result Date: 11/19/2018 IMPRESSION: 1. No evidence of acute intracranial abnormality. No hydrocephalus. 2. Chronic small vessel changes. Xr Chest [facility] Result Date: 11/19/2018 IMPRESSION:   Chronic blunting right costophrenic angle. Heart is enlarged. Pulmonary vasculature and interstitial markings mildly prominent, possibly chronic. There are sternal wires. All Micro Results None Assessment/Plan Active Problems: 
  Acute metabolic encephalopathy (14/29/7433) # Syncope  
-possible etiology is his AS. Will ask cardiology to see him  
-Telemetry did NOT report any major event during the syncope  
-he has a left sided facial trauma and a hemarthrosis of the left knee after he had a syncope a couple of weeks ago. # Left knee hemarthrosis  
-will ask ortho to see him tomorrow #Hypoglycemia  
-unclear etiology as he has not received insulin  
-stop SS  
-IV D10 overnight  
-check his cortisol in the am  
 
# Afib with RVR Slightly uncontrolled On toprol xl 25 mg po daily Increase Toprol xl to 50 mg po daily. MOnitor VS in view of AS Emergency Contact- Son SELENA [phone] Aida Duvall MD 
 November 20, 2018

## 2018-11-20 NOTE — PROGRESS NOTES
100 [facility] OUTREACH NURSE PROGRESS REPORT SUBJECTIVE: Called to assess patient secondary to RAPID RESPONSE   
 
MEWS Score: 3 (11/20/18 9895) Vitals:  
 11/20/18 1240 11/20/18 1305 11/20/18 1429 11/20/18 1719 BP: 130/65 137/58 122/64 118/70 Pulse: (!) 104 (!) 112  (!) 105 Resp:   20 Temp:   95 °F (35 °C) SpO2:   95% 93% Weight:      
Height:      
  
 
LAB DATA: 
 
Recent Labs 11/19/18 
030 70 25 13 11/19/18 
0235 NA  --  137 K  --  3.8 CL  --  100 CO2  --  25 AGAP  --  12  
GLU  --  84 BUN  --  42* CREA  --  6.35* GFRAA  --  11* GFRNA  --  9* CA  --  8.4 MG 2.2  --   
ALB  --  3.0*  
TP  --  7.5 GLOB  --  4.5* AGRAT  --  0.7* ALT  --  12 Recent Labs 11/19/18 
0235 WBC 9.4 HGB 9.4* HCT 30.6*  Pain Assessment Pain Intensity 1: 0 (11/20/18 1552) Patient Stated Pain Goal: 0 
 
  
  
  
  
 
  
  
  
   
 
ASSESSMENT:  Upon arrival to room, pt was non-responsive to any stimuli. VSS, concerns for post ictal event noted. Dr. Chip Barnard at bedside, ordered stat CT with known history of afib and refusal of anticoagulation. Lung sounds clear, on RA with O2 sat 100%. Pt taken to CT, results negative. Pt woke up to baseline mental status en route to CT. Pt awake, alert, oriented to person and place. PLAN:  Will continue to follow per ICU outreach protocol.

## 2018-11-20 NOTE — PROGRESS NOTES
Interdisciplinary Rounds completed 11/20/18. Nursing, Case Management, Physician and PT present. Plan of care reviewed and updated. palliative care consulted.   Possible d/c in am.

## 2018-11-21 NOTE — PROGRESS NOTES
Critical lab result troponin: 0.66, MD on call paged, awaiting call back. 2350: No call back from MD, paged again, awaiting for call back. 0010: Call returned from MD Emma Espino MD aware of troponin 0.66, no new orders.

## 2018-11-21 NOTE — PROGRESS NOTES
MCR patient status changed from OBS to IP. In accordance with Medicare Guidelines, Important Letter From Medicare taken to patient. However, patient was physically unable to sign the Important Letter from Medicare. The unsigned Important Letter from Medicare document was left by patients bedside & a copy of the Important Letter from Medicare document was placed in the medical record under media tab. Care managers notified.

## 2018-11-21 NOTE — H&P
H&P 
 
 
Patient: Denice Portillo               Sex: male             MRN: [id number] YOB: 1940      Age:  66 y.o. Chief Complaint:  AMS HPI Patient was seen at bedside. Stable. Mental status is better. Seen by ortho today and had pre-patellar hematoma drained. Seen by cardiology. Unclear etiology of syncopal episodes. AS remains as a possible culprit. Review of Systems Unable to do Comprehensive 10 point ROS secondary to AMS Past Medical History:  
Diagnosis Date  Anemia, unspecified  7/7/2016  Arrhythmia IRREG. HEART BEAT- pt denies a fib - found on cardiac note 5/3/13- pt states he feels flutter at times  Arthritis  ASCAD - artery bypass graft 7/7/2016  ASCAD - artery bypass graft 7/7/2016  CAD (coronary artery disease) CABG 2007, stented 1995  Chronic kidney disease STAGE 3 CKD, dialysis - Tu-Th- Sat  Chronic prostatitis 11/25/2013  Diverticulitis  Diverticulosis 7/7/2016  GERD (gastroesophageal reflux disease)   
 takes omeprazole  Glomerulonephritis 7/7/2016  GOUT, UNSPECIFIED 7/7/2016  Hypercholesteremia   
 pt states is under control  Hypertension  Hypertrophy of prostate with urinary obstruction and other lower urinary tract symptoms (LUTS) 11/25/2013  Kidney failure  Paroxysmal atrial fibrillation ([address] 7/7/2016  TIA (transient ischemic attack) 07/07/2016  
 no residual weakness Past Surgical History:  
Procedure Laterality Date  ABDOMEN SURGERY PROC UNLISTED    
 perineal cath  CARDIAC SURG PROCEDURE UNLIST CABG x 2 in 2007; PTCA WITH STENTS  
 CREAT AV FISTULA,AUTOGENOUS GRAFT    
 HX COLONOSCOPY  2011  HX CORONARY ARTERY BYPASS GRAFT    
 HX CORONARY STENT PLACEMENT    
 new stent placement 2017  HX CSF SHUNT  HX HEART CATHETERIZATION    
 HX HERNIA REPAIR    
 bilateral  
 HX ORTHOPAEDIC    
 rt shoulder rotater cuff,lt knee  HX VASCULAR ACCESS  2010 L u arm  
 OTHER CELL    
 LEFT KNEE SURGERY  VASCULAR SURGERY PROCEDURE UNLIST    
 cabg x2 Family History Problem Relation Age of Onset  Heart Disease Father 59 MI  
 Heart Attack Father 72 MI  
 Stroke Mother  Hypertension Mother  Heart Disease Mother  Cancer Sister   
     stomach Allergies Allergen Reactions  Nka [No Known Allergies] Unknown (comments) Prior to Admission medications Medication Sig Start Date End Date Taking? Authorizing Provider HYDROcodone-acetaminophen (NORCO) 5-325 mg per tablet Take 1-2 Tabs by mouth every six (6) hours as needed for Pain. Max Daily Amount: 8 Tabs. 11/9/18   Galen Jimenez MD  
metoprolol succinate (TOPROL-XL) 25 mg XL tablet Take 1 Tab by mouth daily. 10/22/18   Alyssa Canas MD  
finasteride (PROSCAR) 5 mg tablet Take 1 Tab by mouth daily. 10/22/18   Alyssa Canas MD  
fludrocortisone (FLORINEF) 0.1 mg tablet Take 1 Tab by mouth daily. 9/19/18   Sreedhar Walker NP  
cyclobenzaprine (FLEXERIL) 5 mg tablet Take 0.5 Tabs by mouth daily as needed for Muscle Spasm(s). 8/22/18   Alyssa Canas MD  
clopidogrel (PLAVIX) 75 mg tab TAKE 1 TABLET BY MOUTH EVERY DAY 8/3/18   Mamta Powell MD  
predniSONE (DELTASONE) 10 mg tablet Take 1 Tab by mouth as needed. 5/23/18   Alyssa Canas MD  
calcitRIOL (ROCALTROL) 0.5 mcg capsule Take 0.5 mcg by mouth daily. Indications: Renal Osteodystrophy, five days a week    Provider, Historical  
nitroglycerin (NITROSTAT) 0.4 mg SL tablet ONE TABLET UNDER TONGUE AS NEEDED FOR CHEST PAIN EVERY 5 MINUTES 4/18/17   Mamta Powell MD  
B.infantis-B.ani-B.long-B.bifi (PROBIOTIC 4X) 10-15 mg TbEC Take  by mouth daily. Provider, Historical  
cinacalcet (SENSIPAR) 60 mg tab Take 120 mg by mouth nightly.  Indications: HYPERPARATHYROIDISM SECONDARY TO CRF WITH DIALYSIS    Provider, Historical  
 DOCUSATE CALCIUM (STOOL SOFTENER PO) Take  by mouth daily. Provider, Historical  
multivitamin (ONE A DAY) tablet Take 1 Tab by mouth daily. Special Complex for dialysis patients    Provider, Historical  
dicyclomine (BENTYL) 10 mg capsule Take 10 mg by mouth daily as needed. Indications: Irritable Bowel Syndrome    Provider, Historical  
Omeprazole Magnesium 20 mg cpDR Take  by mouth daily. Indications: gastroesophageal reflux disease    Provider, Historical  
epoetin alcides (EPOGEN;PROCRIT) 10,000 unit/mL injection by SubCUTAneous route as needed. Currently taking abt twice a month at [facility]    Provider, Historical  
Magnesium Oxide 500 mg cap Take  by mouth daily. Provider, Historical  
KRILL OIL PO Take  by mouth daily. Provider, Historical  
RENVELA 800 mg Tab tab Take 800 mg by mouth three (3) times daily (with meals). 4 tablets with each meal; 2 tablets with each snack . Indications: 3 with meals, 2 with snacks 13   Provider, Historical  
 
 
 
Physical Exam  
 
Visit Vitals /56 (BP 1 Location: Right arm, BP Patient Position: Head of bed elevated (Comment degrees)) Pulse (!) 107 Temp 98.1 °F (36.7 °C) Resp 20 Ht 5' 9\" (1.753 m) Wt 72.1 kg (159 lb) SpO2 93% BMI 22.81 kg/m² Temp (24hrs), Av.4 °F (36.3 °C), Min:97 °F (36.1 °C), Max:98.1 °F (36.7 °C) Oxygen Therapy O2 Sat (%): 93 % (18 1514) Pulse via Oximetry: 107 beats per minute (18 1602) O2 Device: Room air (18 1108) Intake/Output Summary (Last 24 hours) at 2018 1855 Last data filed at 2018 1826 Gross per 24 hour Intake 737.5 ml Output  Net 737.5 ml  
  
 
 
General:- Conscious, No acute distress Eyes:- No pallor/icterus HENT- Oral Mucosa is Moist, Neck:- Supple, No JVD Lungs- CTA Bilaterally, No significant wheezing Heart:- S1 S2 regular Abdomen:- Soft, Positive bowel sounds, NTND, No guarding/rigidity/rebound tend. Extremities:-No pedal edema Neurologic: - AOX2, No acute FND Skin: - No acute rashes Musculoskeletal: left knee pre-patellar hematoma Psych: - Appropriate mood LAB Recent Results (from the past 24 hour(s)) GLUCOSE, POC Collection Time: 11/20/18  8:38 PM  
Result Value Ref Range Glucose (POC) 98 65 - 100 mg/dL TROPONIN I Collection Time: 11/20/18  9:59 PM  
Result Value Ref Range Troponin-I, Qt. 0.66 (HH) 0.02 - 0.05 NG/ML  
GLUCOSE, POC Collection Time: 11/20/18 10:53 PM  
Result Value Ref Range Glucose (POC) 104 (H) 65 - 100 mg/dL GLUCOSE, POC Collection Time: 11/21/18  1:01 AM  
Result Value Ref Range Glucose (POC) 101 (H) 65 - 100 mg/dL TROPONIN I Collection Time: 11/21/18  2:29 AM  
Result Value Ref Range Troponin-I, Qt. 0.54 (HH) 0.02 - 0.05 NG/ML  
GLUCOSE, POC Collection Time: 11/21/18  3:22 AM  
Result Value Ref Range Glucose (POC) 97 65 - 100 mg/dL GLUCOSE, POC Collection Time: 11/21/18  5:11 AM  
Result Value Ref Range Glucose (POC) 99 65 - 100 mg/dL CBC W/O DIFF Collection Time: 11/21/18  5:12 AM  
Result Value Ref Range WBC 9.8 4.3 - 11.1 K/uL  
 RBC 2.82 (L) 4.23 - 5.6 M/uL HGB 9.5 (L) 13.6 - 17.2 g/dL HCT 31.0 (L) 41.1 - 50.3 % .9 (H) 79.6 - 97.8 FL  
 MCH 33.7 (H) 26.1 - 32.9 PG  
 MCHC 30.6 (L) 31.4 - 35.0 g/dL  
 RDW 16.6 % PLATELET 002 808 - 888 K/uL MPV 10.5 9.4 - 12.3 FL ABSOLUTE NRBC 0.03 0.0 - 0.2 K/uL METABOLIC PANEL, BASIC Collection Time: 11/21/18  5:12 AM  
Result Value Ref Range Sodium 137 136 - 145 mmol/L Potassium 4.3 3.5 - 5.1 mmol/L Chloride 100 98 - 107 mmol/L  
 CO2 25 21 - 32 mmol/L Anion gap 12 7 - 16 mmol/L Glucose 87 65 - 100 mg/dL BUN 41 (H) 8 - 23 MG/DL Creatinine 6.08 (H) 0.8 - 1.5 MG/DL  
 GFR est AA 12 (L) >60 ml/min/1.73m2 GFR est non-AA 10 (L) >60 ml/min/1.73m2 Calcium 8.4 8.3 - 10.4 MG/DL CORTISOL, AM  
 Collection Time: 11/21/18  5:12 AM  
Result Value Ref Range Cortisol, a.m. 21.1 7 - 25 ug/dL GLUCOSE, POC Collection Time: 11/21/18  7:22 AM  
Result Value Ref Range Glucose (POC) 90 65 - 100 mg/dL PLEASE READ & DOCUMENT PPD TEST IN 24 HRS Collection Time: 11/21/18  9:13 AM  
Result Value Ref Range PPD  Negative  
 mm Induration  0  
GLUCOSE, POC Collection Time: 11/21/18 10:40 AM  
Result Value Ref Range Glucose (POC) 113 (H) 65 - 100 mg/dL GLUCOSE, POC Collection Time: 11/21/18  3:44 PM  
Result Value Ref Range Glucose (POC) 164 (H) 65 - 100 mg/dL IMAGING:  
 
Mri Brain Wo Cont Result Date: 11/20/2018 IMPRESSION: 1. Motion artifact. 2. Cerebral volume loss. 3. White matter findings compatible with chronic small vessel ischemic disease. Ct Head Wo Cont Result Date: 11/20/2018 IMPRESSION:  Negative for acute intracranial abnormality. Chronic changes. Ct Head Wo Cont Result Date: 11/19/2018 IMPRESSION: 1. No evidence of acute intracranial abnormality. No hydrocephalus. 2. Chronic small vessel changes. Xr Chest [facility] Result Date: 11/19/2018 IMPRESSION:   Chronic blunting right costophrenic angle. Heart is enlarged. Pulmonary vasculature and interstitial markings mildly prominent, possibly chronic. There are sternal wires. All Micro Results Procedure Component Value Units Date/Time CULTURE, BODY FLUID Susanna Solitario [[id number]] Collected:  11/21/18 1424 Order Status:  Completed Specimen:  Bursal Fluid Updated:  11/21/18 0088 Assessment/Plan Active Problems: 
  Acute metabolic encephalopathy (39/44/3841) # Syncope  
-unclear etiology. AS remains as a possible etiology 
-loop recorder interrogated today # Left knee hemarthrosis  
-seen by ortho. Aspiration done. Samples sent to the laboratory. MRI ordered #Hypoglycemia  
-resolved for now # Afib with RVR Increased Toprol xl to 50 mg po daily.  MOnitor VS in view of Jose Turner MD 
 November 21, 2018

## 2018-11-21 NOTE — PROGRESS NOTES
Patient alert and drowsy during shift. VSS on room air. No complaints of pain. Remote tele, afib, sinus tachycardia during shift. D10 infusing at 25 ml/hr overnight, blood sugars monitored. Bed locked and low, call bell within reach, hourly rounds performed, bed alarm in place. Will continue to monitor for remainder of shift and report to oncoming RN.

## 2018-11-21 NOTE — PROGRESS NOTES
Date of Outreach Update: 
Joanne Marie was seen and assessed. MEWS Score: 2 (11/21/18 0415) Vitals:  
 11/20/18 1853 11/20/18 2253 11/21/18 6957 11/21/18 4661 BP: 117/66 112/72 121/71 Pulse: 99 98 (!) 104 Resp: 18 18 18 Temp: 97.6 °F (36.4 °C) 97.2 °F (36.2 °C) 97 °F (36.1 °C) SpO2: 97% 96% 94% Weight:    72.1 kg (159 lb) Height:      
  
 
 Pain Assessment Pain Intensity 1: 0 (11/20/18 1959) Patient Stated Pain Goal: 0 Previous Outreach assessment has been reviewed. There have been no significant clinical changes since the completion of the last dated Outreach assessment. Will continue to follow up per outreach protocol. Signed By:   Tulio Guaman   November 21, 2018 5:31 AM

## 2018-11-21 NOTE — PROGRESS NOTES
100 [facility] OUTREACH NURSE PROGRESS REPORT SUBJECTIVE: Assessed patient secondary to outreach protocol. MEWS Score: 2 (11/21/18 0415) Vitals:  
 11/21/18 0431 11/21/18 0723 11/21/18 1041 11/21/18 1108 BP:  135/78 108/58 Pulse:  100 98 Resp:  20 20 Temp:  97.5 °F (36.4 °C) 97.3 °F (36.3 °C) SpO2:  94% 91% 93% Weight: 72.1 kg (159 lb) Height:      
  
 
 
LAB DATA: 
 
Recent Labs  
  11/21/18 
0512 11/20/18 1829 11/19/18 
0547 11/19/18 
0235  136  --  137  
K 4.3 3.9  --  3.8  97*  --  100 CO2 25 26  --  25 AGAP 12 13  --  12  
GLU 87 171*  --  84 BUN 41* 33*  --  42* CREA 6.08* 5.56*  --  6.35* GFRAA 12* 13*  --  11* GFRNA 10* 11*  --  9*  
CA 8.4 8.4  --  8.4 MG  --   --  2.2  --   
ALB  --   --   --  3.0*  
TP  --   --   --  7.5 GLOB  --   --   --  4.5* AGRAT  --   --   --  0.7* ALT  --   --   --  12 Recent Labs  
  11/21/18 
0512 11/20/18 1829 11/19/18 
0235 WBC 9.8 10.7 9.4 HGB 9.5* 10.1* 9.4* HCT 31.0* 32.4* 30.6*  244 220 OBJECTIVE: On arrival to room, I found patient to be resting in bed. Pain Assessment Pain Intensity 1: 0 (11/21/18 1108) Patient Stated Pain Goal: 0 
 
  
  
  
  
 
  
  
  
   
 
ASSESSMENT:  Pt in bed, alert, intermittently confused, can follow commands. On RA, sat 96%. No concerns at this time. MRI yesterday negative. PLAN:   
 
 
Will continue to follow up per outreach protocol. Signed By:   Krissy Gaines RN   November 21, 2018 1:09 PM

## 2018-11-21 NOTE — PROGRESS NOTES
Pt now has red patches on skin with a small area of opaque color on right  flank side. Pt states he has shingles and is taking medication for it. Will talk to MD about this. Pt denies pain and itchiness. Will continue to monitor.

## 2018-11-21 NOTE — PHYSICIAN ADVISORY
Letter of Determination: Upgrade from Observation to Inpatient Status This patient was originally hospitalized as Outpatient Status with Observation Services on 11/19/2018 for hemodialysis. This patient now meets for Inpatient Admission in accordance with CMS regulation Section 43 .3. Specifically, patient's stay is now over Two Midnights and was medically necessary. The patient's stay was medically necessary based on syncopal episode thought due to aortic stenosis, with troponin-I of 0.66 ng/mdl, and medical plan for cardiology evaluation. Consistent with CMS guidelines, patient meets for inpatient status. It is our recommendation that this patient's hospitalization status should be upgraded from OBSERVATION to INPATIENT status.  
  
The final decision regarding the patient's hospitalization status depends on the attending physician's judgement. Rom Weiner MD, PANDA, Physician Advisor [address] [place] [address].

## 2018-11-21 NOTE — CONSULTS
[facility] Cardiology Consult Date of  Admission: 11/19/2018  5:29 AM  
 
Primary Care Physician: Dr Carlos Champion Primary Cardiologist: Dr Dewitte Hodgkins Referring Physician: Hospital Medicine Consulting Physician: Dr Laila Tenorio CC/Reason for consult: Recurrently Syncope Papo Henry is a 66 y.o. male with a hx of CAD s/p PCI and CABG, TIA, ESRD on HD, Anemia, HTN, HLD, AS, MR, and PAF. The patient came into the ED with AMS after recently starting on oxycodone for one month. The patient suddenly weaned off pain medications and was having difficulty concentrating and remembering things. His initial head CT was negative for acute findings. He was admitted by internal medicine after becoming more confused and agitated while on his cell phone. After HD on 11/20 the patient developed a syncope episode where his VS remained stable, no increased or decreased HR found on telemetry, and no periods of newarhythmia was found as the patient remained in A Fib. .  The patient BGL remained normal and when asked the patient has no recollection of the event. The patient denies SOB, CP, fever, dizziness, ha, changes to medications, N/V/D, blood in stool, blood in urin, dark stool, or taking illegal drugs. The patient did state he has passed out 6 times over a month period one month ago. He states he feels good today, better than his baseline but he feels weak. He has not been able to corollate any of his spells of passing out with a pattern at this time. When asked about dates he passed out to corollate with his loop recorder the patient could not remember. The patient stated he took himself off his eliquis because of bleeding and he has no desire to be on a [address] at this time. He was explained the risk of stroke and the patient was aware of the risk and still does not wish to proceed with [place] at this time.    
 
[facility]: 4/21/2017 Severe CAD, Bypass open 2/2, PCI to the RCA 
 Echo: 7/31/2018 Left ventricle: Systolic function was at the lower limits of normal. Ejection fraction was estimated in the range of 50 % to 55 %. There were no regional wall motion abnormalities. Wall thickness was mildly increased. Right ventricle: The ventricle was moderately dilated. Systolic function Was mildly reduced. There was severe pulmonary artery hypertension.  Left atrium: The atrium was markedly dilated.  Right atrium: The atrium was markedly dilated.   Inferior vena cava, hepatic veins: The inferior vena cava was dilated. The respirophasic change in diameter was less than 50%.  Aortic valve: The valve was probably trileaflet. Leaflets exhibited  Moderate to marked calcification and reduced mobility. There was moderate to severe stenosis. There was mild to moderate regurgitation.  Mitral valve: There was moderate to severe regurgitation.  Tricuspid valve: There was severe regurgitation.   Pulmonic valve: There was moderate regurgitation.   Pericardium: There was a pleural effusion present. Ascites was noted. EKG: A Fib rate of 105 Link Interrogation: Sept 2018 3.3% AT/AF burden w/ Avg of 0.8 hrs per day. 35 episodes lasting 10m-1h. And  142 episodes lasting less than 10 min. Max HR shown was 140 bpm w/ avg during this episode being 105 on 10/3/2018 Patient Active Problem List  
Diagnosis Code  CAD (coronary artery disease) I25.10  
 HTN (hypertension) I10  Benign non-nodular prostatic hyperplasia with lower urinary tract symptoms N40.1  GOUT, UNSPECIFIED M10.9  Anemia, unspecified  D64.9  Chronic atrial fibrillation (HCC) I48.2  End-stage renal disease (HCC) N18.6  History of TIA (transient ischemic attack) Z86.73  
 Hypercholesterolemia E78.00  Internal carotid artery stenosis I65.29  
 Gastroesophageal reflux disease without esophagitis K21.9  Impacted cerumen of left ear H61.22  
 Debility R53.81  
 Skin lesion L98.9  Syncope R55  ESRD (end stage renal disease) ([facility] [address] N18.6  Neck pain M54.2  Nonrheumatic aortic valve stenosis I35.0  Acute respiratory failure with hypoxia (HCC) J96.01  
 Aortic stenosis I35.0  Mitral regurgitation I34.0  Acute metabolic encephalopathy L11.29 Past Medical History:  
Diagnosis Date  Anemia, unspecified  7/7/2016  Arrhythmia IRREG. HEART BEAT- pt denies a fib - found on cardiac note 5/3/13- pt states he feels flutter at times  Arthritis  ASCAD - artery bypass graft 7/7/2016  ASCAD - artery bypass graft 7/7/2016  CAD (coronary artery disease) CABG 2007, stented 1995  Chronic kidney disease STAGE 3 CKD, dialysis - Tu-Th- Sat  Chronic prostatitis 11/25/2013  Diverticulitis  Diverticulosis 7/7/2016  GERD (gastroesophageal reflux disease)   
 takes omeprazole  Glomerulonephritis 7/7/2016  GOUT, UNSPECIFIED 7/7/2016  Hypercholesteremia   
 pt states is under control  Hypertension  Hypertrophy of prostate with urinary obstruction and other lower urinary tract symptoms (LUTS) 11/25/2013  Kidney failure  Paroxysmal atrial fibrillation ([facility] [address] 7/7/2016  TIA (transient ischemic attack) 07/07/2016  
 no residual weakness Past Surgical History:  
Procedure Laterality Date  ABDOMEN SURGERY PROC UNLISTED    
 perineal cath  CARDIAC SURG PROCEDURE UNLIST CABG x 2 in 2007; PTCA WITH STENTS  
 CREAT AV FISTULA,AUTOGENOUS GRAFT    
 HX COLONOSCOPY  2011  HX CORONARY ARTERY BYPASS GRAFT    
 HX CORONARY STENT PLACEMENT    
 new stent placement 2017  HX CSF SHUNT  HX HEART CATHETERIZATION    
 HX HERNIA REPAIR    
 bilateral  
 HX ORTHOPAEDIC    
 rt shoulder rotater cuff,lt knee  HX VASCULAR ACCESS  2010 L u arm  
 OTHER CELL    
 LEFT KNEE SURGERY  VASCULAR SURGERY PROCEDURE UNLIST    
 cabg x2 Allergies Allergen Reactions  Nka [No Known Allergies] Unknown (comments) Family History Problem Relation Age of Onset  Heart Disease Father 59 MI  
 Heart Attack Father 72 MI  
 Stroke Mother  Hypertension Mother  Heart Disease Mother  Cancer Sister   
     stomach Social History Socioeconomic History  Marital status:  Spouse name: Not on file  Number of children: 3  
 Years of education: Not on file  Highest education level: Not on file Social Needs  Financial resource strain: Not on file  Food insecurity - worry: Not on file  Food insecurity - inability: Not on file  Transportation needs - medical: Not on file  Transportation needs - non-medical: Not on file Occupational History  Occupation: marketing management Tobacco Use  Smoking status: Current Every Day Smoker Last attempt to quit: 1980 Years since quittin.9  Smokeless tobacco: Never Used  Tobacco comment: CIGAR DAILY FOR 10 YEARS Substance and Sexual Activity  Alcohol use: Yes Alcohol/week: 0.0 oz  
  Comment: rare  Drug use: No  
 Sexual activity: No  
Other Topics Concern  Not on file Social History Narrative  Not on file Current Facility-Administered Medications Medication Dose Route Frequency  tuberculin injection 5 Units  5 Units IntraDERMal ONCE  
 oxyCODONE IR (ROXICODONE) tablet 2.5 mg  2.5 mg Oral Q12H PRN  
 [START ON 2018] epoetin alcides (EPOGEN;PROCRIT) injection 8,000 Units  8,000 Units IntraVENous DIALYSIS TUE, THU & SAT  heparin (porcine) 1,000 unit/mL injection 5,000 Units  5,000 Units Hemodialysis DIALYSIS PRN  
 dextrose 10% infusion  25 mL/hr IntraVENous CONTINUOUS  
 metoprolol succinate (TOPROL-XL) XL tablet 50 mg  50 mg Oral DAILY  sodium chloride (NS) flush 5-10 mL  5-10 mL IntraVENous Q8H  
 sodium chloride (NS) flush 5-10 mL  5-10 mL IntraVENous PRN  
 acetaminophen (TYLENOL) tablet 650 mg  650 mg Oral Q4H PRN  
  naloxone (NARCAN) injection 0.4 mg  0.4 mg IntraVENous PRN  
 ondansetron (ZOFRAN) injection 4 mg  4 mg IntraVENous Q4H PRN  
 bisacodyl (DULCOLAX) tablet 5 mg  5 mg Oral DAILY PRN  
 dextrose 40% (GLUTOSE) oral gel 1 Tube  15 g Oral PRN  
 glucagon (GLUCAGEN) injection 1 mg  1 mg IntraMUSCular PRN  
 dextrose (D50W) injection syrg 12.5-25 g  25-50 mL IntraVENous PRN  
 heparin (porcine) injection 5,000 Units  5,000 Units SubCUTAneous Q8H  
 alcohol 62% (NOZIN) nasal  1 Ampule  1 Ampule Topical Q12H  
 atorvastatin (LIPITOR) tablet 20 mg  20 mg Oral DAILY  clopidogrel (PLAVIX) tablet 75 mg  75 mg Oral DAILY  cyclobenzaprine (FLEXERIL) tablet 2.5 mg  2.5 mg Oral DAILY PRN  
 fludrocortisone (FLORINEF) tablet 100 mcg  0.1 mg Oral DAILY  sevelamer carbonate (RENVELA) tab 800 mg  800 mg Oral TID WITH MEALS Review of Systems Constitution: Negative for chills, diaphoresis, fever, weakness, malaise/fatigue, weight gain and weight loss. HENT: Negative. Eyes: Negative. Cardiovascular: Positive for syncope. Negative for chest pain (currently pain free), claudication, cyanosis, dyspnea on exertion, irregular heartbeat, leg swelling, near-syncope, orthopnea, palpitations and paroxysmal nocturnal dyspnea. Respiratory: Negative for cough, shortness of breath and wheezing. Endocrine: Negative. Skin: Negative. Musculoskeletal: Negative. Gastrointestinal: Negative for abdominal pain, diarrhea, nausea and vomiting. Genitourinary: Negative. Neurological: Negative for excessive daytime sleepiness, dizziness, focal weakness, light-headedness, seizures, sensory change and vertigo. Psychiatric/Behavioral: Negative. Allergic/Immunologic: Negative. Physical Exam 
Vitals:  
 11/20/18 2253 11/21/18 9024 11/21/18 0431 11/21/18 5840 BP: 112/72 121/71  135/78 Pulse: 98 (!) 104  100 Resp: 18 18 20 Temp: 97.2 °F (36.2 °C) 97 °F (36.1 °C)  97.5 °F (36.4 °C) SpO2: 96% 94%  94% Weight:   72.1 kg (159 lb) Height:      
 
 
Physical Exam: 
General: Well Developed, Well Nourished, No Acute Distress HEENT: pupils equal and round, no abnormalities noted Neck: supple, no JVD, no carotid bruits Heart: S1S2 AS murmur heard 3-4/6 Lungs: Clear throughout auscultation bilaterally without adventitious sounds Abd: soft, nontender, nondistended, with good bowel sounds Ext: warm, no edema, calves supple/nontender, pulses 2+ bilaterally Skin: warm and dry Psychiatric: Normal mood and affect Neurologic: Alert and oriented X 3 Labs:  
Recent Labs  
  11/21/18 
5102 11/20/18 
1829 11/20/18 
1174 11/19/18 
0547 11/19/18 
0235 NA  --  136  --   --  137 K  --  3.9  --   --  3.8 MG  --   --   --  2.2  --   
BUN  --  33*  --   --  42* CREA  --  5.56*  --   --  6.35* GLU  --  171*  --   --  84 WBC 9.8 10.7  --   --  9.4 HGB 9.5* 10.1*  --   --  9.4* HCT 31.0* 32.4*  --   --  30.6*  244  --   --  220 TRIGL  --   --  157*  --   --   
HDL  --   --  40  --   --   
 
 
Echo Results  (Last 48 hours) None CXR Results  (Last 48 hours) None Assessment/Plan: 
 
 Assessment:  
  
Active Problems: 
  Acute metabolic encephalopathy (29/39/5124) Syncope: Syncope vrs Increased AMS? Unknown etiology at this time. Unlikely to be acute AS related due to no changes in VS or HR found. Will continue evaluation for AS workup as an outpatient. A Fib: Currently rate controled, Does not wish to have [address]. The risk have been explained and the patient has shown verbal understanding and wishes to remain off [email or web address] at this time. Thank you very much for this referral. We appreciate the opportunity to participate in this patient's care. We will follow along with above stated plan. Lucretia No NP Consulting MD: Dr Zak Nova

## 2018-11-21 NOTE — CONSULTS
[address] 
MR#: [id number] : 1940 ACCOUNT #: [de-identified] DATE OF SERVICE: 2018 CONSULTING SERVICE:  Hospitalist, Dr. Shelly Hernandez. REASON FOR CONSULTATION:  Left knee pain and swelling. CHIEF COMPLAINT:  Left knee pain and swelling status post fall. HISTORY OF PRESENT ILLNESS:  Mr. Ja Rodgers  is a 51-year-old gentleman with a history of end-stage renal disease requiring hemodialysis who was admitted on 2018 for further evaluation and management of altered mental status. The patient also reports that he fell on his left knee 3 weeks ago and was recently evaluated by Dr. Fermin Lizama in the clinic where x-rays were performed and showed no evidence of acute fracture. Patient reports persistent swelling and bruising over anterior aspect of left knee. He was previously taking Eliquis, but reports that he stopped taking this 2 weeks prior to his fall. He denies any overlying warmth or associated fever, chills, nausea or vomiting. He complains of mild pain with knee flexion and when weightbearing. Orthopedic service is being consulted at this time for further evaluation and management. No other new complaints. REVIEW OF SYSTEMS:  As per HPI, otherwise 10-point review of systems is negative. PAST MEDICAL HISTORY:  Significant for aortic stenosis, mitral regurgitation, end-stage renal disease, debility, GERD, history of TIA, chronic atrial fibrillation, gout, anemia, benign prostate hyperplasia, dyslipidemia, carotid artery disease, hypertension. PAST SURGICAL HISTORY:  Significant for CABG, right shoulder rotator cuff repair, left knee surgery. SOCIAL HISTORY:  Tobacco:  History of tobacco use. Current everyday smoker. Occasional alcohol use. Denies any illicit drug use. FAMILY HISTORY:  Significant for heart disease- father, MI- father. Stroke- mother. Gastric cancer- sister. MEDICATION LIST:  Reviewed. ALLERGIES:  NO KNOWN DRUG ALLERGIES. PHYSICAL EXAMINATION: 
VITAL SIGNS:  Temperature is 97.3 degrees Fahrenheit, blood pressure 108/58, pulse rate 98, respiratory rate 20, O2 saturation 93% on room air. GENERAL:  Well-developed, well-nourished, no acute distress. HEENT:  Head is normocephalic, atraumatic. Pupils are equally round and reactive to light. Extraocular motion intact. EXTREMITIES:  No clubbing, cyanosis or edema present. Pedal pulses are palpable in bilateral lower extremities with brisk capillary refill present in all 5 toes bilaterally. MUSCULOSKELETAL:  Examination of left knee reveals overlying skin intact and in good condition. There is moderate soft tissue swelling noted over anterior aspect of left knee in the area of the prepatellar bursa. There is mild overlying ecchymosis present. There is no appreciable increase in warmth or induration present. There is mild fluctuance present over the anterior aspect of the knee. There is no palpable defect in the extensor mechanism present. There is diffuse tenderness to palpation over anterior aspect of the left knee. Patient is able to actively perform left leg raise and extend the left knee to 5 degrees. Patient is able to flex the left knee to 90 degrees with minimal discomfort. There is no appreciable knee joint effusion present. NEUROLOGIC:  Alert and oriented to person and place. Light touch sensation and motor function intact throughout left lower extremity. Cranial nerves II-XII are intact. Light touch sensation and motor function intact throughout left lower extremity. PSYCHIATRIC:  Appropriate mood and affect. SKIN:  Warm, dry, normal turgor. LABORATORY DATA:  Includes a CBC from 11/21/2018 with hemoglobin of 9.5, hematocrit 31.0, white blood cell count 9.8, platelet count 515,421.   BMP from 11/21/2018:  Sodium 137, potassium 4.3, chloride 100, CO2 25, BUN is 41, creatinine is 6.0.  Glucose is 87. Left knee x-rays taken on 11/12/2018 at [address] [place] [address] revealed mild to moderate degenerative joint disease with no fracture present. ASSESSMENT:  Left knee pain, traumatic prepatellar bursitis of left knee, status post fall. PLAN:  Based on history and exam findings, it was felt appropriate to aspirate left prepatellar bursa to aid in ruling out any infectious etiology. After written consent was obtained, anterior aspect of left knee was prepped with alcohol and an 18-gauge needle was introduced into the prepatellar bursal region which yielded 2-3 mL of blood. Patient tolerated the procedure well without complication. Aspirate will be sent for culture and sensitivity, although findings suggest hemorrhagic rather than  infectious process. We will order MRI of left knee for further evaluation and to rule out occult fracture as well. Apply cold therapy as needed to help alleviate swelling. Activity as tolearated. Further recommendations to follow once the MRI has been completed and results reviewed. Plan of care has been discussed with Dr. Viviana Boyle and he is in agreement. We appreciate being allowed to participate in the care of this patient. CAMERON Leong dictating on behalf of MD Shashi Miranda / DANILO ROSE [facility] 
D: 11/21/2018 13:14    
T: 11/21/2018 13:55 JOB #: [id number]

## 2018-11-21 NOTE — PROGRESS NOTES
[facility] NEPHROLOGY PROGRESS NOTE Follow up for: 
 
Subjective:  
Patient seen and examined. Chart, notes, labs, imaging, results all reviewed. Events of yesterday noted. ? Syncopal episode after HD with stable VS. Cardiology consulted for recurrent syncope. Alert and talkative but still seems confused. No sob, cp, n/v. Denies complaints. ROS: 
confused Objective:  
Exam: 
Vitals:  
 11/21/18 0431 11/21/18 0723 11/21/18 1041 11/21/18 1108 BP:  135/78 108/58 Pulse:  100 98 Resp:  20 20 Temp:  97.5 °F (36.4 °C) 97.3 °F (36.3 °C) SpO2:  94% 91% 93% Weight: 72.1 kg (159 lb) Height:      
 
 
 
Intake/Output Summary (Last 24 hours) at 11/21/2018 1227 Last data filed at 11/21/2018 1212 Gross per 24 hour Intake 737.5 ml Output  Net 737.5 ml  
 
 
Current Facility-Administered Medications Medication Dose Route Frequency  oxyCODONE IR (ROXICODONE) tablet 2.5 mg  2.5 mg Oral Q12H PRN  
 [START ON 11/22/2018] epoetin alcides (EPOGEN;PROCRIT) injection 8,000 Units  8,000 Units IntraVENous DIALYSIS TUE, THU & SAT  heparin (porcine) 1,000 unit/mL injection 5,000 Units  5,000 Units Hemodialysis DIALYSIS PRN  
 metoprolol succinate (TOPROL-XL) XL tablet 50 mg  50 mg Oral DAILY  sodium chloride (NS) flush 5-10 mL  5-10 mL IntraVENous Q8H  
 sodium chloride (NS) flush 5-10 mL  5-10 mL IntraVENous PRN  
 acetaminophen (TYLENOL) tablet 650 mg  650 mg Oral Q4H PRN  
 naloxone (NARCAN) injection 0.4 mg  0.4 mg IntraVENous PRN  
 ondansetron (ZOFRAN) injection 4 mg  4 mg IntraVENous Q4H PRN  
 bisacodyl (DULCOLAX) tablet 5 mg  5 mg Oral DAILY PRN  
 dextrose 40% (GLUTOSE) oral gel 1 Tube  15 g Oral PRN  
 glucagon (GLUCAGEN) injection 1 mg  1 mg IntraMUSCular PRN  
 dextrose (D50W) injection syrg 12.5-25 g  25-50 mL IntraVENous PRN  
 heparin (porcine) injection 5,000 Units  5,000 Units SubCUTAneous Q8H  
 alcohol 62% (NOZIN) nasal  1 Ampule  1 Ampule Topical Q12H  atorvastatin (LIPITOR) tablet 20 mg  20 mg Oral DAILY  clopidogrel (PLAVIX) tablet 75 mg  75 mg Oral DAILY  cyclobenzaprine (FLEXERIL) tablet 2.5 mg  2.5 mg Oral DAILY PRN  
 fludrocortisone (FLORINEF) tablet 100 mcg  0.1 mg Oral DAILY  sevelamer carbonate (RENVELA) tab 800 mg  800 mg Oral TID WITH MEALS  
 
 
EXAM 
GEN - Alert, oriented, in no distress CV - S1, S2, RRR, no rub, murmur, or gallop Lung - clear to auscultation bilaterally Abd - soft, nontender, BS present Ext - no edema Recent Labs  
  11/21/18 
0512 11/20/18 
1829 11/19/18 
0235 WBC 9.8 10.7 9.4 HGB 9.5* 10.1* 9.4* HCT 31.0* 32.4* 30.6*  244 220 Recent Labs  
  11/21/18 
0512 11/20/18 
1829 11/19/18 
0547 11/19/18 
0235  136  --  137  
K 4.3 3.9  --  3.8  97*  --  100 CO2 25 26  --  25 BUN 41* 33*  --  42* CREA 6.08* 5.56*  --  6.35* CA 8.4 8.4  --  8.4 GLU 87 171*  --  84  
MG  --   --  2.2  --   
 
 
Assessment and Plan:  
ESRD on HD [facility] 
- on holiday schedule Sun, Tues, Fri this week for Thanksgiving 
- HD Friday for clearance and volume  
  
AMS 
- likely medication related -?withdrawal symptoms 
- CT and MRI of head negative for acute changes 
  
HTN 
- controlled on BB 
  
DM 
- per primary team 
  
Anemia of ESRD 
- resume epogen 
  
A-fib 
- on metoprolol 
- rate controlled KULWINDER Tim

## 2018-11-21 NOTE — PROGRESS NOTES
Date of Outreach Update: 
Henry Prabhakar was seen and assessed. MEWS Score: 2 (11/21/18 1514) Vitals:  
 11/21/18 0723 11/21/18 1041 11/21/18 1108 11/21/18 1514 BP: 135/78 108/58  105/56 Pulse: 100 98  (!) 107 Resp: 20 20  20 Temp: 97.5 °F (36.4 °C) 97.3 °F (36.3 °C)  98.1 °F (36.7 °C) SpO2: 94% 91% 93% 93% Weight:      
Height:      
  
 
 Pain Assessment Pain Intensity 1: 0 (11/21/18 1108) Patient Stated Pain Goal: 0 Previous Outreach assessment has been reviewed. There have been no significant clinical changes since the completion of the last dated Outreach assessment. Will continue to follow up per outreach protocol. Signed By:   Ronald Johnson RN   November 21, 2018 5:39 PM

## 2018-11-21 NOTE — PROGRESS NOTES
Interdisciplinary Rounds completed 11/21/18. Nursing, Case Management, Physician and PT present. Plan of care reviewed and updated. Pt now with shingles. Continue work up for syncope.

## 2018-11-21 NOTE — PROGRESS NOTES
Problem: Mobility Impaired (Adult and Pediatric) Goal: *Acute Goals and Plan of Care (Insert Text) LTG: 
(1.)Mr. Perales will move from supine to sit and sit to supine , scoot up and down and roll side to side INDEPENDENTLY within 7 treatment day(s). (2.)Mr. Perales will transfer from bed to chair and chair to bed INDEPENDENTLY using the least restrictive device within 7 treatment day(s). (3.)Mr. Perales will ambulate with MODIFIED INDEPENDENCE for 200+ feet with the least restrictive device within 7 treatment day(s). (4.)Mr. Perales will perform exercises per HEP for 15+ minutes to improve strength and mobility within 7 days. ________________________________________________________________________________________________ PHYSICAL THERAPY: Initial Assessment, AM 11/21/2018OBSERVATION: Hospital Day: 3 [place] / Product Type: Medicare /  
  
NAME/AGE/GENDER: Joseline Jacobs is a 66 y.o. male PRIMARY DIAGNOSIS: Acute metabolic encephalopathy <principal problem not specified> <principal problem not specified> 
 
  
ICD-10: Treatment Diagnosis:  
 · Generalized Muscle Weakness (M62.81) · Difficulty in walking, Not elsewhere classified (R26.2) · Other abnormalities of gait and mobility (R26.89) Precaution/Allergies: 
Dairl Safe known allergies] ASSESSMENT:  
Mr. Chevy Chase is a 66year old male admitted from home for metabolic encephalopathy, syncope. He presents in supine without complaints and is agreeable to therapy assessment. Transfers to sitting with supervision. Has left knee redness and edema (ortho consulted). Pt appears intermittently confused at times during evaluation but follows commands and participates well. CGA-min A for sit-stand transfers and ambulation in room/bathroom. Pt with unsteady gait and LE weakness, balance deficits so needs mostly constant min assist for safety.  Able to perform seated hygiene independently and stood at sink for 4 minutes with close CGA performing functional activities. Pt encouraged by therapist and Physician to try ambulating today however he declines and requests return to bed. Left with needs in reach and bed alarm on. Javier Bran appears to be functioning below baseline with strength and mobility and will benefit from continued therapy during acute care stay to address deficits/maximzie independence with mobility. Discharge needs TBD pending progress with therapy- home with [facility] PT vs rehab? This section established at most recent assessment PROBLEM LIST (Impairments causing functional limitations): 1. Decreased Strength 2. Decreased ADL/Functional Activities 3. Decreased Transfer Abilities 4. Decreased Ambulation Ability/Technique 5. Decreased Balance 6. Increased Pain 7. Decreased Activity Tolerance 8. Decreased Cognition INTERVENTIONS PLANNED: (Benefits and precautions of physical therapy have been discussed with the patient.) 1. Balance Exercise 2. Bed Mobility 3. Gait Training 4. Home Exercise Program (HEP) 5. Therapeutic Activites 6. Therapeutic Exercise/Strengthening 7. Transfer Training TREATMENT PLAN: Frequency/Duration: 3 times a week for duration of hospital stay Rehabilitation Potential For Stated Goals: Good RECOMMENDED REHABILITATION/EQUIPMENT: (at time of discharge pending progress): Due to the probability of continued deficits (see above) this patient will likely need continued skilled physical therapy after discharge. Equipment: ? TBD HISTORY:  
History of Present Injury/Illness (Reason for Referral): 
Per H&P, \"Patient was seen at bedside. Had HD today. He has been stable until this afternoon when he developed a syncopal episode. He became unresponsive while he was seated.  His VS remained stable and the telemetry room reported NO events during the syncope apart from the Afib with mild RVR which has been there since admission. He has had some episodes of hypoglycemia and was placed on a low rate D10 but his BS was stable during the syncopal episode. Will ask cards to see him tomorrow as he has a history of moderate to severe AS. \" 
 
Past Medical History/Comorbidities:  
Mr. Ruma Bai  has a past medical history of Anemia, unspecified , Arrhythmia, Arthritis, ASCAD - artery bypass graft, ASCAD - artery bypass graft, CAD (coronary artery disease), Chronic kidney disease, Chronic prostatitis, Diverticulitis, Diverticulosis, GERD (gastroesophageal reflux disease), Glomerulonephritis, GOUT, UNSPECIFIED, Hypercholesteremia, Hypertension, Hypertrophy of prostate with urinary obstruction and other lower urinary tract symptoms (LUTS), Kidney failure, Paroxysmal atrial fibrillation ([address] and TIA (transient ischemic attack). Mr. Ruma Bai  has a past surgical history that includes vascular surgery procedure unlist; other cell; pr creat av fistula,autogenous graft; hx vascular access (2010); hx csf shunt; hx colonoscopy (2011); pr abdomen surgery proc unlisted; hx hernia repair; hx coronary artery bypass graft; pr cardiac surg procedure unlist; hx heart catheterization; hx orthopaedic; hx coronary stent placement; PERITONEAL DIALYSIS CATHETER REMOVAL (N/A, 6/13/2018); THORACENTESIS (Right, 4/30/2018); ULTRASOUND (Bilateral, 4/30/2018); THORACENTESIS (Right, 1/31/2018); ULTRASOUND (Bilateral, 1/31/2018); ULTRASOUND  ** needs new pt consult** (Bilateral, 1/4/2018); THORACENTESIS (Right, 1/4/2018); and PERITONEAL CATHETER INSERTION (N/A, 5/13/2013). Social History/Living Environment:  
Home Environment: Apartment # Steps to Enter: 0 One/Two Story Residence: One story Living Alone: No 
Support Systems: Family member(s), Child(sarath) Patient Expects to be Discharged to[de-identified] Unknown Current DME Used/Available at Home: Walker, rolling Tub or Shower Type: Shower Prior Level of Function/Work/Activity: Lives in [facility] community. Ambulates without assistive device. Two falls. Walks to therapy and to dining area. Does not drive. No local family, son lives in [place] Number of Personal Factors/Comorbidities that affect the Plan of Care: 1-2: MODERATE COMPLEXITY EXAMINATION:  
Most Recent Physical Functioning:  
Gross Assessment: 
AROM: Within functional limits Strength: Generally decreased, functional 
Coordination: Generally decreased, functional 
         
  
Posture: 
Posture (WDL): Exceptions to [facility] Posture Assessment: Forward head, Rounded shoulders Balance: 
Sitting: Intact Standing: Impaired Standing - Static: Fair Standing - Dynamic : Fair(-) Bed Mobility: 
Rolling: Supervision Supine to Sit: Supervision Sit to Supine: Supervision Scooting: Supervision Wheelchair Mobility: 
  
Transfers: 
Sit to Stand: Contact guard assistance;Minimum assistance Stand to Sit: Contact guard assistance Bed to Chair: Contact guard assistance;Minimum assistance Interventions: Verbal cues; Safety awareness training; Tactile cues Duration: 12 Minutes Gait: 
  
Base of Support: Center of gravity altered Speed/Julita: Fluctuations Step Length: Left shortened;Right shortened Gait Abnormalities: Trunk sway increased Distance (ft): 10 Feet (ft) Assistive Device: Other (comment)(handheld assist) Ambulation - Level of Assistance: Minimal assistance Interventions: Verbal cues; Visual/Demos; Safety awareness training; Tactile cues Body Structures Involved: 1. Muscles Body Functions Affected: 1. Mental 
2. Movement Related Activities and Participation Affected: 1. General Tasks and Demands 2. Mobility 3. Domestic Life 4. Community, Social and [place] Number of elements that affect the Plan of Care: 4+: HIGH COMPLEXITY CLINICAL PRESENTATION:  
Presentation: Stable and uncomplicated: LOW COMPLEXITY CLINICAL DECISION MAKIN [facility] [address] [address] AM-PAC 6 Clicks Basic Mobility Inpatient Short Form How much difficulty does the patient currently have. .. Unable A Lot A Little None 1. Turning over in bed (including adjusting bedclothes, sheets and blankets)? [] 1   [] 2   [] 3   [x] 4  
2. Sitting down on and standing up from a chair with arms ( e.g., wheelchair, bedside commode, etc.)   [] 1   [] 2   [x] 3   [] 4  
3. Moving from lying on back to sitting on the side of the bed? [] 1   [] 2   [] 3   [x] 4 How much help from another person does the patient currently need. .. Total A Lot A Little None 4. Moving to and from a bed to a chair (including a wheelchair)? [] 1   [] 2   [x] 3   [] 4  
5. Need to walk in hospital room? [] 1   [] 2   [x] 3   [] 4  
6. Climbing 3-5 steps with a railing? [] 1   [x] 2   [] 3   [] 4  
© 2007, Trustees of [address] [place] [address], under license to [employer]. All rights reserved Score:  Initial: 19 Most Recent: X (Date: -- ) Interpretation of Tool:  Represents activities that are increasingly more difficult (i.e. Bed mobility, Transfers, Gait). Score 24 23 22-20 19-15 14-10 9-7 6 Modifier CH CI CJ CK CL CM CN   
 
? Mobility - Walking and Moving Around:  
  - CURRENT STATUS: CK - 40%-59% impaired, limited or restricted  - GOAL STATUS: CI - 1%-19% impaired, limited or restricted  - D/C STATUS:  ---------------To be determined--------------- [place] / [place] / Product Type: Medicare /   
 
Medical Necessity:    
· Patient demonstrates good rehab potential due to higher previous functional level. Reason for Services/Other Comments: 
· Patient continues to demonstrate capacity to improve strength, balance, mobility which will increase independence and increase safety.   
Use of outcome tool(s) and clinical judgement create a POC that gives a: Clear prediction of patient's progress: LOW COMPLEXITY  
  
 
 
 
TREATMENT:  
(In addition to Assessment/Re-Assessment sessions the following treatments were rendered) Pre-treatment Symptoms/Complaints:  \"thank you\" Pain: Initial:  
Pain Intensity 1: 0  Post Session:  0/10 Therapeutic Activity: (  12 Minutes ):  Therapeutic activities including Bed transfers, Toilet transfers, Ambulation on level ground and standing functional activities and ADLs to improve mobility, strength and balance. Required minimal Verbal cues; Visual/Demos; Safety awareness training; Tactile cues to promote static and dynamic balance in standing and promote motor control of bilateral, lower extremity(s). Braces/Orthotics/Lines/Etc:  
· IV 
· O2 Device: Room air Treatment/Session Assessment:   
· Response to Treatment:  Pt performs mobility with CGA-Caitlin. Unsteady gait · Interdisciplinary Collaboration:  
o Physical Therapist 
o Registered Nurse · After treatment position/precautions:  
o Supine in bed 
o Bed alarm/tab alert on 
o Bed/Chair-wheels locked 
o Bed in low position 
o Call light within reach · Compliance with Program/Exercises: Compliant all of the time · Recommendations/Intent for next treatment session: \"Next visit will focus on advancements to more challenging activities and reduction in assistance provided\". Total Treatment Duration: PT Patient Time In/Time Out Time In: 1340 Time Out: 1108 Aixa Huggins DPT

## 2018-11-21 NOTE — PROGRESS NOTES
Date of Outreach Update: 
José Miguel Bauman was seen and assessed. MEWS Score: 2 (11/21/18 0415) Vitals:  
 11/20/18 1853 11/20/18 2253 11/21/18 3067 11/21/18 5030 BP: 117/66 112/72 121/71 Pulse: 99 98 (!) 104 Resp: 18 18 18 Temp: 97.6 °F (36.4 °C) 97.2 °F (36.2 °C) 97 °F (36.1 °C) SpO2: 97% 96% 94% Weight:    72.1 kg (159 lb) Height:      
  
 
 Pain Assessment Pain Intensity 1: 0 (11/20/18 1959) Patient Stated Pain Goal: 0 Previous Outreach assessment has been reviewed. Patient resting comfortably in bed on room air with O2 sat of 95% and  (known history of Afib). Lung sounds clear. Patient A&O x 4. All VSS. No concerns from patient at this time. Will continue to follow up per outreach protocol. Signed By:   Nicole Da Silva   November 21, 2018 4:49 AM

## 2018-11-22 NOTE — PROGRESS NOTES
[facility] NEPHROLOGY PROGRESS NOTE Follow up for: 
 
Subjective:  
Patient seen and examined. Still complains of nondescript not feeling well. ROS: 
Gen - no fever, no chills, appetite okay CV - no chest pain, no orthopnea Lung - no shortness of breath, no cough Abd - no tenderness, no nausea, no vomiting Ext - no edema Objective:  
Exam: 
Vitals:  
 11/21/18 2322 11/22/18 5673 11/22/18 0404 11/22/18 2511 BP: 106/61 119/74  106/71 Pulse: 83 82  (!) 104 Resp: 18 18 18 Temp: 97.9 °F (36.6 °C) 98.2 °F (36.8 °C)  98.2 °F (36.8 °C) SpO2: 92% 94%  96% Weight:   72.3 kg (159 lb 4.8 oz) Height:      
 
 
 
Intake/Output Summary (Last 24 hours) at 11/22/2018 1127 Last data filed at 11/21/2018 1826 Gross per 24 hour Intake 480 ml Output  Net 480 ml  
 
 
Current Facility-Administered Medications Medication Dose Route Frequency  valACYclovir (VALTREX) tablet 500 mg  500 mg Oral DAILY  oxyCODONE IR (ROXICODONE) tablet 2.5 mg  2.5 mg Oral Q12H PRN  
 epoetin alcides (EPOGEN;PROCRIT) injection 8,000 Units  8,000 Units IntraVENous DIALYSIS TUE, THU & SAT  heparin (porcine) 1,000 unit/mL injection 5,000 Units  5,000 Units Hemodialysis DIALYSIS PRN  
 metoprolol succinate (TOPROL-XL) XL tablet 50 mg  50 mg Oral DAILY  sodium chloride (NS) flush 5-10 mL  5-10 mL IntraVENous Q8H  
 sodium chloride (NS) flush 5-10 mL  5-10 mL IntraVENous PRN  
 acetaminophen (TYLENOL) tablet 650 mg  650 mg Oral Q4H PRN  
 naloxone (NARCAN) injection 0.4 mg  0.4 mg IntraVENous PRN  
 ondansetron (ZOFRAN) injection 4 mg  4 mg IntraVENous Q4H PRN  
 bisacodyl (DULCOLAX) tablet 5 mg  5 mg Oral DAILY PRN  
 dextrose 40% (GLUTOSE) oral gel 1 Tube  15 g Oral PRN  
 glucagon (GLUCAGEN) injection 1 mg  1 mg IntraMUSCular PRN  
 dextrose (D50W) injection syrg 12.5-25 g  25-50 mL IntraVENous PRN  
 heparin (porcine) injection 5,000 Units  5,000 Units SubCUTAneous Q8H  
  alcohol 62% (NOZIN) nasal  1 Ampule  1 Ampule Topical Q12H  
 atorvastatin (LIPITOR) tablet 20 mg  20 mg Oral DAILY  clopidogrel (PLAVIX) tablet 75 mg  75 mg Oral DAILY  cyclobenzaprine (FLEXERIL) tablet 2.5 mg  2.5 mg Oral DAILY PRN  
 fludrocortisone (FLORINEF) tablet 100 mcg  0.1 mg Oral DAILY  sevelamer carbonate (RENVELA) tab 800 mg  800 mg Oral TID WITH MEALS  
 
 
EXAM 
GEN - Alert, oriented, in no distress CV - S1, S2, RRR, no rub, murmur, or gallop Lung - clear to auscultation bilaterally Abd - soft, nontender, BS present Ext - no edema Recent Labs  
  11/21/18 
0512 11/20/18 
1829 WBC 9.8 10.7 HGB 9.5* 10.1* HCT 31.0* 32.4*  
 244 Recent Labs  
  11/21/18 
0512 11/20/18 
1829  136  
K 4.3 3.9  97* CO2 25 26 BUN 41* 33* CREA 6.08* 5.56* CA 8.4 8.4 GLU 87 171* Assessment and Plan:  
 
ESRD on HD TTS [address] 
- on holiday schedule Sun, Tues, Fri this week for Thanksgiving 
- HD tomorrow for clearance and volume  
  
AMS 
-resolved 
  
HTN 
- controlled on BB 
  
DM 
- per primary team 
  
Anemia of ESRD 
- on epogen 
  
A-fib 
- on metoprolol 
- rate controlled Kira Jaimes MD

## 2018-11-22 NOTE — PROGRESS NOTES
Orthopedic Progress Note 2018 Admit Date: 2018 Admit Diagnosis: Acute metabolic encephalopathy Acute metabolic encephalopathy Post Op day: * No surgery found * Subjective:  
 
Artem Gallegos Appears to be doing okay. MRI pending. Objective:  
 
Vital Signs:   
Temp (24hrs), Av.9 °F (36.6 °C), Min:97.3 °F (36.3 °C), Max:98.2 °F (36.8 °C) LAB:   
[unfilled] Lab Results Component Value Date/Time INR 1.1 2018 12:07 PM  
 INR 1.0 2017 10:04 AM  
 
Lab Results Component Value Date/Time HGB 9.5 (L) 2018 05:12 AM  
 HGB 10.1 (L) 2018 06:29 PM  
 
 
Physical Exam: 
 
No apparent distress No neurovascular issues reported No gross problems noted MRI not done yet. Cultures pending Plan:  
 
Continue PT/OT rehab as indicated Nursing and SS for disposition plans Awaiting MRI and culture result Signed By: Carlene Treviño MD

## 2018-11-22 NOTE — PROGRESS NOTES
Patient called and stated he needed help getting up of the floor. When I arrived to room  patient was sitting on floor in front of chair by window and stated that he was trying to get up. Patient stated he was not in pain, nor did he hit his head. /71, , Temp 98.6, 93%on room. Physician paged, no new orders given, will continue to monitor.

## 2018-11-22 NOTE — PROGRESS NOTES
Progress Note Patient: Zina Avendano MRN: [id number]  SSN: [id number] YOB: 1940  Age: 66 y.o. Sex: male Admit Date: 11/19/2018 LOS: 1 day Subjective:  
 
Patient was seen at bedside. Stable. Mental status is back to baseline. Cardiology considering him for TAVR due to recurrent syncopal episodes. Objective:  
 
Vitals:  
 11/22/18 0404 11/22/18 0718 11/22/18 1125 11/22/18 1456 BP:  106/71 107/58 108/62 Pulse:  (!) 104 (!) 102 95 Resp:  18 18 18 Temp:  98.2 °F (36.8 °C) 98.3 °F (36.8 °C) 98.3 °F (36.8 °C) SpO2:  96% 90% 95% Weight: 72.3 kg (159 lb 4.8 oz) Height:      
  
 
Intake and Output: 
Current Shift: 11/22 0701 - 11/22 1900 In: 600 [P.O.:600] Out: - Last three shifts: 11/20 1901 - 11/22 0700 In: 737.5 [P.O.:600; I.V.:137.5] Out: - Physical Exam:  
GENERAL: alert, appears stated age EYE: conjunctivae/corneas clear. PERRL, EOM's intact. Fundi benign LYMPHATIC: Cervical, supraclavicular, and axillary nodes normal.  
THROAT & NECK: normal and no erythema or exudates noted. LUNG: clear to auscultation bilaterally HEART: regular rate and rhythm, S1, S2 normal, no murmur, click, rub or gallop ABDOMEN: soft, non-tender. Bowel sounds normal. No masses,  no organomegaly EXTREMITIES:  Hematoma of the left knee. SKIN: Normal. 
NEUROLOGIC: AOx3. No focal deficits PSYCHIATRIC: non focal 
 
Lab/Data Review: All lab results for the last 24 hours reviewed. Assessment:  
 
Active Problems: 
  Acute metabolic encephalopathy (38/30/2655) Plan:  
 
-cardiology on board. TAVR being considered 
-Knee MRI reported a hematoma of the left pre patellar bursa. Ortho on board  
-for HD tomorrow  
-monitor VS  
 
Signed By: Bucky Rg MD   
 November 22, 2018

## 2018-11-22 NOTE — PROGRESS NOTES
Patient alert and orientated X4, VSS on room air. No complaints of pain. Remote tele, afib, sinus tach. Contact ppx maintained. Bed locked and low, call bell within reach, hourly rounds performed. Will continue to monitor for remainder of shift and report to oncoming RN.

## 2018-11-22 NOTE — PROGRESS NOTES
[facility] CARDIOLOGY PROGRESS NOTE 
      
 
11/22/2018 6:51 AM 
 
Admit Date: 11/19/2018 Subjective: No cp or inc sob ROS: 
Cardiovascular:  As noted above Objective:  
  
Vitals:  
 11/21/18 1910 11/21/18 2322 11/22/18 0349 11/22/18 0404 BP: 104/62 106/61 119/74 Pulse: (!) 103 83 82 Resp: 18 18 18 Temp: 97.6 °F (36.4 °C) 97.9 °F (36.6 °C) 98.2 °F (36.8 °C) SpO2: 94% 92% 94% Weight:    72.3 kg (159 lb 4.8 oz) Height:      
 
 
Physical Exam: 
General-No Acute Distress Neck- supple, no JVD 
CV- irregular rate and rhythm no MRG Lung- clear bilaterally Abd- soft, nontender, nondistended Ext- no edema bilaterally. Skin- warm and dry Data Review:  
Recent Labs  
  11/21/18 
0512 11/21/18 
0229 11/20/18 2159 11/20/18 1829  11/20/18 
3177   --   --  136  --   --   
K 4.3  --   --  3.9  --   --   
BUN 41*  --   --  33*  --   --   
CREA 6.08*  --   --  5.56*  --   --   
GLU 87  --   --  171*  --   --   
WBC 9.8  --   --  10.7  --   --   
HGB 9.5*  --   --  10.1*  --   --   
HCT 31.0*  --   --  32.4*  --   --   
  --   --  244  --   --   
TROIQ  --  0.54* 0.66* 0.64*   < >  --   
CHOL  --   --   --   --   --  123 LDLC  --   --   --   --   --  51.6 HDL  --   --   --   --   --  40  
 < > = values in this interval not displayed. Assessment/Plan:  
 
Encephalopathy Recurrent syncope Coronary artery disease involving native coronary artery of native heart without angina pectoris 
  
  
  
 Essential hypertension 
  
  
 Chronic atrial fibrillation (HCC) 
  
  
 End-stage renal disease (HCC) 
  
  
 Nonrheumatic aortic valve stenosis 
  
/// 
Echo unchanged. Not sure a TAVR will solve his problem of syncope but not unreasonable.  
 
 
 
 
 
 
Milena Goss MD 
11/22/2018 6:51 AM

## 2018-11-23 NOTE — PROGRESS NOTES
Progress Note Patient: Erick [place] MRN: [id number]  SSN: [id number] YOB: 1940  Age: 66 y.o. Sex: male Admit Date: 11/19/2018 LOS: 2 days Subjective:  
 
Patient was seen at bedside. Stable. Mental status is back to baseline. Case discussed with Dr Herminia Perkins today. There is no certainty if TAVR with improve his condition. Dr Herminia Perkins will discuss condition with patient's son. Objective:  
 
Vitals:  
 11/23/18 1454 11/23/18 1526 11/23/18 1535 11/23/18 1633 BP: 126/67 129/60 125/72 118/61 Pulse: (!) 105 98 86 80 Resp:      
Temp:    97.9 °F (36.6 °C) SpO2:    96% Weight:      
Height:      
  
 
Intake and Output: 
Current Shift: 11/23 0701 - 11/23 1900 In: 240 [P.O.:240] Out: 1700 Last three shifts: 11/21 1901 - 11/23 0700 In: 600 [P.O.:600] Out: - Physical Exam:  
GENERAL: alert, appears stated age EYE: conjunctivae/corneas clear. LYMPHATIC: Cervical, supraclavicular, and axillary nodes normal.  
THROAT & NECK: normal and no erythema or exudates noted. LUNG: clear to auscultation bilaterally HEART: regular rate and rhythm ABDOMEN: soft, non-tender. Bowel sounds normal. No masses,  no organomegaly EXTREMITIES:  Hematoma of the left knee. SKIN: Normal. 
NEUROLOGIC: AOx3. No focal deficits PSYCHIATRIC: non focal 
 
Lab/Data Review: All lab results for the last 24 hours reviewed. Assessment:  
 
Active Problems: 
  Acute metabolic encephalopathy (43/36/3961) Plan:  
 
-cardiology on board. TAVR being considered.  
-Knee MRI reported a hematoma of the left pre patellar bursa. Ortho on board  
-Had HD today. No orthostatic changes.  
-continue treatment for herpes zoster. 
-monitor VS  
 
Signed By: Shelly Almazan MD   
 November 23, 2018

## 2018-11-23 NOTE — DIALYSIS
TRANSFER OUT -DIALYSIS Hemodialysis treatment completed without complications. Patient alert and oriented x 2 and VS stalbe  /72  P 86    
 
 1.7 Kgs removed. Needles X2 removed from access and manual pressure held until hemostasis complete and pressure dressing applied. Patient to room 602 after dialysis.

## 2018-11-23 NOTE — PROGRESS NOTES
Dr. Cordelia White was notified of patient change in rhythm to [facility] for 31 seconds. No new orders received at this time. Will continue to monitor patient.

## 2018-11-23 NOTE — PROGRESS NOTES
[facility] CARDIOLOGY PROGRESS NOTE 
      
 
11/23/2018 7:51 AM 
 
Admit Date: 11/19/2018 Subjective:  
Weak ROS: 
Cardiovascular:  As noted above Objective:  
  
Vitals:  
 11/22/18 2115 11/22/18 2313 11/23/18 0431 11/23/18 3086 BP: 108/62 115/61 114/69 104/67 Pulse: 98 93 (!) 101 100 Resp: 18 16 17 17 Temp: 98.1 °F (36.7 °C) 98.3 °F (36.8 °C) 97.3 °F (36.3 °C) 97.6 °F (36.4 °C) SpO2: 96% 96% 94% 90% Weight:   74.3 kg (163 lb 11.2 oz) Height:      
 
 
Physical Exam: 
General-No Acute Distress, weak, chronically ill NData Review:  
Recent Labs  
  11/21/18 
0512 11/21/18 0229 11/20/18 2159 11/20/18 
1829   --   --  136  
K 4.3  --   --  3.9 BUN 41*  --   --  33* CREA 6.08*  --   --  5.56* GLU 87  --   --  171* WBC 9.8  --   --  10.7 HGB 9.5*  --   --  10.1* HCT 31.0*  --   --  32.4*  
  --   --  244 TROIQ  --  0.54* 0.66* 0.64* Assessment/Plan:  
 
Encephalopathy 
  
Recurrent syncope 
  
 Coronary artery disease involving native coronary artery of native heart without angina pectoris 
  
  
  
 Essential hypertension 
  
  
 Chronic atrial fibrillation (HCC) 
  
  
 End-stage renal disease (HCC) 
  
  
 Nonrheumatic aortic valve stenosis 
 
//// Long discussion w/ IM and his son. I doubt a TAVR would make his life better or longer. I think a NHP is best alternative.  
 
 
 
 
Kadie Harden MD 
11/23/2018 7:51 AM

## 2018-11-23 NOTE — PROGRESS NOTES
Telemetry called to notify that patient was in [facility] for 31 seconds. Went in to assess patient and pt is in bed resting at this time with no complaints. Vitals signs are stable. Tele was called back and pt is currently running A Fib at 89. Spoke with doctor Becca Sharma and was asked to page cardiology. Paged cardiologist on call and awaiting for call back. Will continue to monitor patient at this time.

## 2018-11-23 NOTE — DIALYSIS
TRANSFER IN - DIALYSIS Received patient in dialysis unit from 602 (unit) for ordered procedure. Consent verified for renal replacement therapy. Patient alert and vital signs stable. /67 P 98 Hemodialysis initiated using Left UAF and 15 g needles. Machine settings per MD order. Heparin bolus 2000 units given and hourly 500 units/hour. Will monitor during treatment.

## 2018-11-23 NOTE — PROGRESS NOTES
Hourly rounds performed this shift. Bed alarm on, bed lowered and locked, side rails x2, and call light in reach. No complaints overnight. Tele on and patient is currently running Afib at 96. Telemetry reported that patient had a run of VTACHx1 this shift but no more episodes were reported. Neuro checks performed. All patient needs are met at this time. Will continue to monitor and give bedside shift report to oncoming nurse.

## 2018-11-24 NOTE — DIALYSIS
TRANSFER OUT -DIALYSIS Hemodialysis treatment completed without complications by Johanna Wolfe RN. Patient resting with eyes closed. Post Vitals-  BP- 104/67,   P- 99 
 
2.0 Kgs removed. Needles X2 removed from access and manual pressure held until hemostasis complete and pressure dressing applied. Meds given- Epogen, 8000 units IV. Patient to  His room via transport dept after dialysis.

## 2018-11-24 NOTE — PROGRESS NOTES
Progress Note Patient: Papo Henry MRN: [id number]  SSN: [id number] YOB: 1940  Age: 66 y.o. Sex: male Admit Date: 11/19/2018 LOS: 3 days Subjective:  
 
Patient was seen at bedside. Stable. Mental status is back to baseline. Case discussed with Dr Dewitte Hodgkins today. Patient has decided to opt for hospice care. Will consult hospice tomorrow. Objective:  
 
Vitals:  
 11/24/18 1056 11/24/18 1121 11/24/18 1301 11/24/18 1624 BP: 101/64 104/67 120/61 113/63 Pulse: 98 99 96 (!) 101 Resp:   18 16 Temp:   98 °F (36.7 °C) 98.8 °F (37.1 °C) SpO2:   92% 90% Weight:      
Height:      
  
 
Intake and Output: 
Current Shift: 11/24 0701 - 11/24 1900 In: -  
Out: 2000 Last three shifts: 11/22 1901 - 11/24 0700 In: 240 [P.O.:240] Out: 1700 Physical Exam:  
GENERAL: alert, appears stated age EYE: conjunctivae/corneas clear. LYMPHATIC: Cervical, supraclavicular, and axillary nodes normal.  
THROAT & NECK: normal and no erythema or exudates noted. LUNG: clear to auscultation bilaterally HEART: regular rate and rhythm ABDOMEN: soft, non-tender. Bowel sounds normal. No masses,  no organomegaly EXTREMITIES:  Hematoma of the left knee. SKIN: Normal. 
NEUROLOGIC: AOx3. No focal deficits PSYCHIATRIC: non focal 
 
Lab/Data Review: All lab results for the last 24 hours reviewed. Assessment:  
 
Active Problems: 
  Acute metabolic encephalopathy (04/27/9113) Plan:  
 
-cardiology on board.  
-patient has decided to choose hospice care. Will consult hospice tomorrow  
-monitor VS  
 
Signed By: Isis Dougherty MD   
 November 24, 2018

## 2018-11-24 NOTE — PROGRESS NOTES
[facility] NEPHROLOGY PROGRESS NOTE Follow up for: 
 
Subjective:  
Patient seen and examined on dialysis today. Tolerating well. No CP, SOB. Mental status has cleared. ROS: 
Gen - no fever, no chills, appetite okay CV - no chest pain, no orthopnea Lung - no shortness of breath, no cough Abd - no tenderness, no nausea, no vomiting Ext - no edema Objective:  
Exam: 
Vitals:  
 11/24/18 2813 11/24/18 9794 11/24/18 8565 11/24/18 9176 BP: 116/68 111/64  106/63 Pulse: 60 (!) 102 (!) 102 96 Resp: 18 18 18 20 Temp: 98.2 °F (36.8 °C) 98 °F (36.7 °C) SpO2: 94% 90% 95% Weight:      
Height:      
 
 
 
Intake/Output Summary (Last 24 hours) at 11/24/2018 2168 Last data filed at 11/23/2018 1729 Gross per 24 hour Intake 240 ml Output 1700 ml Net -1460 ml  
 
 
Current Facility-Administered Medications Medication Dose Route Frequency  guaiFENesin-dextromethorphan (ROBITUSSIN DM) 100-10 mg/5 mL syrup 5 mL  5 mL Oral TID  benzonatate (TESSALON) capsule 100 mg  100 mg Oral TID PRN  
 valACYclovir (VALTREX) tablet 500 mg  500 mg Oral DAILY  oxyCODONE IR (ROXICODONE) tablet 2.5 mg  2.5 mg Oral Q12H PRN  
 epoetin alcdies (EPOGEN;PROCRIT) injection 8,000 Units  8,000 Units IntraVENous DIALYSIS TUE, THU & SAT  heparin (porcine) 1,000 unit/mL injection 5,000 Units  5,000 Units Hemodialysis DIALYSIS PRN  
 metoprolol succinate (TOPROL-XL) XL tablet 50 mg  50 mg Oral DAILY  sodium chloride (NS) flush 5-10 mL  5-10 mL IntraVENous Q8H  
 sodium chloride (NS) flush 5-10 mL  5-10 mL IntraVENous PRN  
 acetaminophen (TYLENOL) tablet 650 mg  650 mg Oral Q4H PRN  
 naloxone (NARCAN) injection 0.4 mg  0.4 mg IntraVENous PRN  
 ondansetron (ZOFRAN) injection 4 mg  4 mg IntraVENous Q4H PRN  
 bisacodyl (DULCOLAX) tablet 5 mg  5 mg Oral DAILY PRN  
 dextrose 40% (GLUTOSE) oral gel 1 Tube  15 g Oral PRN  
 glucagon (GLUCAGEN) injection 1 mg  1 mg IntraMUSCular PRN  
  dextrose (D50W) injection syrg 12.5-25 g  25-50 mL IntraVENous PRN  
 heparin (porcine) injection 5,000 Units  5,000 Units SubCUTAneous Q8H  
 alcohol 62% (NOZIN) nasal  1 Ampule  1 Ampule Topical Q12H  
 atorvastatin (LIPITOR) tablet 20 mg  20 mg Oral DAILY  clopidogrel (PLAVIX) tablet 75 mg  75 mg Oral DAILY  cyclobenzaprine (FLEXERIL) tablet 2.5 mg  2.5 mg Oral DAILY PRN  
 fludrocortisone (FLORINEF) tablet 100 mcg  0.1 mg Oral DAILY  sevelamer carbonate (RENVELA) tab 800 mg  800 mg Oral TID WITH MEALS  
 
 
EXAM 
GEN - Alert, oriented, in no distress CV - S1, S2, RRR, no rub, murmur, or gallop Lung - clear to auscultation bilaterally Abd - soft, nontender, BS present Ext - no edema No results for input(s): WBC, HGB, HCT, PLT, HGBEXT, HCTEXT, PLTEXT in the last 72 hours. Recent Labs  
  11/24/18 
0445   
K 4.2  CO2 26 BUN 47* CREA 6.02* CA 8.7 GLU 89 Assessment and Plan:  
ESRD on HD TTS [facility] - Seen on HD today at 200 
-  resume TTS schedule 
  
AMS 
-resolved 
  
HTN 
- controlled on BB 
  
DM 
- per primary team 
  
Anemia of ESRD 
- on epogen 
  
A-fib 
- on metoprolol 
- rate controlled Yared Porter MD

## 2018-11-24 NOTE — PROGRESS NOTES
[facility] CARDIOLOGY PROGRESS NOTE 
      
 
11/24/2018 1:36 PM 
 
Admit Date: 11/19/2018 Subjective:  
Weak Objective:  
  
Vitals:  
 11/24/18 1027 11/24/18 1056 11/24/18 1121 11/24/18 1301 BP: 103/62 101/64 104/67 120/61 Pulse: (!) 101 98 99 96 Resp:    18 Temp:    98 °F (36.7 °C) SpO2:    92% Weight:      
Height:      
 
 
Physical Exam: 
General-No Acute Distress, on dialysis, frail, debilitated Data Review:  
Recent Labs  
  11/24/18 
0445   
K 4.2 BUN 47* CREA 6.02* GLU 89 Assessment/Plan:  
 
Encephalopathy 
  
Recurrent syncope 
  
 Coronary artery disease involving native coronary artery of native heart without angina pectoris 
  
  
  
 Essential hypertension 
  
  
 Chronic atrial fibrillation (HCC) 
  
  
 End-stage renal disease (HCC) 
  
  
 Nonrheumatic aortic valve stenosis 
 
//// 
 
Palliative care Katalina Haile MD 
11/24/2018 1:36 PM

## 2018-11-24 NOTE — DIALYSIS
TRANSFER IN - DIALYSIS Received patient in dialysis unit from  room 602 (unit) for ordered procedure. Consent verified for renal replacement therapy. Patient alert and vital signs stable. BP- 105/57 P-89, RR- 20 Hemodialysis initiated using left UAF and 15 g needles. Machine settings per MD order. Heparin 2000 unit bolus and 500 units/hr. Will monitor during treatment.

## 2018-11-24 NOTE — PROGRESS NOTES
Monitor room called reporting pt had 6 beats of v-tach. MD updated. No new orders. MD to address changes regarding category status that has been decided on between MD and pt.

## 2018-11-24 NOTE — PROGRESS NOTES
Pt wants to 'get rid of this!' referring to Oxygen cannula. He was advised that his pulse-ox was below 90 this AM. He is firm in his decision to take it off and said that he is willing to put it back if pulse-ox is low at next VS check. Denies distress or SOB. Son at bedside. Pt is watching soccer with his son. Pt is chomping on gum.

## 2018-11-25 NOTE — PROGRESS NOTES
Patient with moderate to severe aortic stenosis. Cards does not feel that TAVR would be beneficial.  Patient has opted for hospice. He would like stop dialysis, though I think it could be argued it is a palliative measure and I believe he would qualify to continue under hospice since his terminal diagnosis of aortic stenosis is unrelated to his ESRD. Discussed with patient at bedside. No plans for further dialysis at this point.

## 2018-11-25 NOTE — PROGRESS NOTES
ARMANDOSW spoke with eMlly Gutierrez RN with [facility] by phone this am.  They anticipate that they will have a nurse that can assess pt later today. Will follow plan of care and assist further as needed.

## 2018-11-25 NOTE — PROGRESS NOTES
Noted that patient is going to hospice soon Jossue Quarles, staff [address], [id number] [facility] Jameel  /   [email or web address]

## 2018-11-25 NOTE — PROGRESS NOTES
At present, patient room air saturation 85%. Patient informed that oxygen is needed to increase oxygen level . Patient stated , No he would be find without it.

## 2018-11-25 NOTE — HOSPICE
Met with patient and son at the bedside this morning. Hospice Services, goals, criteria for admission, levels of care, and Hospice Philosophy discussed. Patient verbalizes understanding. Patient requesting [address]. He is adamant that he wants to stop HD. Case discussed with Dr. Hernandez Jin. No bed availability at this time. Will continue to follow.

## 2018-11-25 NOTE — PROGRESS NOTES
Patient SAO2 90 % room air. Patient in no distress. NO change in patient condition during hourly nursing rounds.

## 2018-11-25 NOTE — PROGRESS NOTES
OT attempted to see pt in pm. Pt told therapist he will be transported to hospice house and said there is no point in therapy. Will discharge from therapy at this point. Thank you PRADEEP Cheema/JOANNA

## 2018-11-25 NOTE — PROGRESS NOTES
Orthopedic Progress Note 2018 Admit Date: 2018 Admit Diagnosis: Acute metabolic encephalopathy Acute metabolic encephalopathy Post Op day: * No surgery found * Subjective:  
 
Zina Avendano Pt states he is going to hospice for cardiac issues and doesn't desire anything for his knee Objective:  
 
Vital Signs:   
Temp (24hrs), Av.4 °F (36.9 °C), Min:98 °F (36.7 °C), Max:98.8 °F (37.1 °C) LAB:   
[unfilled] Lab Results Component Value Date/Time INR 1.1 2018 12:07 PM  
 INR 1.0 2017 10:04 AM  
 
Lab Results Component Value Date/Time HGB 9.5 (L) 2018 05:12 AM  
 HGB 10.1 (L) 2018 06:29 PM  
 
 
Physical Exam: 
 
No apparent distress No neurovascular issues reported No gross problems noted - L knee with prepatellar swelling and thickening, nontender, no increased temp Plan: Will be available if patient desires Signed By: Jaquelin De La Garza MD

## 2018-11-25 NOTE — PROGRESS NOTES
Rounded every hour and prn. Declined many meds and declined oxygen despite having pulse ox 88-90%. He is firm in his decision on hospice house referral and does not appear to be afflicted with regrets.

## 2018-11-25 NOTE — PROGRESS NOTES
Progress Note Patient: Sushil Mace MRN: [id number]  SSN: [id number] YOB: 1940  Age: 66 y.o. Sex: male Admit Date: 11/19/2018 LOS: 4 days Subjective:  
 
Patient was seen at bedside. Seen by hospice. Will be transferred to the hospice house once a bed is available. Objective:  
 
Vitals:  
 11/24/18 2323 11/25/18 0233 11/25/18 0636 11/25/18 1211 BP: 116/65 115/57 117/73 117/66 Pulse: (!) 102 (!) 102 (!) 107 (!) 101 Resp: 18 19 17 17 Temp: 98.4 °F (36.9 °C) 98.5 °F (36.9 °C) 98 °F (36.7 °C) 98.4 °F (36.9 °C) SpO2: 90% (!) 85% 90% (!) 88% Weight: 73.1 kg (161 lb 3.2 oz) Height:      
  
 
Intake and Output: 
Current Shift: No intake/output data recorded. Last three shifts: 11/23 1901 - 11/25 0700 In: 960 [P.O.:960] Out: 2000 Physical Exam:  
GENERAL: alert, appears stated age EYE: conjunctivae/corneas clear. LYMPHATIC: Cervical, supraclavicular, and axillary nodes normal.  
THROAT & NECK: normal and no erythema or exudates noted. LUNG: clear to auscultation bilaterally HEART: regular rate and rhythm ABDOMEN: soft, non-tender. Bowel sounds normal. No masses,  no organomegaly EXTREMITIES:  Hematoma of the left knee. SKIN: Normal. 
NEUROLOGIC: AOx3. No focal deficits PSYCHIATRIC: non focal 
 
Lab/Data Review: All lab results for the last 24 hours reviewed. Assessment:  
 
Active Problems: 
  Acute metabolic encephalopathy (42/38/5252) Plan:  
 
-seen by hospice  
-will go to the hospice house once a bed is available Signed By: Joelle Goyal MD   
 November 25, 2018

## 2018-11-26 NOTE — PROGRESS NOTES
Progress Note Patient: Zina Avendano MRN: [id number]  SSN: [id number] YOB: 1940  Age: 66 y.o. Sex: male Admit Date: 11/19/2018 LOS: 5 days Subjective: This is a 67 YO male patient with a PMH of ESRD on HD, severe aortic regurgitation, pulmonary hypertension, failure to thrive, multiple admissions for syncope and mechanical falls. He was evaluated by cardiology and he was considered a poor candidate for any intervention. Patient decided to stop his HD treatment and pursue hospice. He has been accepted to the hospice house and is awaiting for a bed to become available. In the last 48 hours has developed a productive cough. Patient and his son have decided NOT to pursue treatment or workup for this and requested only symptomatic treatment. He is being treated for herpes zoster. Objective:  
 
Vitals:  
 11/26/18 0532 11/26/18 0533 11/26/18 1321 11/26/18 1138 BP: 121/72  116/61 120/67 Pulse: (!) 104  (!) 101 (!) 110 Resp: 20  18 20 Temp: 98.3 °F (36.8 °C)  98.2 °F (36.8 °C) 98.4 °F (36.9 °C) SpO2: 90%  90% 90% Weight:  67.2 kg (148 lb 3.2 oz) Height:      
  
 
Intake and Output: 
Current Shift: No intake/output data recorded. Last three shifts: 11/24 1901 - 11/26 0700 In: 480 [P.O.:480] Out: - Physical Exam:  
GENERAL: alert, appears stated age EYE: conjunctivae/corneas clear. LYMPHATIC: Cervical, supraclavicular, and axillary nodes normal.  
THROAT & NECK: normal and no erythema or exudates noted. LUNG: clear to auscultation bilaterally HEART: regular rate and rhythm ABDOMEN: soft, non-tender. Bowel sounds normal. No masses,  no organomegaly EXTREMITIES:  Hematoma of the left knee. SKIN: Normal. 
NEUROLOGIC: AOx3. No focal deficits PSYCHIATRIC: non focal 
 
Lab/Data Review: All lab results for the last 24 hours reviewed. Assessment:  
 
Active Problems: 
  Acute metabolic encephalopathy (66/46/7937) Plan: -seen by hospice  
-will go to the hospice house once a bed is available -nebs and hycodan for cough management. Sputum looks purulent but patient and his son do not want to pursue any diagnostic or therapeutic measure for this . Signed By: Matthew Li MD   
 November 26, 2018

## 2018-11-26 NOTE — PROGRESS NOTES
Interdisciplinary Rounds completed 11/26/18. Nursing, Case Management, Physician and PT present. Plan of care reviewed and updated. Waiting on hospice bed

## 2018-11-26 NOTE — PROGRESS NOTES
Son at bedside most of the day. Pt refuses all but cardiac meds. Awaiting hospice bed. Note status. New orders for hycodan were effective. Pt has been clear in his decision for hospice.

## 2018-11-26 NOTE — PROGRESS NOTES
Problem: Nutrition Deficit Goal: *Optimize nutritional status Nutrition Assessment f/u. Assessment: Saw patient with family member. Patient has no appetite and doesn't like the foods here. Patient's family says he's been bringing in foods from home and patient is not eating any of these either. Patient expects to leave in hospice care. Family and patient seem to have accepted his limited intake as a part of his decline. Patient has opted to stop hemodialysis. Anthropometrics: 
Height: 5' 9\" (175.3 cm), Weight: 67.2 kg (148 lb 3.2 oz), Weight Source: Bed, Body mass index is 21.26 kg/m². BMI class of underweight for age. Patient's weight has decreased 15.8# in one month (9.6%) and 38# in one year (20.4%) Macronutrient needs: EER: 1395-6489 kcal/day 25-30 kcal/kg CBW (Current body weight) EPR: 73-87 g/day 1-1.2 g/kg IBW (Ideal body weight) Intake/Comparative Standards: Patient consuming 0% of diabetic, renal diet for at least 2 days. This meets 0% of kcal needs and 0% of protein needs. ASPEN Malnutrition Criteria Acute Illness, Chronic Illness, or Social/Enviornmental: Chronic illness Energy Intake: Less than/equal to 75% of est energy req for greater than/equal to 1 month Weight Loss: Greater than 5% x 1 mo ASPEN Malnutrition Score - Chronic Illness: 12 
Chronic Illness - Malnutrition Diagnosis: Severe malnutrition Nutrition Diagnosis: 
Inadequate oral intake related to decreased appetite as evidenced by less than 25% of meals consumed, severe weight loss. Nutrition Intervention: 
Medical food supplement therapy: Commercial beverage- Offer suplena (instead of nepro) at breakfast and dinner as patient will accept. (though patient will likely refuse it). Meals and Snacks: Liberalize diet to consistent carbohydrate with no potatoes, tomatoes, bananas or oranges (to limit potassium). This may allow more options for foods palatable to patient.  Giacomo Justin is prescribed to facilitate phosphorus malabsorption. Discharge Plan: Provide foods and supportive nutrition care as patient accepts. Federica Salgado, [address], [address] [address]

## 2018-11-26 NOTE — PROGRESS NOTES
Hourly rounds done. Pt denies pain, nausea, vomiting. 1 Med brown/yellow loose BM. AOx4. All needs met at this time.

## 2018-11-26 NOTE — PROGRESS NOTES
Griselda Ely called, voicemail left. Clarify contact isolation infection d/t shingles, not ectoparasites. Perez Bernal contacted, informed to DC isolation d/t shingles in single area in R flank.

## 2018-11-26 NOTE — PROGRESS NOTES
PT note: Per chart review, pt is currently comfort care and is awaiting bed at hospice house. Will sign off at this time. Please re consult if plans/status changes and therapy again indicated. Thank you.   
BERNY AldnaaT

## 2018-11-27 NOTE — PROGRESS NOTES
Hospitalist Progress Note Admit Date:  2018  5:29 AM  
Name:  [facility] Age:  66 y.o. 
:  1940 MRN:  [id number] PCP:  Alyssa Canas MD 
Treatment Team: Attending Provider: Carlos Thompson MD; Utilization Review: Jena Lawrence RN; Care Manager: Billie Romero [facility]; Consulting Provider: Serafin Henning MD 
 
Subjective:  
From prior provider notes: This is a 65 YO male patient with a PMH of ESRD on HD, severe aortic regurgitation, pulmonary hypertension, failure to thrive, multiple admissions for syncope and mechanical falls. He was evaluated by cardiology and he was considered a poor candidate for any intervention. Patient decided to stop his HD treatment and pursue hospice. He has been accepted to the hospice house and is awaiting for a bed to become available. In the last 48 hours has developed a productive cough. Patient and his son have decided NOT to pursue treatment or workup for this and requested only symptomatic treatment. He is being treated for herpes zoster. Today: He and son request comfort measures. He denies pain. He is aware death is expected but timing unclear. Objective:  
 
Patient Vitals for the past 24 hrs: 
 Temp Pulse Resp BP SpO2  
18 1110 98.4 °F (36.9 °C) (!) 119 18 134/63 90 % 18 0719 98.2 °F (36.8 °C) (!) 115 20 127/82 90 % 18 0501 98.1 °F (36.7 °C) (!) 115 18 122/55 91 % 18 0109     (!) 87 % 18 0108  (!) 128     
18 0024 98.1 °F (36.7 °C) (!) 113 18 120/68 93 % 18 2100     (!) 83 % 18 2043 97.9 °F (36.6 °C) (!) 115 18 131/62 91 % 18 1908     (!) 89 % 18 1642 98.5 °F (36.9 °C) (!) 111 20 117/66 90 % Oxygen Therapy O2 Sat (%): 90 % (18 1110) Pulse via Oximetry: 128 beats per minute (18) O2 Device: Room air(Patient refusing O2) (18) O2 Flow Rate (L/min): 2 l/min (18 0736) No intake or output data in the 24 hours ending 11/27/18 1238 Physical Examination: 
General:    Edematous, lethargic Head:  Normocephalic, atraumatic Eyes:  Extraocular movements intact, normal sclera CV:   RRR. No  Murmurs, clicks, or gallops Lungs:   Unlabored, no cyanosis Abdomen:   Soft, nondistended, nontender. Extremities: Warm and dry. No cyanosis Skin:     No rashes or jaundice. Neuro:  No gross focal deficits Psych:  Mood and affect appropriate--he appears to have accepted his current condition Data Review: 
I have reviewed all labs, meds, telemetry events, and studies from the last 24 hours. Recent Results (from the past 24 hour(s)) GLUCOSE, POC Collection Time: 11/26/18  3:17 PM  
Result Value Ref Range Glucose (POC) 78 65 - 100 mg/dL GLUCOSE, POC Collection Time: 11/26/18  8:53 PM  
Result Value Ref Range Glucose (POC) 80 65 - 100 mg/dL GLUCOSE, POC Collection Time: 11/27/18  7:30 AM  
Result Value Ref Range Glucose (POC) 79 65 - 100 mg/dL GLUCOSE, POC Collection Time: 11/27/18 10:40 AM  
Result Value Ref Range Glucose (POC) 89 65 - 100 mg/dL All Micro Results Procedure Component Value Units Date/Time CULTURE, BODY FLUID Rick Escobedo [[id number]] Collected:  11/21/18 1424 Order Status:  Completed Specimen:  Bursal Fluid Updated:  11/23/18 0009 Special Requests: NO SPECIAL REQUESTS     
  GRAM STAIN 0 TO 3 WBC/OIF  
   NO DEFINITE ORGANISM SEEN Culture result: NO GROWTH 2 DAYS Current Meds: 
Current Facility-Administered Medications Medication Dose Route Frequency  oxyCODONE IR (ROXICODONE) tablet 5 mg  5 mg Oral Q4H PRN  
 LORazepam (ATIVAN) tablet 0.5 mg  0.5 mg Oral Q4H PRN  
 HYDROcodone-homatropine (HYCODAN) 5-1.5 mg/5 mL (5 mL) syrup 5 mL  5 mL Oral Q4H PRN  
 alum-mag hydroxide-simeth (MYLANTA) oral suspension 30 mL  30 mL Oral Q4H PRN  
  benzonatate (TESSALON) capsule 100 mg  100 mg Oral TID PRN  
 valACYclovir (VALTREX) tablet 500 mg  500 mg Oral DAILY  sodium chloride (NS) flush 5-10 mL  5-10 mL IntraVENous Q8H  
 sodium chloride (NS) flush 5-10 mL  5-10 mL IntraVENous PRN  
 acetaminophen (TYLENOL) tablet 650 mg  650 mg Oral Q4H PRN  
 naloxone (NARCAN) injection 0.4 mg  0.4 mg IntraVENous PRN  
 ondansetron (ZOFRAN) injection 4 mg  4 mg IntraVENous Q4H PRN  
 bisacodyl (DULCOLAX) tablet 5 mg  5 mg Oral DAILY PRN  
 cyclobenzaprine (FLEXERIL) tablet 2.5 mg  2.5 mg Oral DAILY PRN Diet: DIET NUTRITIONAL SUPPLEMENTS 
DIET DIABETIC CONSISTENT CARB Other Studies (last 24 hours): No results found. Assessment and Plan:  
 
Hospital Problems as of 11/27/2018 Date Reviewed: 10/22/2018 Codes Class Noted - Resolved POA Acute metabolic encephalopathy [facility]-08-VD: G93.41 
ICD-9-CM: 348.31  11/19/2018 - Present Yes A/P:   
1.esrd ,severe aortic regurgitation, pulm htn-- He has decided to pursure comfort care, awaiting hospice house bed offer Stop all blood draws, monitoring goal is comfort and dignity at end of his life. DC planning/Dispo:  Hospice house Code status:dnr/comfort Medical decision maker:self and son

## 2018-11-27 NOTE — PROGRESS NOTES
11/26/18 1908 Oxygen Therapy O2 Sat (%) (!) 89 % 
(patient does not want to wear O2) Pulse via Oximetry 110 beats per minute O2 Device Room air Pre-Treatment Breathing Pattern Dyspnea at rest  
Cough Non-productive Breath Sounds Bilateral Diminished Pulse 110 SpO2 (!) 89 % Respirations 20 Post-Treatment Breathing Pattern Dyspnea at rest  
Cough Non-productive Breath Sounds Bilateral Diminished Pulse 110 SpO2 98 % Respirations 20 Treatment Tolerance Patient tolerated Procedures  
$$ Subsequent Procedure Aerosol Patient refusing oxygen despite attempt to provide different delivery options. RN notified.

## 2018-11-27 NOTE — PROGRESS NOTES
Interdisciplinary Rounds completed 11/27/18. Nursing, Case Management, Physician and PT present. Plan of care reviewed and updated. Pt was made comfort measures today. No more maintenance medication or needles sticks. No further glucose testing.

## 2018-11-27 NOTE — PROGRESS NOTES
Pt refusing Oxygen at this time. O2 saturation 82-83% on room air. Eduction provided and pt continues to refuse. Pt states he is \"leaving for hospice house as soon as a bed is ready\" and refuses any further oxygen therapy.

## 2018-11-27 NOTE — PROGRESS NOTES
Spoke with Delroy, hospice liaison and currently no beds available at [facility]. Updated son Juan Plants. Son is requesting all labs and blood sugars be discontinued to make patient more comfortable. MD notified.

## 2018-11-27 NOTE — PROGRESS NOTES
Hourly rounding completed on this shift. Pt continues to refuse oxygen and morning medications. Education provided. Pt is currently resting in bed with eyes closed. Will continue to monitor and give report to oncoming nurse.

## 2018-11-28 NOTE — PROGRESS NOTES
Pt refused to allow vitals to be taken, this nurse to assess, and IV to be flushed. Will monitor but pt request not to be messed with.

## 2018-11-28 NOTE — PROGRESS NOTES
Pt's family requests npo sign on door, pt has diabetic diet order but is refusing food, pt is drinking water at times. Pt's family informed if pt requests food to inform nurse so that she can get his tray from dietary, that dietary will not bring tray in room with npo sign on door.

## 2018-11-28 NOTE — PROGRESS NOTES
From prior provider notes: 
  
This is a 65 YO male patient with a PMH of ESRD on HD, severe aortic regurgitation, pulmonary hypertension, failure to thrive, multiple admissions for syncope and mechanical falls. He was evaluated by cardiology and he was considered a poor candidate for any intervention. Patient decided to stop his HD treatment and pursue hospice. He has been accepted to the hospice house and is awaiting for a bed to become available. In the last 48 hours has developed a productive cough. Patient and his son have decided NOT to pursue treatment or workup for this and requested only symptomatic treatment. He is being treated for herpes zoster.  
 
Today: He wants to be left alone. He is starting to become more irritable, likely from worsening azotemia. He wants comfort care with no labs/blood draws or frequent physical evaluations. Still no hospice house bed available. Visit Vitals /63 (BP 1 Location: Left arm, BP Patient Position: At rest) Pulse (!) 119 Temp 98.4 °F (36.9 °C) Resp 18 Ht 5' 9\" (1.753 m) Wt 67.1 kg (148 lb) SpO2 90% BMI 21.24 kg/m² Physical Exam  
Constitutional: He is oriented to person, place, and time. No distress. HENT:  
Head: Atraumatic. Nose: Nose normal.  
Mouth/Throat: Oropharynx is clear and moist.  
Eyes: Conjunctivae and EOM are normal. Pupils are equal, round, and reactive to light. Neck: Neck supple. No thyromegaly present. Cardiovascular: Normal rate. Exam reveals no friction rub. No murmur heard. Pulmonary/Chest: No respiratory distress. He has no wheezes. He has no rales. Abdominal: He exhibits no distension. There is no tenderness. There is no rebound. Musculoskeletal: He exhibits edema. He exhibits no tenderness or deformity. Neurological: He is alert and oriented to person, place, and time. Skin: Skin is warm and dry. No rash noted. He is not diaphoretic. No erythema. Plan: esrd ,severe aortic regurgitation, pulm htn-- He has decided to pursure comfort care, awaiting hospice house bed offer Stop all blood draws, monitoring goal is comfort and dignity at end of his life. 
  
DC planning/Dispo:  Hospice house 
  
  
Code status:dnr/comfort Medical decision maker:self and son

## 2018-11-28 NOTE — PROGRESS NOTES
Hourly rounding completed on this shift. No new complaints at this time. Pt refused assessment, IV flushes, and vital signs throughout shift. Pt did allow assistant to provide incontinence care. Pt is currently resting in bed with eyes closed. Will continue to monitor and give report to oncoming nurse.

## 2018-11-28 NOTE — PROGRESS NOTES
Interdisciplinary Rounds completed 11/28/18. Nursing, Case Management, Physician and PT present. Plan of care reviewed and updated. Still waiting on hospice bed. Pt more agitated. Ativan given.

## 2018-11-29 NOTE — PROGRESS NOTES
Hourly rounds performed;all pt needs met. Bed in low position and call light/ personal items within reach. Patient took scheduled Ativan at 2200 and prn Hycodan syrup for cough, then refused any other prn meds including Ativan. Patient had productive cough over the night and accepted periodic suctioning with the Tomyuer to help get the secretions out. Patient is resting quietly in bed at this time. Will continue to monitor and give bedside shift report to oncoming day shift nurse.

## 2018-11-29 NOTE — PROGRESS NOTES
Interdisciplinary Rounds completed 11/29/18. Nursing, Case Management, Physician and PT present. Plan of care reviewed and updated. Still waiting on hospice bed.

## 2018-11-29 NOTE — PROGRESS NOTES
Hourly rounds done. Patient opens eyes when name called, responds to painful stimuli. Patient is on comfort measures, patient was started on 4mg IV morphine and IV ativan was started. He has a scopolamine patch applied behind right ear. Patient has a bad cough and increased secretions, yonker at bedside. Patient is awaiting hospice bed. Patient has no needs at this time.

## 2018-11-29 NOTE — PROGRESS NOTES
Hospitalist Progress Note Admit Date:  2018  5:29 AM  
Name:  [facility] Age:  66 y.o. 
:  1940 MRN:  [id number] PCP:  Roderick Gonzales MD 
Treatment Team: Attending Provider: Maria A Lyles MD; Utilization Review: General Lui RN; Care Manager: Rossana Myers LMSW Subjective:  
 
From prior provider notes: 
  
This is a 67 YO male patient with a PMH of ESRD on HD, severe aortic regurgitation, pulmonary hypertension, failure to thrive, multiple admissions for syncope and mechanical falls. He was evaluated by cardiology and he was considered a poor candidate for any intervention. Patient decided to stop his HD treatment and pursue hospice. He has been accepted to the hospice house and is awaiting for a bed to become available. In the last 48 hours has developed a productive cough. Patient and his son have decided NOT to pursue treatment or workup for this and requested only symptomatic treatment. He is being treated for herpes zoster.  
  
Today: 
 more azotemic much less responsive. Likely need to change meds to IV route. Son and daughter frustrated that no bed available at hospice house. I will add scopolamine patch for secretions. Likely a matter of time, and comfort and death with dignity are goals. Objective:  
 
Patient Vitals for the past 24 hrs: 
 Temp Pulse Resp BP SpO2  
1842 97.3 °F (36.3 °C) 96 22 119/81 92 % Oxygen Therapy O2 Sat (%): 92 % (18) Pulse via Oximetry: 128 beats per minute (18) O2 Device: Room air(Patient refusing O2) (18) O2 Flow Rate (L/min): 2 l/min (18 0736) No intake or output data in the 24 hours ending 18 2095 Physical Examination: 
Physical Exam  
Constitutional: No distress. HENT:  
Head: Normocephalic. Eyes: Conjunctivae are normal. No scleral icterus. Neck: No tracheal deviation present. No thyromegaly present. Cardiovascular: Normal heart sounds. Exam reveals no gallop and no friction rub. No murmur heard. Pulmonary/Chest: Effort normal. No respiratory distress. He has no rales. He exhibits no tenderness. Abdominal: Bowel sounds are normal. He exhibits no distension. There is no tenderness. There is no rebound and no guarding. Musculoskeletal: He exhibits edema. He exhibits no tenderness. Neurological: No focal deficits , no twitching/ no hemiballismus Skin: No rash noted. He is not diaphoretic. No erythema. Data Review: 
I have reviewed all labs, meds, telemetry events, and studies from the last 24 hours. No results found for this or any previous visit (from the past 24 hour(s)). All Micro Results Procedure Component Value Units Date/Time CULTURE, BODY FLUID Dominique Nielsen [[id number]] Collected:  11/21/18 1424 Order Status:  Completed Specimen:  Bursal Fluid Updated:  11/23/18 6932 Special Requests: NO SPECIAL REQUESTS     
  GRAM STAIN 0 TO 3 WBC/OIF  
   NO DEFINITE ORGANISM SEEN Culture result: NO GROWTH 2 DAYS Current Meds: 
Current Facility-Administered Medications Medication Dose Route Frequency  LORazepam (ATIVAN) injection 1 mg  1 mg IntraVENous Q4H PRN  
 scopolamine (TRANSDERM-SCOP) 1 mg over 3 days 1 Patch  1 Patch TransDERmal Q72H  morphine injection 4 mg  4 mg IntraVENous Q2H PRN  
 LORazepam (ATIVAN) tablet 0.5 mg  0.5 mg Oral TID  lip protectant (BLISTEX) ointment   Topical PRN  
 oxyCODONE IR (ROXICODONE) tablet 5 mg  5 mg Oral Q4H PRN  
 LORazepam (ATIVAN) tablet 0.5 mg  0.5 mg Oral Q4H PRN  
 HYDROcodone-homatropine (HYCODAN) 5-1.5 mg/5 mL (5 mL) syrup 5 mL  5 mL Oral Q4H PRN  
 alum-mag hydroxide-simeth (MYLANTA) oral suspension 30 mL  30 mL Oral Q4H PRN  
 sodium chloride (NS) flush 5-10 mL  5-10 mL IntraVENous Q8H  
 sodium chloride (NS) flush 5-10 mL  5-10 mL IntraVENous PRN  
  acetaminophen (TYLENOL) tablet 650 mg  650 mg Oral Q4H PRN  
 ondansetron (ZOFRAN) injection 4 mg  4 mg IntraVENous Q4H PRN  
 bisacodyl (DULCOLAX) tablet 5 mg  5 mg Oral DAILY PRN  
 cyclobenzaprine (FLEXERIL) tablet 2.5 mg  2.5 mg Oral DAILY PRN Diet: DIET NUTRITIONAL SUPPLEMENTS 
DIET DIABETIC CONSISTENT CARB Other Studies (last 24 hours): No results found. Assessment and Plan:  
 
Hospital Problems as of 11/29/2018 Date Reviewed: 10/22/2018 Codes Class Noted - Resolved POA Acute metabolic encephalopathy AHA-65-HR: G93.41 
ICD-9-CM: 348.31  11/19/2018 - Present Yes A/P:   
esrd ,severe aortic regurgitation, pulm htn-- 
COMFORT CARE--MORE AZOTEMIC -- MEDS TO IV , ADD SCOPOLAMINE FOR SECRETIONS Stop all blood draws, monitoring goal is comfort and dignity at end of his life. 
  
DC planning/Dispo:  Hospice house 
  
  
Code status:dnr/comfort Medical decision maker:self and son

## 2018-11-30 NOTE — PROGRESS NOTES
Called to see patient for unresponsiveness. On exam the patient did not respond to verbal or physical stimuli. Absent heart and breath sounds. Absent peripheral pulses. Pupils are fixed and dilated. Patient pronounced dead at 03:20. Family notified. Autopsy declined.

## 2018-11-30 NOTE — DISCHARGE SUMMARY
Hospitalist Discharge Summary Admit Date:  2018  5:29 AM  
Name:  [facility] Age:  66 y.o. 
:  1940 MRN:  [id number] PCP:  Lois Hunter MD 
Treatment Team: Attending Provider: Rafaela Blue MD; Utilization Review: Michelle Cedillo RN; Care Manager: Deanna Galeano LMSW Problem List for this Hospitalization: 
Hospital Problems as of 2018 Date Reviewed: 10/22/2018 Codes Class Noted - Resolved POA Acute metabolic encephalopathy JOD-73-II: G93.41 
ICD-9-CM: 348.31  2018 - Present Yes Hospital Course: This is a 67 YO male patient with a PMH of ESRD on HD, severe aortic regurgitation, pulmonary hypertension, failure to thrive, multiple admissions for syncope and mechanical falls. He was evaluated by cardiology and he was considered a poor candidate for any intervention. Patient decided to stop his HD treatment and pursue hospice. He has been accepted to the hospice house and is awaiting for a bed to become available. In the last 48 hours has developed a productive cough. Patient and his son have decided NOT to pursue treatment or workup for this and requested only symptomatic treatment. He was being treated for herpes zoster.  
 
Called to see patient for unresponsiveness. On exam the patient did not respond to verbal or physical stimuli. Absent heart and breath sounds. Absent peripheral pulses. Pupils are fixed and dilated. Patient pronounced dead at 03:20. Family notified. Autopsy declined. Diagnostic Imaging/Tests:  
Mri Brain Wo Cont Result Date: 2018 MRI BRAIN WITHOUT CONTRAST 2018 HISTORY: Altered mental status. Severe confusion. TECHNIQUE: Sagittal and axial T1-weighted, axial T2-weighted, axial and coronal FLAIR, axial T2-weighted gradient-echo, axial diffusion weighted images with ADC maps of the brain.    COMPARISON: Head CT 2018 FINDINGS: Several sequences are degraded by motion artifact which limits a detailed evaluation of the brain. Repeat scanning when the patient is better able to remain motionless may be beneficial for further evaluation. There is no acute large artery territorial infarction, acute intracranial hemorrhage, extra-axial hematoma, or shift of the midline structures. Diffuse cerebral volume loss is present with symmetric hippocampal atrophy. On the T2-weighted and FLAIR sequences, there are multiple hyperintense white matter lesions. This pattern is compatible with chronic small vessel ischemic disease. Mucous retention cysts or polyps are present in the right maxillary sinus. IMPRESSION: 1. Motion artifact. 2. Cerebral volume loss. 3. White matter findings compatible with chronic small vessel ischemic disease. Ct Head Wo Cont Result Date: 11/20/2018 CT HEAD WITHOUT CONTRAST. INDICATION: Confusion and delirium. COMPARISON: 8 November 2018  TECHNIQUE:   5 mm axial scans from the skull base to the vertex. Our CT scanners use one or more of the following:  Automated exposure control, adjustment of the mA and or kV according to patient size, iterative reconstruction. FINDINGS:  No acute intraparenchymal hemorrhage or abnormal extra-axial fluid collection. The ventricles are normal size. Moderate white matter low attenuation is present, nonspecific, likely chronic small vessel disease. No midline shift or mass effect. Included portion of the paranasal sinuses and orbits grossly unremarkable demonstrates a mucous retention cyst or polyp in the right maxillary sinus. IMPRESSION:  Negative for acute intracranial abnormality. Chronic changes. Ct Head Wo Cont Result Date: 11/19/2018 Noncontrast CT of the brain.  COMPARISON: September 9, 2018 INDICATION: Altered mental status TECHNIQUE: Contiguous axial images were obtained from the skull base through the vertex without IV contrast. Radiation dose reduction techniques were used for this study:  Our CT scanners use one or all of the following: Automated exposure control, adjustment of the mA and/or kVp according to patient's size, iterative reconstruction. FINDINGS: There is no acute intracranial hemorrhage or evidence for acute territorial infarction. There is no mass effect, midline shift or hydrocephalus. There is no extra-axial fluid collection. The cerebellum and brainstem are grossly unremarkable. Periventricular diffuse hypodensities are nonspecific and likely secondary to chronic small vessel disease. There is generalized cerebral volume loss, age-related. Included globes appear intact. There is polypoid mucosal thickening of the right maxillary sinus, likely retention cyst. This is similar to prior exam. Mastoid air cells are aerated. Surrounding bones are intact. IMPRESSION: 1. No evidence of acute intracranial abnormality. No hydrocephalus. 2. Chronic small vessel changes. Xr Chest [facility] Result Date: 2018 CHEST X-RAY, one view. HISTORY:  Altered mental status. Abnormal chest x-ray. TECHNIQUE:  AP portable semiupright view. COMPARISON: 2018. IMPRESSION:   Chronic blunting right costophrenic angle. Heart is enlarged. Pulmonary vasculature and interstitial markings mildly prominent, possibly chronic. There are sternal wires. Echocardiogram results: 
Results for orders placed or performed during the hospital encounter of 18  
2D ECHO COMPLETE ADULT (TTE) W OR WO CONTR Narrative [facility] [place], [address] [facility] 
[phone] Transthoracic Echocardiogram 
2D, M-mode, Doppler, and Color Doppler Melvina Connolly 
MR #: [id number] : 1940 Age: 66 years Gender: Male Study date: 2018 Account #: [de-identified] Height: 68.9 in 
Weight: 158.6 lb 
BSA: 1.87 mï¾² Status:Routine Location: 602 BP: 105/ 58 Allergies: NO KNOWN ALLERGIES Sonographer:  Lucy Burgess [facility] Group:  [facility] Referring Physician:  Bozena Guerra. Luke Boyd MD [facility] Reading Physician:  Bozena Guerra. Luke Boyd MD [facility] INDICATIONS: Syncope of truly unknown origin PROCEDURE: This was a routine study. A transthoracic echocardiogram was 
performed. The study included complete 2D imaging, M-mode, complete spectral 
Doppler, and color Doppler. Image quality was good. LEFT VENTRICLE: Size was normal. Systolic function was at the lower limits of 
normal. Ejection fraction was estimated in the range of 50 % to 55 %. There 
were no regional wall motion abnormalities. There was mild concentric 
hypertrophy. Left ventricular diastolic function was indeterminate. E/e' avg: 
10.45. RIGHT VENTRICLE: The ventricle was moderately to markedly dilated. Systolic 
function was reduced. There was severe pulmonary artery hypertension. Estimated 
peak pressure was in the range of 60-65 mmHg. LEFT ATRIUM: The atrium was markedly dilated. RIGHT ATRIUM: The atrium was markedly dilated. SYSTEMIC VEINS: IVC: The inferior vena cava was dilated. The respirophasic 
change in diameter was less than 50%. AORTIC VALVE: The valve was trileaflet. Leaflets exhibited calcification. DI: 
0.21. SVi: 25.67. The aortic valve area by the continuity equation was 0.72 
cm2. The peak velocity was 3.57 m/s. Dimentionless index 0.26 The mean  
pressure 
gradient was 27 mmHg. The findings were most consistent with moderate to  
severe 
aortic stenosis. The peak pressure gradient was 51 mmHg. There was moderate 
regurgitation. MITRAL VALVE: Valve structure was normal. There was no evidence for stenosis. There was mild to moderate regurgitation. TRICUSPID VALVE: The valve structure was normal. There was no evidence for 
stenosis. There was severe regurgitation. PULMONIC VALVE: The valve structure was normal. There was no evidence for stenosis. There was mild regurgitation. PERICARDIUM: There was no pericardial effusion. There was a moderate-sized  
left 
pleural effusion. Ascites was noted. AORTA: The root exhibited normal size. SUMMARY: 
 
-  Left ventricle: Systolic function was at the lower limits of normal. 
Ejection fraction was estimated in the range of 50 % to 55 %. There were no 
regional wall motion abnormalities. There was mild concentric hypertrophy. 
 
-  Right ventricle: The ventricle was moderately to markedly dilated. Systolic 
function was reduced. Estimated peak pressure was in the range of 60-65 mmHg. There was severe pulmonary artery hypertension. 
 
-  Left atrium: The atrium was markedly dilated. -  Right atrium: The atrium was markedly dilated. -  Inferior vena cava, hepatic veins: The inferior vena cava was dilated. The 
respirophasic change in diameter was less than 50%. -  Aortic valve: There was moderate regurgitation. The aortic valve area by  
the 
continuity equation was 0.72 cm2. The peak velocity was 3.57 m/s. Dimentionless 
index 0.26 The findings were most consistent with moderate to severe aortic 
stenosis. -  Mitral valve: There was mild to moderate regurgitation. 
 
-  Tricuspid valve: There was severe regurgitation. 
 
-  Pericardium: There was a moderate-sized left pleural effusion. Ascites was 
noted. SYSTEM MEASUREMENT TABLES 
 
2D mode AoR Diam (2D): 2.9 cm 
LA Dimension (2D): 5.1 cm Left Atrium Systolic Volume Index; Method of Disks, Biplane; 2D mode;: 56.1 
ml/m2 IVS/LVPW (2D): 1 IVSd (2D): 1.2 cm LVIDd (2D): 5 cm LVIDs (2D): 3.8 cm 
LVOT Area (2D): 3.5 cm2 LVPWd (2D): 1.2 cm RVIDd (2D): 4.3 cm Unspecified Scan Mode Peak Grad; Mean; Antegrade Flow: 41 mm[Hg] Vmax; Antegrade Flow: 330 cm/s LVOT Diam: 2.1 cm Peak Grad; Mean; Antegrade Flow: 9 mm[Hg] Vmax; Antegrade Flow: 149 cm/s RVSP: 55 mm[Hg] Prepared and signed by 
 
Osvaldo Perez.  Mabel Lawton MD [facility] 
 Signed 21-Nov-2018 14:34:25 All Micro Results Procedure Component Value Units Date/Time CULTURE, BODY FLUID Vincent Freemanfoot [[id number]] Collected:  11/21/18 1424 Order Status:  Completed Specimen:  Bursal Fluid Updated:  11/23/18 [id number] Special Requests: NO SPECIAL REQUESTS     
  GRAM STAIN 0 TO 3 WBC/OIF  
   NO DEFINITE ORGANISM SEEN Culture result: NO GROWTH 2 DAYS Labs: Results:  
   
BMP, Mg, Phos No results for input(s): NA, K, CL, CO2, AGAP, BUN, CREA, CA, GLU, MG, PHOS in the last 72 hours. CBC No results for input(s): WBC, RBC, HGB, HCT, PLT, GRANS, LYMPH, EOS, MONOS, BASOS, IG, ANEU, ABL, NIK, ABM, ABB, AIG, HGBEXT, HCTEXT, PLTEXT in the last 72 hours. LFT No results for input(s): SGOT, ALT, TBIL, AP, TP, ALB, GLOB, AGRAT, GPT in the last 72 hours. Cardiac Testing Lab Results Component Value Date/Time  09/10/2018 05:29 AM  
  (H) 09/09/2018 10:45 AM  
 CK - MB 7.4 (H) 09/09/2018 10:45 AM  
 CK-MB Index 2.5 (H) 09/09/2018 10:45 AM  
 Troponin-I, Qt. 0.54 () 11/21/2018 02:29 AM  
 Troponin-I, Qt. 0.66 () 11/20/2018 09:59 PM  
 Troponin-I, Qt. 0.64 ([facility]) 11/20/2018 06:29 PM  
  
Coagulation Tests Lab Results Component Value Date/Time Prothrombin time 14.2 08/23/2018 12:07 PM  
 Prothrombin time 11.3 04/21/2017 10:04 AM  
 Prothrombin time 9.8 03/28/2011 04:45 PM  
 INR 1.1 08/23/2018 12:07 PM  
 INR 1.0 04/21/2017 10:04 AM  
 INR 1.0 03/28/2011 04:45 PM  
 aPTT 25.9 03/28/2011 04:45 PM  
  
A1c Lab Results Component Value Date/Time Hemoglobin A1c 4.9 11/19/2018 02:35 AM  
 Hemoglobin A1c 5.2 07/31/2018 04:56 PM  
  
Lipid Panel Lab Results Component Value Date/Time  Cholesterol, total 123 11/20/2018 05:14 AM  
 HDL Cholesterol 40 11/20/2018 05:14 AM  
 LDL, calculated 51.6 11/20/2018 05:14 AM  
 VLDL, calculated 31.4 (H) 11/20/2018 05:14 AM  
 Triglyceride 157 (H) 11/20/2018 05:14 AM  
 CHOL/HDL Ratio 3.1 11/20/2018 05:14 AM  
  
 Thyroid Panel No results found for: T4, T3U, TSH, TSHEXT Most Recent UA No results found for: COLOR, APPRN, REFSG, MAILE, PROTU, GLUCU, KETU, BILU, BLDU, UROU, Lenore Kin Allergies Allergen Reactions  Nka [No Known Allergies] Unknown (comments) Immunization History Administered Date(s) Administered  Influenza Vaccine (Quad) PF 2018  TB Skin Test (PPD) Intradermal 2018, 2018  Tdap 2015 Discharge Info:  
Current Discharge Medication List  
  
 
 
 
Disposition:  () Time spent in patient discharge planning and coordination 45 minutes. Signed:  
Max Servin MD

## 2020-06-17 NOTE — PROGRESS NOTES
[facility] PROGRESS NOTE Follow up for: 
 
Subjective:  
Patient seen and examined on dialysis. Tolerating well. No complaints. ROS: 
Gen - no fever, no chills, appetite okay CV - no chest pain, no orthopnea Lung - no shortness of breath, no cough Abd - no tenderness, no nausea, no vomiting Ext - no edema Objective:  
Exam: 
Vitals:  
 11/23/18 0707 11/23/18 1116 11/23/18 1225 11/23/18 1258 BP: 104/67 117/66 114/66 119/63 Pulse: 100 96 98 (!) 102 Resp: 17 17 Temp: 97.6 °F (36.4 °C) 97.7 °F (36.5 °C) SpO2: 90% 91% Weight:      
Height:      
 
 
 
Intake/Output Summary (Last 24 hours) at 11/23/2018 1309 Last data filed at 11/22/2018 1649 Gross per 24 hour Intake 360 ml Output  Net 360 ml  
 
 
Current Facility-Administered Medications Medication Dose Route Frequency  valACYclovir (VALTREX) tablet 500 mg  500 mg Oral DAILY  oxyCODONE IR (ROXICODONE) tablet 2.5 mg  2.5 mg Oral Q12H PRN  
 epoetin alcides (EPOGEN;PROCRIT) injection 8,000 Units  8,000 Units IntraVENous DIALYSIS TUE, THU & SAT  heparin (porcine) 1,000 unit/mL injection 5,000 Units  5,000 Units Hemodialysis DIALYSIS PRN  
 metoprolol succinate (TOPROL-XL) XL tablet 50 mg  50 mg Oral DAILY  sodium chloride (NS) flush 5-10 mL  5-10 mL IntraVENous Q8H  
 sodium chloride (NS) flush 5-10 mL  5-10 mL IntraVENous PRN  
 acetaminophen (TYLENOL) tablet 650 mg  650 mg Oral Q4H PRN  
 naloxone (NARCAN) injection 0.4 mg  0.4 mg IntraVENous PRN  
 ondansetron (ZOFRAN) injection 4 mg  4 mg IntraVENous Q4H PRN  
 bisacodyl (DULCOLAX) tablet 5 mg  5 mg Oral DAILY PRN  
 dextrose 40% (GLUTOSE) oral gel 1 Tube  15 g Oral PRN  
 glucagon (GLUCAGEN) injection 1 mg  1 mg IntraMUSCular PRN  
 dextrose (D50W) injection syrg 12.5-25 g  25-50 mL IntraVENous PRN  
 heparin (porcine) injection 5,000 Units  5,000 Units SubCUTAneous Q8H  
 alcohol 62% (NOZIN) nasal  1 Ampule  1 Ampule Topical Q12H  atorvastatin (LIPITOR) tablet 20 mg  20 mg Oral DAILY  clopidogrel (PLAVIX) tablet 75 mg  75 mg Oral DAILY  cyclobenzaprine (FLEXERIL) tablet 2.5 mg  2.5 mg Oral DAILY PRN  
 fludrocortisone (FLORINEF) tablet 100 mcg  0.1 mg Oral DAILY  sevelamer carbonate (RENVELA) tab 800 mg  800 mg Oral TID WITH MEALS  
 
 
EXAM 
GEN - Alert, oriented, in no distress CV - S1, S2, RRR, no rub, murmur, or gallop Lung - clear to auscultation bilaterally Abd - soft, nontender, BS present Ext - no edema Recent Labs  
  11/21/18 
0512 11/20/18 
1829 WBC 9.8 10.7 HGB 9.5* 10.1* HCT 31.0* 32.4*  
 244 Recent Labs  
  11/21/18 
0512 11/20/18 
1829  136  
K 4.3 3.9  97* CO2 25 26 BUN 41* 33* CREA 6.08* 5.56* CA 8.4 8.4 GLU 87 171* Assessment and Plan:  
ESRD on HD TTS [facility] - Seen on HD today at 1300. 
-  on holiday schedule Sun, Tues, Fri this week for Thanksgiving.   HD  tomorrow to get back on TTS schedule. 
  
AMS 
-resolved 
  
HTN 
- controlled on BB 
  
DM 
- per primary team 
  
Anemia of ESRD 
- on epogen 
  
A-fib 
- on metoprolol 
- rate controlled 
  
 
 
 
Milagros Hayes MD 
 
 Did You Provide Opioid Counseling: No

## 2022-01-06 NOTE — PROGRESS NOTES
Assumed care of patient, report given by Ethel COOK   TRANSFER - IN REPORT: 
 
Verbal report received from HUSEYIN RN(name) on [employer]  being received from ER(unit) for routine progression of care Report consisted of patients Situation, Background, Assessment and  
Recommendations(SBAR). Information from the following report(s) Kardex was reviewed with the receiving nurse. Opportunity for questions and clarification was provided. Assessment completed upon patients arrival to unit and care assumed.

## 2022-11-21 NOTE — PROGRESS NOTES
Please advise on message below:   Pt now on contact isolation, valtrex started, will continue to monitor skin condition.
